# Patient Record
Sex: FEMALE | Race: OTHER | HISPANIC OR LATINO | Employment: FULL TIME | URBAN - METROPOLITAN AREA
[De-identification: names, ages, dates, MRNs, and addresses within clinical notes are randomized per-mention and may not be internally consistent; named-entity substitution may affect disease eponyms.]

---

## 2017-02-24 DIAGNOSIS — Z12.31 OTHER SCREENING MAMMOGRAM: ICD-10-CM

## 2017-08-28 ENCOUNTER — PATIENT OUTREACH (OUTPATIENT)
Dept: ADMINISTRATIVE | Facility: HOSPITAL | Age: 52
End: 2017-08-28

## 2017-08-29 ENCOUNTER — HOSPITAL ENCOUNTER (OUTPATIENT)
Dept: RADIOLOGY | Facility: HOSPITAL | Age: 52
Discharge: HOME | End: 2017-08-29
Attending: INTERNAL MEDICINE
Payer: COMMERCIAL

## 2017-08-29 DIAGNOSIS — Z12.31 OTHER SCREENING MAMMOGRAM: ICD-10-CM

## 2017-08-29 PROCEDURE — 77063 BREAST TOMOSYNTHESIS BI: CPT | Mod: 26,,, | Performed by: RADIOLOGY

## 2017-08-29 PROCEDURE — 77067 SCR MAMMO BI INCL CAD: CPT | Mod: 26,,, | Performed by: RADIOLOGY

## 2017-08-29 PROCEDURE — 77067 SCR MAMMO BI INCL CAD: CPT | Mod: TC

## 2017-09-11 ENCOUNTER — LAB VISIT (OUTPATIENT)
Dept: LAB | Facility: HOSPITAL | Age: 52
End: 2017-09-11
Attending: INTERNAL MEDICINE
Payer: COMMERCIAL

## 2017-09-11 ENCOUNTER — OFFICE VISIT (OUTPATIENT)
Dept: INTERNAL MEDICINE | Facility: CLINIC | Age: 52
End: 2017-09-11
Payer: COMMERCIAL

## 2017-09-11 VITALS
OXYGEN SATURATION: 97 % | WEIGHT: 155 LBS | HEIGHT: 60 IN | SYSTOLIC BLOOD PRESSURE: 120 MMHG | TEMPERATURE: 97 F | DIASTOLIC BLOOD PRESSURE: 80 MMHG | HEART RATE: 100 BPM | BODY MASS INDEX: 30.43 KG/M2

## 2017-09-11 DIAGNOSIS — Z11.59 NEED FOR HEPATITIS C SCREENING TEST: ICD-10-CM

## 2017-09-11 DIAGNOSIS — J30.89 CHRONIC NONSEASONAL ALLERGIC RHINITIS DUE TO POLLEN: ICD-10-CM

## 2017-09-11 DIAGNOSIS — Z00.00 ANNUAL PHYSICAL EXAM: ICD-10-CM

## 2017-09-11 DIAGNOSIS — Z23 IMMUNIZATION DUE: ICD-10-CM

## 2017-09-11 DIAGNOSIS — Z00.00 ANNUAL PHYSICAL EXAM: Primary | ICD-10-CM

## 2017-09-11 LAB
BASOPHILS # BLD AUTO: 0.06 K/UL
BASOPHILS NFR BLD: 0.8 %
DIFFERENTIAL METHOD: ABNORMAL
EOSINOPHIL # BLD AUTO: 2.1 K/UL
EOSINOPHIL NFR BLD: 26.3 %
ERYTHROCYTE [DISTWIDTH] IN BLOOD BY AUTOMATED COUNT: 12.7 %
HCT VFR BLD AUTO: 40.8 %
HGB BLD-MCNC: 14 G/DL
LYMPHOCYTES # BLD AUTO: 1.7 K/UL
LYMPHOCYTES NFR BLD: 21.5 %
MCH RBC QN AUTO: 30.4 PG
MCHC RBC AUTO-ENTMCNC: 34.3 G/DL
MCV RBC AUTO: 89 FL
MONOCYTES # BLD AUTO: 0.5 K/UL
MONOCYTES NFR BLD: 5.9 %
NEUTROPHILS # BLD AUTO: 3.5 K/UL
NEUTROPHILS NFR BLD: 45.5 %
PLATELET # BLD AUTO: 256 K/UL
PMV BLD AUTO: 10.1 FL
RBC # BLD AUTO: 4.61 M/UL
WBC # BLD AUTO: 7.78 K/UL

## 2017-09-11 PROCEDURE — 86803 HEPATITIS C AB TEST: CPT

## 2017-09-11 PROCEDURE — 80053 COMPREHEN METABOLIC PANEL: CPT

## 2017-09-11 PROCEDURE — 90715 TDAP VACCINE 7 YRS/> IM: CPT | Mod: S$GLB,,, | Performed by: INTERNAL MEDICINE

## 2017-09-11 PROCEDURE — 84443 ASSAY THYROID STIM HORMONE: CPT

## 2017-09-11 PROCEDURE — 99396 PREV VISIT EST AGE 40-64: CPT | Mod: 25,S$GLB,, | Performed by: INTERNAL MEDICINE

## 2017-09-11 PROCEDURE — 99999 PR PBB SHADOW E&M-EST. PATIENT-LVL IV: CPT | Mod: PBBFAC,,, | Performed by: INTERNAL MEDICINE

## 2017-09-11 PROCEDURE — 90471 IMMUNIZATION ADMIN: CPT | Mod: S$GLB,,, | Performed by: INTERNAL MEDICINE

## 2017-09-11 PROCEDURE — 85025 COMPLETE CBC W/AUTO DIFF WBC: CPT

## 2017-09-11 PROCEDURE — 36415 COLL VENOUS BLD VENIPUNCTURE: CPT

## 2017-09-11 RX ORDER — FLUTICASONE PROPIONATE 50 MCG
1 SPRAY, SUSPENSION (ML) NASAL DAILY
COMMUNITY
End: 2017-09-11 | Stop reason: SDUPTHER

## 2017-09-11 RX ORDER — FLUTICASONE PROPIONATE 50 MCG
2 SPRAY, SUSPENSION (ML) NASAL DAILY
Qty: 1 BOTTLE | Refills: 6 | Status: SHIPPED | OUTPATIENT
Start: 2017-09-11 | End: 2018-05-03 | Stop reason: SDUPTHER

## 2017-09-11 NOTE — PROGRESS NOTES
Subjective:       Patient ID: Reta Navas is a 51 y.o. female.    Chief Complaint: Annual Exam    Reta Navas  51 y.o. White female     Patient presents with:  Annual Exam    HPI: Here today for an annual physical.   Chronic allergies--she needs a refill on Flonase.   She is otherwise without significant complaints.     Hepatitis C Screening due on 1965  Mammogram due on 08/29/2019  Lipid Panel due on 03/13/2020  Colonoscopy due on 06/29/2025  TETANUS VACCINE due on 09/11/2027  Influenza Vaccine Completed    Past Medical History:  Abnormal Pap smear  Abnormal Pap smear of vagina  Chronic rhinitis  Hyperlipidemia  Hypertension  Overactive bladder  Plantar fasciitis, bilateral  Psoriasis    Past Surgical History:  HYSTERECTOMY      Comment: supracervical hyst/LSO for fibroids  OOPHORECTOMY      Comment: LSO  TUBAL LIGATION    Review of patient's family history indicates:    Diabetes                       Brother                   No Known Problems              Father                    Colon cancer                   Paternal Grandfather      Colon cancer                   Paternal Uncle            Ovarian cancer                 Maternal Aunt             Current Outpatient Prescriptions on File Prior to Visit:  cholecalciferol, vitamin D3, 5,000 unit Tab, Take 5,000 Units by mouth once daily., Disp: , Rfl:   clobetasol (TEMOVATE) 0.05 % cream, APPLY TWICE DAILY TO AFFECTED AREA, Disp: 45 g, Rfl: 1  desonide (DESOWEN) 0.05 % cream, APPLY TWICE DAILY TO AFFECTED AREA, Disp: 60 g, Rfl: 0    Allergies:  Review of patient's allergies indicates:  No Known Allergies                Review of Systems   Constitutional: Negative for fever and unexpected weight change.   Respiratory: Negative for shortness of breath.    Cardiovascular: Negative for chest pain and leg swelling.   Gastrointestinal: Negative for abdominal pain, constipation and diarrhea.   Endocrine: Positive for heat intolerance.   Genitourinary:  Negative for dysuria.   Musculoskeletal: Negative for gait problem.   Neurological: Negative for dizziness and headaches.   Psychiatric/Behavioral: Negative for dysphoric mood.       Objective:      Physical Exam   Constitutional: She is oriented to person, place, and time. She appears well-developed and well-nourished. No distress.   Eyes: No scleral icterus.   Neck: No tracheal deviation present.   Cardiovascular: Normal rate, regular rhythm and normal heart sounds.    Pulmonary/Chest: Effort normal and breath sounds normal. No respiratory distress.   Abdominal: Soft. Bowel sounds are normal.   Neurological: She is alert and oriented to person, place, and time.   Skin: Skin is warm and dry.   Psychiatric: She has a normal mood and affect.   Vitals reviewed.      Assessment:       1. Annual physical exam    2. Chronic nonseasonal allergic rhinitis due to pollen    3. Need for hepatitis C screening test        Plan:       Reta was seen today for annual exam.    Diagnoses and all orders for this visit:    Annual physical exam  -     Counseled regarding age appropriate screenings and immunizations  -     CBC auto differential; Future  -     TSH; Future  -     Comprehensive metabolic panel; Future    Chronic nonseasonal allergic rhinitis due to pollen  -     fluticasone (FLONASE) 50 mcg/actuation nasal spray; 2 sprays by Each Nare route once daily.    Need for hepatitis C screening test  -     Hepatitis C antibody; Future    Immunization due   -     (In Office Administered) Tdap Vaccine    Schedule labs.     RTC annually and as needed.

## 2017-09-12 LAB
ALBUMIN SERPL BCP-MCNC: 3.9 G/DL
ALP SERPL-CCNC: 89 U/L
ALT SERPL W/O P-5'-P-CCNC: 32 U/L
ANION GAP SERPL CALC-SCNC: 8 MMOL/L
AST SERPL-CCNC: 25 U/L
BILIRUB SERPL-MCNC: 0.3 MG/DL
BUN SERPL-MCNC: 12 MG/DL
CALCIUM SERPL-MCNC: 9.2 MG/DL
CHLORIDE SERPL-SCNC: 107 MMOL/L
CO2 SERPL-SCNC: 26 MMOL/L
CREAT SERPL-MCNC: 0.8 MG/DL
EST. GFR  (AFRICAN AMERICAN): >60 ML/MIN/1.73 M^2
EST. GFR  (NON AFRICAN AMERICAN): >60 ML/MIN/1.73 M^2
GLUCOSE SERPL-MCNC: 91 MG/DL
HCV AB SERPL QL IA: NEGATIVE
POTASSIUM SERPL-SCNC: 4 MMOL/L
PROT SERPL-MCNC: 7.3 G/DL
SODIUM SERPL-SCNC: 141 MMOL/L
TSH SERPL DL<=0.005 MIU/L-ACNC: 3.07 UIU/ML

## 2017-10-30 ENCOUNTER — OFFICE VISIT (OUTPATIENT)
Dept: INTERNAL MEDICINE | Facility: CLINIC | Age: 52
End: 2017-10-30
Payer: COMMERCIAL

## 2017-10-30 VITALS
WEIGHT: 151.69 LBS | DIASTOLIC BLOOD PRESSURE: 82 MMHG | BODY MASS INDEX: 29.78 KG/M2 | TEMPERATURE: 97 F | HEIGHT: 60 IN | SYSTOLIC BLOOD PRESSURE: 124 MMHG | HEART RATE: 77 BPM | OXYGEN SATURATION: 96 %

## 2017-10-30 DIAGNOSIS — J01.90 ACUTE BACTERIAL SINUSITIS: Primary | ICD-10-CM

## 2017-10-30 DIAGNOSIS — B96.89 ACUTE BACTERIAL SINUSITIS: Primary | ICD-10-CM

## 2017-10-30 DIAGNOSIS — K21.9 GASTROESOPHAGEAL REFLUX DISEASE, ESOPHAGITIS PRESENCE NOT SPECIFIED: ICD-10-CM

## 2017-10-30 DIAGNOSIS — J30.89 CHRONIC NONSEASONAL ALLERGIC RHINITIS DUE TO OTHER ALLERGEN: ICD-10-CM

## 2017-10-30 PROCEDURE — 96372 THER/PROPH/DIAG INJ SC/IM: CPT | Mod: S$GLB,,, | Performed by: INTERNAL MEDICINE

## 2017-10-30 PROCEDURE — 99999 PR PBB SHADOW E&M-EST. PATIENT-LVL III: CPT | Mod: PBBFAC,,, | Performed by: INTERNAL MEDICINE

## 2017-10-30 PROCEDURE — 99214 OFFICE O/P EST MOD 30 MIN: CPT | Mod: 25,S$GLB,, | Performed by: INTERNAL MEDICINE

## 2017-10-30 RX ORDER — AZITHROMYCIN 250 MG/1
TABLET, FILM COATED ORAL
Qty: 6 TABLET | Refills: 0 | Status: SHIPPED | OUTPATIENT
Start: 2017-10-30 | End: 2017-11-04

## 2017-10-30 RX ORDER — MONTELUKAST SODIUM 10 MG/1
10 TABLET ORAL NIGHTLY
Qty: 30 TABLET | Refills: 6 | Status: SHIPPED | OUTPATIENT
Start: 2017-10-30 | End: 2018-05-17 | Stop reason: SDUPTHER

## 2017-10-30 RX ORDER — METHYLPREDNISOLONE ACETATE 40 MG/ML
40 INJECTION, SUSPENSION INTRA-ARTICULAR; INTRALESIONAL; INTRAMUSCULAR; SOFT TISSUE
Status: COMPLETED | OUTPATIENT
Start: 2017-10-30 | End: 2017-10-30

## 2017-10-30 RX ORDER — OMEPRAZOLE 40 MG/1
40 CAPSULE, DELAYED RELEASE ORAL DAILY
Qty: 30 CAPSULE | Refills: 3 | Status: SHIPPED | OUTPATIENT
Start: 2017-10-30 | End: 2017-12-11 | Stop reason: SDUPTHER

## 2017-10-30 RX ORDER — AMOXICILLIN 500 MG
2 CAPSULE ORAL DAILY
COMMUNITY
End: 2018-09-19

## 2017-10-30 RX ORDER — PROMETHAZINE HYDROCHLORIDE AND DEXTROMETHORPHAN HYDROBROMIDE 6.25; 15 MG/5ML; MG/5ML
5 SYRUP ORAL EVERY 8 HOURS PRN
Qty: 118 ML | Refills: 0 | Status: SHIPPED | OUTPATIENT
Start: 2017-10-30 | End: 2017-11-09

## 2017-10-30 RX ADMIN — METHYLPREDNISOLONE ACETATE 40 MG: 40 INJECTION, SUSPENSION INTRA-ARTICULAR; INTRALESIONAL; INTRAMUSCULAR; SOFT TISSUE at 02:10

## 2017-10-30 NOTE — PATIENT INSTRUCTIONS
Sinusitis (Antibiotic Treatment)    The sinuses are air-filled spaces within the bones of the face. They connect to the inside of the nose. Sinusitis is an inflammation of the tissue lining the sinus cavity. Sinus inflammation can occur during a cold. It can also be due to allergies to pollens and other particles in the air. Sinusitis can cause symptoms of sinus congestion and fullness. A sinus infection causes fever, headache and facial pain. There is often green or yellow drainage from the nose or into the back of the throat (post-nasal drip). You have been given antibiotics to treat this condition.  Home care:  · Take the full course of antibiotics as instructed. Do not stop taking them, even if you feel better.  · Drink plenty of water, hot tea, and other liquids. This may help thin mucus. It also may promote sinus drainage.  · Heat may help soothe painful areas of the face. Use a towel soaked in hot water. Or,  the shower and direct the hot spray onto your face. Using a vaporizer along with a menthol rub at night may also help.   · An expectorant containing guaifenesin may help thin the mucus and promote drainage from the sinuses.  · Over-the-counter decongestants may be used unless a similar medicine was prescribed. Nasal sprays work the fastest. Use one that contains phenylephrine or oxymetazoline. First blow the nose gently. Then use the spray. Do not use these medicines more often than directed on the label or symptoms may get worse. You may also use tablets containing pseudoephedrine. Avoid products that combine ingredients, because side effects may be increased. Read labels. You can also ask the pharmacist for help. (NOTE: Persons with high blood pressure should not use decongestants. They can raise blood pressure.)  · Over-the-counter antihistamines may help if allergies contributed to your sinusitis.    · Do not use nasal rinses or irrigation during an acute sinus infection, unless told to by  your health care provider. Rinsing may spread the infection to other sinuses.  · Use acetaminophen or ibuprofen to control pain, unless another pain medicine was prescribed. (If you have chronic liver or kidney disease or ever had a stomach ulcer, talk with your doctor before using these medicines. Aspirin should never be used in anyone under 18 years of age who is ill with a fever. It may cause severe liver damage.)  · Don't smoke. This can worsen symptoms.  Follow-up care  Follow up with your healthcare provider or our staff if you are not improving within the next week.  When to seek medical advice  Call your healthcare provider if any of these occur:  · Facial pain or headache becoming more severe  · Stiff neck  · Unusual drowsiness or confusion  · Swelling of the forehead or eyelids  · Vision problems, including blurred or double vision  · Fever of 100.4ºF (38ºC) or higher, or as directed by your healthcare provider  · Seizure  · Breathing problems  · Symptoms not resolving within 10 days  Date Last Reviewed: 4/13/2015  © 6668-5779 [employer]. [address] [place] [address], [place] [address]. All rights reserved. This information is not intended as a substitute for professional medical care. Always follow your healthcare professional's instructions.

## 2017-10-30 NOTE — PROGRESS NOTES
Reta Navas  52 y.o. White female     Chief Complaint   Patient presents with    Sinus Problem        HPI: Presents to the clinic with complaint of nasal congestion, sinus pressure/pain and a headache for the past week. She states she is coughing up a greenish mucous. She denies having a fever.   She has chronic allergies and is using Flonase but not any antihistamines. She was on Singulair in the past.   She has been having worsening acid reflux over the past few months. She was on Nexium in the past. She has been taking OTC acid reducers but continues to have symptoms.     PMH: Reviewed    MEDS: Reviewed med card    ALLERGIES: Reviewed allergy card    PE: Reviewed vitals  GENERAL: Alert and oriented, no acute distress  ENT: Nasal congestion noted  NECK: No lymphadenopathy  HEART: Regular rate and rhythm  LUNGS: Unlabored respirations, bilaterally clear to auscultation     ASSESSMENT/PLAN:    Reta was seen today for sinus problem.    Diagnoses and all orders for this visit:    Acute bacterial sinusitis  -     azithromycin (Z-TORSTEN) 250 MG tablet; Take 2 tablets by mouth on day 1; Take 1 tablet by mouth on days 2-5  -     promethazine-dextromethorphan (PROMETHAZINE-DM) 6.25-15 mg/5 mL Syrp; Take 5 mLs by mouth every 8 (eight) hours as needed.  -     methylPREDNISolone acetate injection 40 mg; Inject 1 mL (40 mg total) into the muscle one time.  -     Recommend increased fluids and rest  -     Educational information provided    Chronic nonseasonal allergic rhinitis due to other allergen  -     montelukast (SINGULAIR) 10 mg tablet; Take 1 tablet (10 mg total) by mouth every evening.  -     Continue Flonase     Gastroesophageal reflux disease, esophagitis presence not specified  -     omeprazole (PRILOSEC) 40 MG capsule; Take 1 capsule (40 mg total) by mouth once daily.    RTC: If symptoms worsen or fail to resolve

## 2017-12-11 ENCOUNTER — PATIENT OUTREACH (OUTPATIENT)
Dept: ADMINISTRATIVE | Facility: HOSPITAL | Age: 52
End: 2017-12-11

## 2017-12-11 DIAGNOSIS — K21.9 GASTROESOPHAGEAL REFLUX DISEASE, ESOPHAGITIS PRESENCE NOT SPECIFIED: ICD-10-CM

## 2017-12-12 RX ORDER — OMEPRAZOLE 40 MG/1
40 CAPSULE, DELAYED RELEASE ORAL DAILY
Qty: 90 CAPSULE | Refills: 1 | Status: SHIPPED | OUTPATIENT
Start: 2017-12-12 | End: 2018-08-18 | Stop reason: SDUPTHER

## 2017-12-26 ENCOUNTER — TELEPHONE (OUTPATIENT)
Dept: INTERNAL MEDICINE | Facility: CLINIC | Age: 52
End: 2017-12-26

## 2017-12-26 ENCOUNTER — OFFICE VISIT (OUTPATIENT)
Dept: URGENT CARE | Facility: CLINIC | Age: 52
End: 2017-12-26
Payer: COMMERCIAL

## 2017-12-26 VITALS
HEART RATE: 95 BPM | TEMPERATURE: 99 F | OXYGEN SATURATION: 99 % | HEIGHT: 60 IN | DIASTOLIC BLOOD PRESSURE: 82 MMHG | SYSTOLIC BLOOD PRESSURE: 128 MMHG | BODY MASS INDEX: 29.22 KG/M2 | WEIGHT: 148.81 LBS

## 2017-12-26 DIAGNOSIS — R10.32 LEFT LOWER QUADRANT PAIN: Primary | ICD-10-CM

## 2017-12-26 LAB
BILIRUB SERPL-MCNC: NEGATIVE MG/DL
BLOOD URINE, POC: ABNORMAL
COLOR, POC UA: YELLOW
GLUCOSE UR QL STRIP: NORMAL
KETONES UR QL STRIP: NEGATIVE
LEUKOCYTE ESTERASE URINE, POC: ABNORMAL
NITRITE, POC UA: NEGATIVE
PH, POC UA: 7
PROTEIN, POC: ABNORMAL
SPECIFIC GRAVITY, POC UA: 1.01
UROBILINOGEN, POC UA: 1

## 2017-12-26 PROCEDURE — 99999 PR PBB SHADOW E&M-EST. PATIENT-LVL III: CPT | Mod: PBBFAC,,, | Performed by: FAMILY MEDICINE

## 2017-12-26 PROCEDURE — 99214 OFFICE O/P EST MOD 30 MIN: CPT | Mod: 25,S$GLB,, | Performed by: FAMILY MEDICINE

## 2017-12-26 PROCEDURE — 81002 URINALYSIS NONAUTO W/O SCOPE: CPT | Mod: S$GLB,,, | Performed by: FAMILY MEDICINE

## 2017-12-26 RX ORDER — METRONIDAZOLE 500 MG/1
500 TABLET ORAL EVERY 12 HOURS
Qty: 14 TABLET | Refills: 0 | Status: SHIPPED | OUTPATIENT
Start: 2017-12-26 | End: 2018-05-16

## 2017-12-26 NOTE — TELEPHONE ENCOUNTER
----- Message from Juliet Richardson sent at 12/26/2017  8:19 AM CST -----  Contact: pt  The pt states she is having pain in her left leg wants to be worked in today, pt can be reached at [phone]///[id number]

## 2017-12-26 NOTE — TELEPHONE ENCOUNTER
Called and spoke with pt. Informed pt that dr. Vazquez is booked for today. Pt stated she will got to the walk in clinic.

## 2017-12-29 ENCOUNTER — HOSPITAL ENCOUNTER (OUTPATIENT)
Dept: RADIOLOGY | Facility: HOSPITAL | Age: 52
Discharge: HOME | End: 2017-12-29
Attending: INTERNAL MEDICINE
Payer: COMMERCIAL

## 2017-12-29 ENCOUNTER — OFFICE VISIT (OUTPATIENT)
Dept: INTERNAL MEDICINE | Facility: CLINIC | Age: 52
End: 2017-12-29
Payer: COMMERCIAL

## 2017-12-29 VITALS
OXYGEN SATURATION: 98 % | TEMPERATURE: 98 F | BODY MASS INDEX: 29.13 KG/M2 | SYSTOLIC BLOOD PRESSURE: 138 MMHG | HEIGHT: 60 IN | WEIGHT: 148.38 LBS | HEART RATE: 75 BPM | DIASTOLIC BLOOD PRESSURE: 82 MMHG

## 2017-12-29 DIAGNOSIS — R10.32 ABDOMINAL PAIN, LLQ: ICD-10-CM

## 2017-12-29 DIAGNOSIS — R10.32 ABDOMINAL PAIN, LLQ: Primary | ICD-10-CM

## 2017-12-29 PROCEDURE — 99213 OFFICE O/P EST LOW 20 MIN: CPT | Mod: S$GLB,,, | Performed by: INTERNAL MEDICINE

## 2017-12-29 PROCEDURE — 99999 PR PBB SHADOW E&M-EST. PATIENT-LVL IV: CPT | Mod: PBBFAC,,, | Performed by: INTERNAL MEDICINE

## 2017-12-29 PROCEDURE — 74000 XR ABDOMEN AP 1 VIEW: CPT | Mod: 26,,, | Performed by: RADIOLOGY

## 2017-12-29 PROCEDURE — 74000 XR ABDOMEN AP 1 VIEW: CPT | Mod: TC

## 2017-12-29 NOTE — PATIENT INSTRUCTIONS
Abdominal Pain    Abdominal pain is pain in the stomach or belly area. Everyone has this pain from time to time. In many cases it goes away on its own. But abdominal pain can sometimes be due to a serious problem, such as appendicitis. So its important to know when to seek help.  Causes of abdominal pain  There are many possible causes of abdominal pain. Common causes in adults include:  · Constipation, diarrhea, or gas  · Stomach acid flowing back up into the esophagus (acid reflux or heartburn)  · Severe acid reflux, called GERD (gastroesophageal reflux disease)  · A sore in the lining of the stomach or small intestine (peptic ulcer)  · Inflammation of the gallbladder, liver, or pancreas  · Gallstones or kidney stones  · Appendicitis   · Intestinal blockage   · An internal organ pushing through a muscle or other tissue (hernia)  · Urinary tract infections  · In women, menstrual cramps, fibroids, or endometriosis  · Inflammation or infection of the intestines  Diagnosing the cause of abdominal pain  Your healthcare provider will do a physical exam help find the cause of your pain. If needed, tests will be ordered. Belly pain has many possible causes. So it can be hard to find the reason for your pain. Giving details about your pain can help. Tell your provider where and when you feel the pain, and what makes it better or worse. Also let your provider know if you have other symptoms such as:  · Fever  · Tiredness  · Upset stomach (nausea)  · Vomiting  · Changes in bathroom habits  Treating abdominal pain  Some causes of pain need emergency medical treatment right away. These include appendicitis or a bowel blockage. Other problems can be treated with rest, fluids, or medicines. Your healthcare provider can give you specific instructions for treatment or self-care based on what is causing your pain.  If you have vomiting or diarrhea, sip water or other clear fluids. When you are ready to eat solid foods again,  start with small amounts of easy-to-digest, low-fat foods. These include apple sauce, toast, or crackers.   When to seek medical care  Call 911 or go to the hospital right away if you:  · Cant pass stool and are vomiting  · Are vomiting blood or have bloody diarrhea or black, tarry diarrhea  · Have chest, neck, or shoulder pain  · Feel like you might pass out  · Have pain in your shoulder blades with nausea  · Have sudden, severe belly pain  · Have new, severe pain unlike any you have felt before  · Have a belly that is rigid, hard, and tender to touch  Call your healthcare provider if you have:  · Pain for more than 5 days  · Bloating for more than 2 days  · Diarrhea for more than 5 days  · A fever of 100.4°F (38.0°C) or higher, or as directed by your provider  · Pain that gets worse  · Weight loss for no reason  · Continued lack of appetite  · Blood in your stool  How to prevent abdominal pain  Here are some tips to help prevent abdominal pain:  · Eat smaller amounts of food at one time.  · Avoid greasy, fried, or other high-fat foods.  · Avoid foods that give you gas.  · Exercise regularly.  · Drink plenty of fluids.  To help prevent GERD symptoms:  · Quit smoking.  · Reduce alcohol and certain foods that increase stomach acid.  · Avoid aspirin and over-the-counter pain and fever medicines (NSAIDS or nonsteroidal anti-inflammatory drugs), if possible  · Lose extra weight.  · Finish eating at least 2 hours before you go to bed or lie down.  · Raise the head of your bed.  Date Last Reviewed: 7/1/2016  © 1233-1826 [employer]. [address] [place] [address], [place] [address]. All rights reserved. This information is not intended as a substitute for professional medical care. Always follow your healthcare professional's instructions.

## 2017-12-29 NOTE — PROGRESS NOTES
Subjective:       Patient ID: Reta Navas is a 52 y.o. female.    Chief Complaint: Follow-up    Abdominal Pain   This is a new problem. The current episode started in the past 7 days. The problem occurs intermittently. The problem has been gradually improving. The pain is located in the LLQ. The pain is mild. The quality of the pain is aching. Pain radiation: left lower back/buttock. Pertinent negatives include no constipation, diarrhea, dysuria, fever, hematuria, nausea or vomiting. The pain is aggravated by certain positions. She has tried antibiotics for the symptoms. The treatment provided moderate relief.   She went to urgent care 3 days ago. Dipstick showed 5-10 RBC's. She was prescribed Flagyl.     Review of Systems   Constitutional: Negative for fever.   Gastrointestinal: Positive for abdominal pain. Negative for blood in stool, constipation, diarrhea, nausea and vomiting.   Genitourinary: Negative for dysuria and hematuria.       Objective:      Physical Exam   Constitutional: She is oriented to person, place, and time. She appears well-developed and well-nourished.   Eyes: No scleral icterus.   Cardiovascular: Normal rate.    Pulmonary/Chest: Effort normal. No respiratory distress.   Abdominal: Soft. Bowel sounds are normal. She exhibits no distension. There is tenderness (LLQ). There is no guarding.   Neurological: She is alert and oriented to person, place, and time.   Skin: Skin is warm and dry.   Psychiatric: She has a normal mood and affect.   Vitals reviewed.      Assessment:       1. Abdominal pain, LLQ        Plan:       Reta was seen today for follow-up.    Diagnoses and all orders for this visit:    Abdominal pain, LLQ  -     X-Ray Abdomen AP 1 View; Future  -     Rule out kidney stone    X-ray today.     Handout provided.

## 2018-05-03 DIAGNOSIS — J30.89 CHRONIC NONSEASONAL ALLERGIC RHINITIS DUE TO POLLEN: ICD-10-CM

## 2018-05-04 RX ORDER — FLUTICASONE PROPIONATE 50 MCG
2 SPRAY, SUSPENSION (ML) NASAL DAILY
Qty: 1 BOTTLE | Refills: 6 | Status: SHIPPED | OUTPATIENT
Start: 2018-05-04 | End: 2018-05-09 | Stop reason: SDUPTHER

## 2018-05-09 DIAGNOSIS — J30.89 CHRONIC NONSEASONAL ALLERGIC RHINITIS DUE TO POLLEN: ICD-10-CM

## 2018-05-09 RX ORDER — FLUTICASONE PROPIONATE 50 MCG
2 SPRAY, SUSPENSION (ML) NASAL DAILY
Qty: 2 BOTTLE | Refills: 6 | Status: SHIPPED | OUTPATIENT
Start: 2018-05-09 | End: 2019-06-05 | Stop reason: SDUPTHER

## 2018-05-16 ENCOUNTER — OFFICE VISIT (OUTPATIENT)
Dept: INTERNAL MEDICINE | Facility: CLINIC | Age: 53
End: 2018-05-16
Payer: COMMERCIAL

## 2018-05-16 VITALS
WEIGHT: 153.25 LBS | HEIGHT: 60 IN | DIASTOLIC BLOOD PRESSURE: 74 MMHG | BODY MASS INDEX: 30.09 KG/M2 | TEMPERATURE: 98 F | HEART RATE: 97 BPM | SYSTOLIC BLOOD PRESSURE: 132 MMHG | OXYGEN SATURATION: 97 %

## 2018-05-16 DIAGNOSIS — A09 TRAVELER'S DIARRHEA: Primary | ICD-10-CM

## 2018-05-16 PROCEDURE — 3078F DIAST BP <80 MM HG: CPT | Mod: CPTII,S$GLB,, | Performed by: NURSE PRACTITIONER

## 2018-05-16 PROCEDURE — 99214 OFFICE O/P EST MOD 30 MIN: CPT | Mod: S$GLB,,, | Performed by: NURSE PRACTITIONER

## 2018-05-16 PROCEDURE — 3075F SYST BP GE 130 - 139MM HG: CPT | Mod: CPTII,S$GLB,, | Performed by: NURSE PRACTITIONER

## 2018-05-16 PROCEDURE — 99999 PR PBB SHADOW E&M-EST. PATIENT-LVL IV: CPT | Mod: PBBFAC,,, | Performed by: NURSE PRACTITIONER

## 2018-05-16 PROCEDURE — 3008F BODY MASS INDEX DOCD: CPT | Mod: CPTII,S$GLB,, | Performed by: NURSE PRACTITIONER

## 2018-05-16 RX ORDER — CLOBETASOL PROPIONATE 0.5 MG/G
EMULSION TOPICAL
COMMUNITY
Start: 2018-02-21 | End: 2018-05-16 | Stop reason: SDUPTHER

## 2018-05-16 RX ORDER — CIPROFLOXACIN 500 MG/1
500 TABLET ORAL EVERY 12 HOURS
Qty: 14 TABLET | Refills: 0 | Status: SHIPPED | OUTPATIENT
Start: 2018-05-16 | End: 2018-05-23

## 2018-05-16 NOTE — PROGRESS NOTES
Subjective:       Patient ID: Reta Navsa is a 52 y.o. female.    Chief Complaint: Abdominal Pain and Diarrhea    Patient presents with diarrhea that began 3-5 days ago. It began on her last day in [place] for her son's wedding. She states her son was hospitalized upon return home in [place] for colitis secondary to traveler's diarrhea. She reports she has a history of diverticulitis and colitis.       Abdominal Pain   This is a new problem. The current episode started in the past 7 days. Associated symptoms include diarrhea, myalgias and nausea. Pertinent negatives include no constipation, dysuria, fever, headaches or vomiting.   Diarrhea    This is a new problem. The current episode started in the past 7 days. The problem occurs 5 to 10 times per day (about 5 times per day). The problem has been unchanged. The stool consistency is described as watery. Associated symptoms include abdominal pain, bloating and myalgias. Pertinent negatives include no chills, fever, headaches or vomiting. Associated symptoms comments: nausea. Nothing aggravates the symptoms. Risk factors include travel to endemic area ([place]). She has tried anti-motility drug for the symptoms. The treatment provided no relief.     Review of Systems   Constitutional: Positive for appetite change. Negative for chills and fever.   Cardiovascular: Negative for leg swelling.   Gastrointestinal: Positive for abdominal distention, abdominal pain, bloating, diarrhea and nausea. Negative for blood in stool, constipation and vomiting.   Genitourinary: Negative for dysuria.   Musculoskeletal: Positive for myalgias.   Skin: Negative for rash.   Neurological: Negative for headaches.       Objective:      Physical Exam   Constitutional: She is oriented to person, place, and time. She appears well-developed and well-nourished. No distress.   HENT:   Head: Normocephalic and atraumatic.   Eyes: Conjunctivae are normal.   Cardiovascular: Normal rate and regular  rhythm.  Exam reveals no gallop and no friction rub.    No murmur heard.  Pulmonary/Chest: Effort normal and breath sounds normal.   Abdominal: Soft. Normal appearance and bowel sounds are normal. There is generalized tenderness. There is CVA tenderness (right side). There is no rigidity and no guarding.   Neurological: She is alert and oriented to person, place, and time.   Skin: Skin is warm and dry.   Psychiatric: She has a normal mood and affect.   Vitals reviewed.      Assessment:       1. Traveler's diarrhea        Plan:   Traveler's diarrhea  -     ciprofloxacin HCl (CIPRO) 500 MG tablet; Take 1 tablet (500 mg total) by mouth every 12 (twelve) hours.  Dispense: 14 tablet; Refill: 0      Information given on diarrhea, diet and medication education provided.  Given her history of colitis and son's recent hospitalization with similar symptoms, will treat with antibiotic.  She declined zofran for nausea.   Follow-up if symptoms worsen or fail to improve.

## 2018-05-17 RX ORDER — MONTELUKAST SODIUM 10 MG/1
TABLET ORAL
Qty: 90 TABLET | Refills: 1 | Status: SHIPPED | OUTPATIENT
Start: 2018-05-17 | End: 2018-10-06 | Stop reason: SDUPTHER

## 2018-05-17 NOTE — PATIENT INSTRUCTIONS
Traveler's Diarrhea (Adult)    Traveler's diarrhea is an infection in the intestinal tract caused by bacteria called E coli. This bacteria is commonly found in water supplies of developing countries. The local people of those countries are used to E coli in the water and don't get sick. Tourists who drink contaminated water or eat foods that were washed or prepared with this water may become very ill.  The illness begins 1 to 3 days after exposure and lasts up to 5 days. Symptoms include fever, vomiting, stomach cramping and watery diarrhea. There may also be blood or mucus in the stool. Mild cases will get better without treatment. Antibiotics are used for more severe cases.  Home care  · If you were prescribed antibiotics,  take them until they are finished.  · You may use acetaminophen or ibuprofen to control fever, unless another medicine was prescribed. If you have chronic liver or kidney disease or have ever had a stomach ulcer or gastrointestinal  bleeding, talk with the doctor before using these medicines. Aspirin should never be used in anyone under 18 years of age who is ill with a fever. It may cause severe illness or death.  · Do not take over-the-counter anti-diarrheal medicines, unless advised by the doctor.  Once vomiting stops, follow these guidelines:  During the first 12 to 24 hours, follow the diet below:  · Fruit juices. Apple, grape and cranberry juice; clear fruit drinks, electrolyte replacement and sports drinks.  · Beverages. Soft drinks without caffeine; mineral water (plain or flavored); decaffeinated tea and coffee  · Soups. Clear broth, consommé and bouillon.  · Desserts. Plain gelatin, popsicles and fruit juice bars; As you feel better, you may add 6 to 8 oz of yogurt per day.  During the next 24 hours, you may add the following to the above:  · Hot cereal, plain toast, bread, rolls, crackers  · Plain noodles, rice, mashed potatoes, chicken noodle or rice soup  · Unsweetened canned  fruit (avoid pineapple), bananas  · Limit fat intake to less than 15 grams per day by avoiding margarine, butter, oils, mayonnaise, sauces, gravies, fried foods, peanut butter, meat, poultry and fish.  · Limit fiber; avoid raw or cooked vegetables, fresh fruits (except bananas) and bran cereals.  · Limit caffeine and chocolate. No spices or seasonings except salt.  During the next 24 hours you can gradually resume a normal diet as the symptoms lessen.  Follow-up care  Follow up with your healthcare provider, or as advised. Call if you are not improving within 24 hours or if the diarrhea lasts more than 1 week on antibiotics. If a stool (diarrhea) sample was taken, you may call in 2 days (or as directed) for the results.  When to seek medical advice  Call your healthcare provider if any of these occur:  · Severe constant pain in the lower part of the abdomen, on the right side  · Blood in diarrhea or vomit, dark coffee ground appearing vomit, or dark tarry stools  · continued vomiting (unable to keep liquids down)  · Frequent diarrhea (more than 5 times a day); blood (red or black) or mucus in diarrhea  · Reduced oral intake  · Reduced urine output or extreme thirst  · Weakness, dizziness, fainting  · Drowsiness, confusion, stiff neck, or seizure  · Fever (1 degree above your normal temperature) lasting 24 to 48 hours, or whatever your healthcare provider told you to report based on your condition  Date Last Reviewed: 11/16/2015  © 9247-1615 [employer]. [address] [place] [address], [place] [address]. All rights reserved. This information is not intended as a substitute for professional medical care. Always follow your healthcare professional's instructions.      [place] Diet  Your healthcare provider may recommend a bland diet if you have an upset stomach. It consists of foods that are mild and easy to digest. It is better to eat small frequent meals rather than 3 large meals a day.    Beverages  OK: Fruit  "juices, non-caffeinated teas and coffee, non-carbonated thurston  Avoid: Carbonated beverage, caffeinated tea and coffee, all alcoholic beverages  Bread  OK: Refined white, wheat or rye bread, georgette or soda crackers, Lola toast, plain rolls, bagels  Avoid: Whole-grain bread  Cereal  OK: Refined cereals: cooked or ready to eat  Avoid: Whole-grain cereals and granola, or those containing bran, seeds or nuts  Desserts  OK: Peanut butter and all others except those to "avoid"  Avoid: Chocolate, cocoa, coconut, popcorn, nuts, seeds, jam, marmalade  Fruits  OK: Canned, cooked, frozen or fresh fruits without seeds or tough skin  Avoid: Olives, skin and seeds of fruit  Meats  OK: All fresh or preserved meat, fish and fowl  Avoid: Any that are prepared with those spices to "avoid"  Cheese and eggs  OK: Eggs, cottage cheese, cream cheese, other cheeses  Avoid: All cheeses made with those spices to "avoid"  Potatoes and pasta  OK: Potato, rice, macaroni, noodles, spaghetti  Avoid: None  Soups  OK: All soups without heavy seasoning  Avoid: Soups made with those spices to "avoid"  Vegetables  OK: Canned, cooked, fresh or frozen mildly flavored vegetables without seeds, skins or coarse fiber  Avoid: Vegetables prepared with those spices to "avoid"; skin and seeds of vegetables and those with coarse fiber  Spices  OK: Salt, lemon and lime juice, vinegar, all extracts, norberto, cinnamon, thyme, mace, allspice, paprika  Avoid: Chili powder, cloves, pepper, seed spices, garlic, gravy pickles, highly seasoned salad dressings  Date Last Reviewed: 11/20/2015  © 6234-0264 [employer]. [address] [place] [address], [place] [address]. All rights reserved. This information is not intended as a substitute for professional medical care. Always follow your healthcare professional's instructions.            "

## 2018-07-13 ENCOUNTER — LAB VISIT (OUTPATIENT)
Dept: LAB | Facility: HOSPITAL | Age: 53
End: 2018-07-13
Attending: INTERNAL MEDICINE
Payer: COMMERCIAL

## 2018-07-13 ENCOUNTER — OFFICE VISIT (OUTPATIENT)
Dept: INTERNAL MEDICINE | Facility: CLINIC | Age: 53
End: 2018-07-13
Payer: COMMERCIAL

## 2018-07-13 VITALS
WEIGHT: 157.19 LBS | TEMPERATURE: 96 F | BODY MASS INDEX: 30.86 KG/M2 | HEIGHT: 60 IN | SYSTOLIC BLOOD PRESSURE: 122 MMHG | DIASTOLIC BLOOD PRESSURE: 84 MMHG | OXYGEN SATURATION: 96 % | HEART RATE: 56 BPM

## 2018-07-13 DIAGNOSIS — R35.0 URINARY FREQUENCY: ICD-10-CM

## 2018-07-13 DIAGNOSIS — R53.83 FATIGUE, UNSPECIFIED TYPE: ICD-10-CM

## 2018-07-13 DIAGNOSIS — B35.4 TINEA CORPORIS: Primary | ICD-10-CM

## 2018-07-13 DIAGNOSIS — M79.10 MYALGIA: ICD-10-CM

## 2018-07-13 LAB
ALBUMIN SERPL BCP-MCNC: 4 G/DL
ALP SERPL-CCNC: 94 U/L
ALT SERPL W/O P-5'-P-CCNC: 46 U/L
AMORPH CRY UR QL COMP ASSIST: ABNORMAL
ANION GAP SERPL CALC-SCNC: 12 MMOL/L
AST SERPL-CCNC: 35 U/L
BACTERIA #/AREA URNS AUTO: ABNORMAL /HPF
BASOPHILS # BLD AUTO: 0.09 K/UL
BASOPHILS NFR BLD: 1.3 %
BILIRUB SERPL-MCNC: 0.4 MG/DL
BILIRUB UR QL STRIP: NEGATIVE
BUN SERPL-MCNC: 15 MG/DL
CALCIUM SERPL-MCNC: 9.2 MG/DL
CHLORIDE SERPL-SCNC: 104 MMOL/L
CK SERPL-CCNC: 94 U/L
CLARITY UR REFRACT.AUTO: ABNORMAL
CO2 SERPL-SCNC: 23 MMOL/L
COLOR UR AUTO: YELLOW
CREAT SERPL-MCNC: 0.8 MG/DL
DIFFERENTIAL METHOD: ABNORMAL
EOSINOPHIL # BLD AUTO: 2.1 K/UL
EOSINOPHIL NFR BLD: 30.8 %
ERYTHROCYTE [DISTWIDTH] IN BLOOD BY AUTOMATED COUNT: 12.8 %
ERYTHROCYTE [SEDIMENTATION RATE] IN BLOOD BY WESTERGREN METHOD: 2 MM/HR
EST. GFR  (AFRICAN AMERICAN): >60 ML/MIN/1.73 M^2
EST. GFR  (NON AFRICAN AMERICAN): >60 ML/MIN/1.73 M^2
ESTIMATED AVG GLUCOSE: 111 MG/DL
GLUCOSE SERPL-MCNC: 92 MG/DL
GLUCOSE UR QL STRIP: NEGATIVE
HBA1C MFR BLD HPLC: 5.5 %
HCT VFR BLD AUTO: 43 %
HGB BLD-MCNC: 14.2 G/DL
HGB UR QL STRIP: ABNORMAL
IMM GRANULOCYTES # BLD AUTO: 0.01 K/UL
IMM GRANULOCYTES NFR BLD AUTO: 0.1 %
KETONES UR QL STRIP: NEGATIVE
LEUKOCYTE ESTERASE UR QL STRIP: NEGATIVE
LYMPHOCYTES # BLD AUTO: 1.3 K/UL
LYMPHOCYTES NFR BLD: 18.7 %
MCH RBC QN AUTO: 30.5 PG
MCHC RBC AUTO-ENTMCNC: 33 G/DL
MCV RBC AUTO: 93 FL
MICROSCOPIC COMMENT: ABNORMAL
MONOCYTES # BLD AUTO: 0.5 K/UL
MONOCYTES NFR BLD: 7.5 %
NEUTROPHILS # BLD AUTO: 2.8 K/UL
NEUTROPHILS NFR BLD: 41.6 %
NITRITE UR QL STRIP: NEGATIVE
NRBC BLD-RTO: 0 /100 WBC
PH UR STRIP: 5 [PH] (ref 5–8)
PLATELET # BLD AUTO: 305 K/UL
PMV BLD AUTO: 10.3 FL
POTASSIUM SERPL-SCNC: 4.3 MMOL/L
PROT SERPL-MCNC: 7.5 G/DL
PROT UR QL STRIP: NEGATIVE
RBC # BLD AUTO: 4.65 M/UL
RBC #/AREA URNS AUTO: 1 /HPF (ref 0–4)
SODIUM SERPL-SCNC: 139 MMOL/L
SP GR UR STRIP: 1.03 (ref 1–1.03)
SQUAMOUS #/AREA URNS AUTO: 5 /HPF
TSH SERPL DL<=0.005 MIU/L-ACNC: 3.59 UIU/ML
URN SPEC COLLECT METH UR: ABNORMAL
UROBILINOGEN UR STRIP-ACNC: NEGATIVE EU/DL
WBC # BLD AUTO: 6.79 K/UL

## 2018-07-13 PROCEDURE — 80053 COMPREHEN METABOLIC PANEL: CPT

## 2018-07-13 PROCEDURE — 81001 URINALYSIS AUTO W/SCOPE: CPT

## 2018-07-13 PROCEDURE — 36415 COLL VENOUS BLD VENIPUNCTURE: CPT

## 2018-07-13 PROCEDURE — 85651 RBC SED RATE NONAUTOMATED: CPT

## 2018-07-13 PROCEDURE — 83036 HEMOGLOBIN GLYCOSYLATED A1C: CPT

## 2018-07-13 PROCEDURE — 99214 OFFICE O/P EST MOD 30 MIN: CPT | Mod: S$GLB,,, | Performed by: INTERNAL MEDICINE

## 2018-07-13 PROCEDURE — 84443 ASSAY THYROID STIM HORMONE: CPT

## 2018-07-13 PROCEDURE — 85025 COMPLETE CBC W/AUTO DIFF WBC: CPT

## 2018-07-13 PROCEDURE — 82550 ASSAY OF CK (CPK): CPT

## 2018-07-13 PROCEDURE — 99999 PR PBB SHADOW E&M-EST. PATIENT-LVL III: CPT | Mod: PBBFAC,,, | Performed by: INTERNAL MEDICINE

## 2018-07-13 RX ORDER — CLOTRIMAZOLE AND BETAMETHASONE DIPROPIONATE 10; .64 MG/G; MG/G
CREAM TOPICAL 2 TIMES DAILY
Qty: 15 G | Refills: 0 | Status: SHIPPED | OUTPATIENT
Start: 2018-07-13 | End: 2018-07-27

## 2018-07-13 NOTE — PROGRESS NOTES
Subjective:       Patient ID: Reta Navas is a 52 y.o. female.    Chief Complaint: Rash    Rash   This is a new problem. The current episode started 1 to 4 weeks ago. The problem is unchanged. The affected locations include the right arm. The rash is characterized by itchiness and redness. She was exposed to nothing. Associated symptoms include fatigue (chronic). Pertinent negatives include no cough, fever, joint pain or shortness of breath. Treatments tried: OTC cream. The treatment provided mild relief. Her past medical history is significant for allergies. There is no history of asthma.     She has muscle pain in multiple locations. She states it is not consistently in the same location. This has been going on for several months. She denies significant joint pain.   She has chronic fatigue.   She urinates a lot, especially at night. She denies dysuria. She drinks a lot of sodas and is trying to stop.     Review of Systems   Constitutional: Positive for fatigue (chronic). Negative for fever.   Respiratory: Negative for cough and shortness of breath.    Cardiovascular: Negative for chest pain and leg swelling.   Genitourinary: Positive for frequency. Negative for dysuria.   Musculoskeletal: Positive for back pain and myalgias. Negative for arthralgias and joint pain.   Skin: Positive for rash.       Objective:      Physical Exam   Constitutional: She is oriented to person, place, and time. She appears well-developed and well-nourished. No distress.   Eyes: No scleral icterus.   Neck: No tracheal deviation present.   Cardiovascular: Normal rate, regular rhythm and normal heart sounds.    Pulmonary/Chest: Effort normal and breath sounds normal. No respiratory distress. She has no wheezes. She has no rales.   Abdominal: Soft. Bowel sounds are normal.   Musculoskeletal: She exhibits tenderness (back and thighs without erythema, swelling or lesions).   Neurological: She is alert and oriented to person, place, and  time.   Skin: Skin is warm and dry.   Two circular flat, mildly erythematous lesions with raised erythematous borders on the right arm.   Psychiatric: She has a normal mood and affect.   Vitals reviewed.      Assessment:       1. Tinea corporis    2. Myalgia    3. Fatigue, unspecified type    4. Urinary frequency        Plan:       Reta was seen today for rash.    Diagnoses and all orders for this visit:    Tinea corporis  -     clotrimazole-betamethasone 1-0.05% (LOTRISONE) cream; Apply topically 2 (two) times daily. for 14 days    Myalgia  -     CK; Future  -     CBC auto differential; Future  -     Sedimentation rate; Future  -     TSH; Future    Fatigue, unspecified type  -     CBC auto differential; Future  -     TSH; Future  -     Comprehensive metabolic panel; Future    Urinary frequency  -     Comprehensive metabolic panel; Future  -     Cancel: Urinalysis; Future  -     Hemoglobin A1c; Future  -     Urinalysis    Labs today.

## 2018-07-20 ENCOUNTER — TELEPHONE (OUTPATIENT)
Dept: INTERNAL MEDICINE | Facility: CLINIC | Age: 53
End: 2018-07-20

## 2018-07-20 DIAGNOSIS — R82.90 ABNORMAL URINALYSIS: Primary | ICD-10-CM

## 2018-07-20 DIAGNOSIS — R79.89 LFT ELEVATION: Primary | ICD-10-CM

## 2018-07-23 ENCOUNTER — TELEPHONE (OUTPATIENT)
Dept: INTERNAL MEDICINE | Facility: CLINIC | Age: 53
End: 2018-07-23

## 2018-07-23 NOTE — TELEPHONE ENCOUNTER
I notified patent her ALT (liver test) is mildly elevated, Dr. Vazquez  recommend limiting alcohol, repeat LFT's in 6 weeks and your other labs are normal. I also notified the patient her UA shows a lot of bacteria but no infection and Dr. Vazquez want you to repeat your UA to confirm no UTI. Patient stated okay and her appointment is scheduled for 7-27-18 at 11:00 am

## 2018-07-27 ENCOUNTER — LAB VISIT (OUTPATIENT)
Dept: LAB | Facility: HOSPITAL | Age: 53
End: 2018-07-27
Payer: COMMERCIAL

## 2018-07-27 DIAGNOSIS — R82.90 ABNORMAL URINALYSIS: ICD-10-CM

## 2018-07-27 LAB
BILIRUB UR QL STRIP: NEGATIVE
CLARITY UR REFRACT.AUTO: CLEAR
COLOR UR AUTO: YELLOW
GLUCOSE UR QL STRIP: NEGATIVE
HGB UR QL STRIP: ABNORMAL
KETONES UR QL STRIP: NEGATIVE
LEUKOCYTE ESTERASE UR QL STRIP: NEGATIVE
NITRITE UR QL STRIP: NEGATIVE
PH UR STRIP: 5 [PH] (ref 5–8)
PROT UR QL STRIP: NEGATIVE
SP GR UR STRIP: 1.02 (ref 1–1.03)
URN SPEC COLLECT METH UR: ABNORMAL
UROBILINOGEN UR STRIP-ACNC: NEGATIVE EU/DL

## 2018-07-27 PROCEDURE — 81003 URINALYSIS AUTO W/O SCOPE: CPT

## 2018-07-30 ENCOUNTER — TELEPHONE (OUTPATIENT)
Dept: INTERNAL MEDICINE | Facility: CLINIC | Age: 53
End: 2018-07-30

## 2018-07-30 NOTE — TELEPHONE ENCOUNTER
----- Message from Lexi Vazquez DO sent at 7/27/2018  5:32 PM CDT -----  Notify patient UA does not show any significant abnormal findings.

## 2018-07-30 NOTE — TELEPHONE ENCOUNTER
I notified patient her UA does not show any significant abnormal findings and the patient stated okay.

## 2018-08-18 DIAGNOSIS — K21.9 GASTROESOPHAGEAL REFLUX DISEASE, ESOPHAGITIS PRESENCE NOT SPECIFIED: ICD-10-CM

## 2018-08-20 RX ORDER — OMEPRAZOLE 40 MG/1
CAPSULE, DELAYED RELEASE ORAL
Qty: 90 CAPSULE | Refills: 1 | Status: SHIPPED | OUTPATIENT
Start: 2018-08-20 | End: 2018-09-19

## 2018-09-12 ENCOUNTER — LAB VISIT (OUTPATIENT)
Dept: LAB | Facility: HOSPITAL | Age: 53
End: 2018-09-12
Attending: INTERNAL MEDICINE
Payer: COMMERCIAL

## 2018-09-12 ENCOUNTER — OFFICE VISIT (OUTPATIENT)
Dept: INTERNAL MEDICINE | Facility: CLINIC | Age: 53
End: 2018-09-12
Payer: COMMERCIAL

## 2018-09-12 VITALS
DIASTOLIC BLOOD PRESSURE: 88 MMHG | SYSTOLIC BLOOD PRESSURE: 140 MMHG | HEIGHT: 60 IN | OXYGEN SATURATION: 97 % | TEMPERATURE: 98 F | BODY MASS INDEX: 30.47 KG/M2 | WEIGHT: 155.19 LBS | HEART RATE: 76 BPM

## 2018-09-12 DIAGNOSIS — R79.89 LFT ELEVATION: ICD-10-CM

## 2018-09-12 DIAGNOSIS — R25.1 TREMOR: ICD-10-CM

## 2018-09-12 DIAGNOSIS — B35.4 TINEA CORPORIS: Primary | ICD-10-CM

## 2018-09-12 DIAGNOSIS — E55.9 VITAMIN D INSUFFICIENCY: ICD-10-CM

## 2018-09-12 LAB
25(OH)D3+25(OH)D2 SERPL-MCNC: 28 NG/ML
ALBUMIN SERPL BCP-MCNC: 4.1 G/DL
ALP SERPL-CCNC: 91 U/L
ALT SERPL W/O P-5'-P-CCNC: 54 U/L
AST SERPL-CCNC: 37 U/L
BILIRUB DIRECT SERPL-MCNC: 0.2 MG/DL
BILIRUB SERPL-MCNC: 0.4 MG/DL
PROT SERPL-MCNC: 7.6 G/DL
VIT B12 SERPL-MCNC: 1158 PG/ML

## 2018-09-12 PROCEDURE — 82306 VITAMIN D 25 HYDROXY: CPT

## 2018-09-12 PROCEDURE — 99999 PR PBB SHADOW E&M-EST. PATIENT-LVL III: CPT | Mod: PBBFAC,,, | Performed by: INTERNAL MEDICINE

## 2018-09-12 PROCEDURE — 80076 HEPATIC FUNCTION PANEL: CPT

## 2018-09-12 PROCEDURE — 82607 VITAMIN B-12: CPT

## 2018-09-12 PROCEDURE — 99214 OFFICE O/P EST MOD 30 MIN: CPT | Mod: S$GLB,,, | Performed by: INTERNAL MEDICINE

## 2018-09-12 PROCEDURE — 36415 COLL VENOUS BLD VENIPUNCTURE: CPT

## 2018-09-12 RX ORDER — CLOTRIMAZOLE AND BETAMETHASONE DIPROPIONATE 10; .64 MG/G; MG/G
CREAM TOPICAL 2 TIMES DAILY
Qty: 15 G | Refills: 0 | Status: SHIPPED | OUTPATIENT
Start: 2018-09-12 | End: 2018-09-12 | Stop reason: SDUPTHER

## 2018-09-12 RX ORDER — CLOTRIMAZOLE AND BETAMETHASONE DIPROPIONATE 10; .64 MG/G; MG/G
CREAM TOPICAL 2 TIMES DAILY
Qty: 15 G | Refills: 0 | Status: SHIPPED | OUTPATIENT
Start: 2018-09-12 | End: 2018-09-26

## 2018-09-12 NOTE — PROGRESS NOTES
Reta Navas  52 y.o. White female    Chief Complaint   Patient presents with    Follow-up     HPI: Presents to the clinic to follow up on a rash. She was treated for tinea recently but states she lost the tube of cream after using it for approximately one week. She states when she did use it, it helped.    She is also concerned about a tremor of her right lower face on occasion. She does not notice it but other people do. She denies any other neurological symptoms. She is under a lot of stress.   She would like to have her vitamin D level checked. She has a history of a low vitamin D and was on medication but not now.     Past Medical History:   Diagnosis Date    Abnormal Pap smear     Abnormal Pap smear of vagina     Chronic rhinitis     Hyperlipidemia     history    Hypertension     not on meds    Overactive bladder     Plantar fasciitis, bilateral     Psoriasis        Current Outpatient Medications on File Prior to Visit   Medication Sig Dispense Refill    clobetasol (TEMOVATE) 0.05 % cream APPLY TWICE DAILY TO AFFECTED AREA 45 g 1    desonide (DESOWEN) 0.05 % cream APPLY TWICE DAILY TO AFFECTED AREA 60 g 0    cholecalciferol, vitamin D3, 5,000 unit Tab Take 5,000 Units by mouth once daily.      fish oil-omega-3 fatty acids 300-1,000 mg capsule Take 2 g by mouth once daily.      fluticasone (FLONASE) 50 mcg/actuation nasal spray 2 sprays (100 mcg total) by Each Nare route once daily. 2 Bottle 6    montelukast (SINGULAIR) 10 mg tablet TAKE 1 TABLET BY MOUTH EVERY EVENING. 90 tablet 1    omeprazole (PRILOSEC) 40 MG capsule TAKE 1 CAPSULE (40 MG TOTAL) BY MOUTH ONCE DAILY IN THE MORNING BEFORE BREAKFAST FOR ACID REFLUX 90 capsule 1     No current facility-administered medications on file prior to visit.        Allergies:  Review of patient's allergies indicates:  No Known Allergies    ROS:  Denies headache, dizziness, chest pain or shortness of breath    PHYSICAL EXAM:  VITAL SIGNS:  Reviewed  GENERAL: Alert and oriented, no acute distress  SKIN: Two circular, faintly erythematous lesions on right upper arm  HEART: Regular rate  LUNGS:Respirations unlabored    ASSESSMENT/PLAN:  Reta was seen today for follow-up.    Diagnoses and all orders for this visit:    Tinea corporis  -     clotrimazole-betamethasone 1-0.05% (LOTRISONE) cream; Apply topically 2 (two) times daily. for 14 days    Tremor  -     Vitamin B12; Future    Vitamin D insufficiency  -     Vitamin D; Future    RTC as previously scheduled and as needed.

## 2018-09-14 ENCOUNTER — PATIENT MESSAGE (OUTPATIENT)
Dept: INTERNAL MEDICINE | Facility: CLINIC | Age: 53
End: 2018-09-14

## 2018-09-14 ENCOUNTER — TELEPHONE (OUTPATIENT)
Dept: INTERNAL MEDICINE | Facility: CLINIC | Age: 53
End: 2018-09-14

## 2018-09-14 DIAGNOSIS — R79.89 LFT ELEVATION: Primary | ICD-10-CM

## 2018-09-14 DIAGNOSIS — E55.9 VITAMIN D INSUFFICIENCY: Primary | ICD-10-CM

## 2018-09-14 RX ORDER — ERGOCALCIFEROL 1.25 MG/1
50000 CAPSULE ORAL
Qty: 4 CAPSULE | Refills: 11 | Status: SHIPPED | OUTPATIENT
Start: 2018-09-14 | End: 2018-09-19

## 2018-09-17 ENCOUNTER — TELEPHONE (OUTPATIENT)
Dept: GASTROENTEROLOGY | Facility: CLINIC | Age: 53
End: 2018-09-17

## 2018-09-17 ENCOUNTER — PATIENT MESSAGE (OUTPATIENT)
Dept: INTERNAL MEDICINE | Facility: CLINIC | Age: 53
End: 2018-09-17

## 2018-09-17 ENCOUNTER — TELEPHONE (OUTPATIENT)
Dept: INTERNAL MEDICINE | Facility: CLINIC | Age: 53
End: 2018-09-17

## 2018-09-17 DIAGNOSIS — R25.1 TREMOR: Primary | ICD-10-CM

## 2018-09-17 NOTE — TELEPHONE ENCOUNTER
----- Message from Nabila Trejoite sent at 9/17/2018 11:56 AM CDT -----  Contact: Pt  Pt called and stated she needs to schedule an appt for a follow up on her liver tests. She can be reached at [phone].    Thanks,  TF

## 2018-09-17 NOTE — TELEPHONE ENCOUNTER
----- Message from Jessica Gamboa LPN sent at 9/12/2018  2:42 PM CDT -----  Pt is asking if she needs a neurology referral. She states yall talked about it in the visit.

## 2018-09-18 ENCOUNTER — LAB VISIT (OUTPATIENT)
Dept: LAB | Facility: HOSPITAL | Age: 53
End: 2018-09-18
Attending: INTERNAL MEDICINE
Payer: COMMERCIAL

## 2018-09-18 ENCOUNTER — TELEPHONE (OUTPATIENT)
Dept: INTERNAL MEDICINE | Facility: CLINIC | Age: 53
End: 2018-09-18

## 2018-09-18 DIAGNOSIS — R79.89 LFT ELEVATION: ICD-10-CM

## 2018-09-18 PROCEDURE — 80074 ACUTE HEPATITIS PANEL: CPT

## 2018-09-18 PROCEDURE — 36415 COLL VENOUS BLD VENIPUNCTURE: CPT

## 2018-09-19 ENCOUNTER — TELEPHONE (OUTPATIENT)
Dept: INTERNAL MEDICINE | Facility: CLINIC | Age: 53
End: 2018-09-19

## 2018-09-19 ENCOUNTER — HOSPITAL ENCOUNTER (OUTPATIENT)
Dept: RADIOLOGY | Facility: HOSPITAL | Age: 53
Discharge: HOME | End: 2018-09-19
Attending: INTERNAL MEDICINE
Payer: COMMERCIAL

## 2018-09-19 ENCOUNTER — PATIENT MESSAGE (OUTPATIENT)
Dept: INTERNAL MEDICINE | Facility: CLINIC | Age: 53
End: 2018-09-19

## 2018-09-19 ENCOUNTER — OFFICE VISIT (OUTPATIENT)
Dept: NEUROLOGY | Facility: CLINIC | Age: 53
End: 2018-09-19
Payer: COMMERCIAL

## 2018-09-19 VITALS
WEIGHT: 159.63 LBS | DIASTOLIC BLOOD PRESSURE: 90 MMHG | HEIGHT: 60 IN | BODY MASS INDEX: 31.34 KG/M2 | HEART RATE: 80 BPM | SYSTOLIC BLOOD PRESSURE: 140 MMHG | RESPIRATION RATE: 20 BRPM

## 2018-09-19 DIAGNOSIS — R16.0 ENLARGED LIVER: Primary | ICD-10-CM

## 2018-09-19 DIAGNOSIS — R74.8 ABNORMAL LIVER ENZYMES: ICD-10-CM

## 2018-09-19 DIAGNOSIS — K80.20 GALL BLADDER STONES: ICD-10-CM

## 2018-09-19 DIAGNOSIS — R79.89 LFT ELEVATION: ICD-10-CM

## 2018-09-19 DIAGNOSIS — G70.00 MG (MYASTHENIA GRAVIS): ICD-10-CM

## 2018-09-19 DIAGNOSIS — R25.3 MUSCLE TWITCHING: ICD-10-CM

## 2018-09-19 LAB
HAV IGM SERPL QL IA: NEGATIVE
HBV CORE IGM SERPL QL IA: NEGATIVE
HBV SURFACE AG SERPL QL IA: NEGATIVE
HCV AB SERPL QL IA: NEGATIVE

## 2018-09-19 PROCEDURE — 99999 PR PBB SHADOW E&M-EST. PATIENT-LVL III: CPT | Mod: PBBFAC,,, | Performed by: PSYCHIATRY & NEUROLOGY

## 2018-09-19 PROCEDURE — 99205 OFFICE O/P NEW HI 60 MIN: CPT | Mod: S$GLB,,, | Performed by: PSYCHIATRY & NEUROLOGY

## 2018-09-19 PROCEDURE — 76700 US EXAM ABDOM COMPLETE: CPT | Mod: TC

## 2018-09-19 NOTE — LETTER
September 19, 2018      Lexi Vazquez DO  [address] [place] [address] [address] [place] [address]           [facility]  [address] [place] [address] [address] [address]  Phone: [phone]  Fax: [phone]          Patient: Reta Navas   MR Number: [id number]   YOB: 1965   Date of Visit: 9/19/2018       Dear Dr. Lexi Vazquez:    Thank you for referring Reta Navas to me for evaluation. Attached you will find relevant portions of my assessment and plan of care.    If you have questions, please do not hesitate to call me. I look forward to following Reta Navas along with you.    Sincerely,    Natalee Vega MD    Enclosure  CC:  No Recipients    If you would like to receive this communication electronically, please contact [email or web address] or [phone] to request more information on [employer] Link access.    For providers and/or their staff who would like to refer a patient to [facility], please contact us through our one-stop-shop provider referral line, Ellie Garcia, at [phone].    If you feel you have received this communication in error or would no longer like to receive these types of communications, please e-mail [email or web address]

## 2018-09-19 NOTE — PROGRESS NOTES
Consult Requested By: No att. providers found  Reason for Consult: Left chin twitching     SUBJECTIVE:       HPI:   HISTORY OF PRESENT ILLNESS:  This is a 52-year-old right-handed pleasant lady,   presented today in the clinic with the complaint of left chin twitching.  She   does not feel anything and she did not see any twitching, but she was told by   her grandkids and also by her family member that when she talks her left chin   twitches and she also said that sometimes when she smiles she feels some spasm   on the right side of the face.  She recalled that 10-12 years ago, one day she   was driving and she felt numbness on her left side of the face and she thought   maybe stroke, but she did not take any medical help at that time.  She also   noticed that her left eye is little smaller than the right eye.  She is no   diabetic, but she has high blood pressure.      AK/EFREN  dd: 2018 14:21:59 (CDT)  td: 2018 03:43:03 (CDT)  Doc ID   #[id number]  Job ID #[id number]    CC:     This office note has been dictated.      Past Medical History:   Diagnosis Date    Abnormal Pap smear     Abnormal Pap smear of vagina     Chronic rhinitis     Hyperlipidemia     history    Hypertension     not on meds    Overactive bladder     Plantar fasciitis, bilateral     Psoriasis      Past Surgical History:   Procedure Laterality Date    COLONOSCOPY N/A 2015    [facility]    HYSTERECTOMY      supracervical hyst/LSO for fibroids    OOPHORECTOMY      LSO    TUBAL LIGATION       Family History   Problem Relation Age of Onset    Diabetes Brother     No Known Problems Father     Colon cancer Paternal Grandfather     Colon cancer Paternal Uncle     Ovarian cancer Maternal Aunt      Social History     Tobacco Use    Smoking status: Former Smoker     Packs/day: 1.00     Years: 39.00     Pack years: 39.00     Last attempt to quit: 2013     Years since quittin.6     Smokeless tobacco: Never Used   Substance Use Topics    Alcohol use: No     Alcohol/week: 0.0 oz    Drug use: No     Review of Systems   Constitutional: Negative for fever and weight loss.   HENT: Negative for hearing loss.    Eyes: Negative for blurred vision, double vision, photophobia and pain.   Respiratory: Negative for cough.    Cardiovascular: Negative for chest pain.   Gastrointestinal: Negative for abdominal pain, nausea and vomiting.   Genitourinary: Negative for dysuria, frequency and urgency.   Musculoskeletal: Negative.  Negative for back pain, falls, joint pain, myalgias and neck pain.   Skin: Negative for itching and rash.   Neurological: Positive for tremors. Negative for tingling and headaches.   Psychiatric/Behavioral: Negative for depression and memory loss.         OBJECTIVE:     Vital Signs (Most Recent)  Pulse: 80 (09/19/18 1357)  Resp: 20 (09/19/18 1357)  BP: (!) 140/90 (09/19/18 1357)    Physical Exam   Constitutional: She is oriented to person, place, and time. She appears well-developed and well-nourished.   HENT:   Head: Normocephalic and atraumatic.   Eyes: Conjunctivae and EOM are normal. Pupils are equal, round, and reactive to light.   Neck: Normal range of motion. Neck supple. No JVD present. No tracheal deviation present. No thyromegaly present.   Cardiovascular: Normal rate, regular rhythm and normal heart sounds.   Pulmonary/Chest: Effort normal and breath sounds normal.   Abdominal: She exhibits no distension. There is no tenderness.   Musculoskeletal: Normal range of motion. She exhibits no edema or tenderness.   Neurological: She is alert and oriented to person, place, and time. She has normal strength and normal reflexes. She displays normal reflexes. No cranial nerve deficit or sensory deficit. She exhibits normal muscle tone. She displays a negative Romberg sign. Coordination and gait normal.   Reflex Scores:       Tricep reflexes are 2+ on the right side and 2+ on the left  side.       Bicep reflexes are 2+ on the right side and 2+ on the left side.       Brachioradialis reflexes are 2+ on the right side and 2+ on the left side.       Patellar reflexes are 2+ on the right side and 2+ on the left side.       Achilles reflexes are 2+ on the right side and 2+ on the left side.  She blinked her eyes ten times and left eye became smaller in size.  There was no visible twitching or tremor in her face.   Skin: Skin is warm and dry. No rash noted.   Psychiatric: She has a normal mood and affect. Her behavior is normal. Judgment and thought content normal.         Strength  Deltoids Triceps Biceps Wrist Extension Wrist Flexion Hand    Upper: R 5/5 5/5 5/5 5/5 5/5 5/5    L 5/5 5/5 5/5 5/5 5/5 5/5     Iliopsoas Quadriceps Knee  Flexion Tibialis  anterior Gastro- cnemius EHL   Lower: R 5/5 5/5 5/5 5/5 5/5 5/5    L 5/5 5/5 5/5 5/5 5/5 5/5     Laboratory:  Lab Results   Component Value Date    WBC 6.79 07/13/2018    HGB 14.2 07/13/2018    HCT 43.0 07/13/2018     07/13/2018    CHOL 170 03/13/2015    TRIG 76 03/13/2015    HDL 45 03/13/2015    ALT 54 (H) 09/12/2018    AST 37 09/12/2018     07/13/2018    K 4.3 07/13/2018     07/13/2018    CREATININE 0.8 07/13/2018    BUN 15 07/13/2018    CO2 23 07/13/2018    TSH 3.588 07/13/2018    HGBA1C 5.5 07/13/2018               ASSESSMENT/PLAN:     Primary Diagnoses:  1. Left chin twitching : May be benign process . Had history of left face numbness 10 years ago , may be it was some kind of Bell's palsy at that time went unnoticed. Now she has complication of Bell's palsy. We have to rule out any lesion pressing the nerve.  2. Myasthenia Graves: left eye get  Smaller on repeated blinking , rule out MG.      Patient Active Problem List   Diagnosis    Obesity    Urinary, incontinence, stress female    Abnormal Pap smear    Lateral epicondylitis (tennis elbow)    Hypertension    Chest pain    Chronic rhinitis    Special screening for  malignant neoplasms, colon        Plan: MRI of the brain and face.  Time spent: 60 minutes in face to face discussion concerning diagnosis, prognosis, review of lab and test results, benefits of treatment as well as management of disease, counseling of patient and coordination of care between various health care providers . Greater than half the time spent was used for coordination of care and counseling of patient. This note may have some spelling or wording mistakes which might have been overlooked during proof reading.

## 2018-09-21 ENCOUNTER — CLINICAL SUPPORT (OUTPATIENT)
Dept: CARDIOLOGY | Facility: CLINIC | Age: 53
End: 2018-09-21
Payer: COMMERCIAL

## 2018-09-21 ENCOUNTER — OFFICE VISIT (OUTPATIENT)
Dept: SURGERY | Facility: CLINIC | Age: 53
End: 2018-09-21
Payer: COMMERCIAL

## 2018-09-21 ENCOUNTER — HOSPITAL ENCOUNTER (OUTPATIENT)
Dept: RADIOLOGY | Facility: HOSPITAL | Age: 53
Discharge: HOME | End: 2018-09-21
Attending: SURGERY
Payer: COMMERCIAL

## 2018-09-21 VITALS
BODY MASS INDEX: 29.72 KG/M2 | TEMPERATURE: 98 F | HEIGHT: 61 IN | DIASTOLIC BLOOD PRESSURE: 79 MMHG | SYSTOLIC BLOOD PRESSURE: 106 MMHG | WEIGHT: 157.44 LBS | HEART RATE: 66 BPM

## 2018-09-21 DIAGNOSIS — K80.50 BILIARY COLIC: Primary | ICD-10-CM

## 2018-09-21 DIAGNOSIS — K80.50 BILIARY COLIC: ICD-10-CM

## 2018-09-21 PROCEDURE — 99204 OFFICE O/P NEW MOD 45 MIN: CPT | Mod: S$GLB,,, | Performed by: SURGERY

## 2018-09-21 PROCEDURE — 71046 X-RAY EXAM CHEST 2 VIEWS: CPT | Mod: TC,FY,PO

## 2018-09-21 PROCEDURE — 71046 X-RAY EXAM CHEST 2 VIEWS: CPT | Mod: 26,,, | Performed by: RADIOLOGY

## 2018-09-21 PROCEDURE — 93000 ELECTROCARDIOGRAM COMPLETE: CPT | Mod: S$GLB,,, | Performed by: INTERNAL MEDICINE

## 2018-09-21 PROCEDURE — 99999 PR PBB SHADOW E&M-EST. PATIENT-LVL IV: CPT | Mod: PBBFAC,,, | Performed by: SURGERY

## 2018-09-21 RX ORDER — LIDOCAINE HYDROCHLORIDE 10 MG/ML
1 INJECTION, SOLUTION EPIDURAL; INFILTRATION; INTRACAUDAL; PERINEURAL ONCE
Status: DISCONTINUED | OUTPATIENT
Start: 2018-09-21 | End: 2018-10-18 | Stop reason: HOSPADM

## 2018-09-21 RX ORDER — ERGOCALCIFEROL 1.25 MG/1
CAPSULE ORAL
Qty: 90 CAPSULE | Refills: 3 | Status: SHIPPED | OUTPATIENT
Start: 2018-09-21 | End: 2019-03-20 | Stop reason: SDUPTHER

## 2018-09-21 RX ORDER — SODIUM CHLORIDE 9 MG/ML
INJECTION, SOLUTION INTRAVENOUS CONTINUOUS
Status: CANCELLED | OUTPATIENT
Start: 2018-09-21

## 2018-09-21 NOTE — H&P
History & Physical    SUBJECTIVE:     History of Present Illness:  Patient is a 52 y.o. female presents with known gallstones associated with right upper quadrant pain and nausea. Onset of symptoms was gradual starting several months ago with gradually worsening course since that time. Patient denies of nausea and vomiting at this time of examination. Symptoms are aggravated by fatty food intake.  She had been seen by her primary care provider and was noted to have abnormal liver function test results which led to ultrasound evaluation.  The ultrasound evaluation confirmed the presence of a 14 mm gallstones in her gallbladder without any signs of acute cholecystitis.    Chief Complaint   Patient presents with    GI Problem     Gallbladder       Review of patient's allergies indicates:  No Known Allergies    Current Outpatient Medications   Medication Sig Dispense Refill    clobetasol (TEMOVATE) 0.05 % cream APPLY TWICE DAILY TO AFFECTED AREA 45 g 1    clotrimazole-betamethasone 1-0.05% (LOTRISONE) cream Apply topically 2 (two) times daily. for 14 days 15 g 0    fluticasone (FLONASE) 50 mcg/actuation nasal spray 2 sprays (100 mcg total) by Each Nare route once daily. 2 Bottle 6    montelukast (SINGULAIR) 10 mg tablet TAKE 1 TABLET BY MOUTH EVERY EVENING. 90 tablet 1     Current Facility-Administered Medications   Medication Dose Route Frequency Provider Last Rate Last Dose    lidocaine (PF) 10 mg/ml (1%) injection 10 mg  1 mL Intradermal Once Robel Ferrer MD           Past Medical History:   Diagnosis Date    Abnormal Pap smear     Abnormal Pap smear of vagina     Chronic rhinitis     Hyperlipidemia     history    Hypertension     not on meds    Overactive bladder     Plantar fasciitis, bilateral     Psoriasis      Past Surgical History:   Procedure Laterality Date    COLONOSCOPY N/A 6/29/2015    [facility]    HYSTERECTOMY      supracervical hyst/LSO for fibroids  "   OOPHORECTOMY      LSO    TUBAL LIGATION       Family History   Problem Relation Age of Onset    Diabetes Brother     No Known Problems Father     Colon cancer Paternal Grandfather     Colon cancer Paternal Uncle     Ovarian cancer Maternal Aunt      Social History     Tobacco Use    Smoking status: Former Smoker     Packs/day: 1.00     Years: 39.00     Pack years: 39.00     Last attempt to quit: 2013     Years since quittin.6    Smokeless tobacco: Never Used   Substance Use Topics    Alcohol use: No     Alcohol/week: 0.0 oz    Drug use: No        Review of Systems:  Review of Systems   Constitutional: Positive for appetite change. Negative for chills and fever.   HENT: Negative for sore throat and trouble swallowing.    Eyes: Negative.    Respiratory: Negative for cough and shortness of breath.    Cardiovascular: Negative.    Gastrointestinal: Positive for abdominal distention, abdominal pain, nausea and vomiting.   Endocrine: Negative.    Genitourinary: Negative.    Musculoskeletal: Negative.    Skin: Negative.    Allergic/Immunologic: Negative.    Neurological: Negative.    Hematological: Does not bruise/bleed easily.   Psychiatric/Behavioral: Negative.        OBJECTIVE:     Vital Signs (Most Recent)  Temp: 98.1 °F (36.7 °C) (18 0859)  Pulse: 66 (18 0859)  BP: 106/79 (18 0859)  5' 1" (1.549 m)  71.4 kg (157 lb 6.5 oz)     Physical Exam:  Physical Exam   Constitutional: She is oriented to person, place, and time. She appears well-developed and well-nourished.   HENT:   Head: Normocephalic.   Right Ear: External ear normal.   Left Ear: External ear normal.   Nose: Nose normal.   Eyes: Pupils are equal, round, and reactive to light. No scleral icterus.   Neck: Normal range of motion. Neck supple. No thyromegaly present.   Cardiovascular: Normal rate, regular rhythm and normal heart sounds.   No murmur heard.  Pulmonary/Chest: Effort normal and breath sounds normal. "   Abdominal: Soft. Bowel sounds are normal. There is tenderness. There is no guarding.   Musculoskeletal: Normal range of motion.   Lymphadenopathy:     She has no cervical adenopathy.   Neurological: She is alert and oriented to person, place, and time.   Skin: Skin is warm and dry.       Laboratory  Lab Results   Component Value Date    WBC 6.79 07/13/2018    HGB 14.2 07/13/2018    HCT 43.0 07/13/2018     07/13/2018    CHOL 170 03/13/2015    TRIG 76 03/13/2015    HDL 45 03/13/2015    ALT 54 (H) 09/12/2018    AST 37 09/12/2018     07/13/2018    K 4.3 07/13/2018     07/13/2018    CREATININE 0.8 07/13/2018    BUN 15 07/13/2018    CO2 23 07/13/2018    TSH 3.588 07/13/2018    HGBA1C 5.5 07/13/2018       No results found for this or any previous visit.      Diagnostic Results:  Labs: Reviewed  ECG: Reviewed  X-Ray: Reviewed  US: Reviewed    XR CHEST PA AND LATERAL    ASSESSMENT/PLAN:     Cholelithiasis with biliary colic.      PLAN:Plan     The recommendation is to proceed for robotic assisted laparoscopic cholecystectomy.  The risk and the benefit of the surgery were discussed with the patient.  The surgery scheduled for October 18, 2018 on Thursday at 11:30 a.m..    Robel Ferrer

## 2018-09-21 NOTE — LETTER
September 21, 2018      Victor M Chow III, PA-C  [address] [place] [address]           [facility] [place]  [address] [place] [address]  Phone: [phone]  Fax: [phone]          Patient: Reta Navas   MR Number: [id number]   YOB: 1965   Date of Visit: 9/21/2018       Dear Victor M Chow III:    Thank you for referring Reta Navas to me for evaluation. Attached you will find relevant portions of my assessment and plan of care.    If you have questions, please do not hesitate to call me. I look forward to following Reta Navas along with you.    Sincerely,    Robel Ferrer MD    Enclosure  CC:  No Recipients    If you would like to receive this communication electronically, please contact [email or web address] or [phone] to request more information on [employer] Link access.    For providers and/or their staff who would like to refer a patient to [facility], please contact us through our one-stop-shop provider referral line, Ellie Garcia, at [phone].    If you feel you have received this communication in error or would no longer like to receive these types of communications, please e-mail [email or web address]

## 2018-09-21 NOTE — H&P (VIEW-ONLY)
History & Physical    SUBJECTIVE:     History of Present Illness:  Patient is a 52 y.o. female presents with known gallstones associated with right upper quadrant pain and nausea. Onset of symptoms was gradual starting several months ago with gradually worsening course since that time. Patient denies of nausea and vomiting at this time of examination. Symptoms are aggravated by fatty food intake.  She had been seen by her primary care provider and was noted to have abnormal liver function test results which led to ultrasound evaluation.  The ultrasound evaluation confirmed the presence of a 14 mm gallstones in her gallbladder without any signs of acute cholecystitis.    Chief Complaint   Patient presents with    GI Problem     Gallbladder       Review of patient's allergies indicates:  No Known Allergies    Current Outpatient Medications   Medication Sig Dispense Refill    clobetasol (TEMOVATE) 0.05 % cream APPLY TWICE DAILY TO AFFECTED AREA 45 g 1    clotrimazole-betamethasone 1-0.05% (LOTRISONE) cream Apply topically 2 (two) times daily. for 14 days 15 g 0    fluticasone (FLONASE) 50 mcg/actuation nasal spray 2 sprays (100 mcg total) by Each Nare route once daily. 2 Bottle 6    montelukast (SINGULAIR) 10 mg tablet TAKE 1 TABLET BY MOUTH EVERY EVENING. 90 tablet 1     Current Facility-Administered Medications   Medication Dose Route Frequency Provider Last Rate Last Dose    lidocaine (PF) 10 mg/ml (1%) injection 10 mg  1 mL Intradermal Once Robel Ferrer MD           Past Medical History:   Diagnosis Date    Abnormal Pap smear     Abnormal Pap smear of vagina     Chronic rhinitis     Hyperlipidemia     history    Hypertension     not on meds    Overactive bladder     Plantar fasciitis, bilateral     Psoriasis      Past Surgical History:   Procedure Laterality Date    COLONOSCOPY N/A 6/29/2015    Performed by Darien Grace MD at [facility] END[facility]    HYSTERECTOMY      supracervical hyst/LSO for fibroids  "   OOPHORECTOMY      LSO    TUBAL LIGATION       Family History   Problem Relation Age of Onset    Diabetes Brother     No Known Problems Father     Colon cancer Paternal Grandfather     Colon cancer Paternal Uncle     Ovarian cancer Maternal Aunt      Social History     Tobacco Use    Smoking status: Former Smoker     Packs/day: 1.00     Years: 39.00     Pack years: 39.00     Last attempt to quit: 2013     Years since quittin.6    Smokeless tobacco: Never Used   Substance Use Topics    Alcohol use: No     Alcohol/week: 0.0 oz    Drug use: No        Review of Systems:  Review of Systems   Constitutional: Positive for appetite change. Negative for chills and fever.   HENT: Negative for sore throat and trouble swallowing.    Eyes: Negative.    Respiratory: Negative for cough and shortness of breath.    Cardiovascular: Negative.    Gastrointestinal: Positive for abdominal distention, abdominal pain, nausea and vomiting.   Endocrine: Negative.    Genitourinary: Negative.    Musculoskeletal: Negative.    Skin: Negative.    Allergic/Immunologic: Negative.    Neurological: Negative.    Hematological: Does not bruise/bleed easily.   Psychiatric/Behavioral: Negative.        OBJECTIVE:     Vital Signs (Most Recent)  Temp: 98.1 °F (36.7 °C) (18 0859)  Pulse: 66 (18 0859)  BP: 106/79 (18 0859)  5' 1" (1.549 m)  71.4 kg (157 lb 6.5 oz)     Physical Exam:  Physical Exam   Constitutional: She is oriented to person, place, and time. She appears well-developed and well-nourished.   HENT:   Head: Normocephalic.   Right Ear: External ear normal.   Left Ear: External ear normal.   Nose: Nose normal.   Eyes: Pupils are equal, round, and reactive to light. No scleral icterus.   Neck: Normal range of motion. Neck supple. No thyromegaly present.   Cardiovascular: Normal rate, regular rhythm and normal heart sounds.   No murmur heard.  Pulmonary/Chest: Effort normal and breath sounds normal. "   Abdominal: Soft. Bowel sounds are normal. There is tenderness. There is no guarding.   Musculoskeletal: Normal range of motion.   Lymphadenopathy:     She has no cervical adenopathy.   Neurological: She is alert and oriented to person, place, and time.   Skin: Skin is warm and dry.       Laboratory  Lab Results   Component Value Date    WBC 6.79 07/13/2018    HGB 14.2 07/13/2018    HCT 43.0 07/13/2018     07/13/2018    CHOL 170 03/13/2015    TRIG 76 03/13/2015    HDL 45 03/13/2015    ALT 54 (H) 09/12/2018    AST 37 09/12/2018     07/13/2018    K 4.3 07/13/2018     07/13/2018    CREATININE 0.8 07/13/2018    BUN 15 07/13/2018    CO2 23 07/13/2018    TSH 3.588 07/13/2018    HGBA1C 5.5 07/13/2018       No results found for this or any previous visit.      Diagnostic Results:  Labs: Reviewed  ECG: Reviewed  X-Ray: Reviewed  US: Reviewed    XR CHEST PA AND LATERAL    ASSESSMENT/PLAN:     Cholelithiasis with biliary colic.      PLAN:Plan     The recommendation is to proceed for robotic assisted laparoscopic cholecystectomy.  The risk and the benefit of the surgery were discussed with the patient.  The surgery scheduled for October 18, 2018 on Thursday at 11:30 a.m..    Robel Ferrer

## 2018-09-26 ENCOUNTER — TELEPHONE (OUTPATIENT)
Dept: NEUROLOGY | Facility: CLINIC | Age: 53
End: 2018-09-26

## 2018-09-26 NOTE — TELEPHONE ENCOUNTER
Called to give results of ms labs.normal per dr matthews. She says she has seen it online. 9/26/18/26/18/1655/sf

## 2018-09-28 ENCOUNTER — HOSPITAL ENCOUNTER (OUTPATIENT)
Dept: RADIOLOGY | Facility: HOSPITAL | Age: 53
Discharge: HOME | End: 2018-09-28
Attending: PSYCHIATRY & NEUROLOGY
Payer: COMMERCIAL

## 2018-09-28 DIAGNOSIS — R25.3 MUSCLE TWITCHING: ICD-10-CM

## 2018-09-28 DIAGNOSIS — G70.00 MG (MYASTHENIA GRAVIS): ICD-10-CM

## 2018-10-09 RX ORDER — MONTELUKAST SODIUM 10 MG/1
TABLET ORAL
Qty: 90 TABLET | Refills: 0 | Status: SHIPPED | OUTPATIENT
Start: 2018-10-09 | End: 2019-01-02 | Stop reason: SDUPTHER

## 2018-10-17 NOTE — PRE-PROCEDURE INSTRUCTIONS
Pre op instructions reviewed with patient per phone:    To confirm, Your surgeon has instructed you:  Surgery is scheduled 10/18/18 at 1130.    Please report to [facility] 1st floor main lobby by 1000.  Pre admit office will call this afternoon only if arrival time for surgery changes      INSTRUCTIONS IMPORTANT!!!  ¨ No smoking after 12 midnight, the night before surgery.  ¨ No solid food after 12 midnight, but you may have clear liquids up until 3 hours prior to surgery.  This includes: grape, cranberry, and apple juice (not orange, and no coffee.)   ¨ OK to brush teeth, but no gum, candy or mints!    ¨ Take only these medicines with a small swallow of water-morning of surgery.  None    ____  Do not wear makeup, including mascara.  ____  No powder, lotions or creams to surgical area.  ____  Please remove all jewelry, including piercings and leave at home.  ____  No money or valuables needed. Please leave at home.  ____  Please bring identification and insurance information to hospital.  ____  If going home the same day, arrange for a ride home. You will not be able to   drive if Anesthesia was used.  ____  Children, under 12 years old, must remain in the waiting room with an adult.  They are not allowed in patient areas.  ____  Wear loose fitting clothing. Allow for dressings, bandages.  ____  Stop Aspirin, Ibuprofen, Motrin and Aleve at least 5-7 days before surgery, unless otherwise instructed by your doctor, or the nurse.   You MAY use Tylenol/acetaminophen until day of surgery.  ____  If you take diabetic medication, do not take am of surgery unless instructed by   Doctor.  ____ Stop taking any Fish Oil supplement or any Vitamins that contain Vitamin E at least 5 days prior to surgery.          Bathing Instructions-- The night before surgery and the morning prior to coming to the hospital:   -Do not shave the surgical area.   -Shower and wash your hair and body as usual with your  regular soap and shampoo.   -Rinse your hair and body completely.   -Use one packet of hibiclens to wash the surgical site (using your hand) gently for 5 minutes.  Do not scrub you skin too hard.   -Do not use hibiclens on your head, face, or genitals.   -Do not wash with regular soap after you use the hibiclens.   -Rinse your body thoroughly.   -Dry with clean, soft towel.  Do not use lotion, cream, deodorant, or powders on   the surgical site.    Use antibacterial soap in place of hibiclens if your surgery is on the head, face or genitals.         Surgical Site Infection    Prevention of surgical site infections:     -Keep incisions clean and dry.   -Do not soak/submerge incisions in water until completely healed.   -Do not apply lotions, powders, creams, or deodorants to site.   -Always make sure hands are cleaned with antibacterial soap/ alcohol-based   prior to touching the surgical site.  (This includes doctors, nurses, staff, and yourself.)    Signs and symptoms:   -Redness and pain around the area where you had surgery   -Drainage of cloudy fluid from your surgical wound   -Fever over 100.4  I have read or had read and explained to me, and understand the above information.

## 2018-10-18 ENCOUNTER — ANESTHESIA EVENT (OUTPATIENT)
Dept: SURGERY | Facility: HOSPITAL | Age: 53
End: 2018-10-18
Payer: COMMERCIAL

## 2018-10-18 ENCOUNTER — HOSPITAL ENCOUNTER (OUTPATIENT)
Facility: HOSPITAL | Age: 53
Discharge: HOME | End: 2018-10-18
Attending: SURGERY | Admitting: SURGERY
Payer: COMMERCIAL

## 2018-10-18 ENCOUNTER — ANESTHESIA (OUTPATIENT)
Dept: SURGERY | Facility: HOSPITAL | Age: 53
End: 2018-10-18
Payer: COMMERCIAL

## 2018-10-18 DIAGNOSIS — K80.50 BILIARY COLIC: ICD-10-CM

## 2018-10-18 PROCEDURE — 37000009 HC ANESTHESIA EA ADD 15 MINS: Performed by: SURGERY

## 2018-10-18 PROCEDURE — 25000003 PHARM REV CODE 250: Performed by: CLINIC/CENTER

## 2018-10-18 PROCEDURE — 27201423 OPTIME MED/SURG SUP & DEVICES STERILE SUPPLY: Performed by: SURGERY

## 2018-10-18 PROCEDURE — 36000710: Performed by: SURGERY

## 2018-10-18 PROCEDURE — 25000003 PHARM REV CODE 250: Performed by: ANESTHESIOLOGY

## 2018-10-18 PROCEDURE — 25000003 PHARM REV CODE 250: Performed by: SURGERY

## 2018-10-18 PROCEDURE — S0020 INJECTION, BUPIVICAINE HYDRO: HCPCS | Performed by: SURGERY

## 2018-10-18 PROCEDURE — 63600175 PHARM REV CODE 636 W HCPCS: Performed by: STUDENT IN AN ORGANIZED HEALTH CARE EDUCATION/TRAINING PROGRAM

## 2018-10-18 PROCEDURE — 88304 TISSUE EXAM BY PATHOLOGIST: CPT | Performed by: PATHOLOGY

## 2018-10-18 PROCEDURE — 47562 LAPAROSCOPIC CHOLECYSTECTOMY: CPT | Mod: ,,, | Performed by: SURGERY

## 2018-10-18 PROCEDURE — 63600175 PHARM REV CODE 636 W HCPCS: Performed by: SURGERY

## 2018-10-18 PROCEDURE — 63600175 PHARM REV CODE 636 W HCPCS: Performed by: CLINIC/CENTER

## 2018-10-18 PROCEDURE — 36000711: Performed by: SURGERY

## 2018-10-18 PROCEDURE — 88304 TISSUE EXAM BY PATHOLOGIST: CPT | Mod: 26,,, | Performed by: PATHOLOGY

## 2018-10-18 PROCEDURE — 37000008 HC ANESTHESIA 1ST 15 MINUTES: Performed by: SURGERY

## 2018-10-18 PROCEDURE — 71000015 HC POSTOP RECOV 1ST HR: Performed by: SURGERY

## 2018-10-18 PROCEDURE — 71000033 HC RECOVERY, INTIAL HOUR: Performed by: SURGERY

## 2018-10-18 RX ORDER — MIDAZOLAM HYDROCHLORIDE 1 MG/ML
INJECTION, SOLUTION INTRAMUSCULAR; INTRAVENOUS
Status: DISCONTINUED | OUTPATIENT
Start: 2018-10-18 | End: 2018-10-18

## 2018-10-18 RX ORDER — INDOCYANINE GREEN AND WATER 25 MG
2.5 KIT INJECTION ONCE
Status: COMPLETED | OUTPATIENT
Start: 2018-10-18 | End: 2018-10-18

## 2018-10-18 RX ORDER — SODIUM CHLORIDE 0.9 % (FLUSH) 0.9 %
3 SYRINGE (ML) INJECTION
Status: DISCONTINUED | OUTPATIENT
Start: 2018-10-18 | End: 2018-10-18 | Stop reason: HOSPADM

## 2018-10-18 RX ORDER — GLYCOPYRROLATE 0.2 MG/ML
INJECTION INTRAMUSCULAR; INTRAVENOUS
Status: DISCONTINUED | OUTPATIENT
Start: 2018-10-18 | End: 2018-10-18

## 2018-10-18 RX ORDER — ONDANSETRON 2 MG/ML
INJECTION INTRAMUSCULAR; INTRAVENOUS
Status: DISCONTINUED | OUTPATIENT
Start: 2018-10-18 | End: 2018-10-18

## 2018-10-18 RX ORDER — ACETAMINOPHEN 10 MG/ML
INJECTION, SOLUTION INTRAVENOUS
Status: DISCONTINUED | OUTPATIENT
Start: 2018-10-18 | End: 2018-10-18

## 2018-10-18 RX ORDER — LIDOCAINE HYDROCHLORIDE 10 MG/ML
1 INJECTION, SOLUTION EPIDURAL; INFILTRATION; INTRACAUDAL; PERINEURAL ONCE
Status: DISCONTINUED | OUTPATIENT
Start: 2018-10-18 | End: 2018-10-18 | Stop reason: HOSPADM

## 2018-10-18 RX ORDER — CEFAZOLIN SODIUM 2 G/50ML
2 SOLUTION INTRAVENOUS
Status: COMPLETED | OUTPATIENT
Start: 2018-10-18 | End: 2018-10-18

## 2018-10-18 RX ORDER — PROPOFOL 10 MG/ML
VIAL (ML) INTRAVENOUS
Status: DISCONTINUED | OUTPATIENT
Start: 2018-10-18 | End: 2018-10-18

## 2018-10-18 RX ORDER — INDOCYANINE GREEN AND WATER 25 MG
2.5 KIT INJECTION ONCE
Status: DISCONTINUED | OUTPATIENT
Start: 2018-10-18 | End: 2018-10-18 | Stop reason: HOSPADM

## 2018-10-18 RX ORDER — SODIUM CHLORIDE 9 MG/ML
INJECTION, SOLUTION INTRAVENOUS CONTINUOUS
Status: DISCONTINUED | OUTPATIENT
Start: 2018-10-18 | End: 2018-10-18 | Stop reason: HOSPADM

## 2018-10-18 RX ORDER — SODIUM CHLORIDE 0.9 % (FLUSH) 0.9 %
3 SYRINGE (ML) INJECTION EVERY 8 HOURS
Status: DISCONTINUED | OUTPATIENT
Start: 2018-10-18 | End: 2018-10-18 | Stop reason: HOSPADM

## 2018-10-18 RX ORDER — ROCURONIUM BROMIDE 10 MG/ML
INJECTION, SOLUTION INTRAVENOUS
Status: DISCONTINUED | OUTPATIENT
Start: 2018-10-18 | End: 2018-10-18

## 2018-10-18 RX ORDER — FENTANYL CITRATE 50 UG/ML
25 INJECTION, SOLUTION INTRAMUSCULAR; INTRAVENOUS EVERY 5 MIN PRN
Status: DISCONTINUED | OUTPATIENT
Start: 2018-10-18 | End: 2018-10-18 | Stop reason: HOSPADM

## 2018-10-18 RX ORDER — LIDOCAINE HYDROCHLORIDE 10 MG/ML
INJECTION INFILTRATION; PERINEURAL
Status: DISCONTINUED | OUTPATIENT
Start: 2018-10-18 | End: 2018-10-18

## 2018-10-18 RX ORDER — FENTANYL CITRATE 50 UG/ML
INJECTION, SOLUTION INTRAMUSCULAR; INTRAVENOUS
Status: DISCONTINUED | OUTPATIENT
Start: 2018-10-18 | End: 2018-10-18

## 2018-10-18 RX ORDER — ONDANSETRON 2 MG/ML
4 INJECTION INTRAMUSCULAR; INTRAVENOUS DAILY PRN
Status: DISCONTINUED | OUTPATIENT
Start: 2018-10-18 | End: 2018-10-18 | Stop reason: HOSPADM

## 2018-10-18 RX ORDER — SUCCINYLCHOLINE CHLORIDE 20 MG/ML
INJECTION INTRAMUSCULAR; INTRAVENOUS
Status: DISCONTINUED | OUTPATIENT
Start: 2018-10-18 | End: 2018-10-18

## 2018-10-18 RX ORDER — NEOSTIGMINE METHYLSULFATE 1 MG/ML
INJECTION, SOLUTION INTRAVENOUS
Status: DISCONTINUED | OUTPATIENT
Start: 2018-10-18 | End: 2018-10-18

## 2018-10-18 RX ORDER — DEXAMETHASONE SODIUM PHOSPHATE 4 MG/ML
INJECTION, SOLUTION INTRA-ARTICULAR; INTRALESIONAL; INTRAMUSCULAR; INTRAVENOUS; SOFT TISSUE
Status: DISCONTINUED | OUTPATIENT
Start: 2018-10-18 | End: 2018-10-18

## 2018-10-18 RX ORDER — BUPIVACAINE HYDROCHLORIDE 5 MG/ML
INJECTION, SOLUTION EPIDURAL; INTRACAUDAL
Status: DISCONTINUED | OUTPATIENT
Start: 2018-10-18 | End: 2018-10-18 | Stop reason: HOSPADM

## 2018-10-18 RX ORDER — OXYCODONE AND ACETAMINOPHEN 10; 325 MG/1; MG/1
1 TABLET ORAL EVERY 4 HOURS PRN
Qty: 30 TABLET | Refills: 0 | Status: SHIPPED | OUTPATIENT
Start: 2018-10-18 | End: 2018-11-02

## 2018-10-18 RX ORDER — SODIUM CHLORIDE, SODIUM LACTATE, POTASSIUM CHLORIDE, CALCIUM CHLORIDE 600; 310; 30; 20 MG/100ML; MG/100ML; MG/100ML; MG/100ML
INJECTION, SOLUTION INTRAVENOUS CONTINUOUS
Status: DISCONTINUED | OUTPATIENT
Start: 2018-10-18 | End: 2018-10-18 | Stop reason: HOSPADM

## 2018-10-18 RX ADMIN — NEOSTIGMINE METHYLSULFATE 5 MG: 1 INJECTION INTRAVENOUS at 12:10

## 2018-10-18 RX ADMIN — FENTANYL CITRATE 50 MCG: 50 INJECTION, SOLUTION INTRAMUSCULAR; INTRAVENOUS at 12:10

## 2018-10-18 RX ADMIN — ROBINUL 0.5 MG: 0.2 INJECTION INTRAMUSCULAR; INTRAVENOUS at 12:10

## 2018-10-18 RX ADMIN — SODIUM CHLORIDE, SODIUM LACTATE, POTASSIUM CHLORIDE, AND CALCIUM CHLORIDE: 600; 310; 30; 20 INJECTION, SOLUTION INTRAVENOUS at 11:10

## 2018-10-18 RX ADMIN — ROCURONIUM BROMIDE 5 MG: 10 INJECTION, SOLUTION INTRAVENOUS at 11:10

## 2018-10-18 RX ADMIN — DEXAMETHASONE SODIUM PHOSPHATE 4 MG: 4 INJECTION, SOLUTION INTRA-ARTICULAR; INTRALESIONAL; INTRAMUSCULAR; INTRAVENOUS; SOFT TISSUE at 12:10

## 2018-10-18 RX ADMIN — MIDAZOLAM 2 MG: 1 INJECTION INTRAMUSCULAR; INTRAVENOUS at 11:10

## 2018-10-18 RX ADMIN — INDOCYANINE GREEN AND WATER 2.5 MG: KIT at 11:10

## 2018-10-18 RX ADMIN — CEFAZOLIN SODIUM 2 G: 2 SOLUTION INTRAVENOUS at 11:10

## 2018-10-18 RX ADMIN — ROCURONIUM BROMIDE 25 MG: 10 INJECTION, SOLUTION INTRAVENOUS at 11:10

## 2018-10-18 RX ADMIN — SUCCINYLCHOLINE CHLORIDE 100 MG: 20 INJECTION, SOLUTION INTRAMUSCULAR; INTRAVENOUS at 11:10

## 2018-10-18 RX ADMIN — LIDOCAINE HYDROCHLORIDE 30 MG: 10 INJECTION, SOLUTION INFILTRATION; PERINEURAL at 11:10

## 2018-10-18 RX ADMIN — FENTANYL CITRATE 25 MCG: 50 INJECTION INTRAMUSCULAR; INTRAVENOUS at 01:10

## 2018-10-18 RX ADMIN — PROPOFOL 140 MG: 10 INJECTION, EMULSION INTRAVENOUS at 11:10

## 2018-10-18 RX ADMIN — FENTANYL CITRATE 100 MCG: 50 INJECTION, SOLUTION INTRAMUSCULAR; INTRAVENOUS at 11:10

## 2018-10-18 RX ADMIN — ONDANSETRON 4 MG: 2 INJECTION, SOLUTION INTRAMUSCULAR; INTRAVENOUS at 12:10

## 2018-10-18 RX ADMIN — ACETAMINOPHEN 1000 MG: 10 INJECTION, SOLUTION INTRAVENOUS at 12:10

## 2018-10-18 NOTE — ANESTHESIA POSTPROCEDURE EVALUATION
Anesthesia Post Evaluation    Patient: Reta Navas    Procedure(s) Performed: Procedure(s) (LRB):  XI ROBOTIC CHOLECYSTECTOMY (N/A)    Final Anesthesia Type: general  Patient location during evaluation: PACU  Patient participation: Yes- Able to Participate  Level of consciousness: awake and alert and oriented  Post-procedure vital signs: reviewed and stable  Pain management: adequate  Airway patency: patent  PONV status at discharge: No PONV  Anesthetic complications: no      Cardiovascular status: blood pressure returned to baseline  Respiratory status: unassisted and spontaneous ventilation  Hydration status: euvolemic          Visit Vitals  /73   Pulse 70   Temp 36.4 °C (97.6 °F) (Temporal)   Resp 14   Ht 5' (1.524 m)   Wt 71.2 kg (156 lb 15.5 oz)   SpO2 95%   Breastfeeding? No   BMI 30.66 kg/m²       Pain/Yessy Score: Pain Assessment Performed: Yes (10/18/2018  1:30 PM)  Presence of Pain: complains of pain/discomfort (10/18/2018  1:30 PM)  Pain Rating Prior to Med Admin: 5 (10/18/2018  1:17 PM)  Yessy Score: 9 (10/18/2018  1:30 PM)

## 2018-10-18 NOTE — DISCHARGE INSTRUCTIONS
General Information:    1. Do not drink alcoholic beverages including beer for 24 hours or as long as you are on pain medication..  2. Do not drive a motor vehicle, operate machinery or power tools, or signs legal papers for 24 hours or as long as you are on pain medication.   3. You may experience light-headedness, dizziness, and sleepiness following surgery. Please do not stay alone. A responsible adult should be with you for this 24 hour period.  4. Go home and rest.  5. Progress slowly to a normal diet unless instructed.  Otherwise, begin with liquids such as soft drinks, then soup and crackers working up to solid foods. Drink plenty of nonalcoholic fluids.  6. Certain anesthetics and pain medications produce nausea and vomiting in certain individuals. If nausea becomes a problem at home, call you doctor.  7. A nurse will be calling you sometime after surgery. Do not be alarmed. This is our way of finding out how you are doing.  8. Several times every hour while you are awake, take 2-3 deep breaths and cough. If you had stomach surgery hold a pillow or rolled towel firmly against your stomach before you cough. This will help with any pain the cough might cause.  9. Several times every hour while you are awake, pump and flex your feet 5-6 times and do foot circles. This will help prevent blood clots.  10. Call your doctor for severe pain, bleeding, fever, or signs or symptoms of infection (pain, swelling, redness, foul odor, drainage).  11. You can contact your doctor anytime by [phone] for the [facility] (at [facility]) or [phone] for the [facility] on [facility].   [email or web address] is another way to contact your doctor if you are an active participant online with [facility].  12. Continue Nozin provided at discharge twice daily for 7 days or until the incision is healed.  See pamphlet or box for instructions.

## 2018-10-18 NOTE — DISCHARGE SUMMARY
[facility]  Short Stay  Discharge Summary    Admit Date: 10/18/2018    Discharge Date and Time: 10/18/2018      Discharge Attending Physician: Robel Ferrer MD     Reason for Admission: Biliary colic.    Hospital Course (synopsis of major diagnoses, care, treatment, and services provided during the course of the hospital stay): Uneventful and full recovery    Final Diagnoses:    Principal Problem: Biliary colic   Secondary Diagnoses: Biliary colic    Procedures Performed: Procedure(s) (LRB):  XI ROBOTIC CHOLECYSTECTOMY (N/A)      Discharged Condition: good    Disposition: Home or Self Care     Diet: regular.    Discharge Condition: Stable    Follow up/Patient Instructions:    Follow-up Information     Opal Elmore PA-C.    Specialty:  General Surgery  Contact information:  [address] [place] [address] [address] Andrey [place] [id number]  [phone]                   Medications:      Medication List      START taking these medications    oxyCODONE-acetaminophen  mg per tablet  Commonly known as:  PERCOCET  Take 1 tablet by mouth every 4 (four) hours as needed for Pain.        CONTINUE taking these medications    clobetasol 0.05 % cream  Commonly known as:  TEMOVATE  APPLY TWICE DAILY TO AFFECTED AREA     ergocalciferol 50,000 unit Cap  Commonly known as:  ERGOCALCIFEROL  TAKE 1 CAPSULE BY MOUTH WEEKLY     fluticasone 50 mcg/actuation nasal spray  Commonly known as:  FLONASE  2 sprays (100 mcg total) by Each Nare route once daily.     montelukast 10 mg tablet  Commonly known as:  SINGULAIR  TAKE 1 TABLET BY MOUTH EVERY EVENING.           Where to Get Your Medications      These medications were sent to [facility] [address] [place] [address]    Hours:  Mon-Fri, 8a-5:30p Phone:  [phone]   · oxyCODONE-acetaminophen  mg per tablet       Discharge Procedure Orders   Diet general     Remove dressing in 48 hours

## 2018-10-18 NOTE — TRANSFER OF CARE
Anesthesia Transfer of Care Note    Patient: Reta Navas    Procedure(s) Performed: Procedure(s) (LRB):  XI ROBOTIC CHOLECYSTECTOMY (N/A)    Patient location: PACU    Anesthesia Type: general    Transport from OR: Transported from OR on room air with adequate spontaneous ventilation    Post pain: adequate analgesia    Post assessment: no apparent anesthetic complications and tolerated procedure well    Post vital signs: stable    Level of consciousness: sedated    Nausea/Vomiting: no nausea/vomiting    Complications: none    Transfer of care protocol was followed      Last vitals:   Visit Vitals  /81 (BP Location: Right arm, Patient Position: Sitting)   Pulse 73   Temp 37.1 °C (98.7 °F) (Temporal)   Resp 18   Ht 5' (1.524 m)   Wt 71.2 kg (156 lb 15.5 oz)   SpO2 97%   Breastfeeding? No   BMI 30.66 kg/m²

## 2018-10-18 NOTE — OP NOTE
"Operative Note       SURGERY DATE:  10/18/2018    PRE-OP DIAGNOSIS:  Biliary colic [K80.50]    POST-OP DIAGNOSIS:  Biliary colic [K80.50]   Active Hospital Problems    Diagnosis  POA    *Biliary colic [K80.50]  Yes      Resolved Hospital Problems   No resolved problems to display.       Procedure(s) (LRB):  XI ROBOTIC CHOLECYSTECTOMY (N/A)    Surgeon(s) and Role:     * Robel Ferrer MD - Primary    ASSISTANTS: None  ANESTHESIA: General    FINDINGS: gallstones.    ESTIMATED BLOOD LOSS: 5 mL              COMPLICATIONS:  None    SPECIMEN:  gall bladder    Implants: None    INDICATION:  Biliary colic.    DESCRIPTION OF PROCEDURE:    The patient was taken to the operating room and underwent general anesthesia with orotracheal intubation. Once the patient was sleep, a "time-out" was called, the patient's ID, the site of surgery, the names of participants, and the planned surgery were confirmed prior to making the incision. A Verres needle was inserted on the left upper quadrant, and achieved pneumoperitoneum. Once gained access, insufflation was achieved up to 15 mm Hg. Then four 8 mm metal trocars were positioned in a line on the abdomen toward the gall bladder. The patient was then positioned in reverse trendelenburg position, and docking was completed. The gall bladder was retracted cephalad, and the Calot's triangle was exposed. With the indocyanine Green 2.5 mg injected prior to the surgery, the biliary tree was visualized. There was no filling defects in the biliary tree, and visualized the cystic duct. The cystic duct and artery were identified, and clipped twice distally and single proximally with Hemolock clips. The structures divided, and then gall bladder was then mobilized off the fossa. The gall bladder was then removed from the peritoneal cavity after placing in a 5 mm bag. With the help of Firefly, there was no evidence of bile leakage at the operative field. The port sites were all injected with 0.25% " Marcaine and wound closed in the usual fashion with 4-0 Vicryl. The incisions were dressed with steristrips and band-aids. The patient was later awakened and extubated.           CONDITION: Good    DISPOSITION: PACU - hemodynamically stable.     Robel Ferrer

## 2018-10-18 NOTE — ANESTHESIA PREPROCEDURE EVALUATION
10/18/2018  Reta Navas is a 52 y.o., female.    Anesthesia Evaluation    I have reviewed the Patient Summary Reports.    I have reviewed the Nursing Notes.   I have reviewed the Medications.     Review of Systems  Anesthesia Hx:  No problems with previous Anesthesia Denies Hx of Anesthetic complications  History of prior surgery of interest to airway management or planning: Denies Family Hx of Anesthesia complications.   Denies Personal Hx of Anesthesia complications.   EENT/Dental:EENT/Dental Normal   Cardiovascular:   Exercise tolerance: good Hypertension Denies CAD.         Hypertension, Essential Hypertension    Pulmonary:   Denies COPD. Asthma asymptomatic  Denies Shortness of breath.    Renal/:  Renal/ Normal     Hepatic/GI:   Denies GERD.  Biliary Disease, Chronic Cholecystitis    Neurological:   Neuromuscular Disease,  Neuromuscular Disease, Myasthenia Gravis       Physical Exam  General:  Well nourished    Airway/Jaw/Neck:  Airway Findings: Mouth Opening: Normal Tongue: Normal  General Airway Assessment: Adult  Mallampati: II  TM Distance: Normal, at least 6 cm      Dental:  Dental Findings: In tact   Chest/Lungs:  Chest/Lungs Findings: Clear to auscultation, Normal Respiratory Rate     Heart/Vascular:  Heart Findings: Rate: Normal  Rhythm: Regular Rhythm  Sounds: Normal        Mental Status:  Mental Status Findings:  Cooperative, Alert and Oriented         Anesthesia Plan  Type of Anesthesia, risks & benefits discussed:  Anesthesia Type:  general  Patient's Preference:   Intra-op Monitoring Plan: standard ASA monitors  Intra-op Monitoring Plan Comments:   Post Op Pain Control Plan: multimodal analgesia  Post Op Pain Control Plan Comments:   Induction:   IV  Beta Blocker:  Patient is not currently on a Beta-Blocker (No further documentation required).       Informed Consent: Patient  understands risks and agrees with Anesthesia plan.  Questions answered. Anesthesia consent signed with patient.  ASA Score: 2     Day of Surgery Review of History & Physical: I have interviewed and examined the patient. I have reviewed the patient's H&P dated:  There are no significant changes.  H&P update referred to the surgeon.         Ready For Surgery From Anesthesia Perspective.

## 2018-11-01 VITALS
TEMPERATURE: 98 F | HEIGHT: 60 IN | BODY MASS INDEX: 30.81 KG/M2 | DIASTOLIC BLOOD PRESSURE: 73 MMHG | RESPIRATION RATE: 14 BRPM | HEART RATE: 70 BPM | SYSTOLIC BLOOD PRESSURE: 119 MMHG | WEIGHT: 156.94 LBS | OXYGEN SATURATION: 95 %

## 2018-11-02 ENCOUNTER — OFFICE VISIT (OUTPATIENT)
Dept: SURGERY | Facility: CLINIC | Age: 53
End: 2018-11-02
Payer: COMMERCIAL

## 2018-11-02 VITALS
SYSTOLIC BLOOD PRESSURE: 136 MMHG | DIASTOLIC BLOOD PRESSURE: 93 MMHG | HEART RATE: 88 BPM | HEIGHT: 60 IN | WEIGHT: 158.75 LBS | BODY MASS INDEX: 31.17 KG/M2 | TEMPERATURE: 98 F

## 2018-11-02 DIAGNOSIS — Z98.890 POST-OPERATIVE STATE: Primary | ICD-10-CM

## 2018-11-02 DIAGNOSIS — K80.50 BILIARY COLIC: ICD-10-CM

## 2018-11-02 PROCEDURE — 99999 PR PBB SHADOW E&M-EST. PATIENT-LVL III: CPT | Mod: PBBFAC,,, | Performed by: PHYSICIAN ASSISTANT

## 2018-11-02 PROCEDURE — 99024 POSTOP FOLLOW-UP VISIT: CPT | Mod: S$GLB,,, | Performed by: PHYSICIAN ASSISTANT

## 2018-11-02 NOTE — PROGRESS NOTES
Reta Navas is status post robotic cholecystectomy and presents today for follow-up care.  She is tolerating a regular diet and denies fevers, nausea or vomiting. She continues to have right upper back pain at times which she also had before surgery. Pain only occurs when she moves into certain positions.     PE: Abdomen is soft, non-tender, non-distended.  Incisions clean, dry, and intact.    Pathology report was reviewed with the patient, which showed Gallbladder:  Chronic cholecystitis and cholelithiasis.    A/P:  Normal post-operative course.    The patient is instructed to avoid heavy lifting until 4 weeks after the surgery date.  Return to clinic as needed.

## 2018-11-19 ENCOUNTER — OFFICE VISIT (OUTPATIENT)
Dept: INTERNAL MEDICINE | Facility: CLINIC | Age: 53
End: 2018-11-19
Payer: COMMERCIAL

## 2018-11-19 VITALS
BODY MASS INDEX: 31.82 KG/M2 | DIASTOLIC BLOOD PRESSURE: 94 MMHG | HEART RATE: 92 BPM | SYSTOLIC BLOOD PRESSURE: 142 MMHG | WEIGHT: 162.06 LBS | TEMPERATURE: 98 F | OXYGEN SATURATION: 98 % | HEIGHT: 60 IN

## 2018-11-19 DIAGNOSIS — F32.9 MAJOR DEPRESSION, CHRONIC: ICD-10-CM

## 2018-11-19 DIAGNOSIS — J01.90 ACUTE BACTERIAL SINUSITIS: Primary | ICD-10-CM

## 2018-11-19 DIAGNOSIS — I10 HYPERTENSION GOAL BP (BLOOD PRESSURE) < 140/90: ICD-10-CM

## 2018-11-19 DIAGNOSIS — K76.0 FATTY LIVER: ICD-10-CM

## 2018-11-19 DIAGNOSIS — B96.89 ACUTE BACTERIAL SINUSITIS: Primary | ICD-10-CM

## 2018-11-19 DIAGNOSIS — E78.5 HYPERLIPIDEMIA LDL GOAL <130: ICD-10-CM

## 2018-11-19 DIAGNOSIS — R79.89 ELEVATED LFTS: ICD-10-CM

## 2018-11-19 PROCEDURE — 99214 OFFICE O/P EST MOD 30 MIN: CPT | Mod: 25,S$GLB,, | Performed by: INTERNAL MEDICINE

## 2018-11-19 PROCEDURE — 99999 PR PBB SHADOW E&M-EST. PATIENT-LVL III: CPT | Mod: PBBFAC,,, | Performed by: INTERNAL MEDICINE

## 2018-11-19 PROCEDURE — 96372 THER/PROPH/DIAG INJ SC/IM: CPT | Mod: S$GLB,,, | Performed by: INTERNAL MEDICINE

## 2018-11-19 RX ORDER — VENLAFAXINE HYDROCHLORIDE 75 MG/1
75 CAPSULE, EXTENDED RELEASE ORAL DAILY
Qty: 90 CAPSULE | Refills: 1 | Status: SHIPPED | OUTPATIENT
Start: 2018-11-19 | End: 2019-04-24

## 2018-11-19 RX ORDER — AZITHROMYCIN 250 MG/1
TABLET, FILM COATED ORAL
Qty: 6 TABLET | Refills: 0 | Status: SHIPPED | OUTPATIENT
Start: 2018-11-19 | End: 2018-11-24

## 2018-11-19 RX ORDER — DESONIDE 0.5 MG/G
CREAM TOPICAL
COMMUNITY
Start: 2015-03-04 | End: 2020-06-30 | Stop reason: SDUPTHER

## 2018-11-19 RX ORDER — METHYLPREDNISOLONE ACETATE 40 MG/ML
40 INJECTION, SUSPENSION INTRA-ARTICULAR; INTRALESIONAL; INTRAMUSCULAR; SOFT TISSUE
Status: COMPLETED | OUTPATIENT
Start: 2018-11-19 | End: 2018-11-19

## 2018-11-19 RX ORDER — VENLAFAXINE HYDROCHLORIDE 75 MG/1
75 CAPSULE, EXTENDED RELEASE ORAL DAILY
Qty: 30 CAPSULE | Refills: 3 | Status: SHIPPED | OUTPATIENT
Start: 2018-11-19 | End: 2018-11-19 | Stop reason: SDUPTHER

## 2018-11-19 RX ORDER — VENLAFAXINE HYDROCHLORIDE 75 MG/1
CAPSULE, EXTENDED RELEASE ORAL
COMMUNITY
Start: 2017-07-11 | End: 2018-11-19 | Stop reason: SDUPTHER

## 2018-11-19 RX ADMIN — METHYLPREDNISOLONE ACETATE 40 MG: 40 INJECTION, SUSPENSION INTRA-ARTICULAR; INTRALESIONAL; INTRAMUSCULAR; SOFT TISSUE at 05:11

## 2018-11-21 NOTE — PROGRESS NOTES
Subjective:       Patient ID: Reta Navas is a 53 y.o. female.    Chief Complaint: Sinus Problem and Follow-up    Reta Navas  53 y.o. White female    Patient presents with:  Sinus Problem  Follow-up    HPI: Presents to the clinic with complaint of sinus problems and to follow up.  She has been having sinus pressure for the past week. Her symptoms were getting better then started worsening. She denies a fever or purulent nasal drainage. She uses Flonase and takes Singulair.   HTN--elevated today. She is not on medication. She checks her b/p at home and gets variable readings. She has not had her cuff checked.   She needs labs.                     LDLCALC                  109.8               03/13/2015          ;  She recently had a cholecystectomy. She would like to have her LFT's rechecked. She has a fatty liver.                     ALT                      53 (H)              09/21/2018                 AST                      38                  09/21/2018                 ALKPHOS                  87                  09/21/2018                 BILITOT                  0.4                 09/21/2018            Depression--she would like to get back on medication. She was taking venlafaxine and it helped.     Past Medical History:  Abnormal Pap smear  Chronic rhinitis  Hyperlipidemia      Comment:  history  Hypertension  Overactive bladder  Plantar fasciitis, bilateral  Psoriasis    Current Outpatient Medications on File Prior to Visit:  clobetasol (TEMOVATE) 0.05 % cream, APPLY TWICE DAILY TO AFFECTED AREA, Disp: 45 g, Rfl: 1  desonide (DESOWEN) 0.05 % cream, , Disp: , Rfl:   ergocalciferol (ERGOCALCIFEROL) 50,000 unit Cap, TAKE 1 CAPSULE BY MOUTH WEEKLY, Disp: 90 capsule, Rfl: 3  fluticasone (FLONASE) 50 mcg/actuation nasal spray, 2 sprays (100 mcg total) by Each Nare route once daily., Disp: 2 Bottle, Rfl: 6  montelukast (SINGULAIR) 10 mg tablet, TAKE 1 TABLET BY MOUTH EVERY EVENING., Disp: 90 tablet,  Rfl: 0    Allergies:  Review of patient's allergies indicates:   -- Latex, natural rubber         Review of Systems   Constitutional: Negative for fever.   HENT: Positive for congestion and sinus pressure.    Respiratory: Negative for shortness of breath.    Cardiovascular: Negative for chest pain.   Gastrointestinal: Positive for constipation. Negative for abdominal pain.   Musculoskeletal: Negative for gait problem.   Neurological: Negative for dizziness and headaches.   Psychiatric/Behavioral: Positive for dysphoric mood.       Objective:      Physical Exam   Constitutional: She is oriented to person, place, and time. She appears well-developed and well-nourished. No distress.   Eyes: No scleral icterus.   Neck: No tracheal deviation present. No thyromegaly present.   Cardiovascular: Normal rate, regular rhythm and normal heart sounds.   Pulmonary/Chest: Effort normal and breath sounds normal. No respiratory distress. She has no wheezes. She has no rales.   Abdominal: Soft. Bowel sounds are normal.   Lymphadenopathy:     She has no cervical adenopathy.   Neurological: She is alert and oriented to person, place, and time.   Skin: Skin is warm and dry.   Psychiatric: She has a normal mood and affect.   Vitals reviewed.      Assessment:       1. Acute bacterial sinusitis    2. Hypertension goal BP (blood pressure) < 140/90    3. Hyperlipidemia LDL goal <130    4. Elevated LFTs    5. Fatty liver    6. Major depression, chronic        Plan:       Reta was seen today for sinus problem and follow-up.    Diagnoses and all orders for this visit:    Acute bacterial sinusitis  -     methylPREDNISolone acetate injection 40 mg  -     azithromycin (Z-TORSTEN) 250 MG tablet; Take 2 tablets by mouth on day 1; Take 1 tablet by mouth on days 2-5  -     Continue Flonase and Singulair     Hypertension goal BP (blood pressure) < 140/90  -     Comprehensive metabolic panel; Future  -     Lipid panel; Future    Hyperlipidemia LDL goal  <130  -     Lipid panel; Future    Elevated LFTs  -     Comprehensive metabolic panel; Future    Fatty liver  -     Lifestyle modifications discussed    Major depression, chronic  -     Resume venlafaxine (EFFEXOR-XR) 75 MG 24 hr capsule; Take 1 capsule (75 mg total) by mouth once daily.    Schedule labs.     F/U in 2 weeks for b/p recheck and cuff check.

## 2018-12-03 ENCOUNTER — OFFICE VISIT (OUTPATIENT)
Dept: INTERNAL MEDICINE | Facility: CLINIC | Age: 53
End: 2018-12-03
Payer: COMMERCIAL

## 2018-12-03 VITALS
BODY MASS INDEX: 31.86 KG/M2 | HEART RATE: 95 BPM | DIASTOLIC BLOOD PRESSURE: 94 MMHG | WEIGHT: 162.25 LBS | SYSTOLIC BLOOD PRESSURE: 140 MMHG | OXYGEN SATURATION: 98 % | HEIGHT: 60 IN | TEMPERATURE: 99 F

## 2018-12-03 DIAGNOSIS — I10 HYPERTENSION GOAL BP (BLOOD PRESSURE) < 140/90: Primary | ICD-10-CM

## 2018-12-03 DIAGNOSIS — I10 HYPERTENSION GOAL BP (BLOOD PRESSURE) < 140/90: ICD-10-CM

## 2018-12-03 PROCEDURE — 99214 OFFICE O/P EST MOD 30 MIN: CPT | Mod: S$GLB,,, | Performed by: INTERNAL MEDICINE

## 2018-12-03 PROCEDURE — 99999 PR PBB SHADOW E&M-EST. PATIENT-LVL III: CPT | Mod: PBBFAC,,, | Performed by: INTERNAL MEDICINE

## 2018-12-03 RX ORDER — AMLODIPINE BESYLATE 2.5 MG/1
2.5 TABLET ORAL DAILY
Qty: 30 TABLET | Refills: 1 | Status: SHIPPED | OUTPATIENT
Start: 2018-12-03 | End: 2018-12-24

## 2018-12-03 NOTE — PROGRESS NOTES
Reta Navas  53 y.o. White female    HPI: Presents to the clinic to follow up on hypertension. Her b/p remains above goal. She checks it at home and states it runs about the same. She is not on medication for hypertension but was on amlodipine and HCTZ in the past. She states she urinated too much with HCTZ but does not recall side effects from amlodipine.     Past Medical History:   Diagnosis Date    Abnormal Pap smear     Chronic rhinitis     Depression     Hyperlipidemia     Hypertension     Overactive bladder     Plantar fasciitis, bilateral     Psoriasis          Current Outpatient Medications:     clobetasol (TEMOVATE) 0.05 % cream, APPLY TWICE DAILY TO AFFECTED AREA, Disp: 45 g, Rfl: 1    desonide (DESOWEN) 0.05 % cream, , Disp: , Rfl:     ergocalciferol (ERGOCALCIFEROL) 50,000 unit Cap, TAKE 1 CAPSULE BY MOUTH WEEKLY, Disp: 90 capsule, Rfl: 3    fluticasone (FLONASE) 50 mcg/actuation nasal spray, 2 sprays (100 mcg total) by Each Nare route once daily., Disp: 2 Bottle, Rfl: 6    montelukast (SINGULAIR) 10 mg tablet, TAKE 1 TABLET BY MOUTH EVERY EVENING., Disp: 90 tablet, Rfl: 0    venlafaxine (EFFEXOR-XR) 75 MG 24 hr capsule, Take 1 capsule (75 mg total) by mouth once daily., Disp: 90 capsule, Rfl: 1    Review of patient's allergies indicates:   Allergen Reactions    Latex, natural rubber        ROS: Denies dizziness, headache, chest pain, shortness of breath or edema    PE:  GEN: Alert and oriented, no acute distress  HEART: Normal S1 and S2, RRR, no lower extremity edema  LUNGS: Respirations unlabored, bilaterally clear to auscultation    ASSESSMENT/PLAN:  Reta was seen today for follow-up.    Diagnoses and all orders for this visit:    Hypertension goal BP (blood pressure) < 140/90  -     amLODIPine (NORVASC) 2.5 MG tablet; Take 1 tablet (2.5 mg total) by mouth once daily.  -     Discussed lifestyle modifications  -     Recommend home b/p monitoring     RTC in 2 weeks for b/p recheck.

## 2018-12-04 RX ORDER — AMLODIPINE BESYLATE 2.5 MG/1
TABLET ORAL
Qty: 90 TABLET | Refills: 1 | OUTPATIENT
Start: 2018-12-04

## 2018-12-18 ENCOUNTER — CLINICAL SUPPORT (OUTPATIENT)
Dept: INTERNAL MEDICINE | Facility: CLINIC | Age: 53
End: 2018-12-18
Payer: COMMERCIAL

## 2018-12-18 ENCOUNTER — LAB VISIT (OUTPATIENT)
Dept: LAB | Facility: HOSPITAL | Age: 53
End: 2018-12-18
Attending: INTERNAL MEDICINE
Payer: COMMERCIAL

## 2018-12-18 VITALS — DIASTOLIC BLOOD PRESSURE: 90 MMHG | HEART RATE: 78 BPM | OXYGEN SATURATION: 98 % | SYSTOLIC BLOOD PRESSURE: 130 MMHG

## 2018-12-18 DIAGNOSIS — I10 HYPERTENSION GOAL BP (BLOOD PRESSURE) < 140/90: ICD-10-CM

## 2018-12-18 DIAGNOSIS — R79.89 ELEVATED LFTS: ICD-10-CM

## 2018-12-18 DIAGNOSIS — E78.5 HYPERLIPIDEMIA LDL GOAL <130: ICD-10-CM

## 2018-12-18 DIAGNOSIS — E55.9 VITAMIN D INSUFFICIENCY: ICD-10-CM

## 2018-12-18 LAB
25(OH)D3+25(OH)D2 SERPL-MCNC: 25 NG/ML
ALBUMIN SERPL BCP-MCNC: 3.7 G/DL
ALP SERPL-CCNC: 112 U/L
ALT SERPL W/O P-5'-P-CCNC: 71 U/L
ANION GAP SERPL CALC-SCNC: 9 MMOL/L
AST SERPL-CCNC: 37 U/L
BILIRUB SERPL-MCNC: 0.4 MG/DL
BUN SERPL-MCNC: 12 MG/DL
CALCIUM SERPL-MCNC: 9.3 MG/DL
CHLORIDE SERPL-SCNC: 107 MMOL/L
CHOLEST SERPL-MCNC: 171 MG/DL
CHOLEST/HDLC SERPL: 3.5 {RATIO}
CO2 SERPL-SCNC: 26 MMOL/L
CREAT SERPL-MCNC: 0.8 MG/DL
EST. GFR  (AFRICAN AMERICAN): >60 ML/MIN/1.73 M^2
EST. GFR  (NON AFRICAN AMERICAN): >60 ML/MIN/1.73 M^2
GLUCOSE SERPL-MCNC: 104 MG/DL
HDLC SERPL-MCNC: 49 MG/DL
HDLC SERPL: 28.7 %
LDLC SERPL CALC-MCNC: 98.6 MG/DL
NONHDLC SERPL-MCNC: 122 MG/DL
POTASSIUM SERPL-SCNC: 4.2 MMOL/L
PROT SERPL-MCNC: 7.7 G/DL
SODIUM SERPL-SCNC: 142 MMOL/L
TRIGL SERPL-MCNC: 117 MG/DL

## 2018-12-18 PROCEDURE — 82306 VITAMIN D 25 HYDROXY: CPT

## 2018-12-18 PROCEDURE — 80053 COMPREHEN METABOLIC PANEL: CPT

## 2018-12-18 PROCEDURE — 80061 LIPID PANEL: CPT

## 2018-12-18 PROCEDURE — 99999 PR PBB SHADOW E&M-EST. PATIENT-LVL II: CPT | Mod: PBBFAC,,,

## 2018-12-18 PROCEDURE — 36415 COLL VENOUS BLD VENIPUNCTURE: CPT

## 2018-12-18 NOTE — PROGRESS NOTES
"Pt here for b/p check. Today b/p 132/90 Hr 78  Pt stated, " At home my b/p runs high. I think my medication should be increased." Pt denies headaches or dizziness.  Pt getting labs done after b/p.  Please advise.  "

## 2018-12-19 ENCOUNTER — PATIENT MESSAGE (OUTPATIENT)
Dept: INTERNAL MEDICINE | Facility: CLINIC | Age: 53
End: 2018-12-19

## 2018-12-19 NOTE — TELEPHONE ENCOUNTER
Pt also wants to know about her Vitmin D levels she said she takes her vitamin D meds and not understanding why her levels are so low.

## 2018-12-24 DIAGNOSIS — I10 HYPERTENSION GOAL BP (BLOOD PRESSURE) < 140/90: ICD-10-CM

## 2018-12-24 RX ORDER — AMLODIPINE BESYLATE 5 MG/1
TABLET ORAL
Qty: 90 TABLET | Refills: 1 | OUTPATIENT
Start: 2018-12-24

## 2018-12-24 RX ORDER — AMLODIPINE BESYLATE 5 MG/1
5 TABLET ORAL DAILY
Qty: 30 TABLET | Refills: 1 | Status: SHIPPED | OUTPATIENT
Start: 2018-12-24 | End: 2019-03-01 | Stop reason: SDUPTHER

## 2018-12-26 NOTE — PROGRESS NOTES
Spoke with pt about the increase in her medication, and schedule her appt for 2 weeks to get b/p check. Pt states she seen her lab results and is concern about her liver, wants to know what do she need to do or what is the next step to get the levels right?

## 2018-12-26 NOTE — PROGRESS NOTES
Recommendations have been sent to patient regarding diet, exercise and weight loss for fatty liver/elevated liver enzymes.   If she would like, she can see a gastroenterologist as well.

## 2018-12-30 DIAGNOSIS — I10 HYPERTENSION GOAL BP (BLOOD PRESSURE) < 140/90: ICD-10-CM

## 2018-12-31 RX ORDER — AMLODIPINE BESYLATE 2.5 MG/1
TABLET ORAL
Qty: 90 TABLET | Refills: 1 | Status: SHIPPED | OUTPATIENT
Start: 2018-12-31 | End: 2019-01-11 | Stop reason: DRUGHIGH

## 2019-01-02 RX ORDER — MONTELUKAST SODIUM 10 MG/1
TABLET ORAL
Qty: 90 TABLET | Refills: 0 | Status: SHIPPED | OUTPATIENT
Start: 2019-01-02 | End: 2019-12-16 | Stop reason: SDUPTHER

## 2019-01-08 ENCOUNTER — TELEPHONE (OUTPATIENT)
Dept: INTERNAL MEDICINE | Facility: CLINIC | Age: 54
End: 2019-01-08

## 2019-01-08 NOTE — TELEPHONE ENCOUNTER
----- Message from Candi Cole sent at 1/8/2019  1:50 PM CST -----  Contact: self [phone]  Would like to reschedule nurse visit appt scheduled on 01/09 to 1:45pm.  Please call back at [phone].  Sanjuana Cuellar

## 2019-01-09 ENCOUNTER — CLINICAL SUPPORT (OUTPATIENT)
Dept: INTERNAL MEDICINE | Facility: CLINIC | Age: 54
End: 2019-01-09
Payer: COMMERCIAL

## 2019-01-09 VITALS — OXYGEN SATURATION: 96 % | DIASTOLIC BLOOD PRESSURE: 90 MMHG | SYSTOLIC BLOOD PRESSURE: 140 MMHG | HEART RATE: 83 BPM

## 2019-01-09 DIAGNOSIS — R79.89 ELEVATED LFTS: Primary | ICD-10-CM

## 2019-01-09 PROCEDURE — 99999 PR PBB SHADOW E&M-EST. PATIENT-LVL III: ICD-10-PCS | Mod: PBBFAC,,,

## 2019-01-09 PROCEDURE — 99999 PR PBB SHADOW E&M-EST. PATIENT-LVL III: CPT | Mod: PBBFAC,,,

## 2019-01-09 NOTE — PROGRESS NOTES
Patient came in today for b/p check 140/90, pulse 83. Ox 96, she compared it to her machine it was 164/113. Pt is concerned about her lab results and would like to know what is the next step to get her levels back normal.

## 2019-01-11 ENCOUNTER — TELEPHONE (OUTPATIENT)
Dept: INTERNAL MEDICINE | Facility: CLINIC | Age: 54
End: 2019-01-11

## 2019-01-11 NOTE — PROGRESS NOTES
Just to clarify. Patient should be taking amlodipine 5 mg daily.   Recommend lifestyle modifications.   Repeat LFT's in 6 weeks.   If LFT's remain elevated I recommend she see GI.   Schedule f/u with me in 6 weeks as well for HTN.

## 2019-03-01 DIAGNOSIS — I10 HYPERTENSION GOAL BP (BLOOD PRESSURE) < 140/90: ICD-10-CM

## 2019-03-01 RX ORDER — AMLODIPINE BESYLATE 5 MG/1
TABLET ORAL
Qty: 30 TABLET | Refills: 3 | Status: SHIPPED | OUTPATIENT
Start: 2019-03-01 | End: 2019-06-05 | Stop reason: SDUPTHER

## 2019-03-21 RX ORDER — ERGOCALCIFEROL 1.25 MG/1
CAPSULE ORAL
Qty: 90 CAPSULE | Refills: 1 | Status: SHIPPED | OUTPATIENT
Start: 2019-03-21 | End: 2020-09-28

## 2019-04-24 ENCOUNTER — OFFICE VISIT (OUTPATIENT)
Dept: OBSTETRICS AND GYNECOLOGY | Facility: CLINIC | Age: 54
End: 2019-04-24
Payer: COMMERCIAL

## 2019-04-24 ENCOUNTER — LAB VISIT (OUTPATIENT)
Dept: LAB | Facility: HOSPITAL | Age: 54
End: 2019-04-24
Attending: INTERNAL MEDICINE
Payer: COMMERCIAL

## 2019-04-24 VITALS
BODY MASS INDEX: 31.68 KG/M2 | HEIGHT: 60 IN | SYSTOLIC BLOOD PRESSURE: 118 MMHG | DIASTOLIC BLOOD PRESSURE: 78 MMHG | WEIGHT: 161.38 LBS

## 2019-04-24 DIAGNOSIS — Z12.39 BREAST CANCER SCREENING: ICD-10-CM

## 2019-04-24 DIAGNOSIS — R79.89 ELEVATED LFTS: ICD-10-CM

## 2019-04-24 DIAGNOSIS — E55.9 VITAMIN D INSUFFICIENCY: ICD-10-CM

## 2019-04-24 DIAGNOSIS — N95.2 ATROPHIC VAGINITIS: ICD-10-CM

## 2019-04-24 DIAGNOSIS — Z01.419 WELL WOMAN EXAM WITH ROUTINE GYNECOLOGICAL EXAM: Primary | ICD-10-CM

## 2019-04-24 DIAGNOSIS — Z12.4 CERVICAL CANCER SCREENING: ICD-10-CM

## 2019-04-24 DIAGNOSIS — R79.89 ELEVATED LFTS: Primary | ICD-10-CM

## 2019-04-24 LAB
25(OH)D3+25(OH)D2 SERPL-MCNC: 19 NG/ML (ref 30–96)
ALBUMIN SERPL BCP-MCNC: 4 G/DL (ref 3.5–5.2)
ALP SERPL-CCNC: 125 U/L (ref 55–135)
ALT SERPL W/O P-5'-P-CCNC: 60 U/L (ref 10–44)
AST SERPL-CCNC: 37 U/L (ref 10–40)
BILIRUB DIRECT SERPL-MCNC: 0.2 MG/DL (ref 0.1–0.3)
BILIRUB SERPL-MCNC: 0.4 MG/DL (ref 0.1–1)
PROT SERPL-MCNC: 7.8 G/DL (ref 6–8.4)

## 2019-04-24 PROCEDURE — 80076 HEPATIC FUNCTION PANEL: CPT

## 2019-04-24 PROCEDURE — 36415 COLL VENOUS BLD VENIPUNCTURE: CPT

## 2019-04-24 PROCEDURE — 99386 PR PREVENTIVE VISIT,NEW,40-64: ICD-10-PCS | Mod: S$GLB,,, | Performed by: OBSTETRICS & GYNECOLOGY

## 2019-04-24 PROCEDURE — 99999 PR PBB SHADOW E&M-EST. PATIENT-LVL III: ICD-10-PCS | Mod: PBBFAC,,, | Performed by: OBSTETRICS & GYNECOLOGY

## 2019-04-24 PROCEDURE — 88175 CYTOPATH C/V AUTO FLUID REDO: CPT

## 2019-04-24 PROCEDURE — 87624 HPV HI-RISK TYP POOLED RSLT: CPT

## 2019-04-24 PROCEDURE — 99999 PR PBB SHADOW E&M-EST. PATIENT-LVL III: CPT | Mod: PBBFAC,,, | Performed by: OBSTETRICS & GYNECOLOGY

## 2019-04-24 PROCEDURE — 99386 PREV VISIT NEW AGE 40-64: CPT | Mod: S$GLB,,, | Performed by: OBSTETRICS & GYNECOLOGY

## 2019-04-24 PROCEDURE — 82306 VITAMIN D 25 HYDROXY: CPT

## 2019-04-24 RX ORDER — ESTRADIOL 10 UG/1
1 INSERT VAGINAL
Qty: 8 TABLET | Refills: 11 | Status: SHIPPED | OUTPATIENT
Start: 2019-04-25 | End: 2020-06-01

## 2019-04-24 NOTE — PROGRESS NOTES
Subjective:       Patient ID: Reta Navas is a 53 y.o. female.    Chief Complaint:  Annual Exam      History of Present Illness  HPI  Presents for well-woman exam.  Patient has a hx of supracervical hysterectomy/LSO for benign indications.  Has a hx of dysplasia in the past.  She took effexor for menopausal hot flushes, and it helped.  She stopped it, then resumed it, but developed significant thirst while on it, so stopped it again.  Has some mild hot flushes, but states they are manageable.  She does report significant vaginal dryness during intercourse.  Uses OTC lubricant, but still has dryness and pain.  Is mutually monogamous with her .   Needs screening MMG.  Denies any breast complaints.  Colonoscopy : one small polyp; repeat due in 5-10 years    GYN & OB History  No LMP recorded. Patient has had a hysterectomy.   Date of Last Pap: 2013    OB History    Para Term  AB Living   5 4 4   1 4   SAB TAB Ectopic Multiple Live Births   1              # Outcome Date GA Lbr Cesar/2nd Weight Sex Delivery Anes PTL Lv   5 Term  38w0d  3.175 kg (7 lb)  Vag-Spont      4 Term  40w0d  3.175 kg (7 lb)  Vag-Spont      3 Term  40w0d  3.175 kg (7 lb)  Vag-Spont      2 Term  42w0d  4.309 kg (9 lb 8 oz)  Vag-Spont EPI     1 SAB                Review of Systems  Review of Systems   Constitutional: Negative for fatigue, fever and unexpected weight change.   Gastrointestinal: Negative for abdominal pain, bloating, blood in stool, constipation, diarrhea, nausea and vomiting.   Endocrine: Positive for hot flashes (mild, and currently able to manage them).   Genitourinary: Positive for dyspareunia and vaginal pain (dryness and pain with sex). Negative for decreased libido, dysuria, flank pain, frequency, genital sores, hematuria, pelvic pain, urgency, vaginal bleeding, vaginal discharge, urinary incontinence, postcoital bleeding, postmenopausal bleeding and vaginal odor.   Integumentary:  Negative for  rash, hair changes, breast mass, nipple discharge and breast skin changes.   Psychiatric/Behavioral: Negative for depression. The patient is not nervous/anxious.    Breast: Negative for mass, mastodynia, nipple discharge and skin changes          Objective:    Physical Exam:   Constitutional: She is oriented to person, place, and time. She appears well-developed and well-nourished. No distress.      Neck: Neck supple. No thyromegaly present.     Pulmonary/Chest: Right breast exhibits no inverted nipple, no mass, no nipple discharge, no skin change, no tenderness, no bleeding and no swelling. Left breast exhibits no inverted nipple, no mass, no nipple discharge, no skin change, no tenderness, no bleeding and no swelling. Breasts are symmetrical.        Abdominal: Soft. She exhibits no distension and no mass. There is no tenderness. There is no rebound and no guarding.     Genitourinary: Pelvic exam was performed with patient supine. There is no rash, tenderness, lesion or injury on the right labia. There is no rash, tenderness, lesion or injury on the left labia. Uterus is absent. Cervix is normal. Right adnexum displays no mass, no tenderness and no fullness. Left adnexum displays no mass, no tenderness and no fullness. There is tenderness in the vagina. No erythema (mucosa pale, thin, not well-rugated), bleeding, rectocele or cystocele in the vagina. No foreign body in the vagina. No signs of injury around the vagina. No vaginal discharge found. Cervix exhibits no motion tenderness, no discharge and no friability.              Lymphadenopathy:        Right: No inguinal adenopathy present.        Left: No inguinal adenopathy present.    Neurological: She is alert and oriented to person, place, and time.     Psychiatric: She has a normal mood and affect.          Assessment:        1. Well woman exam with routine gynecological exam    2. Cervical cancer screening    3. Breast cancer screening    4. Atrophic vaginitis                 Plan:      Reta was seen today for annual exam.    Diagnoses and all orders for this visit:    Well woman exam with routine gynecological exam    Cervical cancer screening  -     Liquid-based pap smear, screening  -     HPV High Risk Genotypes, PCR    Breast cancer screening  -     Mammo Digital Screening Bilat; Future    Atrophic vaginitis  -     estradiol (VAGIFEM) 10 mcg Tab; Place 1 tablet (10 mcg total) vaginally twice a week.    Discussed pap with HPV co-tests q 3 years.  Discussed EVOO as needed for lubrication during sex.  Reviewed vaginal estrogen therapy, and she would like to try it.  Rx sent.  RTC 1 year.

## 2019-04-24 NOTE — PATIENT INSTRUCTIONS
Atrophic Vaginitis    Atrophic vaginitis means the walls of your vagina have become thin. This happens when your body makes too little of the hormone estrogen. Menopause or surgical removal of the ovaries are the most common causes for a drop in estrogen. Breastfeeding can also cause the hormone level to drop.  Symptoms of atrophic vaginitis include:  · Dryness, soreness, burning, or itching in the vagina  · Vaginal discharge  Sex can be uncomfortable, even painful. After sex, you may have bleeding from your vagina. You may also have burning or pain when you urinate.  Home care  Your healthcare provider may recommend 1 or more of these as treatment:  · Vaginal creams, lotions, and lubricants. These products help relieve vaginal dryness. They dont need a prescription. They can be found in the personal care section of most pharmacies. Creams and lotions are used daily to help keep the vagina moist. Lubricants help reduce dryness and pain during sex. Choose water-based lubricants. Dont use petroleum jelly, mineral oil, or other oils. These increase the chance of infection.   · Hormone therapy (HT). HT increases the amount of estrogen in your body. This can help manage or relieve symptoms. HT can be given in pill form. It may be given as a lotion, cream, ring put into the vagina, or a patch on the skin. The risks and benefits of HT vary for each woman. For instance, your risk may be higher if you have had breast cancer. Discuss this treatment with your healthcare provider. Not every woman can use HT.  You dont need to give up (abstain from) sex. In fact, regular sex can help keep vaginal tissues healthy. Take steps to make sex more comfortable by using water-based lubricants.  Preventing infections  Atrophic vaginitis makes an infection of the vagina or the urinary tract more likely. To help reduce your risk:  · Keep your genitals clean. When you bathe, wash the outside of your vagina with mild soap and water.  Clean gently between the folds of your vagina.  · Wipe from front to back after a bowel movement.  · Dont douche unless your healthcare provider tells you to.  · Avoid scented toilet paper, scented vaginal sprays, and scented tampons.  · Avoid wearing clothes that are tight in the crotch. These include pantyhose, jeans, and leggings. Wear cotton underwear. Change it every day.  Follow-up care  Follow up with your healthcare provider, or as advised.  When to seek medical advice  Call your health care provider right away if any of these occur:  · Fever of 100.4°F (38°C) or higher, or as directed by your healthcare provider  · Symptoms dont go away or get worse even with treatment  · Vaginal area swells or becomes painful  · Vaginal area bleeds, but not because of your period  · Bad-smelling discharge from the vagina  · Pain or burning when you urinate or you have trouble passing urine  · Open sores develop around vagina   Date Last Reviewed: 12/1/2016  © 4913-0275 [employer]. [address] [place] [address], [place] [address]. All rights reserved. This information is not intended as a substitute for professional medical care. Always follow your healthcare professional's instructions.

## 2019-04-29 LAB
HPV HR 12 DNA CVX QL NAA+PROBE: NEGATIVE
HPV16 AG SPEC QL: NEGATIVE
HPV18 DNA SPEC QL NAA+PROBE: NEGATIVE

## 2019-05-10 ENCOUNTER — OFFICE VISIT (OUTPATIENT)
Dept: INTERNAL MEDICINE | Facility: CLINIC | Age: 54
End: 2019-05-10
Payer: COMMERCIAL

## 2019-05-10 VITALS
TEMPERATURE: 98 F | HEIGHT: 60 IN | HEART RATE: 81 BPM | SYSTOLIC BLOOD PRESSURE: 124 MMHG | WEIGHT: 160.06 LBS | DIASTOLIC BLOOD PRESSURE: 68 MMHG | BODY MASS INDEX: 31.42 KG/M2 | OXYGEN SATURATION: 96 %

## 2019-05-10 DIAGNOSIS — R04.0 EPISTAXIS: Primary | ICD-10-CM

## 2019-05-10 PROCEDURE — 99999 PR PBB SHADOW E&M-EST. PATIENT-LVL IV: ICD-10-PCS | Mod: PBBFAC,,, | Performed by: NURSE PRACTITIONER

## 2019-05-10 PROCEDURE — 99213 PR OFFICE/OUTPT VISIT, EST, LEVL III, 20-29 MIN: ICD-10-PCS | Mod: S$GLB,,, | Performed by: NURSE PRACTITIONER

## 2019-05-10 PROCEDURE — 99999 PR PBB SHADOW E&M-EST. PATIENT-LVL IV: CPT | Mod: PBBFAC,,, | Performed by: NURSE PRACTITIONER

## 2019-05-10 PROCEDURE — 99213 OFFICE O/P EST LOW 20 MIN: CPT | Mod: S$GLB,,, | Performed by: NURSE PRACTITIONER

## 2019-05-10 NOTE — PATIENT INSTRUCTIONS
Nosebleed (Adult)    Bleeding from the nose most commonly occurs because of injury or drying and cracking of the inner lining of the nose. Most nosebleeds are because of dry air or nose-picking. They can occur during a common cold or an allergy attack. They can also occur on a very hot day, or from dry air in the winter.  If the bleeding site is found, it may be cauterized. This means it is treated to cause a blood clot to form. This may be done with a chemical, heat, or electricity. If the bleeding continues after the site is cauterized, or if the site cannot be found, packing may be put in your nose. This is to apply pressure and stop the bleeding. The packing may be made of gauze or sponge. A small balloon catheter is sometimes used. These must be removed by your doctor. Some types of packing dissolve on their own.  Home care  · If packing was put in your nose, unless told otherwise, do not pull on it or try to remove it yourself. You will be given an appointment to have it removed. You may also have been given antibiotics to prevent a sinus infection. If so, finish all of the medicine.  · Do not blow your nose for 12 hours after the bleeding stops. This will allow a strong blood clot to form. Do not pick your nose. This may restart bleeding.  · Avoid drinking alcohol and hot liquids for the next 2 days. Alcohol or hot liquids in your mouth can dilate blood vessels in your nose. This can cause bleeding to start again.  · Do not take ibuprofen, naproxen, or medicines that contain aspirin. These thin the blood and may cause your nose to bleed. You may take acetaminophen for pain, unless another pain medicine was prescribed.  · If the bleeding starts again, sit up and lean forward to prevent swallowing blood. Pinch your nose tightly on both sides, as shown above, for 10 to 15 minutes. Time yourself. Dont release the pressure on your nose until 10 minutes is up. If bleeding does not stop, continue to pinch your  nose and call your healthcare provider or return to this facility.  · If you have a cold, allergies, or dry nasal membranes, lubricate the nasal passages. Apply a small amount of petroleum jelly inside the nose with a cotton swab twice a day (morning and night).  · Avoid overheating your home. This can dry the air and make your condition worse.  · Put a humidifier in the room where you sleep. This will add moisture to the air.  · Use a saline nasal spray to keep nasal passages moist.  · Do not pick your nose. Keep fingernails trimmed to decrease risk of bleeds.  · Do not smoke.  Follow-up care  Follow up with your healthcare provider, or as advised. Nasal packing should be rechecked or removed within 2 to 3 days.  When to seek medical advice  Call your healthcare provider right away if any of these occur.  · You have another nosebleed that you cannot control  · Dizziness, weakness, or fainting  · You become tired or confused  · Fever of 100.4ºF (38ºC) or higher, or as directed by your healthcare provider  · Headache  · Sinus or facial pain  · Shortness of breath or trouble breathing  Date Last Reviewed: 3/22/2015  © 7119-5285 [employer]. [address] [place] [address], [place] [address]. All rights reserved. This information is not intended as a substitute for professional medical care. Always follow your healthcare professional's instructions.

## 2019-05-10 NOTE — PROGRESS NOTES
Subjective:       Patient ID: Reta Navas is a 53 y.o. female.    Chief Complaint: blood vessel popped in nostril and Sore Throat    Patient presents for nose bleed that occurred this morning. Spontaneous left-side nosebleed for 10-15 minutes. Stopped after applying continuous pressure to nasal bridge.     Reports increased stress. Son is an addict and missing for past 3 days.     Sore Throat    Associated symptoms include headaches.   Epistaxis    The bleeding has been from the left nare. This is a new problem. The current episode started today. She has experienced no nasal trauma. The problem has been resolved. She has tried vasoconstrictor for the symptoms. The treatment provided significant relief.     Review of Systems   Constitutional: Negative for chills and fever.   HENT: Positive for nosebleeds and sore throat.    Neurological: Positive for headaches.   Psychiatric/Behavioral: The patient is nervous/anxious.        Objective:      Physical Exam   Constitutional: She appears well-developed and well-nourished. No distress.   HENT:   Head: Normocephalic and atraumatic.   Nose: No mucosal edema, rhinorrhea or nasal septal hematoma. No epistaxis. Right sinus exhibits no maxillary sinus tenderness. Left sinus exhibits no maxillary sinus tenderness.   Skin: She is not diaphoretic.   Psychiatric: Her mood appears anxious.   Vitals reviewed.      Assessment:       1. Epistaxis        Plan:   Epistaxis  Comments:  resolved, discussed symptom management     Other orders  -     mupirocin calcium 2% nasl oint (BACTROBAN) 2 % Oint; by Nasal route 2 (two) times daily.  Dispense: 1 g; Refill: 0      Nosebleed lasted less than 15 minutes and no other red flag signs. Reassurance provided and handout given.   Follow up if symptoms worsen or fail to improve, for keep routine follow up.

## 2019-05-15 ENCOUNTER — HOSPITAL ENCOUNTER (EMERGENCY)
Facility: HOSPITAL | Age: 54
Discharge: HOME | End: 2019-05-15
Attending: EMERGENCY MEDICINE
Payer: COMMERCIAL

## 2019-05-15 VITALS
HEART RATE: 94 BPM | DIASTOLIC BLOOD PRESSURE: 94 MMHG | RESPIRATION RATE: 22 BRPM | HEIGHT: 60 IN | BODY MASS INDEX: 31.29 KG/M2 | OXYGEN SATURATION: 97 % | SYSTOLIC BLOOD PRESSURE: 153 MMHG | TEMPERATURE: 97 F | WEIGHT: 159.38 LBS

## 2019-05-15 DIAGNOSIS — R04.0 ANTERIOR EPISTAXIS: Primary | ICD-10-CM

## 2019-05-15 PROCEDURE — 99281 EMR DPT VST MAYX REQ PHY/QHP: CPT

## 2019-05-16 NOTE — ED PROVIDER NOTES
SCRIBE #1 NOTE: I, Nikolas Fisher, am scribing for, and in the presence of, TRENA Vasquez. I have scribed the entire note.       History     Chief Complaint   Patient presents with    Epistaxis     states taking bath tonight and nose started bleeding States this is 3rd episode this week. Saw PCP with tampon and cream given. Slight bleeding at present     Review of patient's allergies indicates:   Allergen Reactions    Latex, natural rubber          History of Present Illness     HPI    5/15/2019, 8:14 PM  History obtained from the patient      History of Present Illness: Reta Navas is a 53 y.o. female patient who presents to the Emergency Department for evaluation of epistaxis which onset gradually tonight. Pt reports that this is the 3rd episode this week. B/l nares were bleeding this evening. Pt reports that she has seasonal allergies. Symptoms are episodic and moderate in severity. No mitigating or exacerbating factors reported. There are no other sxs. Patient denies any fever, chills, lightheadedness, dizziness, HA, numbness, weakness, and all other sxs at this time. No further complaints or concerns at this time.         Arrival mode: Personal vehicle    PCP: Lexi Vazquez DO        Past Medical History:  Past Medical History:   Diagnosis Date    Abnormal Pap smear     Abnormal Pap smear of cervix     Chronic rhinitis     Depression     GERD (gastroesophageal reflux disease)     Hyperlipidemia     Hypertension     Overactive bladder     Plantar fasciitis, bilateral     Psoriasis        Past Surgical History:  Past Surgical History:   Procedure Laterality Date    CERVICAL BIOPSY  W/ LOOP ELECTRODE EXCISION      COLONOSCOPY      COLONOSCOPY N/A 6/29/2015    [facility]    GALLBLADDER SURGERY N/A 10/11/2018    hx colposcopy      HYSTERECTOMY      supracervical hyst/LSO for fibroids    OOPHORECTOMY      LSO    TUBAL LIGATION      XI ROBOTIC  CHOLECYSTECTOMY N/A 10/18/2018    [facility]el Ferrer [facility] at [facility] OR         Family History:  Family History   Problem Relation Age of Onset    Diabetes Brother     No Known Problems Father     Colon cancer Paternal Grandfather     Colon cancer Paternal Uncle     Ovarian cancer Maternal Aunt     Breast cancer Paternal Grandmother        Social History:  Social History     Tobacco Use    Smoking status: Former Smoker     Packs/day: 1.00     Years: 39.00     Pack years: 39.00     Last attempt to quit: 2013     Years since quittin.3    Smokeless tobacco: Never Used   Substance and Sexual Activity    Alcohol use: No     Alcohol/week: 0.0 oz    Drug use: No    Sexual activity: Yes     Partners: Male     Birth control/protection: None, Surgical, Post-menopausal        Review of Systems     Review of Systems   Constitutional: Negative for chills and fever.   HENT: Positive for nosebleeds. Negative for sore throat.    Respiratory: Negative for shortness of breath.    Cardiovascular: Negative for chest pain.   Gastrointestinal: Negative for nausea and vomiting.   Genitourinary: Negative for dysuria.   Musculoskeletal: Negative for back pain.   Skin: Negative for rash.   Neurological: Negative for dizziness, weakness, light-headedness, numbness and headaches.   Hematological: Does not bruise/bleed easily.   All other systems reviewed and are negative.     Physical Exam     Initial Vitals [05/15/19 1953]   BP Pulse Resp Temp SpO2   (!) 153/94 94 (!) 22 97.3 °F (36.3 °C) 97 %      MAP       --          Physical Exam  Nursing Notes and Vital Signs Reviewed.  Constitutional: Patient is in no acute distress. Well-developed and well-nourished.  Head: Atraumatic. Normocephalic.  Eyes: PERRL. EOM intact. Conjunctivae are not pale. No scleral icterus.  ENT: Mucous membranes are moist. Oropharynx is clear and symmetric.  Excoriations to the bilateral anterior kiesselbach plexi. No active bleeding.  Neck: Supple.  Full ROM. No lymphadenopathy.  Cardiovascular: Regular rate. Regular rhythm. No murmurs, rubs, or gallops. Distal pulses are 2+ and symmetric.  Pulmonary/Chest: No respiratory distress. Clear to auscultation bilaterally. No wheezing or rales.  Abdominal: Soft and non-distended.  There is no tenderness.  No rebound, guarding, or rigidity.   Musculoskeletal: Moves all extremities. No obvious deformities.  Skin: Warm and dry.  Neurological:  Alert, awake, and appropriate.  Normal speech.  No acute focal neurological deficits are appreciated.  Psychiatric: Normal affect. Good eye contact. Appropriate in content.     ED Course   Procedures  ED Vital Signs:  Vitals:    05/15/19 1953   BP: (!) 153/94   Pulse: 94   Resp: (!) 22   Temp: 97.3 °F (36.3 °C)   TempSrc: Oral   SpO2: 97%   Weight: 72.3 kg (159 lb 6.3 oz)   Height: 5' (1.524 m)       Abnormal Lab Results:  Labs Reviewed   HIV 1 / 2 ANTIBODY   HEPATITIS C ANTIBODY        All Lab Results:  None    Imaging Results:  Imaging Results    None                 The Emergency Provider reviewed the vital signs and test results, which are outlined above.     ED Discussion     8:23 PM: Initial encounter. Discussed with pt all pertinent ED information and results. Discussed pt dx and plan of tx. Gave pt all f/u and return to the ED instructions. All questions and concerns were addressed at this time. Pt expresses understanding of information and instructions, and is comfortable with plan to discharge. Pt is stable for discharge.    I discussed with patient and/or family/caretaker that evaluation in the ED does not suggest any emergent or life threatening medical conditions requiring immediate intervention beyond what was provided in the ED, and I believe patient is safe for discharge.  Regardless, an unremarkable evaluation in the ED does not preclude the development or presence of a serious of life threatening condition. As such, patient was instructed to return immediately for  any worsening or change in current symptoms.      ED Medication(s):  Medications - No data to display    New Prescriptions    No medications on file       Follow-up Information     Saul Jesus MD. Go in 1 day.    Specialty:  Otolaryngology  Contact information:  [address] [address]  [phone]                                     Scribe Attestation:   Scribe #1: I performed the above scribed service and the documentation accurately describes the services I performed. I attest to the accuracy of the note.     Attending:   Physician Attestation Statement for Scribe #1: I, TRENA Vasquez, personally performed the services described in this documentation, as scribed by Nikolas Fisher, in my presence, and it is both accurate and complete.           Clinical Impression       ICD-10-CM ICD-9-CM   1. Anterior epistaxis R04.0 784.7       Disposition:   Disposition: Discharged  Condition: Stable         TRENA Vasquez  05/15/19 2110

## 2019-06-05 ENCOUNTER — TELEPHONE (OUTPATIENT)
Dept: OPHTHALMOLOGY | Facility: CLINIC | Age: 54
End: 2019-06-05

## 2019-06-05 ENCOUNTER — OFFICE VISIT (OUTPATIENT)
Dept: ALLERGY | Facility: CLINIC | Age: 54
End: 2019-06-05
Payer: COMMERCIAL

## 2019-06-05 ENCOUNTER — OFFICE VISIT (OUTPATIENT)
Dept: INTERNAL MEDICINE | Facility: CLINIC | Age: 54
End: 2019-06-05
Payer: COMMERCIAL

## 2019-06-05 VITALS
HEART RATE: 74 BPM | SYSTOLIC BLOOD PRESSURE: 120 MMHG | TEMPERATURE: 97 F | BODY MASS INDEX: 30.47 KG/M2 | DIASTOLIC BLOOD PRESSURE: 80 MMHG | WEIGHT: 161.38 LBS | HEIGHT: 61 IN | RESPIRATION RATE: 15 BRPM

## 2019-06-05 VITALS
HEIGHT: 61 IN | BODY MASS INDEX: 30.6 KG/M2 | HEART RATE: 86 BPM | DIASTOLIC BLOOD PRESSURE: 88 MMHG | RESPIRATION RATE: 20 BRPM | OXYGEN SATURATION: 98 % | WEIGHT: 162.06 LBS | SYSTOLIC BLOOD PRESSURE: 126 MMHG | TEMPERATURE: 98 F

## 2019-06-05 DIAGNOSIS — J30.9 ALLERGIC RHINITIS, UNSPECIFIED SEASONALITY, UNSPECIFIED TRIGGER: ICD-10-CM

## 2019-06-05 DIAGNOSIS — I10 HYPERTENSION GOAL BP (BLOOD PRESSURE) < 140/90: Primary | ICD-10-CM

## 2019-06-05 DIAGNOSIS — L30.9 DERMATITIS: ICD-10-CM

## 2019-06-05 DIAGNOSIS — K90.49 FOOD INTOLERANCE: ICD-10-CM

## 2019-06-05 DIAGNOSIS — J30.89 CHRONIC NONSEASONAL ALLERGIC RHINITIS DUE TO POLLEN: ICD-10-CM

## 2019-06-05 DIAGNOSIS — K76.0 FATTY LIVER: ICD-10-CM

## 2019-06-05 DIAGNOSIS — R79.89 ELEVATED LFTS: ICD-10-CM

## 2019-06-05 DIAGNOSIS — Z23 IMMUNIZATION DUE: ICD-10-CM

## 2019-06-05 DIAGNOSIS — J31.0 CHRONIC RHINITIS: Primary | ICD-10-CM

## 2019-06-05 DIAGNOSIS — E55.9 VITAMIN D DEFICIENCY: ICD-10-CM

## 2019-06-05 DIAGNOSIS — Z72.0 NICOTINE VAPOR PRODUCT USER: ICD-10-CM

## 2019-06-05 DIAGNOSIS — K21.9 CHRONIC GERD: ICD-10-CM

## 2019-06-05 DIAGNOSIS — L25.9 CONTACT DERMATITIS, UNSPECIFIED CONTACT DERMATITIS TYPE, UNSPECIFIED TRIGGER: ICD-10-CM

## 2019-06-05 PROCEDURE — 99999 PR PBB SHADOW E&M-EST. PATIENT-LVL III: CPT | Mod: PBBFAC,,, | Performed by: INTERNAL MEDICINE

## 2019-06-05 PROCEDURE — 99214 PR OFFICE/OUTPT VISIT, EST, LEVL IV, 30-39 MIN: ICD-10-PCS | Mod: 25,S$GLB,, | Performed by: INTERNAL MEDICINE

## 2019-06-05 PROCEDURE — 95004 PR ALLERGY SKIN TESTS,ALLERGENS: ICD-10-PCS | Mod: S$GLB,,, | Performed by: ALLERGY & IMMUNOLOGY

## 2019-06-05 PROCEDURE — 90471 PNEUMOCOCCAL POLYSACCHARIDE VACCINE 23-VALENT =>2YO SQ IM: ICD-10-PCS | Mod: S$GLB,,, | Performed by: INTERNAL MEDICINE

## 2019-06-05 PROCEDURE — 99999 PR PBB SHADOW E&M-EST. PATIENT-LVL III: ICD-10-PCS | Mod: PBBFAC,,, | Performed by: INTERNAL MEDICINE

## 2019-06-05 PROCEDURE — 99999 PR PBB SHADOW E&M-EST. PATIENT-LVL III: CPT | Mod: PBBFAC,,, | Performed by: ALLERGY & IMMUNOLOGY

## 2019-06-05 PROCEDURE — 99204 OFFICE O/P NEW MOD 45 MIN: CPT | Mod: 25,S$GLB,, | Performed by: ALLERGY & IMMUNOLOGY

## 2019-06-05 PROCEDURE — 99214 OFFICE O/P EST MOD 30 MIN: CPT | Mod: 25,S$GLB,, | Performed by: INTERNAL MEDICINE

## 2019-06-05 PROCEDURE — 90732 PPSV23 VACC 2 YRS+ SUBQ/IM: CPT | Mod: S$GLB,,, | Performed by: INTERNAL MEDICINE

## 2019-06-05 PROCEDURE — 90471 IMMUNIZATION ADMIN: CPT | Mod: S$GLB,,, | Performed by: INTERNAL MEDICINE

## 2019-06-05 PROCEDURE — 99999 PR PBB SHADOW E&M-EST. PATIENT-LVL III: ICD-10-PCS | Mod: PBBFAC,,, | Performed by: ALLERGY & IMMUNOLOGY

## 2019-06-05 PROCEDURE — 90732 PNEUMOCOCCAL POLYSACCHARIDE VACCINE 23-VALENT =>2YO SQ IM: ICD-10-PCS | Mod: S$GLB,,, | Performed by: INTERNAL MEDICINE

## 2019-06-05 PROCEDURE — 95004 PERQ TESTS W/ALRGNC XTRCS: CPT | Mod: S$GLB,,, | Performed by: ALLERGY & IMMUNOLOGY

## 2019-06-05 PROCEDURE — 99204 PR OFFICE/OUTPT VISIT, NEW, LEVL IV, 45-59 MIN: ICD-10-PCS | Mod: 25,S$GLB,, | Performed by: ALLERGY & IMMUNOLOGY

## 2019-06-05 RX ORDER — LEVOCETIRIZINE DIHYDROCHLORIDE 5 MG/1
5 TABLET, FILM COATED ORAL NIGHTLY
Qty: 90 TABLET | Refills: 1 | Status: SHIPPED | OUTPATIENT
Start: 2019-06-05 | End: 2019-12-16

## 2019-06-05 RX ORDER — AMLODIPINE BESYLATE 5 MG/1
5 TABLET ORAL DAILY
Qty: 90 TABLET | Refills: 1 | Status: SHIPPED | OUTPATIENT
Start: 2019-06-05 | End: 2020-03-11

## 2019-06-05 RX ORDER — FLUTICASONE PROPIONATE 50 MCG
2 SPRAY, SUSPENSION (ML) NASAL DAILY
Qty: 3 BOTTLE | Refills: 1 | Status: SHIPPED | OUTPATIENT
Start: 2019-06-05 | End: 2019-12-16 | Stop reason: SDUPTHER

## 2019-06-05 RX ORDER — OMEPRAZOLE 20 MG/1
20 CAPSULE, DELAYED RELEASE ORAL DAILY
Qty: 90 CAPSULE | Refills: 1 | Status: SHIPPED | OUTPATIENT
Start: 2019-06-05 | End: 2019-12-04 | Stop reason: SDUPTHER

## 2019-06-05 RX ORDER — AZELASTINE 1 MG/ML
1 SPRAY, METERED NASAL 2 TIMES DAILY
Qty: 30 ML | Refills: 6 | Status: SHIPPED | OUTPATIENT
Start: 2019-06-05 | End: 2020-06-01

## 2019-06-05 RX ORDER — CETIRIZINE HYDROCHLORIDE 10 MG/1
10 TABLET ORAL DAILY
COMMUNITY
End: 2019-06-05 | Stop reason: ALTCHOICE

## 2019-06-05 NOTE — PROGRESS NOTES
Subjective:       Patient ID: Reta Navas is a 53 y.o. female.    Chief Complaint: Follow-up    Reta Navas  53 y.o. White female    Patient presents with:  Follow-up    HPI: Here today to follow up on chronic conditions.  HTN--stable on amlodipine.              LDLCALC                  98.6                12/18/2018            Chronic allergies--she  Takes cetirizine but would like to try levocetirizine. She also takes Singulair and uses Flonase.   GERD--she states omeprazole 20 mg works. She needs a prescription.   Vitamin D deficiency--she is taking vitamin D weekly. Her last vitamin D level was 19.   Elevated LFT's--she has a fatty liver. She has not lost a significant amount of weight.   She vapes.     Past Medical History:  Abnormal Pap smear  Abnormal Pap smear of cervix  Chronic rhinitis  Depression  GERD (gastroesophageal reflux disease)  Hyperlipidemia  Hypertension  Overactive bladder  Plantar fasciitis, bilateral  Psoriasis    Current Outpatient Medications on File Prior to Visit:  clobetasol (TEMOVATE) 0.05 % cream, APPLY TWICE DAILY TO AFFECTED AREA, Disp: 45 g, Rfl: 1  ergocalciferol (ERGOCALCIFEROL) 50,000 unit Cap, TAKE 1 CAPSULE BY MOUTH WEEKLY, Disp: 90 capsule, Rfl: 1  estradiol (VAGIFEM) 10 mcg Tab, Place 1 tablet (10 mcg total) vaginally twice a week., Disp: 8 tablet, Rfl: 11  mupirocin calcium 2% nasl oint (BACTROBAN) 2 % Oint, by Nasal route 2 (two) times daily., Disp: 1 g, Rfl: 0  amLODIPine (NORVASC) 5 MG tablet, TAKE 1 TABLET(5 MG) BY MOUTH EVERY DAY, Disp: 30 tablet, Rfl: 3  cetirizine (ZYRTEC) 10 MG tablet, Take 10 mg by mouth once daily., Disp: , Rfl:   fluticasone (FLONASE) 50 mcg/actuation nasal spray, 2 sprays (100 mcg total) by Each Nare route once daily., Disp: 2 Bottle, Rfl: 6  azelastine (ASTELIN) 137 mcg (0.1 %) nasal spray, 1 spray (137 mcg total) by Nasal route 2 (two) times daily., Disp: 30 mL, Rfl: 6  desonide (DESOWEN) 0.05 % cream, , Disp: , Rfl:   montelukast  (SINGULAIR) 10 mg tablet, TAKE 1 TABLET BY MOUTH EVERY EVENING, Disp: 90 tablet, Rfl: 0    Allergies:  Review of patient's allergies indicates:   -- Latex, natural rubber           Review of Systems   Constitutional: Negative for fever and unexpected weight change.   Respiratory: Negative for cough and shortness of breath.    Cardiovascular: Negative for chest pain and leg swelling.   Gastrointestinal: Positive for constipation. Negative for abdominal pain.   Genitourinary: Negative for difficulty urinating.   Musculoskeletal: Negative for gait problem.   Allergic/Immunologic: Positive for environmental allergies.   Neurological: Negative for dizziness and headaches.       Objective:      Physical Exam   Constitutional: She is oriented to person, place, and time. She appears well-developed and well-nourished.   Eyes: No scleral icterus.   Neck: No tracheal deviation present.   Cardiovascular: Normal rate, regular rhythm and normal heart sounds.   Pulmonary/Chest: Effort normal and breath sounds normal. No respiratory distress. She has no wheezes. She has no rales.   Abdominal: Soft. Bowel sounds are normal.   Musculoskeletal: She exhibits no edema.   Neurological: She is alert and oriented to person, place, and time.   Skin: Skin is warm and dry.   Psychiatric: She has a normal mood and affect.   Vitals reviewed.      Assessment:       1. Hypertension goal BP (blood pressure) < 140/90    2. Chronic nonseasonal allergic rhinitis due to pollen    3. Chronic GERD    4. Vitamin D deficiency    5. Elevated LFTs    6. Fatty liver    7. Nicotine vapor product user    8. Immunization due        Plan:       Reta was seen today for follow-up.    Diagnoses and all orders for this visit:    Hypertension goal BP (blood pressure) < 140/90  -     Refill amLODIPine (NORVASC) 5 MG tablet; Take 1 tablet (5 mg total) by mouth once daily.  -     Comprehensive metabolic panel; Standing  -     Lipid panel; Standing    Chronic nonseasonal  allergic rhinitis due to pollen  -     Refill fluticasone propionate (FLONASE) 50 mcg/actuation nasal spray; 2 sprays (100 mcg total) by Each Nare route once daily.  -     Change to levocetirizine (XYZAL) 5 MG tablet; Take 1 tablet (5 mg total) by mouth every evening.    Chronic GERD  -     omeprazole (PRILOSEC) 20 MG capsule; Take 1 capsule (20 mg total) by mouth once daily.    Vitamin D deficiency  -     Vitamin D; Future    Elevated LFTs  -     Comprehensive metabolic panel; Standing    Fatty liver  -     Comprehensive metabolic panel; Standing  -     Lifestyle modifications discussed    Nicotine vapor product user  -     Recommend cessation    Immunization due  -     (In Office Administered) Pneumococcal Polysaccharide Vaccine (23 Valent) (SQ/IM)    Labs and f/u in 6 months and as needed.

## 2019-06-05 NOTE — TELEPHONE ENCOUNTER
----- Message from Riana Montemayor sent at 6/5/2019 12:49 PM CDT -----  Contact: pt  She's calling in regards to schedule appt ,pls call pt back at [phone] (home)

## 2019-06-05 NOTE — PROGRESS NOTES
Chief complaint:  Respiratory symptoms, skin problems    This note was dictated using voice recognition software and may contain errors.    History:    She had an 8:00 a.m. appointment on Wednesday June 5, 2019.  Information in her medical record regarding her past medical history, family history, and social history, was reviewed and updated today.  Significant additions, if any, are as noted below.    She is concerned regarding her status is relates to her respiratory symptoms and skin concerns.    She stated about 20 years ago when she was living in [facility], allergy testing was performed.  She was told that she was allergic.  She has been living in [place] the past 8 years.  She stated that she has perennial nasal symptoms.  She experiences nasal obstruction, excessive nasal mucus production and sneezing.    She is suspicious that she is allergic to cats.  She has no history of nose or sinus surgery.    In the past she found Flonase to be helpful.  She stated she has been informed that Flonase is not a covered medication with her health insurance pharmacy benefits.  She has never evaluated Astelin.    A separate concern is that dermatitis.  She stated that she will develop itching and rash after wearing her watch.  She is suspicious that she may be allergic to metal and possibly rubber.    Social history:  She lives in a house with 1 cat and 1 dog.  No one smokes indoors.  There is carpeting in her bedroom.    Family history:  She is not aware of family members with respiratory symptoms    Past medical history:  She stated in the past when she ate citrus fruit she developed swelling of the lips.  As result she avoids citrus foods.    Review of systems:  At the time of her appointment this morning she is feeling well in general.  She pointed out several areas of concern regarding the skin on her arms.  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    Exam:    In  general she is in no distress.  She is alert oriented well-developed in good mood and attentive  Gait steady  Skin minimal erythema and noted in small patches on the skin of the forearms.  No vesicles noted.  No urticaria noted  Head no swelling noted  Eyes scleral white conjunctiva pink  Nose patent no polyp seen  Mouth no inflammation or swelling of the lips tongue or in the throat noted  Ears not inflamed tympanic membranes not inflamed  Neck no masses or thyromegaly noted  Lymph nodes no significant cervical or epitrochlear lymphadenopathy noted  Lungs clear to auscultation  Heart no murmurs heard regular rhythm  Extremities no swelling or inflammation of the hands legs noted    Allergy testing:    She elected to pursue allergy evaluation of her respiratory symptoms.  Diagnostic immediate hypersensitivity prick skin tests were performed on the volar forearms.  These tests were personally evaluated in interpreted by myself 15 min after they were performed.  Histamine and saline were tested.  Thirty-six airborne allergens were tested.    The histamine control was positive.  The saline control was negative.  All allergens tested were negative.  I reviewed the results of the tests with her.    Impression:    1.  Chronic rhinitis  2.  Dermatitis, possible contact dermatitis  3.  Food intolerance  4.  Other health concerns as noted in her medical record    Assessment plan:    Her appointment was 45 min in duration spent entirely in face-to-face contact.  More than 50% of the visit was spent in counseling and coordination of care.  An additional 15 min were required for the performance an evaluation of her immediate hypersensitivity skin tests.    I recommended she contact the  of her air filtering machines and be certain that these machines do not generate ozone when they operate.  I reviewed with her that ozone may provoke respiratory symptoms.  I reviewed with her concerns of an indoor environment with  increased levels of ozone being present.    I suggested temporarily she stop using her air filter machines and observe as to whether not any of her symptoms improve.    In the past she has not evaluated Astelin nasal spray.  Using anatomical teaching models, I reviewed nasal anatomy with her.  She was instructed in the proper technique for using a nasal spray.  At her request she was given a printed prescription for Astelin.  I suggested she consider evaluating 1 spray in each nasal passage every 12 hr if needed.    I recommended she obtain dermatology consultation for evaluation.  I told her dermatologists may perform diagnostic patch tests as part of the evaluation of concerns regarding allergic contact dermatitis.  I informed her sometimes the performance of a skin biopsy may be considered in trying to assist in establishing a diagnosis of skin disorders.    She will continue to avoid those foods and substances she does not tolerate.  I reviewed with her the treatment of choice for allergic contact dermatitis is avoidance.    She was given the office phone number.  Should she have additional questions or concerns she was instructed to call.

## 2019-06-07 ENCOUNTER — NURSE TRIAGE (OUTPATIENT)
Dept: ADMINISTRATIVE | Facility: CLINIC | Age: 54
End: 2019-06-07

## 2019-06-07 ENCOUNTER — HOSPITAL ENCOUNTER (EMERGENCY)
Facility: HOSPITAL | Age: 54
Discharge: HOME | End: 2019-06-07
Attending: EMERGENCY MEDICINE
Payer: COMMERCIAL

## 2019-06-07 VITALS
WEIGHT: 164.25 LBS | SYSTOLIC BLOOD PRESSURE: 131 MMHG | BODY MASS INDEX: 32.25 KG/M2 | DIASTOLIC BLOOD PRESSURE: 70 MMHG | HEART RATE: 70 BPM | OXYGEN SATURATION: 99 % | RESPIRATION RATE: 18 BRPM | HEIGHT: 60 IN | TEMPERATURE: 98 F

## 2019-06-07 DIAGNOSIS — R07.9 CHEST PAIN, UNSPECIFIED TYPE: Primary | ICD-10-CM

## 2019-06-07 DIAGNOSIS — R07.9 CHEST PAIN: ICD-10-CM

## 2019-06-07 DIAGNOSIS — R20.2 PARESTHESIA: ICD-10-CM

## 2019-06-07 LAB
ALBUMIN SERPL BCP-MCNC: 4 G/DL (ref 3.5–5.2)
ALP SERPL-CCNC: 110 U/L (ref 55–135)
ALT SERPL W/O P-5'-P-CCNC: 60 U/L (ref 10–44)
ANION GAP SERPL CALC-SCNC: 9 MMOL/L (ref 8–16)
ANISOCYTOSIS BLD QL SMEAR: SLIGHT
AST SERPL-CCNC: 38 U/L (ref 10–40)
BASOPHILS # BLD AUTO: 0.05 K/UL (ref 0–0.2)
BASOPHILS NFR BLD: 0.5 % (ref 0–1.9)
BILIRUB SERPL-MCNC: 0.3 MG/DL (ref 0.1–1)
BNP SERPL-MCNC: 14 PG/ML (ref 0–99)
BUN SERPL-MCNC: 11 MG/DL (ref 6–20)
CALCIUM SERPL-MCNC: 9.5 MG/DL (ref 8.7–10.5)
CHLORIDE SERPL-SCNC: 108 MMOL/L (ref 95–110)
CO2 SERPL-SCNC: 25 MMOL/L (ref 23–29)
CREAT SERPL-MCNC: 0.9 MG/DL (ref 0.5–1.4)
DACRYOCYTES BLD QL SMEAR: ABNORMAL
DIFFERENTIAL METHOD: ABNORMAL
EOSINOPHIL # BLD AUTO: 3.5 K/UL (ref 0–0.5)
EOSINOPHIL NFR BLD: 37.8 % (ref 0–8)
ERYTHROCYTE [DISTWIDTH] IN BLOOD BY AUTOMATED COUNT: 13.1 % (ref 11.5–14.5)
EST. GFR  (AFRICAN AMERICAN): >60 ML/MIN/1.73 M^2
EST. GFR  (NON AFRICAN AMERICAN): >60 ML/MIN/1.73 M^2
GLUCOSE SERPL-MCNC: 101 MG/DL (ref 70–110)
HCT VFR BLD AUTO: 39.7 % (ref 37–48.5)
HGB BLD-MCNC: 13.6 G/DL (ref 12–16)
HYPOCHROMIA BLD QL SMEAR: ABNORMAL
LYMPHOCYTES # BLD AUTO: 2 K/UL (ref 1–4.8)
LYMPHOCYTES NFR BLD: 21.4 % (ref 18–48)
MCH RBC QN AUTO: 31.1 PG (ref 27–31)
MCHC RBC AUTO-ENTMCNC: 34.3 G/DL (ref 32–36)
MCV RBC AUTO: 91 FL (ref 82–98)
MONOCYTES # BLD AUTO: 0.4 K/UL (ref 0.3–1)
MONOCYTES NFR BLD: 4.7 % (ref 4–15)
NEUTROPHILS # BLD AUTO: 3.3 K/UL (ref 1.8–7.7)
NEUTROPHILS NFR BLD: 35.7 % (ref 38–73)
OVALOCYTES BLD QL SMEAR: ABNORMAL
PLATELET # BLD AUTO: 275 K/UL (ref 150–350)
PLATELET BLD QL SMEAR: ABNORMAL
PMV BLD AUTO: 10 FL (ref 9.2–12.9)
POIKILOCYTOSIS BLD QL SMEAR: SLIGHT
POLYCHROMASIA BLD QL SMEAR: ABNORMAL
POTASSIUM SERPL-SCNC: 3.8 MMOL/L (ref 3.5–5.1)
PROT SERPL-MCNC: 7.6 G/DL (ref 6–8.4)
RBC # BLD AUTO: 4.38 M/UL (ref 4–5.4)
SODIUM SERPL-SCNC: 142 MMOL/L (ref 136–145)
SPHEROCYTES BLD QL SMEAR: ABNORMAL
STOMATOCYTES BLD QL SMEAR: PRESENT
TROPONIN I SERPL DL<=0.01 NG/ML-MCNC: <0.006 NG/ML (ref 0–0.03)
WBC # BLD AUTO: 9.3 K/UL (ref 3.9–12.7)

## 2019-06-07 PROCEDURE — 84484 ASSAY OF TROPONIN QUANT: CPT

## 2019-06-07 PROCEDURE — 83880 ASSAY OF NATRIURETIC PEPTIDE: CPT

## 2019-06-07 PROCEDURE — 85025 COMPLETE CBC W/AUTO DIFF WBC: CPT

## 2019-06-07 PROCEDURE — 86703 HIV-1/HIV-2 1 RESULT ANTBDY: CPT

## 2019-06-07 PROCEDURE — 93005 ELECTROCARDIOGRAM TRACING: CPT

## 2019-06-07 PROCEDURE — 80053 COMPREHEN METABOLIC PANEL: CPT

## 2019-06-07 PROCEDURE — 25000003 PHARM REV CODE 250: Performed by: EMERGENCY MEDICINE

## 2019-06-07 PROCEDURE — 99285 EMERGENCY DEPT VISIT HI MDM: CPT

## 2019-06-07 PROCEDURE — 93010 ELECTROCARDIOGRAM REPORT: CPT | Mod: ,,, | Performed by: INTERNAL MEDICINE

## 2019-06-07 PROCEDURE — 93010 EKG 12-LEAD: ICD-10-PCS | Mod: ,,, | Performed by: INTERNAL MEDICINE

## 2019-06-07 RX ORDER — ASPIRIN 325 MG
325 TABLET ORAL
Status: COMPLETED | OUTPATIENT
Start: 2019-06-07 | End: 2019-06-07

## 2019-06-07 RX ORDER — KETOROLAC TROMETHAMINE 10 MG/1
10 TABLET, FILM COATED ORAL EVERY 6 HOURS
Qty: 12 TABLET | Refills: 0 | Status: SHIPPED | OUTPATIENT
Start: 2019-06-07 | End: 2019-12-16

## 2019-06-07 RX ORDER — HYDROCODONE BITARTRATE AND ACETAMINOPHEN 5; 325 MG/1; MG/1
1 TABLET ORAL
Status: COMPLETED | OUTPATIENT
Start: 2019-06-07 | End: 2019-06-07

## 2019-06-07 RX ADMIN — ASPIRIN 325 MG ORAL TABLET 325 MG: 325 PILL ORAL at 04:06

## 2019-06-07 RX ADMIN — HYDROCODONE BITARTRATE AND ACETAMINOPHEN 1 TABLET: 5; 325 TABLET ORAL at 05:06

## 2019-06-07 NOTE — TELEPHONE ENCOUNTER
Reason for Disposition   Chest pain lasting longer than 5 minutes and ANY of the following:* Over 50 years old* Over 30 years old and at least one cardiac risk factor (i.e., high blood pressure, diabetes, high cholesterol, obesity, smoker or strong family history of heart disease)* Pain is crushing, pressure-like, or heavy * Took nitroglycerin and chest pain was not relieved* History of heart disease (i.e., angina, heart attack, bypass surgery, angioplasty, CHF)    Protocols used: CHEST PAIN-A-OH    Chest pain intermittent, aching, radiates to left arm, 3/10  Right hand has been getting numb-starting yesterday, constant  129/88 pulse 91  Recommended 911 per protocol

## 2019-06-07 NOTE — ED PROVIDER NOTES
"SCRIBE #1 NOTE: I, Lucy Kaur, am scribing for, and in the presence of, Surendra Parks Jr., MD. I have scribed the entire note.      History      Chief Complaint   Patient presents with    Chest Pain     C/o of midsternal constant chest pain described as an ache radiating down the left arm since yesterday.       Review of patient's allergies indicates:   Allergen Reactions    Latex, natural rubber         HPI   HPI    6/7/2019, 4:22 PM   History obtained from the patient      History of Present Illness: Reta Navas is a 53 y.o. female patient with PMHx HTN, HLD, and GERD who presents to the Emergency Department for evaluation of L sided chest tightness which onset gradually yesterday. Patient describes it as "aching" and uncomfortable". She called cardiologist today to make an appointment but was told to come to ED. Symptoms are constant and moderate in severity. No mitigating or exacerbating factors reported. Associated sxs include LUE numbness/tingling and HA. Patient denies any SOB, diaphoresis, palpitations, leg pain/swelling, dizziness, cough, N/V, and all other sxs at this time. Prior Tx includes aspirin 81 mg today. Patient's last cardiac stress test was 15 years ago. No further complaints or concerns at this time.          Arrival mode: Personal vehicle    PCP: Lexi Vazquez DO       Past Medical History:  Past Medical History:   Diagnosis Date    Abnormal Pap smear     Abnormal Pap smear of cervix     Chronic rhinitis     Depression     GERD (gastroesophageal reflux disease)     Hyperlipidemia     Hypertension     Overactive bladder     Plantar fasciitis, bilateral     Psoriasis        Past Surgical History:  Past Surgical History:   Procedure Laterality Date    CERVICAL BIOPSY  W/ LOOP ELECTRODE EXCISION      COLONOSCOPY      COLONOSCOPY N/A 6/29/2015    [facility] at [facility]    GALLBLADDER SURGERY N/A 10/11/2018    hx colposcopy      HYSTERECTOMY      " supracervical hyst/LSO for fibroids    OOPHORECTOMY      LSO    TUBAL LIGATION      XI ROBOTIC CHOLECYSTECTOMY N/A 10/18/2018    Performed by Robel Ferrer MD at [facility] [place] [facility]         Family History:  Family History   Problem Relation Age of Onset    Diabetes Brother     No Known Problems Father     Colon cancer Paternal Grandfather     Colon cancer Paternal Uncle     Ovarian cancer Maternal Aunt     Breast cancer Paternal Grandmother        Social History:  Social History     Tobacco Use    Smoking status: Former Smoker     Packs/day: 1.00     Years: 39.00     Pack years: 39.00     Last attempt to quit: 2013     Years since quittin.3    Smokeless tobacco: Never Used   Substance and Sexual Activity    Alcohol use: No     Alcohol/week: 0.0 oz    Drug use: No    Sexual activity: Yes     Partners: Male     Birth control/protection: None, Surgical, Post-menopausal       ROS   Review of Systems   Constitutional: Negative for activity change, appetite change, chills, diaphoresis, fatigue and fever.   HENT: Negative for congestion, ear pain, nosebleeds, rhinorrhea, sinus pain, sore throat and trouble swallowing.    Eyes: Negative for pain and discharge.   Respiratory: Positive for chest tightness (L sided). Negative for cough, shortness of breath, wheezing and stridor.    Cardiovascular: Negative for chest pain, palpitations and leg swelling.   Gastrointestinal: Negative for abdominal distention, abdominal pain, blood in stool, constipation, diarrhea, nausea and vomiting.   Genitourinary: Negative for difficulty urinating, dysuria, flank pain, frequency, hematuria and urgency.   Musculoskeletal: Negative for arthralgias, back pain, myalgias and neck pain.        (-) leg pain   Skin: Negative for pallor, rash and wound.   Neurological: Positive for numbness (LUE) and headaches. Negative for dizziness, syncope, weakness and light-headedness.        (+) LUE tingling   Hematological: Does not bruise/bleed  easily.   Psychiatric/Behavioral: Negative for confusion and self-injury.   All other systems reviewed and are negative.    Physical Exam      Initial Vitals [06/07/19 1606]   BP Pulse Resp Temp SpO2   (!) 159/85 80 16 97.9 °F (36.6 °C) 96 %      MAP       --          Physical Exam  Nursing Notes and Vital Signs Reviewed.  Constitutional: Patient is in no acute distress. Well-developed and well-nourished.  Head: Atraumatic. Normocephalic.  Eyes: PERRL. EOM intact. Conjunctivae are not pale. No scleral icterus.  ENT: Mucous membranes are moist. Oropharynx is clear and symmetric.    Neck: Supple. Full ROM. No lymphadenopathy.  Cardiovascular: Regular rate. Regular rhythm. No murmurs, rubs, or gallops. Distal pulses are 2+ and symmetric.  Pulmonary/Chest: No respiratory distress. Clear to auscultation bilaterally. No wheezing or rales.  Abdominal: Soft and non-distended.  There is no tenderness.  No rebound, guarding, or rigidity. Good bowel sounds.  Genitourinary: No CVA tenderness  Musculoskeletal: Moves all extremities. No obvious deformities. No edema. No calf tenderness.  Skin: Warm and dry.  Neurological:  Alert, awake, and appropriate.  Normal speech.  No acute focal neurological deficits are appreciated.  Psychiatric: Normal affect. Good eye contact. Appropriate in content.    ED Course    Procedures  ED Vital Signs:  Vitals:    06/07/19 1606 06/07/19 1633 06/07/19 1653 06/07/19 1657   BP: (!) 159/85 133/80  130/74   Pulse: 80  77 72   Resp: 16      Temp: 97.9 °F (36.6 °C)      TempSrc: Oral      SpO2: 96%  96% 96%   Weight: 74.5 kg (164 lb 3.9 oz)      Height: 5' (1.524 m)          Abnormal Lab Results:  Labs Reviewed   CBC W/ AUTO DIFFERENTIAL - Abnormal; Notable for the following components:       Result Value    Mean Corpuscular Hemoglobin 31.1 (*)     Eos # 3.5 (*)     Gran% 35.7 (*)     Eosinophil% 37.8 (*)     All other components within normal limits   COMPREHENSIVE METABOLIC PANEL - Abnormal; Notable  for the following components:    ALT 60 (*)     All other components within normal limits   TROPONIN I   B-TYPE NATRIURETIC PEPTIDE   HIV 1 / 2 ANTIBODY   TROPONIN I        All Lab Results:  Results for orders placed or performed during the hospital encounter of 06/07/19   CBC auto differential   Result Value Ref Range    WBC 9.30 3.90 - 12.70 K/uL    RBC 4.38 4.00 - 5.40 M/uL    Hemoglobin 13.6 12.0 - 16.0 g/dL    Hematocrit 39.7 37.0 - 48.5 %    Mean Corpuscular Volume 91 82 - 98 fL    Mean Corpuscular Hemoglobin 31.1 (H) 27.0 - 31.0 pg    Mean Corpuscular Hemoglobin Conc 34.3 32.0 - 36.0 g/dL    RDW 13.1 11.5 - 14.5 %    Platelets 275 150 - 350 K/uL    MPV 10.0 9.2 - 12.9 fL    Gran # (ANC) 3.3 1.8 - 7.7 K/uL    Lymph # 2.0 1.0 - 4.8 K/uL    Mono # 0.4 0.3 - 1.0 K/uL    Eos # 3.5 (H) 0.0 - 0.5 K/uL    Baso # 0.05 0.00 - 0.20 K/uL    Gran% 35.7 (L) 38.0 - 73.0 %    Lymph% 21.4 18.0 - 48.0 %    Mono% 4.7 4.0 - 15.0 %    Eosinophil% 37.8 (H) 0.0 - 8.0 %    Basophil% 0.5 0.0 - 1.9 %    Platelet Estimate Appears normal     Aniso Slight     Poik Slight     Poly Occasional     Hypo Occasional     Ovalocytes Occasional     Tear Drop Cells Occasional     Stomatocytes Present     Spherocytes Occasional     Differential Method Automated    Comprehensive metabolic panel   Result Value Ref Range    Sodium 142 136 - 145 mmol/L    Potassium 3.8 3.5 - 5.1 mmol/L    Chloride 108 95 - 110 mmol/L    CO2 25 23 - 29 mmol/L    Glucose 101 70 - 110 mg/dL    BUN, Bld 11 6 - 20 mg/dL    Creatinine 0.9 0.5 - 1.4 mg/dL    Calcium 9.5 8.7 - 10.5 mg/dL    Total Protein 7.6 6.0 - 8.4 g/dL    Albumin 4.0 3.5 - 5.2 g/dL    Total Bilirubin 0.3 0.1 - 1.0 mg/dL    Alkaline Phosphatase 110 55 - 135 U/L    AST 38 10 - 40 U/L    ALT 60 (H) 10 - 44 U/L    Anion Gap 9 8 - 16 mmol/L    eGFR if African American >60 >60 mL/min/1.73 m^2    eGFR if non African American >60 >60 mL/min/1.73 m^2   Troponin I #1   Result Value Ref Range    Troponin I <0.006  0.000 - 0.026 ng/mL   B-Type natriuretic peptide (BNP)   Result Value Ref Range    BNP 14 0 - 99 pg/mL         Imaging Results:  Imaging Results          X-Ray Cervical Spine AP And Lateral (Final result)  Result time 06/07/19 16:45:33    Final result by Zeeshan Buck III, MD (06/07/19 16:45:33)                 Impression:      1.  Slight reversal of normal lordotic curve which could be positional or due to muscle spasm.  No acute cervical fracture or significant subluxation.      Electronically signed by: Zeeshan Buck  Date:    06/07/2019  Time:    16:45             Narrative:    EXAMINATION:  XR CERVICAL SPINE AP LATERAL    CLINICAL HISTORY:  neck pain;    COMPARISON:  None    FINDINGS  There is slight reversal of the normal lordotic curve which could be positional or due to muscle spasm.  No acute cervical fracture or significant subluxation.                               X-Ray Chest AP Portable (Final result)  Result time 06/07/19 16:44:32    Final result by Zeeshan Buck III, MD (06/07/19 16:44:32)                 Impression:      Negative one view chest x-ray.      Electronically signed by: Zeeshan Buck  Date:    06/07/2019  Time:    16:44             Narrative:    EXAMINATION:  XR CHEST AP PORTABLE    CLINICAL HISTORY:  Chest Pain;    COMPARISON:  Two thousand eighteen    FINDINGS:  No significant change.  Normal heart size.  Clear lungs.                                 The EKG was ordered, reviewed, and independently interpreted by the ED provider.  Interpretation time: 16:05  Rate: 75 BPM  Rhythm: normal sinus rhythm  Interpretation: No acute ST changes. No STEMI.         The Emergency Provider reviewed the vital signs and test results, which are outlined above.    ED Discussion     5:37 PM: Reassessed pt at this time. Discussed with pt all pertinent ED information and results. Discussed pt dx and plan of tx. Gave pt all f/u and return to the ED instructions. All questions and concerns  were addressed at this time. Pt expresses understanding of information and instructions, and is comfortable with plan to discharge. Pt is stable for discharge.    I discussed with patient and/or family/caretaker that evaluation in the ED does not suggest any emergent or life threatening medical conditions requiring immediate intervention beyond what was provided in the ED, and I believe patient is safe for discharge.  Regardless, an unremarkable evaluation in the ED does not preclude the development or presence of a serious of life threatening condition. As such, patient was instructed to return immediately for any worsening or change in current symptoms.      ED Medication(s):  Medications   nitroGLYCERIN 2% TD oint ointment 0.5 inch (0.5 inches Topical (Top) Not Given 6/7/19 1630)   HYDROcodone-acetaminophen 5-325 mg per tablet 1 tablet (has no administration in time range)   aspirin tablet 325 mg (325 mg Oral Given 6/7/19 1652)       Follow-up Information     Katina Llamas MD. Call in 3 days.    Specialty:  Cardiology  Why:  to schedule appt for recheck and for further evaluation of your chest discomfort  Contact information:  [address]  [place] [id number] [phone]                     Current Discharge Medication List      START taking these medications    Details   ketorolac (TORADOL) 10 mg tablet Take 1 tablet (10 mg total) by mouth every 6 (six) hours. Prn neck pain and chest soreness take with food  Qty: 12 tablet, Refills: 0             Medical Decision Making    Medical Decision Making:   Clinical Tests:   Lab Tests: Ordered and Reviewed  Radiological Study: Ordered and Reviewed  Medical Tests: Ordered and Reviewed           Scribe Attestation:   Scribe #1: I performed the above scribed service and the documentation accurately describes the services I performed. I attest to the accuracy of the note.    Attending:   Physician Attestation Statement for Scribe #1: I, Surendra Parks Jr., MD,  personally performed the services described in this documentation, as scribed by Lucy Kaur, in my presence, and it is both accurate and complete.          Clinical Impression       ICD-10-CM ICD-9-CM   1. Chest pain, unspecified type R07.9 786.50   2. Chest pain R07.9 786.50   3. Paresthesia R20.2 782.0       Disposition:   Disposition: Discharged  Condition: Stable         Surendra Parks Jr., MD  06/07/19 3044

## 2019-06-10 LAB — HIV 1+2 AB+HIV1 P24 AG SERPL QL IA: NEGATIVE

## 2019-06-25 ENCOUNTER — OFFICE VISIT (OUTPATIENT)
Dept: DERMATOLOGY | Facility: CLINIC | Age: 54
End: 2019-06-25
Payer: COMMERCIAL

## 2019-06-25 DIAGNOSIS — L30.9 DERMATITIS: Primary | ICD-10-CM

## 2019-06-25 PROCEDURE — 99202 OFFICE O/P NEW SF 15 MIN: CPT | Mod: S$GLB,,, | Performed by: STUDENT IN AN ORGANIZED HEALTH CARE EDUCATION/TRAINING PROGRAM

## 2019-06-25 PROCEDURE — 99999 PR PBB SHADOW E&M-EST. PATIENT-LVL II: ICD-10-PCS | Mod: PBBFAC,,, | Performed by: STUDENT IN AN ORGANIZED HEALTH CARE EDUCATION/TRAINING PROGRAM

## 2019-06-25 PROCEDURE — 99999 PR PBB SHADOW E&M-EST. PATIENT-LVL II: CPT | Mod: PBBFAC,,, | Performed by: STUDENT IN AN ORGANIZED HEALTH CARE EDUCATION/TRAINING PROGRAM

## 2019-06-25 PROCEDURE — 99202 PR OFFICE/OUTPT VISIT, NEW, LEVL II, 15-29 MIN: ICD-10-PCS | Mod: S$GLB,,, | Performed by: STUDENT IN AN ORGANIZED HEALTH CARE EDUCATION/TRAINING PROGRAM

## 2019-06-25 RX ORDER — TRIAMCINOLONE ACETONIDE 1 MG/G
CREAM TOPICAL
Qty: 454 G | Refills: 0 | Status: SHIPPED | OUTPATIENT
Start: 2019-06-25 | End: 2020-06-01

## 2019-06-25 NOTE — PROGRESS NOTES
Subjective:       Patient ID:  Reta Navas is a 53 y.o. female who presents for   Chief Complaint   Patient presents with    Rash     c/o rashes to arms and legs x 1 yr on and off tx none     Psoriasis     c/o psoriasis to feet and eyelids x 12 yrs tx desowen      History of Present Illness: The patient presents with chief complaint of rashes and psoriasis.  Location: feet eyelids psoriaisis , rashes to arms and feet   Duration: months to years   Signs/Symptoms: itch at times, develops redness and itchy rash, especially when going outside. Currently rash under control  Prior treatments: desowen   She was recently evaluated by [facility] where she had skin allergy testing done. Currently on desowen for the face, and recently started on xyzal. Not using any topical treatments for the rash on the arms and body.         Review of Systems   Skin: Positive for rash.        Objective:    Physical Exam   Skin:   Areas Examined (abnormalities noted in diagram):   Head / Face Inspection Performed  Neck Inspection Performed  RUE Inspected  LUE Inspection Performed  RLE Inspected  LLE Inspection Performed  Nails and Digits Inspection Performed                  Diagram Legend     Erythematous scaling macule/papule c/w actinic keratosis       Vascular papule c/w angioma      Pigmented verrucoid papule/plaque c/w seborrheic keratosis      Yellow umbilicated papule c/w sebaceous hyperplasia      Irregularly shaped tan macule c/w lentigo     1-2 mm smooth white papules consistent with Milia      Movable subcutaneous cyst with punctum c/w epidermal inclusion cyst      Subcutaneous movable cyst c/w pilar cyst      Firm pink to brown papule c/w dermatofibroma      Pedunculated fleshy papule(s) c/w skin tag(s)      Evenly pigmented macule c/w junctional nevus     Mildly variegated pigmented, slightly irregular-bordered macule c/w mildly atypical nevus      Flesh colored to evenly pigmented papule c/w intradermal nevus       Pink  pearly papule/plaque c/w basal cell carcinoma      Erythematous hyperkeratotic cursted plaque c/w SCC      Surgical scar with no sign of skin cancer recurrence      Open and closed comedones      Inflammatory papules and pustules      Verrucoid papule consistent consistent with wart     Erythematous eczematous patches and plaques     Dystrophic onycholytic nail with subungual debris c/w onychomycosis     Umbilicated papule    Erythematous-base heme-crusted tan verrucoid plaque consistent with inflamed seborrheic keratosis     Erythematous Silvery Scaling Plaque c/w Psoriasis     See annotation      Assessment / Plan:        Dermatitis  -     triamcinolone acetonide 0.1% (KENALOG) 0.1 % cream; AAA bid  Dispense: 454 g; Refill: 0  -     Counseled patient on gentle skin care regimen, including need for sensitive soaps/detergents, as well as need for frequent use of sensitize moisturizers.   -     Continue xyzal orally daily.        Follow up in about 4 months (around 10/25/2019).

## 2019-09-20 ENCOUNTER — OFFICE VISIT (OUTPATIENT)
Dept: INTERNAL MEDICINE | Facility: CLINIC | Age: 54
End: 2019-09-20
Payer: COMMERCIAL

## 2019-09-20 VITALS
HEIGHT: 60 IN | DIASTOLIC BLOOD PRESSURE: 84 MMHG | HEART RATE: 77 BPM | BODY MASS INDEX: 30.35 KG/M2 | TEMPERATURE: 98 F | SYSTOLIC BLOOD PRESSURE: 130 MMHG | OXYGEN SATURATION: 97 % | WEIGHT: 154.56 LBS

## 2019-09-20 DIAGNOSIS — B96.89 ACUTE BACTERIAL SINUSITIS: Primary | ICD-10-CM

## 2019-09-20 DIAGNOSIS — J01.90 ACUTE BACTERIAL SINUSITIS: Primary | ICD-10-CM

## 2019-09-20 PROCEDURE — 99213 OFFICE O/P EST LOW 20 MIN: CPT | Mod: S$GLB,,, | Performed by: INTERNAL MEDICINE

## 2019-09-20 PROCEDURE — 99999 PR PBB SHADOW E&M-EST. PATIENT-LVL IV: CPT | Mod: PBBFAC,,, | Performed by: INTERNAL MEDICINE

## 2019-09-20 PROCEDURE — 99999 PR PBB SHADOW E&M-EST. PATIENT-LVL IV: ICD-10-PCS | Mod: PBBFAC,,, | Performed by: INTERNAL MEDICINE

## 2019-09-20 PROCEDURE — 99213 PR OFFICE/OUTPT VISIT, EST, LEVL III, 20-29 MIN: ICD-10-PCS | Mod: S$GLB,,, | Performed by: INTERNAL MEDICINE

## 2019-09-20 RX ORDER — AZITHROMYCIN 250 MG/1
TABLET, FILM COATED ORAL
Qty: 6 TABLET | Refills: 0 | Status: SHIPPED | OUTPATIENT
Start: 2019-09-20 | End: 2019-09-25

## 2019-09-20 NOTE — PATIENT INSTRUCTIONS
Sinusitis (Antibiotic Treatment)    The sinuses are air-filled spaces within the bones of the face. They connect to the inside of the nose. Sinusitis is an inflammation of the tissue lining the sinus cavity. Sinus inflammation can occur during a cold. It can also be due to allergies to pollens and other particles in the air. Sinusitis can cause symptoms of sinus congestion and fullness. A sinus infection causes fever, headache and facial pain. There is often green or yellow drainage from the nose or into the back of the throat (post-nasal drip). You have been given antibiotics to treat this condition.  Home care:  · Take the full course of antibiotics as instructed. Do not stop taking them, even if you feel better.  · Drink plenty of water, hot tea, and other liquids. This may help thin mucus. It also may promote sinus drainage.  · Heat may help soothe painful areas of the face. Use a towel soaked in hot water. Or,  the shower and direct the hot spray onto your face. Using a vaporizer along with a menthol rub at night may also help.   · An expectorant containing guaifenesin may help thin the mucus and promote drainage from the sinuses.  · Over-the-counter decongestants may be used unless a similar medicine was prescribed. Nasal sprays work the fastest. Use one that contains phenylephrine or oxymetazoline. First blow the nose gently. Then use the spray. Do not use these medicines more often than directed on the label or symptoms may get worse. You may also use tablets containing pseudoephedrine. Avoid products that combine ingredients, because side effects may be increased. Read labels. You can also ask the pharmacist for help. (NOTE: Persons with high blood pressure should not use decongestants. They can raise blood pressure.)  · Over-the-counter antihistamines may help if allergies contributed to your sinusitis.    · Do not use nasal rinses or irrigation during an acute sinus infection, unless told to by  your health care provider. Rinsing may spread the infection to other sinuses.  · Use acetaminophen or ibuprofen to control pain, unless another pain medicine was prescribed. (If you have chronic liver or kidney disease or ever had a stomach ulcer, talk with your doctor before using these medicines. Aspirin should never be used in anyone under 18 years of age who is ill with a fever. It may cause severe liver damage.)  · Don't smoke. This can worsen symptoms.  Follow-up care  Follow up with your healthcare provider or our staff if you are not improving within the next week.  When to seek medical advice  Call your healthcare provider if any of these occur:  · Facial pain or headache becoming more severe  · Stiff neck  · Unusual drowsiness or confusion  · Swelling of the forehead or eyelids  · Vision problems, including blurred or double vision  · Fever of 100.4ºF (38ºC) or higher, or as directed by your healthcare provider  · Seizure  · Breathing problems  · Symptoms not resolving within 10 days  Date Last Reviewed: 4/13/2015  © 1269-5520 [employer]. [address] [place] [address], [place] [address]. All rights reserved. This information is not intended as a substitute for professional medical care. Always follow your healthcare professional's instructions.

## 2019-09-20 NOTE — PROGRESS NOTES
Reta Navas  53 y.o. White female     Chief Complaint   Patient presents with    Cough    Nasal Congestion      HPI: Presents to the clinic with complaint of a cough and nasal congestion for the past week. When she coughs she produces a greenish sputum with occasional blood. She feels a lot of pressure in her sinuses. She has chronic allergies. She takes allergy medication and has also been taking Dayquil.   She denies having a fever.     PMH: Reviewed    MEDS: Reviewed med card    ALLERGIES: Reviewed allergy card    PE: Reviewed vitals  GENERAL: Alert and oriented, no acute distress  ENT: Nasal mucosa inflamed   NECK: No lymphadenopathy  HEART: Regular rate and rhythm  LUNGS: Unlabored respirations, bilaterally clear to auscultation     ASSESSMENT/PLAN:    Reta was seen today for cough and nasal congestion.    Diagnoses and all orders for this visit:    Acute bacterial sinusitis  -     azithromycin (Z-TORSTEN) 250 MG tablet; Take 2 tablets by mouth on day 1; Take 1 tablet by mouth on days 2-5  -     Recommend increased fluids and rest  -     Educational information provided    RTC: If symptoms worsen or fail to resolve

## 2019-10-17 ENCOUNTER — PATIENT OUTREACH (OUTPATIENT)
Dept: ADMINISTRATIVE | Facility: HOSPITAL | Age: 54
End: 2019-10-17

## 2019-10-17 NOTE — PROGRESS NOTES
I have attempted without success to contact this patient re scheduling Mammogram. No answer, Unable to LVM.

## 2019-12-04 DIAGNOSIS — K21.9 CHRONIC GERD: ICD-10-CM

## 2019-12-05 RX ORDER — OMEPRAZOLE 20 MG/1
20 CAPSULE, DELAYED RELEASE ORAL DAILY
Qty: 90 CAPSULE | Refills: 1 | Status: SHIPPED | OUTPATIENT
Start: 2019-12-05 | End: 2020-06-03

## 2019-12-11 ENCOUNTER — OFFICE VISIT (OUTPATIENT)
Dept: CARDIOLOGY | Facility: CLINIC | Age: 54
End: 2019-12-11
Payer: COMMERCIAL

## 2019-12-11 ENCOUNTER — PATIENT MESSAGE (OUTPATIENT)
Dept: CARDIOLOGY | Facility: CLINIC | Age: 54
End: 2019-12-11

## 2019-12-11 ENCOUNTER — CLINICAL SUPPORT (OUTPATIENT)
Dept: CARDIOLOGY | Facility: CLINIC | Age: 54
End: 2019-12-11
Payer: COMMERCIAL

## 2019-12-11 VITALS
HEIGHT: 60 IN | HEART RATE: 78 BPM | WEIGHT: 160 LBS | DIASTOLIC BLOOD PRESSURE: 92 MMHG | SYSTOLIC BLOOD PRESSURE: 132 MMHG | BODY MASS INDEX: 31.41 KG/M2

## 2019-12-11 DIAGNOSIS — E66.9 OBESITY, UNSPECIFIED CLASSIFICATION, UNSPECIFIED OBESITY TYPE, UNSPECIFIED WHETHER SERIOUS COMORBIDITY PRESENT: ICD-10-CM

## 2019-12-11 DIAGNOSIS — I10 ESSENTIAL HYPERTENSION: ICD-10-CM

## 2019-12-11 DIAGNOSIS — I10 HYPERTENSION, UNSPECIFIED TYPE: ICD-10-CM

## 2019-12-11 DIAGNOSIS — R07.9 CHEST PAIN, UNSPECIFIED TYPE: Primary | ICD-10-CM

## 2019-12-11 DIAGNOSIS — R25.3 MUSCLE TWITCHING: ICD-10-CM

## 2019-12-11 DIAGNOSIS — G70.00 MG (MYASTHENIA GRAVIS): ICD-10-CM

## 2019-12-11 DIAGNOSIS — I10 ESSENTIAL HYPERTENSION: Primary | ICD-10-CM

## 2019-12-11 PROCEDURE — 99204 PR OFFICE/OUTPT VISIT, NEW, LEVL IV, 45-59 MIN: ICD-10-PCS | Mod: S$GLB,,, | Performed by: INTERNAL MEDICINE

## 2019-12-11 PROCEDURE — 99999 PR PBB SHADOW E&M-EST. PATIENT-LVL III: ICD-10-PCS | Mod: PBBFAC,,, | Performed by: INTERNAL MEDICINE

## 2019-12-11 PROCEDURE — 93010 ELECTROCARDIOGRAM REPORT: CPT | Mod: S$GLB,,, | Performed by: INTERNAL MEDICINE

## 2019-12-11 PROCEDURE — 93005 EKG 12-LEAD: ICD-10-PCS | Mod: S$GLB,,, | Performed by: INTERNAL MEDICINE

## 2019-12-11 PROCEDURE — 99204 OFFICE O/P NEW MOD 45 MIN: CPT | Mod: S$GLB,,, | Performed by: INTERNAL MEDICINE

## 2019-12-11 PROCEDURE — 93010 EKG 12-LEAD: ICD-10-PCS | Mod: S$GLB,,, | Performed by: INTERNAL MEDICINE

## 2019-12-11 PROCEDURE — 93005 ELECTROCARDIOGRAM TRACING: CPT | Mod: S$GLB,,, | Performed by: INTERNAL MEDICINE

## 2019-12-11 PROCEDURE — 99999 PR PBB SHADOW E&M-EST. PATIENT-LVL III: CPT | Mod: PBBFAC,,, | Performed by: INTERNAL MEDICINE

## 2019-12-11 RX ORDER — LOSARTAN POTASSIUM 25 MG/1
25 TABLET ORAL DAILY
Qty: 90 TABLET | Refills: 3 | Status: SHIPPED | OUTPATIENT
Start: 2019-12-11 | End: 2020-06-01

## 2019-12-11 NOTE — PROGRESS NOTES
Subjective:   Patient ID:  Reta Navas is a 54 y.o. female who presents for evaluation of Consult (chest pains)      HPI  A 53 yo female with htn hlp atypical chest pain was seen in the er for chest pain and was discahrged home. She ahs different types pof chest pain that are burning sharp left sided it radiates to the left arm. She has also neck pain and shoulder pain and is unable to raise her left arm no exertional symptoms. She had w/u in  with stress echo which was normal. Her ekg is normal her htn fluctuates. Has  Not been compliant with salt.,she is compliant with meds.   Past Medical History:   Diagnosis Date    Abnormal Pap smear     Abnormal Pap smear of cervix     Chronic rhinitis     Depression     GERD (gastroesophageal reflux disease)     Hyperlipidemia     Hypertension     Overactive bladder     Plantar fasciitis, bilateral     Psoriasis        Past Surgical History:   Procedure Laterality Date    CERVICAL BIOPSY  W/ LOOP ELECTRODE EXCISION      COLONOSCOPY      GALLBLADDER SURGERY N/A 10/11/2018    hx colposcopy      HYSTERECTOMY      supracervical hyst/LSO for fibroids    OOPHORECTOMY      LSO    ROBOT-ASSISTED CHOLECYSTECTOMY USING DA WILVER XI N/A 10/18/2018    Procedure: XI ROBOTIC CHOLECYSTECTOMY;  Surgeon: Robel Ferrer MD;  [facility];  Service: General;  Laterality: N/A;    TUBAL LIGATION         Social History     Tobacco Use    Smoking status: Former Smoker     Packs/day: 1.00     Years: 39.00     Pack years: 39.00     Types: Vaping with nicotine     Last attempt to quit: 2013     Years since quittin.9    Smokeless tobacco: Never Used   Substance Use Topics    Alcohol use: No     Alcohol/week: 0.0 standard drinks    Drug use: No       Family History   Problem Relation Age of Onset    Diabetes Brother     No Known Problems Father     Colon cancer Paternal Grandfather     Colon cancer Paternal Uncle     Ovarian cancer Maternal Aunt     Breast  cancer Paternal Grandmother        Current Outpatient Medications   Medication Sig    amLODIPine (NORVASC) 5 MG tablet Take 1 tablet (5 mg total) by mouth once daily.    azelastine (ASTELIN) 137 mcg (0.1 %) nasal spray 1 spray (137 mcg total) by Nasal route 2 (two) times daily.    clobetasol (TEMOVATE) 0.05 % cream APPLY TWICE DAILY TO AFFECTED AREA    desonide (DESOWEN) 0.05 % cream     ergocalciferol (ERGOCALCIFEROL) 50,000 unit Cap TAKE 1 CAPSULE BY MOUTH WEEKLY    estradiol (VAGIFEM) 10 mcg Tab Place 1 tablet (10 mcg total) vaginally twice a week.    fluticasone propionate (FLONASE) 50 mcg/actuation nasal spray 2 sprays (100 mcg total) by Each Nare route once daily.    levocetirizine (XYZAL) 5 MG tablet Take 1 tablet (5 mg total) by mouth every evening.    montelukast (SINGULAIR) 10 mg tablet TAKE 1 TABLET BY MOUTH EVERY EVENING    mupirocin calcium 2% nasl oint (BACTROBAN) 2 % Oint by Nasal route 2 (two) times daily.    omeprazole (PRILOSEC) 20 MG capsule Take 1 capsule (20 mg total) by mouth once daily.    triamcinolone acetonide 0.1% (KENALOG) 0.1 % cream AAA bid    ketorolac (TORADOL) 10 mg tablet Take 1 tablet (10 mg total) by mouth every 6 (six) hours. Prn neck pain and chest soreness take with food     No current facility-administered medications for this visit.      Current Outpatient Medications on File Prior to Visit   Medication Sig    amLODIPine (NORVASC) 5 MG tablet Take 1 tablet (5 mg total) by mouth once daily.    azelastine (ASTELIN) 137 mcg (0.1 %) nasal spray 1 spray (137 mcg total) by Nasal route 2 (two) times daily.    clobetasol (TEMOVATE) 0.05 % cream APPLY TWICE DAILY TO AFFECTED AREA    desonide (DESOWEN) 0.05 % cream     ergocalciferol (ERGOCALCIFEROL) 50,000 unit Cap TAKE 1 CAPSULE BY MOUTH WEEKLY    estradiol (VAGIFEM) 10 mcg Tab Place 1 tablet (10 mcg total) vaginally twice a week.    fluticasone propionate (FLONASE) 50 mcg/actuation nasal spray 2 sprays (100 mcg  total) by Each Nare route once daily.    levocetirizine (XYZAL) 5 MG tablet Take 1 tablet (5 mg total) by mouth every evening.    montelukast (SINGULAIR) 10 mg tablet TAKE 1 TABLET BY MOUTH EVERY EVENING    mupirocin calcium 2% nasl oint (BACTROBAN) 2 % Oint by Nasal route 2 (two) times daily.    omeprazole (PRILOSEC) 20 MG capsule Take 1 capsule (20 mg total) by mouth once daily.    triamcinolone acetonide 0.1% (KENALOG) 0.1 % cream AAA bid    ketorolac (TORADOL) 10 mg tablet Take 1 tablet (10 mg total) by mouth every 6 (six) hours. Prn neck pain and chest soreness take with food     No current facility-administered medications on file prior to visit.        Review of patient's allergies indicates:   Allergen Reactions    Adhesive Rash    Latex, natural rubber        Review of Systems   Constitution: Negative for diaphoresis, malaise/fatigue and weight gain.   HENT: Negative for hoarse voice.    Eyes: Negative for double vision and visual disturbance.   Cardiovascular: Positive for chest pain. Negative for claudication, cyanosis, dyspnea on exertion, irregular heartbeat, leg swelling, near-syncope, orthopnea, palpitations, paroxysmal nocturnal dyspnea and syncope.   Respiratory: Negative for cough, hemoptysis, shortness of breath and snoring.    Hematologic/Lymphatic: Negative for bleeding problem. Does not bruise/bleed easily.   Skin: Negative for color change and poor wound healing.   Musculoskeletal: Positive for joint pain, muscle weakness, myalgias, neck pain and stiffness. Negative for muscle cramps.   Gastrointestinal: Negative for bloating, abdominal pain, change in bowel habit, diarrhea, heartburn, hematemesis, hematochezia, melena and nausea.   Neurological: Negative for excessive daytime sleepiness, dizziness, headaches, light-headedness, loss of balance, numbness and weakness.   Psychiatric/Behavioral: Negative for memory loss. The patient does not have insomnia.    Allergic/Immunologic:  Negative for hives.       Objective:   Physical Exam   Constitutional: She is oriented to person, place, and time. She appears well-developed and well-nourished. She does not appear ill. No distress.   HENT:   Head: Normocephalic and atraumatic.   Eyes: Pupils are equal, round, and reactive to light. EOM are normal. No scleral icterus.   Neck: Normal range of motion. Neck supple. Normal carotid pulses, no hepatojugular reflux and no JVD present. Carotid bruit is not present. No tracheal deviation present. No thyromegaly present.   Cardiovascular: Normal rate, regular rhythm, normal heart sounds and normal pulses. Exam reveals no gallop and no friction rub.   No murmur heard.  Pulses:       Carotid pulses are 2+ on the right side, and 2+ on the left side.       Radial pulses are 2+ on the right side, and 2+ on the left side.        Femoral pulses are 2+ on the right side, and 2+ on the left side.       Popliteal pulses are 2+ on the right side, and 2+ on the left side.        Dorsalis pedis pulses are 2+ on the right side, and 2+ on the left side.        Posterior tibial pulses are 2+ on the right side, and 2+ on the left side.   Pulmonary/Chest: Effort normal and breath sounds normal. No respiratory distress. She has no wheezes. She has no rhonchi. She has no rales. She exhibits no tenderness.   Abdominal: Soft. Normal appearance, normal aorta and bowel sounds are normal. She exhibits no distension, no abdominal bruit, no ascites and no pulsatile midline mass. There is no hepatomegaly. There is no tenderness.   Musculoskeletal: She exhibits no edema.        Right shoulder: She exhibits no deformity.   Neurological: She is alert and oriented to person, place, and time. She has normal strength. No cranial nerve deficit. Coordination normal.   Skin: Skin is warm. No rash noted. No cyanosis or erythema. Nails show no clubbing.   Psychiatric: She has a normal mood and affect. Her speech is normal and behavior is normal.    Nursing note and vitals reviewed.    Vitals:    12/11/19 1605 12/11/19 1609   BP: 122/88 (!) 132/92   BP Location: Left arm Right arm   Patient Position: Sitting    Pulse: 78    Weight: 72.6 kg (160 lb)    Height: 5' (1.524 m)      Lab Results   Component Value Date    CHOL 171 12/18/2018    CHOL 170 03/13/2015    CHOL 278 (H) 11/07/2014     Lab Results   Component Value Date    HDL 49 12/18/2018    HDL 45 03/13/2015    HDL 46 11/07/2014     Lab Results   Component Value Date    LDLCALC 98.6 12/18/2018    LDLCALC 109.8 03/13/2015    LDLCALC 197.6 (H) 11/07/2014     Lab Results   Component Value Date    TRIG 117 12/18/2018    TRIG 76 03/13/2015    TRIG 172 (H) 11/07/2014     Lab Results   Component Value Date    CHOLHDL 28.7 12/18/2018    CHOLHDL 26.5 03/13/2015    CHOLHDL 16.5 (L) 11/07/2014       Chemistry        Component Value Date/Time     06/07/2019 1632    K 3.8 06/07/2019 1632     06/07/2019 1632    CO2 25 06/07/2019 1632    BUN 11 06/07/2019 1632    CREATININE 0.9 06/07/2019 1632     06/07/2019 1632        Component Value Date/Time    CALCIUM 9.5 06/07/2019 1632    ALKPHOS 110 06/07/2019 1632    AST 38 06/07/2019 1632    ALT 60 (H) 06/07/2019 1632    BILITOT 0.3 06/07/2019 1632    ESTGFRAFRICA >60 06/07/2019 1632    EGFRNONAA >60 06/07/2019 1632          Lab Results   Component Value Date    TSH 3.588 07/13/2018     No results found for: INR, PROTIME  Lab Results   Component Value Date    WBC 9.30 06/07/2019    HGB 13.6 06/07/2019    HCT 39.7 06/07/2019    MCV 91 06/07/2019     06/07/2019     BNP  @LABRCNTIP(BNP,BNPTRIAGEBLO)@  CrCl cannot be calculated (Patient's most recent lab result is older than the maximum 7 days allowed.).  Assessment:     1. Chest pain, unspecified type    2. Obesity, unspecified classification, unspecified obesity type, unspecified whether serious comorbidity present    3. Hypertension, unspecified type    4. MG (myasthenia gravis)    5. Muscle twitching       Uncontrolled bp will add losartan 25 mg po daily  Atypical chest pain stress echo  Plan:   Stress echo   Losartan 25 mg po daily   Low salt diet   F/u in 2 weeks with mid level.

## 2019-12-16 ENCOUNTER — OFFICE VISIT (OUTPATIENT)
Dept: INTERNAL MEDICINE | Facility: CLINIC | Age: 54
End: 2019-12-16
Payer: COMMERCIAL

## 2019-12-16 ENCOUNTER — OFFICE VISIT (OUTPATIENT)
Dept: OTOLARYNGOLOGY | Facility: CLINIC | Age: 54
End: 2019-12-16
Payer: COMMERCIAL

## 2019-12-16 VITALS
DIASTOLIC BLOOD PRESSURE: 88 MMHG | OXYGEN SATURATION: 96 % | RESPIRATION RATE: 18 BRPM | TEMPERATURE: 97 F | WEIGHT: 160.5 LBS | SYSTOLIC BLOOD PRESSURE: 130 MMHG | HEART RATE: 71 BPM | BODY MASS INDEX: 31.34 KG/M2

## 2019-12-16 VITALS
WEIGHT: 160.25 LBS | TEMPERATURE: 99 F | HEART RATE: 81 BPM | BODY MASS INDEX: 31.3 KG/M2 | DIASTOLIC BLOOD PRESSURE: 85 MMHG | SYSTOLIC BLOOD PRESSURE: 127 MMHG

## 2019-12-16 DIAGNOSIS — R09.81 NASAL CONGESTION: ICD-10-CM

## 2019-12-16 DIAGNOSIS — J01.10 ACUTE FRONTAL SINUSITIS, RECURRENCE NOT SPECIFIED: Primary | ICD-10-CM

## 2019-12-16 DIAGNOSIS — J01.90 ACUTE BACTERIAL SINUSITIS: Primary | ICD-10-CM

## 2019-12-16 DIAGNOSIS — J30.9 CHRONIC ALLERGIC RHINITIS: ICD-10-CM

## 2019-12-16 DIAGNOSIS — L30.9 DERMATITIS: ICD-10-CM

## 2019-12-16 DIAGNOSIS — B96.89 ACUTE BACTERIAL SINUSITIS: Primary | ICD-10-CM

## 2019-12-16 DIAGNOSIS — J32.9 RECURRENT SINUS INFECTIONS: ICD-10-CM

## 2019-12-16 DIAGNOSIS — J30.2 SEASONAL ALLERGIC RHINITIS, UNSPECIFIED TRIGGER: ICD-10-CM

## 2019-12-16 PROCEDURE — 99999 PR PBB SHADOW E&M-EST. PATIENT-LVL IV: ICD-10-PCS | Mod: PBBFAC,,, | Performed by: PHYSICIAN ASSISTANT

## 2019-12-16 PROCEDURE — 99999 PR PBB SHADOW E&M-EST. PATIENT-LVL IV: ICD-10-PCS | Mod: PBBFAC,,, | Performed by: INTERNAL MEDICINE

## 2019-12-16 PROCEDURE — 99203 PR OFFICE/OUTPT VISIT, NEW, LEVL III, 30-44 MIN: ICD-10-PCS | Mod: S$GLB,,, | Performed by: PHYSICIAN ASSISTANT

## 2019-12-16 PROCEDURE — 99999 PR PBB SHADOW E&M-EST. PATIENT-LVL IV: CPT | Mod: PBBFAC,,, | Performed by: PHYSICIAN ASSISTANT

## 2019-12-16 PROCEDURE — 99203 OFFICE O/P NEW LOW 30 MIN: CPT | Mod: S$GLB,,, | Performed by: PHYSICIAN ASSISTANT

## 2019-12-16 PROCEDURE — 99214 PR OFFICE/OUTPT VISIT, EST, LEVL IV, 30-39 MIN: ICD-10-PCS | Mod: S$GLB,,, | Performed by: INTERNAL MEDICINE

## 2019-12-16 PROCEDURE — 99999 PR PBB SHADOW E&M-EST. PATIENT-LVL IV: CPT | Mod: PBBFAC,,, | Performed by: INTERNAL MEDICINE

## 2019-12-16 PROCEDURE — 99214 OFFICE O/P EST MOD 30 MIN: CPT | Mod: S$GLB,,, | Performed by: INTERNAL MEDICINE

## 2019-12-16 RX ORDER — MONTELUKAST SODIUM 10 MG/1
10 TABLET ORAL NIGHTLY
Qty: 90 TABLET | Refills: 1 | Status: SHIPPED | OUTPATIENT
Start: 2019-12-16 | End: 2020-03-16

## 2019-12-16 RX ORDER — AMOXICILLIN AND CLAVULANATE POTASSIUM 875; 125 MG/1; MG/1
1 TABLET, FILM COATED ORAL 2 TIMES DAILY
Qty: 20 TABLET | Refills: 0 | Status: SHIPPED | OUTPATIENT
Start: 2019-12-16 | End: 2019-12-26

## 2019-12-16 RX ORDER — FLUTICASONE PROPIONATE 50 MCG
2 SPRAY, SUSPENSION (ML) NASAL DAILY
Qty: 3 BOTTLE | Refills: 1 | Status: SHIPPED | OUTPATIENT
Start: 2019-12-16

## 2019-12-16 RX ORDER — CLOBETASOL PROPIONATE 0.5 MG/G
CREAM TOPICAL
Qty: 45 G | Refills: 1 | Status: SHIPPED | OUTPATIENT
Start: 2019-12-16 | End: 2020-06-30 | Stop reason: ALTCHOICE

## 2019-12-16 RX ORDER — AZITHROMYCIN 250 MG/1
TABLET, FILM COATED ORAL
Qty: 6 TABLET | Refills: 0 | Status: SHIPPED | OUTPATIENT
Start: 2019-12-16 | End: 2019-12-21

## 2019-12-16 NOTE — LETTER
December 16, 2019      Lexi Vazqeuz DO  [address] [place] [address] Dr Andrey [place] [address]           [facility]  [address] [place] [address] [address] [place] [address]  Phone: [phone]  Fax: [phone]          Patient: Reta Navas   MR Number: [id number]   YOB: 1965   Date of Visit: 12/16/2019       Dear Dr. Lexi Vazquez:    Thank you for referring Reta Navas to me for evaluation. Attached you will find relevant portions of my assessment and plan of care.    If you have questions, please do not hesitate to call me. I look forward to following Reta Navas along with you.    Sincerely,    Oliva Loza PA-C    Enclosure  CC:  No Recipients    If you would like to receive this communication electronically, please contact [email or web address] or [phone] to request more information on [employer] access.    For providers and/or their staff who would like to refer a patient to [facility], please contact us through our one-stop-shop provider referral line, [facility], at [phone].    If you feel you have received this communication in error or would no longer like to receive these types of communications, please e-mail [email or web address]

## 2019-12-16 NOTE — PROGRESS NOTES
Subjective:       Patient ID: Reta Navas is a 54 y.o. female.    Chief Complaint: Sinus Problem    Sinus Problem   This is a recurrent problem. The current episode started in the past 7 days. The problem has been gradually worsening since onset. There has been no fever. The pain is moderate. Associated symptoms include congestion, headaches and sinus pressure. Pertinent negatives include no chills, coughing, ear pain, shortness of breath or sore throat. Past treatments include nothing.     She was taking Singulair and using Flonase but has not used them in a while. She has chronic allergies. She is concerned because she gets several sinus infections yearly. She would like to see a specialist.     She has a rash on her arms that is itchy. She frequently gets rashes. She needs a refill on her steroid cream.     Review of Systems   Constitutional: Negative for chills.   HENT: Positive for congestion and sinus pressure. Negative for ear pain and sore throat.    Respiratory: Negative for cough and shortness of breath.    Neurological: Positive for headaches.       Objective:      Physical Exam   Constitutional: She is oriented to person, place, and time. She appears well-developed and well-nourished. No distress.   Eyes: No scleral icterus.   Neck: No tracheal deviation present. No thyromegaly present.   Cardiovascular: Normal rate, regular rhythm and normal heart sounds.   Pulmonary/Chest: Effort normal and breath sounds normal. No respiratory distress.   Abdominal: Soft. Bowel sounds are normal.   Lymphadenopathy:     She has no cervical adenopathy.   Neurological: She is alert and oriented to person, place, and time.   Skin: Skin is warm and dry. Rash (bilateral arms) noted.   Psychiatric: She has a normal mood and affect.   Vitals reviewed.      Assessment:       1. Acute bacterial sinusitis    2. Chronic allergic rhinitis    3. Recurrent sinus infections    4. Dermatitis        Plan:       Reta was seen today  for sinus problem.    Diagnoses and all orders for this visit:    Acute bacterial sinusitis  -     azithromycin (Z-TORSTEN) 250 MG tablet; Take 2 tablets by mouth on day 1; Take 1 tablet by mouth on days 2-5    Chronic allergic rhinitis  -     fluticasone propionate (FLONASE) 50 mcg/actuation nasal spray; 2 sprays (100 mcg total) by Each Nostril route once daily.  -     montelukast (SINGULAIR) 10 mg tablet; Take 1 tablet (10 mg total) by mouth every evening.  -     azithromycin (Z-TORSTEN) 250 MG tablet; Take 2 tablets by mouth on day 1; Take 1 tablet by mouth on days 2-5    Recurrent sinus infections  -     Ambulatory consult to ENT    Dermatitis  -     clobetasol (TEMOVATE) 0.05 % cream; APPLY TWICE DAILY TO AFFECTED AREA    RTC if symptoms worsen or fail to resolve with treatment.

## 2019-12-16 NOTE — PATIENT INSTRUCTIONS
Sinusitis (Antibiotic Treatment)    The sinuses are air-filled spaces within the bones of the face. They connect to the inside of the nose. Sinusitis is an inflammation of the tissue lining the sinus cavity. Sinus inflammation can occur during a cold. It can also be due to allergies to pollens and other particles in the air. Sinusitis can cause symptoms of sinus congestion and fullness. A sinus infection causes fever, headache and facial pain. There is often green or yellow drainage from the nose or into the back of the throat (post-nasal drip). You have been given antibiotics to treat this condition.  Home care:  · Take the full course of antibiotics as instructed. Do not stop taking them, even if you feel better.  · Drink plenty of water, hot tea, and other liquids. This may help thin mucus. It also may promote sinus drainage.  · Heat may help soothe painful areas of the face. Use a towel soaked in hot water. Or,  the shower and direct the hot spray onto your face. Using a vaporizer along with a menthol rub at night may also help.   · An expectorant containing guaifenesin may help thin the mucus and promote drainage from the sinuses.  · Over-the-counter decongestants may be used unless a similar medicine was prescribed. Nasal sprays work the fastest. Use one that contains phenylephrine or oxymetazoline. First blow the nose gently. Then use the spray. Do not use these medicines more often than directed on the label or symptoms may get worse. You may also use tablets containing pseudoephedrine. Avoid products that combine ingredients, because side effects may be increased. Read labels. You can also ask the pharmacist for help. (NOTE: Persons with high blood pressure should not use decongestants. They can raise blood pressure.)  · Over-the-counter antihistamines may help if allergies contributed to your sinusitis.    · Do not use nasal rinses or irrigation during an acute sinus infection, unless told to by  your health care provider. Rinsing may spread the infection to other sinuses.  · Use acetaminophen or ibuprofen to control pain, unless another pain medicine was prescribed. (If you have chronic liver or kidney disease or ever had a stomach ulcer, talk with your doctor before using these medicines. Aspirin should never be used in anyone under 18 years of age who is ill with a fever. It may cause severe liver damage.)  · Don't smoke. This can worsen symptoms.  Follow-up care  Follow up with your healthcare provider or our staff if you are not improving within the next week.  When to seek medical advice  Call your healthcare provider if any of these occur:  · Facial pain or headache becoming more severe  · Stiff neck  · Unusual drowsiness or confusion  · Swelling of the forehead or eyelids  · Vision problems, including blurred or double vision  · Fever of 100.4ºF (38ºC) or higher, or as directed by your healthcare provider  · Seizure  · Breathing problems  · Symptoms not resolving within 10 days  Date Last Reviewed: 4/13/2015  © 7784-3067 [employer]. [address] [place] [address], [place] [address]. All rights reserved. This information is not intended as a substitute for professional medical care. Always follow your healthcare professional's instructions.

## 2019-12-16 NOTE — PROGRESS NOTES
Subjective:       Patient ID: Reta Navas is a 54 y.o. female.    Chief Complaint: Sinusitis (for about the last week) and Dizziness (On and off and sometimes more than once a day)    Ms. Navas is a very pleasant 53 yo female here to see me today with complaints of nasal congestion, facial pressure/pain, fever. She has known allergies an has been on allergy medications in the past but none recently . She states that she get receurrent sinus infections.     Review of Systems   Constitutional: Positive for fever. Negative for chills, fatigue and unexpected weight change.   HENT: Positive for congestion, sinus pressure and sinus pain. Negative for dental problem, ear discharge, ear pain, facial swelling, hearing loss, nosebleeds, postnasal drip, rhinorrhea, sneezing, sore throat, tinnitus, trouble swallowing and voice change.    Eyes: Negative for redness, itching and visual disturbance.   Respiratory: Negative for cough, choking, shortness of breath and wheezing.    Cardiovascular: Negative for chest pain and palpitations.   Gastrointestinal: Negative for abdominal pain.        No reflux.   Musculoskeletal: Negative for gait problem.   Skin: Negative for rash.   Neurological: Negative for dizziness, light-headedness and headaches.       Objective:      Physical Exam   Constitutional: She is oriented to person, place, and time. She appears well-developed and well-nourished. No distress.   HENT:   Head: Normocephalic and atraumatic.   Right Ear: Tympanic membrane, external ear and ear canal normal.   Left Ear: Tympanic membrane, external ear and ear canal normal.   Nose: Mucosal edema present. No rhinorrhea, nasal deformity or septal deviation. No epistaxis. Right sinus exhibits maxillary sinus tenderness and frontal sinus tenderness. Left sinus exhibits maxillary sinus tenderness and frontal sinus tenderness.   Mouth/Throat: Uvula is midline, oropharynx is clear and moist and mucous membranes are normal. Mucous  membranes are not pale and not dry. No dental caries. No oropharyngeal exudate or posterior oropharyngeal erythema.   Eyes: Pupils are equal, round, and reactive to light. Conjunctivae, EOM and lids are normal. Right eye exhibits no chemosis. Left eye exhibits no chemosis. Right conjunctiva is not injected. Left conjunctiva is not injected. No scleral icterus. Right eye exhibits normal extraocular motion and no nystagmus. Left eye exhibits normal extraocular motion and no nystagmus.   Neck: Trachea normal and phonation normal. No tracheal tenderness present. No tracheal deviation present. No thyroid mass and no thyromegaly present.   Cardiovascular: Intact distal pulses.   Pulmonary/Chest: Effort normal. No stridor. No respiratory distress.   Abdominal: She exhibits no distension.   Lymphadenopathy:        Head (right side): No submental, no submandibular, no preauricular, no posterior auricular and no occipital adenopathy present.        Head (left side): No submental, no submandibular, no preauricular, no posterior auricular and no occipital adenopathy present.     She has no cervical adenopathy.   Neurological: She is alert and oriented to person, place, and time. No cranial nerve deficit.   Skin: Skin is warm and dry. No rash noted. No erythema.   Psychiatric: She has a normal mood and affect. Her behavior is normal.       Assessment:       1. Acute frontal sinusitis, recurrence not specified    2. Seasonal allergic rhinitis, unspecified trigger    3. Nasal congestion        Plan:       I agree to go ahead and treat her for an acute sinus infection. I will change her to Augmentin. I do believe that her symptoms have an allergic component. I would recommend that she start daily antihistamine, Flonase and singulair. RTC in 3-4 weeks for recheck.

## 2019-12-20 ENCOUNTER — OFFICE VISIT (OUTPATIENT)
Dept: SPORTS MEDICINE | Facility: CLINIC | Age: 54
End: 2019-12-20
Payer: COMMERCIAL

## 2019-12-20 ENCOUNTER — HOSPITAL ENCOUNTER (OUTPATIENT)
Dept: RADIOLOGY | Facility: HOSPITAL | Age: 54
Discharge: HOME | End: 2019-12-20
Attending: ORTHOPAEDIC SURGERY
Payer: COMMERCIAL

## 2019-12-20 VITALS
SYSTOLIC BLOOD PRESSURE: 138 MMHG | HEIGHT: 60 IN | WEIGHT: 160 LBS | DIASTOLIC BLOOD PRESSURE: 92 MMHG | HEART RATE: 74 BPM | BODY MASS INDEX: 31.41 KG/M2

## 2019-12-20 DIAGNOSIS — M25.512 LEFT SHOULDER PAIN, UNSPECIFIED CHRONICITY: ICD-10-CM

## 2019-12-20 DIAGNOSIS — M54.2 NECK PAIN: ICD-10-CM

## 2019-12-20 DIAGNOSIS — M75.42 SUBACROMIAL IMPINGEMENT OF LEFT SHOULDER: ICD-10-CM

## 2019-12-20 DIAGNOSIS — M25.512 LEFT SHOULDER PAIN, UNSPECIFIED CHRONICITY: Primary | ICD-10-CM

## 2019-12-20 DIAGNOSIS — M75.52 SUBACROMIAL BURSITIS OF LEFT SHOULDER JOINT: Primary | ICD-10-CM

## 2019-12-20 DIAGNOSIS — M75.82 ROTATOR CUFF TENDONITIS, LEFT: ICD-10-CM

## 2019-12-20 PROCEDURE — 73030 X-RAY EXAM OF SHOULDER: CPT | Mod: TC,LT

## 2019-12-20 PROCEDURE — 99204 OFFICE O/P NEW MOD 45 MIN: CPT | Mod: 25,S$GLB,, | Performed by: ORTHOPAEDIC SURGERY

## 2019-12-20 PROCEDURE — 99999 PR PBB SHADOW E&M-EST. PATIENT-LVL III: CPT | Mod: PBBFAC,,, | Performed by: ORTHOPAEDIC SURGERY

## 2019-12-20 PROCEDURE — 99999 PR PBB SHADOW E&M-EST. PATIENT-LVL III: ICD-10-PCS | Mod: PBBFAC,,, | Performed by: ORTHOPAEDIC SURGERY

## 2019-12-20 PROCEDURE — 73030 XR SHOULDER COMPLETE 2 OR MORE VIEWS LEFT: ICD-10-PCS | Mod: 26,LT,, | Performed by: RADIOLOGY

## 2019-12-20 PROCEDURE — 73030 X-RAY EXAM OF SHOULDER: CPT | Mod: 26,LT,, | Performed by: RADIOLOGY

## 2019-12-20 PROCEDURE — 99204 PR OFFICE/OUTPT VISIT, NEW, LEVL IV, 45-59 MIN: ICD-10-PCS | Mod: 25,S$GLB,, | Performed by: ORTHOPAEDIC SURGERY

## 2019-12-20 PROCEDURE — 20610 LARGE JOINT ASPIRATION/INJECTION: L SUBACROMIAL BURSA: ICD-10-PCS | Mod: LT,S$GLB,, | Performed by: ORTHOPAEDIC SURGERY

## 2019-12-20 PROCEDURE — 20610 DRAIN/INJ JOINT/BURSA W/O US: CPT | Mod: LT,S$GLB,, | Performed by: ORTHOPAEDIC SURGERY

## 2019-12-20 RX ORDER — METHYLPREDNISOLONE ACETATE 80 MG/ML
80 INJECTION, SUSPENSION INTRA-ARTICULAR; INTRALESIONAL; INTRAMUSCULAR; SOFT TISSUE
Status: DISCONTINUED | OUTPATIENT
Start: 2019-12-20 | End: 2019-12-20 | Stop reason: HOSPADM

## 2019-12-20 RX ADMIN — METHYLPREDNISOLONE ACETATE 80 MG: 80 INJECTION, SUSPENSION INTRA-ARTICULAR; INTRALESIONAL; INTRAMUSCULAR; SOFT TISSUE at 10:12

## 2019-12-20 NOTE — PROCEDURES
Large Joint Aspiration/Injection: R subacromial bursa  Date/Time: 12/20/2019 10:20 AM  Performed by: Jonathan Shin MD  Authorized by: Jonathan Shin MD     Consent Done?:  Yes (Verbal)  Indications:  Pain and diagnostic evaluation  Procedure site marked: Yes    Timeout: Prior to procedure the correct patient, procedure, and site was verified    Anesthesia  Local anesthesia used      Location:  Shoulder  Site:  R subacromial bursa  Prep: Patient was prepped and draped in usual sterile fashion    Needle size:  22 G  Ultrasonic Guidance for needle placement: No  Approach: posterolateral.  Medications:  80 mg methylPREDNISolone acetate 80 mg/mL  Patient tolerance:  Patient tolerated the procedure well with no immediate complications    Additional Comments: The patient had no adverse reactions to the medication. The patient was instructed to apply ice to the joint for 30 minutes on and 30 minutes off for the next 48 hours, and avoid strenuous activities for 48 hours following the injection. Patient was warned of possible blood sugar and/or blood pressure changes during that time regarding corticosteroid injections if applicable.  Following that time, patient can resume regular activities. Note that informed consent was performed with the patient before injection discussing risks, benefits, indications and alternatives to injection, risks including but not limited to bleeding and infection.      5 min post injection ROM and impingement testing improved

## 2019-12-20 NOTE — PROCEDURES
Large Joint Aspiration/Injection: L subacromial bursa  Date/Time: 12/20/2019 10:20 AM  Performed by: Jonathan Shin MD  Authorized by: Jonathan Shin MD     Consent Done?:  Yes (Verbal)  Indications:  Pain and diagnostic evaluation  Procedure site marked: Yes    Timeout: Prior to procedure the correct patient, procedure, and site was verified    Anesthesia  Local anesthesia used      Location:  Shoulder  Site:  L subacromial bursa  Prep: Patient was prepped and draped in usual sterile fashion    Needle size:  22 G  Ultrasonic Guidance for needle placement: No  Approach: posterolateral.  Medications:  80 mg methylPREDNISolone acetate 80 mg/mL  Patient tolerance:  Patient tolerated the procedure well with no immediate complications    Additional Comments: The patient had no adverse reactions to the medication. The patient was instructed to apply ice to the joint for 30 minutes on and 30 minutes off for the next 48 hours, and avoid strenuous activities for 48 hours following the injection. Patient was warned of possible blood sugar and/or blood pressure changes during that time regarding corticosteroid injections if applicable.  Following that time, patient can resume regular activities. Note that informed consent was performed with the patient before injection discussing risks, benefits, indications and alternatives to injection, risks including but not limited to bleeding and infection.        5 min post injection ROM and impingement testing to shoulder improved

## 2019-12-20 NOTE — PROGRESS NOTES
Subjective:     Patient ID: Reta Navas is a 54 y.o. female.    Chief Complaint: Pain of the Left Shoulder    Reta states she was in a department store and she hit her shoulder on a metal rake that was sticking out.    She believe that happen about 4 months ago    She has lingering pain especially with overhead lifting and lying on the left side    She has some trapezial pain and dipika acromial pain, she has lingering neck pain / cervical spine pain    Shoulder Pain    The pain is present in the left shoulder. The current episode started more than 1 month ago. The problem occurs intermittently. The problem has been gradually worsening. The quality of the pain is described as aching. The pain is at a severity of 4/10. Pertinent negatives include no fever or itching. The symptoms are aggravated by activity, lifting and rotation.       Past Medical History:   Diagnosis Date    Abnormal Pap smear     Abnormal Pap smear of cervix     Chronic rhinitis     Depression     GERD (gastroesophageal reflux disease)     Hyperlipidemia     Hypertension     Overactive bladder     Plantar fasciitis, bilateral     Psoriasis      Past Surgical History:   Procedure Laterality Date    CERVICAL BIOPSY  W/ LOOP ELECTRODE EXCISION      COLONOSCOPY      GALLBLADDER SURGERY N/A 10/11/2018    hx colposcopy      HYSTERECTOMY      supracervical hyst/LSO for fibroids    OOPHORECTOMY      LSO    ROBOT-ASSISTED CHOLECYSTECTOMY USING DA WILVER XI N/A 10/18/2018    Procedure: XI ROBOTIC CHOLECYSTECTOMY;  Surgeon: Robel Ferrer MD;  Location: [facility];  Service: General;  Laterality: N/A;    TUBAL LIGATION       Family History   Problem Relation Age of Onset    Diabetes Brother     No Known Problems Father     Colon cancer Paternal Grandfather     Colon cancer Paternal Uncle     Ovarian cancer Maternal Aunt     Breast cancer Paternal Grandmother      Social History     Socioeconomic History    Marital status:       Spouse name: Not on file    Number of children: Not on file    Years of education: Not on file    Highest education level: Not on file   Occupational History    Not on file   Social Needs    Financial resource strain: Not on file    Food insecurity:     Worry: Not on file     Inability: Not on file    Transportation needs:     Medical: Not on file     Non-medical: Not on file   Tobacco Use    Smoking status: Former Smoker     Packs/day: 1.00     Years: 39.00     Pack years: 39.00     Types: Vaping with nicotine     Last attempt to quit: 2013     Years since quittin.9    Smokeless tobacco: Never Used   Substance and Sexual Activity    Alcohol use: No     Alcohol/week: 0.0 standard drinks    Drug use: No    Sexual activity: Yes     Partners: Male     Birth control/protection: None, Surgical, Post-menopausal   Lifestyle    Physical activity:     Days per week: Not on file     Minutes per session: Not on file    Stress: Not on file   Relationships    Social connections:     Talks on phone: Not on file     Gets together: Not on file     Attends Rastafari service: Not on file     Active member of club or organization: Not on file     Attends meetings of clubs or organizations: Not on file     Relationship status: Not on file   Other Topics Concern    Not on file   Social History Narrative    Not on file     Medication List with Changes/Refills   Current Medications    AMLODIPINE (NORVASC) 5 MG TABLET    Take 1 tablet (5 mg total) by mouth once daily.    AMOXICILLIN-CLAVULANATE 875-125MG (AUGMENTIN) 875-125 MG PER TABLET    Take 1 tablet by mouth 2 (two) times daily. for 10 days    AZELASTINE (ASTELIN) 137 MCG (0.1 %) NASAL SPRAY    1 spray (137 mcg total) by Nasal route 2 (two) times daily.    AZITHROMYCIN (Z-TORSTEN) 250 MG TABLET    Take 2 tablets by mouth on day 1; Take 1 tablet by mouth on days 2-5    CLOBETASOL (TEMOVATE) 0.05 % CREAM    APPLY TWICE DAILY TO AFFECTED AREA    DESONIDE (DESOWEN)  0.05 % CREAM        ERGOCALCIFEROL (ERGOCALCIFEROL) 50,000 UNIT CAP    TAKE 1 CAPSULE BY MOUTH WEEKLY    ESTRADIOL (VAGIFEM) 10 MCG TAB    Place 1 tablet (10 mcg total) vaginally twice a week.    FLUTICASONE PROPIONATE (FLONASE) 50 MCG/ACTUATION NASAL SPRAY    2 sprays (100 mcg total) by Each Nostril route once daily.    LOSARTAN (COZAAR) 25 MG TABLET    Take 1 tablet (25 mg total) by mouth once daily.    MONTELUKAST (SINGULAIR) 10 MG TABLET    Take 1 tablet (10 mg total) by mouth every evening.    OMEPRAZOLE (PRILOSEC) 20 MG CAPSULE    Take 1 capsule (20 mg total) by mouth once daily.    TRIAMCINOLONE ACETONIDE 0.1% (KENALOG) 0.1 % CREAM    AAA bid     Review of patient's allergies indicates:   Allergen Reactions    Adhesive Rash    Latex, natural rubber      Review of Systems   Constitution: Negative for fever.   HENT: Negative for sore throat.    Eyes: Negative for blurred vision.   Cardiovascular: Negative for dyspnea on exertion.   Respiratory: Negative for shortness of breath.    Hematologic/Lymphatic: Does not bruise/bleed easily.   Skin: Negative for itching.   Gastrointestinal: Negative for vomiting.   Genitourinary: Negative for dysuria.   Neurological: Negative for dizziness.   Psychiatric/Behavioral: The patient does not have insomnia.        Objective:   Body mass index is 31.25 kg/m².  Vitals:    12/20/19 1020   BP: (!) 138/92   Pulse: 74           General    Nursing note and vitals reviewed.  Constitutional: She is oriented to person, place, and time. She appears well-developed. No distress.   HENT:   Head: Normocephalic and atraumatic.   Eyes: EOM are normal.   Cardiovascular: Normal rate.    Pulmonary/Chest: Effort normal. No stridor.   Neurological: She is alert and oriented to person, place, and time.   Psychiatric: She has a normal mood and affect. Her behavior is normal.         Back (L-Spine & T-Spine) / Neck (C-Spine) Exam     Tenderness   The patient is tender to palpation of the left  trapezial.     Comments:  She is mild to moderate TTP over the paraspinous muscles of the neck especially the left side  Mild stiffness with ROM cervical spine  No step off or deformity  She has pain to left trapezius area with Spurlings but none down the arm more distal  Right Shoulder Exam     Range of Motion   Active abduction: 90   Forward Flexion: 170   External Rotation 0 degrees: 30 External Rotation 90 degrees: 90   Internal rotation 0 degrees: L1   Internal rotation 90 degrees: 10     Left Shoulder Exam     Inspection/Observation   Deformity: absent  Atrophy: absent    Tenderness   The patient is tender to palpation of the acromion and greater tuberosity.    Range of Motion   Active abduction: 80   Forward Flexion: 150   External Rotation 0 degrees: 30 External Rotation 90 degrees: 90   Internal rotation 0 degrees: L3   Internal rotation 90 degrees: 10     Tests & Signs   Apprehension: negative  Drop arm: negative  Zuleta test: positive  Impingement: positive  Rotator Cuff Painful Arc/Range: mild and moderate  Belly Press: negative  Left Speed's test:  equivocal   Bear Hug: negative    Other   Sensation: normal     Comments:        Spurling's is negative bilaterally      Muscle Strength   Left Upper Extremity  Shoulder Internal Rotation: 5/5   Shoulder External Rotation: 5/5   Supraspinatus: 5/5/5   Subscapularis: 5/5/5     Vascular Exam       Capillary Refill  Left Hand: normal capillary refill      IMAGING Radiographs / Imaging : Results reviewed by me and interpreted by me, discussed with the patient and / or family     X-Ray Cervical Spine AP And Lateral  Narrative: EXAMINATION:  XR CERVICAL SPINE AP LATERAL    CLINICAL HISTORY:  neck pain;    COMPARISON:  None    FINDINGS  There is slight reversal of the normal lordotic curve which could be positional or due to muscle spasm.  No acute cervical fracture or significant subluxation.  Impression: 1.  Slight reversal of normal lordotic curve which could be  positional or due to muscle spasm.  No acute cervical fracture or significant subluxation.    Electronically signed by: Zeeshan Buck  Date:    06/07/2019  Time:    16:45        Imaging / Radiographs:  4 views of the Left shoulder - AP, Grashey, Outlet and Axillary view were ordered by me and reviewed / interpreted by me demonstrate:   No fracture or dislocation   Proximal migration of humeral head: None    Glenohumeral degenerative change / arthritis: None   AC joint degenerative change / arthritis: mild   Acromion type: Type II      Assessment:     Encounter Diagnoses   Name Primary?    Subacromial bursitis of left shoulder joint Yes    Subacromial impingement of left shoulder     Rotator cuff tendonitis, left     Neck pain         Plan:     I had an in depth discussion today with Reta today regarding her left shoulder problem, going over her radiographs and the model to help further her understanding. I explained the anatomy and pathophysiology of the problem. I told Reta  that I believe the problem relates to above noted diagnoses. We had an in depth discussion regarding appropriate treatment and management of her condition.     From a treatment standpoint, the decision was made to go forward with :     Offered left shoulder corticosteroid injection to subacromial space today, discussed pros and cons risks benefits including risk for infection bleeding, Reta Navas expressed good understanding and wishes to proceed    Recommend physical therapy 1-2 visits for left shoulder subacromial bursitis, eval and treat, teach home exercise program to include stretching and range-of-motion exercises, rotator cuff strengthening and scapular stabilizing exercises    Referral to Pain Management for evaluation cervical spine pain    Follow-up in 2 months for recheck    Consider MRI left shoulder if not improving     Reta Navas is very agreeable with above noted plan, and all questions answered to full  satisfaction today.

## 2019-12-31 ENCOUNTER — DOCUMENTATION ONLY (OUTPATIENT)
Dept: CARDIOLOGY | Facility: CLINIC | Age: 54
End: 2019-12-31

## 2019-12-31 ENCOUNTER — CLINICAL SUPPORT (OUTPATIENT)
Dept: CARDIOLOGY | Facility: CLINIC | Age: 54
End: 2019-12-31
Attending: INTERNAL MEDICINE
Payer: COMMERCIAL

## 2019-12-31 DIAGNOSIS — I10 HYPERTENSION, UNSPECIFIED TYPE: ICD-10-CM

## 2019-12-31 DIAGNOSIS — R07.9 CHEST PAIN, UNSPECIFIED TYPE: ICD-10-CM

## 2019-12-31 LAB
DIASTOLIC DYSFUNCTION: NO
ESTIMATED PA SYSTOLIC PRESSURE: 20.31
MITRAL VALVE MOBILITY: NORMAL
RETIRED EF AND QEF - SEE NOTES: 60 (ref 55–65)
TRICUSPID VALVE REGURGITATION: NORMAL

## 2019-12-31 PROCEDURE — 93351 STRESS TTE COMPLETE: CPT | Mod: S$GLB,,, | Performed by: INTERNAL MEDICINE

## 2019-12-31 PROCEDURE — 93325 EXERCISE STRESS ECHO WITH COLOR FLOW: ICD-10-PCS | Mod: S$GLB,,, | Performed by: INTERNAL MEDICINE

## 2019-12-31 PROCEDURE — 93352 ADMIN ECG CONTRAST AGENT: CPT | Mod: S$GLB,,, | Performed by: INTERNAL MEDICINE

## 2019-12-31 PROCEDURE — 93320 EXERCISE STRESS ECHO WITH COLOR FLOW: ICD-10-PCS | Mod: S$GLB,,, | Performed by: INTERNAL MEDICINE

## 2019-12-31 PROCEDURE — 93325 DOPPLER ECHO COLOR FLOW MAPG: CPT | Mod: S$GLB,,, | Performed by: INTERNAL MEDICINE

## 2019-12-31 PROCEDURE — 93320 DOPPLER ECHO COMPLETE: CPT | Mod: S$GLB,,, | Performed by: INTERNAL MEDICINE

## 2019-12-31 PROCEDURE — 93352 EXERCISE STRESS ECHO WITH COLOR FLOW: ICD-10-PCS | Mod: S$GLB,,, | Performed by: INTERNAL MEDICINE

## 2019-12-31 PROCEDURE — 93351 EXERCISE STRESS ECHO WITH COLOR FLOW: ICD-10-PCS | Mod: S$GLB,,, | Performed by: INTERNAL MEDICINE

## 2019-12-31 NOTE — PROGRESS NOTES
Pt presented for a stress echo today.  This study was performed in conjunction with Optison contrast agent because of poor endocardial visualization.  Procedure was explained to the patient, she verbalized understanding and signed the consent.  IV, 24ga x 1 attempts, was started in the left AC using aseptic technique.  Administered a total of 3 ml of Optison (lot # [id number], expiration date 01/07/2021).  Patient tolerated the procedure well.  IV discontinued, pressure dressing applied.

## 2020-01-02 ENCOUNTER — TELEPHONE (OUTPATIENT)
Dept: CARDIOLOGY | Facility: CLINIC | Age: 55
End: 2020-01-02

## 2020-01-08 ENCOUNTER — OFFICE VISIT (OUTPATIENT)
Dept: CARDIOLOGY | Facility: CLINIC | Age: 55
End: 2020-01-08
Payer: COMMERCIAL

## 2020-01-08 ENCOUNTER — LAB VISIT (OUTPATIENT)
Dept: LAB | Facility: HOSPITAL | Age: 55
End: 2020-01-08
Attending: PHYSICIAN ASSISTANT
Payer: COMMERCIAL

## 2020-01-08 VITALS
SYSTOLIC BLOOD PRESSURE: 122 MMHG | WEIGHT: 161.38 LBS | HEIGHT: 60 IN | BODY MASS INDEX: 31.68 KG/M2 | DIASTOLIC BLOOD PRESSURE: 82 MMHG | HEART RATE: 72 BPM

## 2020-01-08 DIAGNOSIS — I10 ESSENTIAL HYPERTENSION: ICD-10-CM

## 2020-01-08 DIAGNOSIS — I10 ESSENTIAL HYPERTENSION: Primary | ICD-10-CM

## 2020-01-08 DIAGNOSIS — E66.9 OBESITY, UNSPECIFIED CLASSIFICATION, UNSPECIFIED OBESITY TYPE, UNSPECIFIED WHETHER SERIOUS COMORBIDITY PRESENT: ICD-10-CM

## 2020-01-08 DIAGNOSIS — G70.00 MG (MYASTHENIA GRAVIS): ICD-10-CM

## 2020-01-08 LAB
ANION GAP SERPL CALC-SCNC: 13 MMOL/L (ref 8–16)
BUN SERPL-MCNC: 14 MG/DL (ref 6–20)
CALCIUM SERPL-MCNC: 9.1 MG/DL (ref 8.7–10.5)
CHLORIDE SERPL-SCNC: 106 MMOL/L (ref 95–110)
CO2 SERPL-SCNC: 24 MMOL/L (ref 23–29)
CREAT SERPL-MCNC: 0.7 MG/DL (ref 0.5–1.4)
EST. GFR  (AFRICAN AMERICAN): >60 ML/MIN/1.73 M^2
EST. GFR  (NON AFRICAN AMERICAN): >60 ML/MIN/1.73 M^2
GLUCOSE SERPL-MCNC: 90 MG/DL (ref 70–110)
POTASSIUM SERPL-SCNC: 3.8 MMOL/L (ref 3.5–5.1)
SODIUM SERPL-SCNC: 143 MMOL/L (ref 136–145)

## 2020-01-08 PROCEDURE — 99999 PR PBB SHADOW E&M-EST. PATIENT-LVL IV: CPT | Mod: PBBFAC,,, | Performed by: PHYSICIAN ASSISTANT

## 2020-01-08 PROCEDURE — 99214 PR OFFICE/OUTPT VISIT, EST, LEVL IV, 30-39 MIN: ICD-10-PCS | Mod: S$GLB,,, | Performed by: PHYSICIAN ASSISTANT

## 2020-01-08 PROCEDURE — 99214 OFFICE O/P EST MOD 30 MIN: CPT | Mod: S$GLB,,, | Performed by: PHYSICIAN ASSISTANT

## 2020-01-08 PROCEDURE — 36415 COLL VENOUS BLD VENIPUNCTURE: CPT

## 2020-01-08 PROCEDURE — 80048 BASIC METABOLIC PNL TOTAL CA: CPT

## 2020-01-08 PROCEDURE — 99999 PR PBB SHADOW E&M-EST. PATIENT-LVL IV: ICD-10-PCS | Mod: PBBFAC,,, | Performed by: PHYSICIAN ASSISTANT

## 2020-01-08 RX ORDER — MINERAL OIL
180 ENEMA (ML) RECTAL DAILY
COMMUNITY
End: 2020-06-01 | Stop reason: ALTCHOICE

## 2020-01-08 NOTE — PROGRESS NOTES
Subjective:    Patient ID:  Reta Navas is a 54 y.o. female who presents for follow-up of HTN      HPI   Ms. Navas is a 54 year old female patient whose current medical conditions include HTN and atypical chest pain who presents today for follow-up. Patient previously seen by Dr. Llamas and had Losartan added to med regimen due to elevated BP. Stress echo was also ordered. She returns today and states overall is doing well. Still has occasional bouts of atypical chest pain-numbness and sharp in nature. Generally occurs at rest. Not really bothersome. No exertional symptoms. No other complaints. No SOB. No lightheadedness, dizziness, palpitations, near syncope, or syncope. No s/s of CHF. BP stable and controlled. Patient tolerating current meds. Stress echo 12/31/19 showed normal EF, no WMA.     Results discussed with patient.       Review of Systems   Constitution: Negative for chills, decreased appetite, fever and malaise/fatigue.   HENT: Negative for congestion, hoarse voice and sore throat.    Eyes: Negative for blurred vision and discharge.   Cardiovascular: Positive for chest pain (atypical). Negative for claudication, cyanosis, dyspnea on exertion, irregular heartbeat, leg swelling, near-syncope, orthopnea, palpitations and paroxysmal nocturnal dyspnea.   Respiratory: Negative for cough, hemoptysis, shortness of breath, snoring, sputum production and wheezing.    Endocrine: Negative for cold intolerance and heat intolerance.   Hematologic/Lymphatic: Negative for bleeding problem. Does not bruise/bleed easily.   Skin: Negative for rash.   Musculoskeletal: Negative for arthritis, back pain, joint pain, joint swelling, muscle cramps, muscle weakness and myalgias.   Gastrointestinal: Negative for abdominal pain, constipation, diarrhea, heartburn, melena and nausea.   Genitourinary: Negative for hematuria.   Neurological: Negative for dizziness, focal weakness, headaches, light-headedness, loss of balance,  numbness, paresthesias, seizures and weakness.   Psychiatric/Behavioral: Negative for memory loss. The patient does not have insomnia.    Allergic/Immunologic: Negative for hives.     /82 (BP Location: Left arm)   Pulse 72   Ht 5' (1.524 m)   Wt 73.2 kg (161 lb 6 oz)   BMI 31.52 kg/m²   Objective:    Physical Exam   Constitutional: She is oriented to person, place, and time. She appears well-developed and well-nourished. No distress.   HENT:   Head: Normocephalic and atraumatic.   Eyes: Pupils are equal, round, and reactive to light. Right eye exhibits no discharge. Left eye exhibits no discharge.   Neck: Neck supple. No JVD present.   Cardiovascular: Normal rate, regular rhythm, S1 normal, S2 normal and normal heart sounds.   No murmur heard.  Pulmonary/Chest: Effort normal and breath sounds normal. No respiratory distress. She has no wheezes. She has no rales.   Abdominal: Soft. She exhibits no distension.   Musculoskeletal: She exhibits no edema.   Neurological: She is alert and oriented to person, place, and time.   Skin: Skin is warm and dry. She is not diaphoretic. No erythema.   Psychiatric: She has a normal mood and affect. Her behavior is normal. Thought content normal.   Nursing note and vitals reviewed.    2D Echo CONCLUSIONS     1 - Normal left ventricular systolic function (EF 60-65%).     2 - Normal left ventricular diastolic function.     3 - Normal right ventricular systolic function .     4 - The estimated PA systolic pressure is 20 mmHg.     5 - Concentric hypertrophy.     6 - No wall motion abnormalities.     7 - Mild tricuspid regurgitation.     No evidence of stress induced myocardial ischemia.   Assessment:       1. Essential hypertension    2. MG (myasthenia gravis)    3. Obesity, unspecified classification, unspecified obesity type, unspecified whether serious comorbidity present      Patient presents for f/u. Doing clinically well. BP controlled. Patient tolerating meds. Stress  echo results/reviewed discussed. Continue same meds/mgmt.  Plan:   -Continue current medical management and risk factor modification  -Cardiac, low salt diet  -Increase activity level, weight loss  -BMP today, phone review  -RTC 1 year with EKG  -PCP gets labs

## 2020-01-10 ENCOUNTER — TELEPHONE (OUTPATIENT)
Dept: CARDIOLOGY | Facility: HOSPITAL | Age: 55
End: 2020-01-10

## 2020-01-16 ENCOUNTER — OFFICE VISIT (OUTPATIENT)
Dept: OTOLARYNGOLOGY | Facility: CLINIC | Age: 55
End: 2020-01-16
Payer: COMMERCIAL

## 2020-01-16 VITALS
DIASTOLIC BLOOD PRESSURE: 89 MMHG | BODY MASS INDEX: 31.39 KG/M2 | HEART RATE: 85 BPM | WEIGHT: 160.69 LBS | SYSTOLIC BLOOD PRESSURE: 129 MMHG | TEMPERATURE: 98 F

## 2020-01-16 DIAGNOSIS — J34.89 SINUS PRESSURE: Primary | ICD-10-CM

## 2020-01-16 PROCEDURE — 99213 PR OFFICE/OUTPT VISIT, EST, LEVL III, 20-29 MIN: ICD-10-PCS | Mod: S$GLB,,, | Performed by: PHYSICIAN ASSISTANT

## 2020-01-16 PROCEDURE — 99999 PR PBB SHADOW E&M-EST. PATIENT-LVL IV: ICD-10-PCS | Mod: PBBFAC,,, | Performed by: PHYSICIAN ASSISTANT

## 2020-01-16 PROCEDURE — 99999 PR PBB SHADOW E&M-EST. PATIENT-LVL IV: CPT | Mod: PBBFAC,,, | Performed by: PHYSICIAN ASSISTANT

## 2020-01-16 PROCEDURE — 99213 OFFICE O/P EST LOW 20 MIN: CPT | Mod: S$GLB,,, | Performed by: PHYSICIAN ASSISTANT

## 2020-01-16 NOTE — PROGRESS NOTES
Subjective:       Patient ID: Reta Navas is a 54 y.o. female.    Chief Complaint: 1 month follow up on sinus/allergy (Still has pain on top of eyebrows/forehead) and Nasal Congestion (Hear & feels like a rattling in nose)    Ms. Navas is a very pleasant 55 yo female here to see me t follow up of nasal congestion, facial pressure/pain, fever. She has known allergies an has been on allergy medications in the past but none recently . She states that she get receurrent sinus infections. She has completed augmentin.    Review of Systems   Constitutional: Negative for chills, fatigue, fever and unexpected weight change.   HENT: Positive for congestion, sinus pressure and sneezing. Negative for dental problem, ear discharge, ear pain, facial swelling, hearing loss, nosebleeds, postnasal drip, rhinorrhea, sore throat, tinnitus, trouble swallowing and voice change.    Eyes: Negative for redness, itching and visual disturbance.   Respiratory: Negative for cough, choking, shortness of breath and wheezing.    Cardiovascular: Negative for chest pain and palpitations.   Gastrointestinal: Negative for abdominal pain.        No reflux.   Musculoskeletal: Negative for gait problem.   Skin: Negative for rash.   Neurological: Positive for headaches. Negative for dizziness and light-headedness.       Objective:      Physical Exam   Constitutional: She is oriented to person, place, and time. She appears well-developed and well-nourished. No distress.   HENT:   Head: Normocephalic and atraumatic.   Right Ear: Tympanic membrane, external ear and ear canal normal.   Left Ear: Tympanic membrane, external ear and ear canal normal.   Nose: Mucosal edema present. No rhinorrhea, nasal deformity or septal deviation. No epistaxis. Right sinus exhibits frontal sinus tenderness. Right sinus exhibits no maxillary sinus tenderness. Left sinus exhibits frontal sinus tenderness. Left sinus exhibits no maxillary sinus tenderness.   Mouth/Throat:  Uvula is midline, oropharynx is clear and moist and mucous membranes are normal. Mucous membranes are not pale and not dry. No dental caries. No oropharyngeal exudate or posterior oropharyngeal erythema.   Eyes: Pupils are equal, round, and reactive to light. Conjunctivae, EOM and lids are normal. Right eye exhibits no chemosis. Left eye exhibits no chemosis. Right conjunctiva is not injected. Left conjunctiva is not injected. No scleral icterus. Right eye exhibits normal extraocular motion and no nystagmus. Left eye exhibits normal extraocular motion and no nystagmus.   Neck: Trachea normal and phonation normal. No tracheal tenderness present. No tracheal deviation present. No thyroid mass and no thyromegaly present.   Cardiovascular: Intact distal pulses.   Pulmonary/Chest: Effort normal. No stridor. No respiratory distress.   Abdominal: She exhibits no distension.   Lymphadenopathy:        Head (right side): No submental, no submandibular, no preauricular, no posterior auricular and no occipital adenopathy present.        Head (left side): No submental, no submandibular, no preauricular, no posterior auricular and no occipital adenopathy present.     She has no cervical adenopathy.   Neurological: She is alert and oriented to person, place, and time. No cranial nerve deficit.   Skin: Skin is warm and dry. No rash noted. No erythema.   Psychiatric: She has a normal mood and affect. Her behavior is normal.       Assessment:       1. Sinus pressure        Plan:       Will plan to order CT sinus to evaluate sinus disease. I do believe that she has an allergic component and recommend coninued use of allergy medications.

## 2020-01-23 ENCOUNTER — HOSPITAL ENCOUNTER (OUTPATIENT)
Dept: RADIOLOGY | Facility: HOSPITAL | Age: 55
Discharge: HOME | End: 2020-01-23
Attending: PHYSICIAN ASSISTANT
Payer: COMMERCIAL

## 2020-01-23 DIAGNOSIS — J34.89 SINUS PRESSURE: ICD-10-CM

## 2020-01-23 PROCEDURE — 70486 CT MAXILLOFACIAL W/O DYE: CPT | Mod: TC

## 2020-01-24 ENCOUNTER — TELEPHONE (OUTPATIENT)
Dept: OTOLARYNGOLOGY | Facility: CLINIC | Age: 55
End: 2020-01-24

## 2020-01-24 NOTE — TELEPHONE ENCOUNTER
Please let Mrs. Navas know that her recent CT of her sinuses did not show sinus infection.  No additional antibiotics are needed.  She should continue allergy medications as discussed with Yoselin.

## 2020-01-24 NOTE — TELEPHONE ENCOUNTER
Spoke with patient and gave her the results of her CT and informed her of Angelica's recommendation to continue with allergy medication.

## 2020-03-10 DIAGNOSIS — I10 HYPERTENSION GOAL BP (BLOOD PRESSURE) < 140/90: ICD-10-CM

## 2020-03-11 RX ORDER — AMLODIPINE BESYLATE 5 MG/1
TABLET ORAL
Qty: 30 TABLET | Refills: 6 | Status: SHIPPED | OUTPATIENT
Start: 2020-03-11 | End: 2020-06-30 | Stop reason: DRUGHIGH

## 2020-03-13 DIAGNOSIS — J30.9 CHRONIC ALLERGIC RHINITIS: ICD-10-CM

## 2020-03-16 RX ORDER — MONTELUKAST SODIUM 10 MG/1
TABLET ORAL
Qty: 90 TABLET | Refills: 1 | Status: SHIPPED | OUTPATIENT
Start: 2020-03-16 | End: 2021-05-03

## 2020-06-01 ENCOUNTER — TELEPHONE (OUTPATIENT)
Dept: INTERNAL MEDICINE | Facility: CLINIC | Age: 55
End: 2020-06-01

## 2020-06-01 ENCOUNTER — TELEPHONE (OUTPATIENT)
Dept: ORTHOPEDICS | Facility: CLINIC | Age: 55
End: 2020-06-01

## 2020-06-01 ENCOUNTER — OFFICE VISIT (OUTPATIENT)
Dept: INTERNAL MEDICINE | Facility: CLINIC | Age: 55
End: 2020-06-01
Payer: COMMERCIAL

## 2020-06-01 DIAGNOSIS — I10 ESSENTIAL HYPERTENSION: ICD-10-CM

## 2020-06-01 DIAGNOSIS — J06.9 UPPER RESPIRATORY TRACT INFECTION, UNSPECIFIED TYPE: Primary | ICD-10-CM

## 2020-06-01 PROCEDURE — 99214 PR OFFICE/OUTPT VISIT, EST, LEVL IV, 30-39 MIN: ICD-10-PCS | Mod: 95,,, | Performed by: NURSE PRACTITIONER

## 2020-06-01 PROCEDURE — 99214 OFFICE O/P EST MOD 30 MIN: CPT | Mod: 95,,, | Performed by: NURSE PRACTITIONER

## 2020-06-01 RX ORDER — LEVOCETIRIZINE DIHYDROCHLORIDE 5 MG/1
5 TABLET, FILM COATED ORAL NIGHTLY
Qty: 30 TABLET | Refills: 0 | Status: SHIPPED | OUTPATIENT
Start: 2020-06-01 | End: 2020-06-02

## 2020-06-01 NOTE — TELEPHONE ENCOUNTER
----- Message from Aydee Stark sent at 6/1/2020  3:44 PM CDT -----  Contact: Patient [phone]  Requesting Orders    Orders being requested: COVID-19    Reason for request: chills, sore throat, temp 98 but normal is 96    Does patient have future appt with PCP: yes    Please advise.    Thank You

## 2020-06-01 NOTE — TELEPHONE ENCOUNTER
Called pt to schedule her an apt with another provider. Pt understood.        ----- Message from Aydee Stark sent at 6/1/2020  3:42 PM CDT -----  Contact: Patient [phone]  Patient had an appt on 02/21/2020 that was cancelled and needs to reschedule.    Please call and advise.    Thank You

## 2020-06-01 NOTE — PROGRESS NOTES
Reta Navas 54 y.o. female     History of Present Illness:  The patient location is: [place], [place]   The chief complaint leading to consultation is: COVID   Visit type: Virtual visit with synchronous audio and video  Total time spent with patient: 12 min   Each patient to whom he or she provides medical services by telemedicine is:  (1) informed of the relationship between the physician and patient and the respective role of any other health care provider with respect to management of the patient; and (2) notified that he or she may decline to receive medical services by telemedicine and may withdraw from such care at any time.    Pt is new to provider, but established in practice and c/o:      Cough, worse in evening   Mouth breathing   Nasal congestion   Using vicks     No SOB/wheezing   About 6 day   Taking dayquil and nyquil   No fever, however Temp 98, typically 96   + sore throat     No sudden loss of smell/taste   Decreased appetite   Fatigue   + nausea   No VD     Not taking norvasc   Home Bps 130/90s     Exam:  Review of Systems   Constitutional: Positive for appetite change, fatigue and fever. Negative for chills and diaphoresis.   HENT: Positive for congestion and sore throat. Negative for drooling, ear discharge, ear pain and trouble swallowing.    Respiratory: Positive for cough. Negative for chest tightness, shortness of breath and wheezing.    Gastrointestinal: Negative for abdominal distention, abdominal pain, constipation, diarrhea and vomiting.   Musculoskeletal: Positive for neck pain (chronic and stable ). Negative for myalgias.   Neurological: Positive for headaches.     Physical Exam   Constitutional: She is oriented to person, place, and time. She appears well-developed and well-nourished.  Non-toxic appearance. She does not have a sickly appearance. She does not appear ill. No distress.   HENT:   Head: Normocephalic and atraumatic.   Right Ear: External ear normal.   Left Ear: External ear  normal.   Eyes: Pupils are equal, round, and reactive to light. Conjunctivae, EOM and lids are normal. Right eye exhibits no discharge. Left eye exhibits no discharge. No scleral icterus.   Neck: Normal range of motion. No tracheal deviation present.   Pulmonary/Chest: Effort normal. No respiratory distress.   Speaks in full sentences without distress    Neurological: She is alert and oriented to person, place, and time.   Skin: She is not diaphoretic.   Psychiatric: She has a normal mood and affect. Her speech is normal and behavior is normal. Judgment and thought content normal. Cognition and memory are normal.       Most Recent Laboratory Results Reviewed ({Yes)  Lab Results   Component Value Date    WBC 9.30 06/07/2019    HGB 13.6 06/07/2019    HCT 39.7 06/07/2019     06/07/2019    CHOL 171 12/18/2018    TRIG 117 12/18/2018    HDL 49 12/18/2018    ALT 60 (H) 06/07/2019    AST 38 06/07/2019     01/08/2020    K 3.8 01/08/2020     01/08/2020    CREATININE 0.7 01/08/2020    BUN 14 01/08/2020    CO2 24 01/08/2020    TSH 3.588 07/13/2018    HGBA1C 5.5 07/13/2018       Assessment     ICD-10-CM ICD-9-CM   1. Upper respiratory tract infection, unspecified type J06.9 465.9   2. Essential hypertension I10 401.9        Plan   Upper respiratory tract infection, unspecified type  -     COVID-19 Routine Screening- negative   -    levocetirizine (XYZAL) 5 MG tablet; Take 1 tablet (5 mg total) by mouth every evening.  Dispense: 30 tablet; Refill: 0  - mucinex (not D/DM) twice daily   - increase fluids and rest   - humidifier at night   - sleep with your head elevated as much as possible   - throat lozenges all day long   - tylenol as needed for pain/body aches      Essential hypertension  Reported borderline/uncontrolled   [facility]        Follow up if symptoms worsen or fail to improve.  Future Appointments     Date Provider Specialty Appt Notes    6/16/2020 Oliva Carson PA-C Orthopedics left  shoulder pain    6/30/2020 Mendoza Cardoso MD Internal Medicine Establish Care

## 2020-06-02 ENCOUNTER — LAB VISIT (OUTPATIENT)
Dept: INTERNAL MEDICINE | Facility: CLINIC | Age: 55
End: 2020-06-02
Payer: COMMERCIAL

## 2020-06-02 DIAGNOSIS — J06.9 UPPER RESPIRATORY TRACT INFECTION, UNSPECIFIED TYPE: ICD-10-CM

## 2020-06-02 PROCEDURE — U0003 INFECTIOUS AGENT DETECTION BY NUCLEIC ACID (DNA OR RNA); SEVERE ACUTE RESPIRATORY SYNDROME CORONAVIRUS 2 (SARS-COV-2) (CORONAVIRUS DISEASE [COVID-19]), AMPLIFIED PROBE TECHNIQUE, MAKING USE OF HIGH THROUGHPUT TECHNOLOGIES AS DESCRIBED BY CMS-2020-01-R: HCPCS

## 2020-06-03 DIAGNOSIS — K21.9 CHRONIC GERD: ICD-10-CM

## 2020-06-03 LAB — SARS-COV-2 RNA RESP QL NAA+PROBE: NOT DETECTED

## 2020-06-03 RX ORDER — OMEPRAZOLE 20 MG/1
CAPSULE, DELAYED RELEASE ORAL
Qty: 90 CAPSULE | Refills: 0 | Status: SHIPPED | OUTPATIENT
Start: 2020-06-03 | End: 2020-09-28 | Stop reason: SDUPTHER

## 2020-06-08 VITALS — SYSTOLIC BLOOD PRESSURE: 130 MMHG | DIASTOLIC BLOOD PRESSURE: 90 MMHG

## 2020-06-15 ENCOUNTER — PATIENT OUTREACH (OUTPATIENT)
Dept: ADMINISTRATIVE | Facility: OTHER | Age: 55
End: 2020-06-15

## 2020-06-15 DIAGNOSIS — Z12.31 BREAST CANCER SCREENING BY MAMMOGRAM: Primary | ICD-10-CM

## 2020-06-15 NOTE — PROGRESS NOTES
Chart reviewed.   Immunizations: Triggered Imm Registry     Orders placed: Mammo w/CAD   Upcoming appts to satisfy CAROL topics: n/a

## 2020-06-16 ENCOUNTER — OFFICE VISIT (OUTPATIENT)
Dept: ORTHOPEDICS | Facility: CLINIC | Age: 55
End: 2020-06-16
Payer: COMMERCIAL

## 2020-06-16 VITALS
HEART RATE: 84 BPM | HEIGHT: 65 IN | SYSTOLIC BLOOD PRESSURE: 133 MMHG | BODY MASS INDEX: 26.66 KG/M2 | WEIGHT: 160 LBS | DIASTOLIC BLOOD PRESSURE: 91 MMHG

## 2020-06-16 DIAGNOSIS — M75.42 IMPINGEMENT SYNDROME OF LEFT SHOULDER: ICD-10-CM

## 2020-06-16 DIAGNOSIS — M54.12 CERVICAL RADICULOPATHY: ICD-10-CM

## 2020-06-16 DIAGNOSIS — G89.29 CHRONIC LEFT SHOULDER PAIN: Primary | ICD-10-CM

## 2020-06-16 DIAGNOSIS — M75.02 ADHESIVE CAPSULITIS OF LEFT SHOULDER: ICD-10-CM

## 2020-06-16 DIAGNOSIS — M67.912 DYSFUNCTION OF LEFT ROTATOR CUFF: ICD-10-CM

## 2020-06-16 DIAGNOSIS — M25.512 CHRONIC LEFT SHOULDER PAIN: Primary | ICD-10-CM

## 2020-06-16 PROCEDURE — 99214 PR OFFICE/OUTPT VISIT, EST, LEVL IV, 30-39 MIN: ICD-10-PCS | Mod: S$GLB,,, | Performed by: PHYSICIAN ASSISTANT

## 2020-06-16 PROCEDURE — 99999 PR PBB SHADOW E&M-EST. PATIENT-LVL IV: CPT | Mod: PBBFAC,,, | Performed by: PHYSICIAN ASSISTANT

## 2020-06-16 PROCEDURE — 99999 PR PBB SHADOW E&M-EST. PATIENT-LVL IV: ICD-10-PCS | Mod: PBBFAC,,, | Performed by: PHYSICIAN ASSISTANT

## 2020-06-16 PROCEDURE — 99214 OFFICE O/P EST MOD 30 MIN: CPT | Mod: S$GLB,,, | Performed by: PHYSICIAN ASSISTANT

## 2020-06-16 RX ORDER — METHYLPREDNISOLONE 4 MG/1
TABLET ORAL
Qty: 1 PACKAGE | Refills: 0 | Status: SHIPPED | OUTPATIENT
Start: 2020-06-16 | End: 2020-06-30

## 2020-06-16 NOTE — PROGRESS NOTES
Patient ID: Reta Navas is a 54 y.o. female.    Chief Complaint: Pain of the Left Shoulder      HPI: Reta Navas  is a 54 y.o. female who c/o Pain of the Left Shoulder   for duration of about a year.  She saw Dr. Shin back in December.  He did a corticosteroid injection for the left shoulder.  She says that that really helped, but unfortunately the pain has returned and it is worse now.  She complains of pain radiating down the arm past the elbow into the forearm.  She also complains of pain radiating up into the neck.  She says it is now 10 times worse than it was back in December before the injection.  Pain severity today at present is 4/10.  It is significantly worsened with range of motion above shoulder level.  Quality is aching, sharp, pulling, shooting.  Alleviating factors include range of motion below shoulder level, prior steroid injection.  Aggravating factors include trying to sleep at nighttime, range of motion overhead, lifting.    Past Medical History:   Diagnosis Date    Abnormal Pap smear     Abnormal Pap smear of cervix     Chronic rhinitis     Depression     GERD (gastroesophageal reflux disease)     Hyperlipidemia     Hypertension     Overactive bladder     Plantar fasciitis, bilateral     Psoriasis      Past Surgical History:   Procedure Laterality Date    CERVICAL BIOPSY  W/ LOOP ELECTRODE EXCISION      COLONOSCOPY      GALLBLADDER SURGERY N/A 10/11/2018    hx colposcopy      HYSTERECTOMY      supracervical hyst/LSO for fibroids    OOPHORECTOMY      LSO    ROBOT-ASSISTED CHOLECYSTECTOMY USING DA WILVER XI N/A 10/18/2018    Procedure: XI ROBOTIC CHOLECYSTECTOMY;  Surgeon: Robel Ferrer MD;  Location: [facility];  Service: General;  Laterality: N/A;    TUBAL LIGATION       Family History   Problem Relation Age of Onset    Diabetes Brother     No Known Problems Father     Colon cancer Paternal Grandfather     Colon cancer Paternal Uncle     Ovarian cancer Maternal Aunt      Breast cancer Paternal Grandmother      Social History     Socioeconomic History    Marital status:      Spouse name: Not on file    Number of children: Not on file    Years of education: Not on file    Highest education level: Not on file   Occupational History    Not on file   Social Needs    Financial resource strain: Not on file    Food insecurity     Worry: Not on file     Inability: Not on file    Transportation needs     Medical: Not on file     Non-medical: Not on file   Tobacco Use    Smoking status: Former Smoker     Packs/day: 1.00     Years: 39.00     Pack years: 39.00     Types: Vaping with nicotine     Quit date: 2013     Years since quittin.4    Smokeless tobacco: Never Used   Substance and Sexual Activity    Alcohol use: No     Alcohol/week: 0.0 standard drinks    Drug use: No    Sexual activity: Yes     Partners: Male     Birth control/protection: None, Surgical, Post-menopausal   Lifestyle    Physical activity     Days per week: Not on file     Minutes per session: Not on file    Stress: Not on file   Relationships    Social connections     Talks on phone: Not on file     Gets together: Not on file     Attends Yazdanism service: Not on file     Active member of club or organization: Not on file     Attends meetings of clubs or organizations: Not on file     Relationship status: Not on file   Other Topics Concern    Not on file   Social History Narrative    Not on file     Medication List with Changes/Refills   New Medications    METHYLPREDNISOLONE (MEDROL DOSEPACK) 4 MG TABLET    use as directed   Current Medications    AMLODIPINE (NORVASC) 5 MG TABLET    TAKE 1 TABLET(5 MG) BY MOUTH EVERY DAY    CLOBETASOL (TEMOVATE) 0.05 % CREAM    APPLY TWICE DAILY TO AFFECTED AREA    DESONIDE (DESOWEN) 0.05 % CREAM        ERGOCALCIFEROL (ERGOCALCIFEROL) 50,000 UNIT CAP    TAKE 1 CAPSULE BY MOUTH WEEKLY    FLUTICASONE PROPIONATE (FLONASE) 50 MCG/ACTUATION NASAL SPRAY    2  sprays (100 mcg total) by Each Nostril route once daily.    LEVOCETIRIZINE (XYZAL) 5 MG TABLET    TAKE 1 TABLET(5 MG) BY MOUTH EVERY EVENING    MONTELUKAST (SINGULAIR) 10 MG TABLET    TAKE 1 TABLET(10 MG) BY MOUTH EVERY EVENING    MULTIVITAMIN/IRON/FOLIC ACID (CENTRUM ORAL)    Take 1 tablet by mouth once daily.    OMEPRAZOLE (PRILOSEC) 20 MG CAPSULE    TAKE 1 CAPSULE(20 MG) BY MOUTH EVERY DAY     Review of patient's allergies indicates:   Allergen Reactions    Adhesive Rash    Latex, natural rubber        Objective:        General    Nursing note and vitals reviewed.  Constitutional: She is oriented to person, place, and time. She appears well-developed and well-nourished.   HENT:   Head: Normocephalic and atraumatic.   Eyes: EOM are normal.   Cardiovascular: Normal rate and regular rhythm.    Pulmonary/Chest: Effort normal and breath sounds normal.   Abdominal: Soft.   Neurological: She is alert and oriented to person, place, and time.   Psychiatric: She has a normal mood and affect. Her behavior is normal.         Right Shoulder Exam     Inspection/Observation   Swelling: absent  Bruising: absent  Scars: absent  Deformity: absent  Scapular Dyskinesia: negative    Range of Motion   Active abduction: normal   Passive abduction: normal   Extension: normal   Forward Flexion: normal   Forward Elevation: normal  Adduction: normal  External Rotation 0 degrees: normal   Internal rotation 0 degrees: normal     Left Shoulder Exam     Inspection/Observation   Swelling: absent  Bruising: absent  Scars: absent  Deformity: absent  Scapular Dyskinesia: negative    Tenderness   The patient is tender to palpation of the greater tuberosity.    Range of Motion   Active abduction:  120 abnormal   Passive abduction: abnormal   Extension: abnormal   Forward Flexion:  140 abnormal   Forward Elevation: abnormal  Adduction: normal  External Rotation 0 degrees: normal   Internal rotation 0 degrees: abnormal Left shoulder internal  rotation 0 degrees: unable to IR to sacrum.     Tests & Signs   Drop arm: negative  Zuleta test: positive  Impingement: positive  Rotator Cuff Painful Arc/Range: severe  Active Compression test ([place]'s Sign): negative  Speed's Test: negative    Other   Sensation: normal     Comments:  No TTP over bicipital groove, greater tuberosity, nor ac joint.        Muscle Strength   Right Upper Extremity   Shoulder Abduction: 5/5   Shoulder Internal Rotation: 5/5   Shoulder External Rotation: 5/5   Left Upper Extremity  Shoulder Abduction: 4/5   Shoulder Internal Rotation: 5/5   Shoulder External Rotation: 5/5     Vascular Exam       Left Pulses      Radial:                    2+      Capillary Refill  Left Hand: normal capillary refill      Xray Images and report were reviewed today.  I agree with the radiologist's interpretation.    X-Ray Shoulder 2 or More Views Left  Order: [id number]  Status:  Final result   Visible to patient:  Yes (Patient Portal)   Next appt:  06/30/2020 at 02:00 PM in Internal Medicine (Mendoza Cardoso MD)   Dx:  Left shoulder pain, unspecified chron...  Details    Reading Physician Reading Date Result Priority   Clayton Simmons III, MD  [phone] 12/20/2019 Routine      Narrative & Impression     EXAMINATION:  XR SHOULDER COMPLETE 2 OR MORE VIEWS LEFT     CLINICAL HISTORY:  Pain in left shoulder     TECHNIQUE:  Two or three views of the left shoulder were performed.     COMPARISON:  None     FINDINGS:  Generalized osteopenia.  Mild degenerative change in the AC and glenohumeral joints.  Remaining included osseous structures intact.  Visualized lung fields clear.     Impression:     As above.        Electronically signed by: Clayton Simmons MD  Date:                                            12/20/2019  Time:                                           18:36               Assessment:       Encounter Diagnoses   Name Primary?    Chronic left shoulder pain Yes    Dysfunction of left rotator cuff      Impingement syndrome of left shoulder     Adhesive capsulitis of left shoulder     Cervical radiculopathy           Plan:       Reta was seen today for pain.    Diagnoses and all orders for this visit:    Chronic left shoulder pain  -     methylPREDNISolone (MEDROL DOSEPACK) 4 mg tablet; use as directed    Dysfunction of left rotator cuff  -     methylPREDNISolone (MEDROL DOSEPACK) 4 mg tablet; use as directed    Impingement syndrome of left shoulder  -     methylPREDNISolone (MEDROL DOSEPACK) 4 mg tablet; use as directed    Adhesive capsulitis of left shoulder  -     Ambulatory referral/consult to Physical/Occupational Therapy; Future  -     methylPREDNISolone (MEDROL DOSEPACK) 4 mg tablet; use as directed    Cervical radiculopathy  -     methylPREDNISolone (MEDROL DOSEPACK) 4 mg tablet; use as directed        Reta Navas is an established pt who comes in today for evaluation of a new problem to my clinic.  Although established within the department this is the 1st time I have seen her.  She is developing adhesive capsulitis of the left shoulder.  I am also concerned about cervical radiculopathy.  We briefly discussed risks and benefits of repeating corticosteroid injection.  She is terrified of needles and would like to hold off on that.  Instead, I have given her prescription for Medrol Dosepak.  She would greatly benefit from formal physical therapy for range of motion of the shoulder as well as rotator cuff strengthening and manual modalities for myofascial release and cervical radiculopathy.  I would like to see her back in the office in 1 month.  If she is not making good progress, I would recommend further diagnostic imaging.  I have put her on a home exercise program to include exercises for range of motion of the shoulder until she can get started with therapy.  She verbalizes understanding and agrees.    Follow up in about 1 month (around 7/16/2020) for no xray.    The patient understands,  chooses and consents to this plan and accepts all   the risks which include but are not limited to the risks mentioned above.     Disclaimer: This note was prepared using a voice recognition system and is likely to have sound alike errors within the text.

## 2020-06-16 NOTE — PATIENT INSTRUCTIONS
Exercises for Shoulder Flexibility: Back Scratch    Improving your flexibility can reduce pain. Stretching exercises also can help increase your range of pain-free motion. Breathe normally when you exercise. Try to use smooth, fluid movements. Never force a stretch.  Note: Follow any special instructions you are given. If you feel pain, stop the exercise. If the pain continues after stopping, call your healthcare provider.  · Stand straight, placing the back of your hand on the side you want to stretch flat against your lower back.  · Throw one end of a towel over your shoulder. Grab it behind your back with your other hand.  · Pull down gently on the towel with your front arm. Let your back arm slide up as high as is comfortable. Youll feel a stretch in your shoulder. Hold the stretch for a few seconds.  · Repeat 3 to 5 times. Build up to holding each stretch for 30 to 60 seconds.  For your safety, check with your healthcare provider before starting an exercise program.   Date Last Reviewed: 8/26/2015  © 0075-0373 [employer]. [address] [place] [address]. All rights reserved. This information is not intended as a substitute for professional medical care. Always follow your healthcare professional's instructions.        Exercises for Shoulder Flexibility: Wall Walk    Improving your flexibility can reduce pain. Stretching exercises also can help increase your range of pain-free motion. Breathe normally when you exercise. Use smooth, fluid movements.  Note: Follow any special instructions you are given. If you feel pain, stop the exercise. If the pain continues after stopping, call your healthcare provider:  · Stand with your shoulder about 2 feet from the wall.  · Raise your arm to shoulder level and gently walk your fingers up the wall as high as you can.  · Hold for a few seconds. Then walk your fingers back down.  · Repeat 3 times. Move closer to the wall as you repeat.  · Build  "up to holding each stretch for 30 seconds.  Caution: Do this stretch only if your healthcare provider recommends it. Dont do it when you are first injured.       Date Last Reviewed: 8/16/2015  © 5493-4265 [employer]. [address] [place] [address]. All rights reserved. This information is not intended as a substitute for professional medical care. Always follow your healthcare professional's instructions.        Shoulder Abduction (Flexibility)    1. Stand up straight. Or lie on your back on the floor with legs straight. Hold a broomstick horizontally. Cup the top of the broomstick with your right hand. Hold the broomstick just above the broom with your left hand, palm facing down.  2. Push the broomstick to the right with your left hand, raising your right arm up. Raise it only as high as feels comfortable.  3. Hold for a few seconds. Return to the starting position.  4. Repeat 3 to 5 times, or as instructed. Build up to holding each stretch for 10 to 30 seconds.     Tip: If you dont have a broom, you can also do this exercise with a cane, mop, or yardstick.      Date Last Reviewed: 3/10/2016  © 8954-5548 [employer]. [address] [place] [address]. All rights reserved. This information is not intended as a substitute for professional medical care. Always follow your healthcare professional's instructions.        "Reaching Up" for Shoulder Flexibility    This stretch can help restore shoulder flexibility and relieve pain over time. When stretching, be sure to breathe deeply. And follow any special instructions from your doctor or therapist.  5. Raise the hand on the side you want to stretch as high as you can. Then grasp a stable surface, such as a bookcase or a doorframe, with the same hand.  6. Keeping your arm straight, lower your body by bending your knees. Stop when you feel the stretch in the shoulder. Try to hold the stretch for 5 seconds.  7. Work up to " doing 3 sets of this stretch, 3 times a day. Work up to holding the stretch for 30 to 60 seconds.  Note: Your back should remain straight. To enhance the stretch over time, try to bend your knees lower. Or, raise your arm higher at the start of the stretch.     Frozen shoulder  Frozen shoulder is another name for adhesive capsulitis. This causes restricted movement in the shoulder. If you have frozen shoulder, this stretch may cause discomfort, especially when you first get started. A few months may pass before you achieve the results you want. But once your shoulder heals, it rarely becomes frozen again. So stick to your stretching program. If you have any questions, be sure to ask your healthcare provider.   Date Last Reviewed: 10/14/2015  © 2703-6051 [employer]. [address] [place] [address]. All rights reserved. This information is not intended as a substitute for professional medical care. Always follow your healthcare professional's instructions.        Shoulder Flexion (Flexibility)    8. Sit in a chair sideways next to a table. Lay your right arm on the table, pointed forward.  9. Slowly lean forward.  Slide your arm forward on the table. Feel the stretch in your right shoulder.  10. Hold for 5 seconds. Slowly sit back up.  11. Repeat 5 times.  12. Repeat this exercise 3 times a day, or as instructed.  Date Last Reviewed: 3/10/2016  © 6849-2951 [employer]. [address] [place] [address]. All rights reserved. This information is not intended as a substitute for professional medical care. Always follow your healthcare professional's instructions.

## 2020-06-29 ENCOUNTER — PATIENT OUTREACH (OUTPATIENT)
Dept: ADMINISTRATIVE | Facility: OTHER | Age: 55
End: 2020-06-29

## 2020-06-29 NOTE — PROGRESS NOTES
Chart reviewed.   Immunizations: Triggered Imm Registry     Orders placed: n/a  Upcoming appts to satisfy CAROL topics: n/a

## 2020-06-30 ENCOUNTER — HOSPITAL ENCOUNTER (OUTPATIENT)
Dept: RADIOLOGY | Facility: HOSPITAL | Age: 55
Discharge: HOME | End: 2020-06-30
Attending: INTERNAL MEDICINE
Payer: COMMERCIAL

## 2020-06-30 ENCOUNTER — OFFICE VISIT (OUTPATIENT)
Dept: INTERNAL MEDICINE | Facility: CLINIC | Age: 55
End: 2020-06-30
Payer: COMMERCIAL

## 2020-06-30 VITALS
WEIGHT: 163.13 LBS | OXYGEN SATURATION: 98 % | RESPIRATION RATE: 16 BRPM | DIASTOLIC BLOOD PRESSURE: 22 MMHG | HEART RATE: 91 BPM | TEMPERATURE: 100 F | HEIGHT: 65 IN | BODY MASS INDEX: 27.18 KG/M2 | SYSTOLIC BLOOD PRESSURE: 118 MMHG

## 2020-06-30 DIAGNOSIS — I10 HYPERTENSION, UNSPECIFIED TYPE: Primary | ICD-10-CM

## 2020-06-30 DIAGNOSIS — J30.9 ALLERGIC RHINITIS, UNSPECIFIED SEASONALITY, UNSPECIFIED TRIGGER: ICD-10-CM

## 2020-06-30 DIAGNOSIS — E55.9 VITAMIN D INSUFFICIENCY: ICD-10-CM

## 2020-06-30 DIAGNOSIS — L40.9 PSORIASIS: ICD-10-CM

## 2020-06-30 DIAGNOSIS — F41.9 ANXIETY: ICD-10-CM

## 2020-06-30 DIAGNOSIS — Z12.31 BREAST CANCER SCREENING BY MAMMOGRAM: ICD-10-CM

## 2020-06-30 DIAGNOSIS — L30.9 DERMATITIS: ICD-10-CM

## 2020-06-30 DIAGNOSIS — K21.9 GASTROESOPHAGEAL REFLUX DISEASE, ESOPHAGITIS PRESENCE NOT SPECIFIED: ICD-10-CM

## 2020-06-30 PROCEDURE — 99214 PR OFFICE/OUTPT VISIT, EST, LEVL IV, 30-39 MIN: ICD-10-PCS | Mod: S$GLB,,, | Performed by: FAMILY MEDICINE

## 2020-06-30 PROCEDURE — 77067 SCR MAMMO BI INCL CAD: CPT | Mod: 26,,, | Performed by: RADIOLOGY

## 2020-06-30 PROCEDURE — 99999 PR PBB SHADOW E&M-EST. PATIENT-LVL IV: CPT | Mod: PBBFAC,,, | Performed by: FAMILY MEDICINE

## 2020-06-30 PROCEDURE — 77063 BREAST TOMOSYNTHESIS BI: CPT | Mod: 26,,, | Performed by: RADIOLOGY

## 2020-06-30 PROCEDURE — 77063 MAMMO DIGITAL SCREENING BILAT WITH TOMOSYNTHESIS_CAD: ICD-10-PCS | Mod: 26,,, | Performed by: RADIOLOGY

## 2020-06-30 PROCEDURE — 99214 OFFICE O/P EST MOD 30 MIN: CPT | Mod: S$GLB,,, | Performed by: FAMILY MEDICINE

## 2020-06-30 PROCEDURE — 77067 MAMMO DIGITAL SCREENING BILAT WITH TOMOSYNTHESIS_CAD: ICD-10-PCS | Mod: 26,,, | Performed by: RADIOLOGY

## 2020-06-30 PROCEDURE — 99999 PR PBB SHADOW E&M-EST. PATIENT-LVL IV: ICD-10-PCS | Mod: PBBFAC,,, | Performed by: FAMILY MEDICINE

## 2020-06-30 PROCEDURE — 77067 SCR MAMMO BI INCL CAD: CPT | Mod: TC

## 2020-06-30 RX ORDER — LOSARTAN POTASSIUM 25 MG/1
25 TABLET ORAL DAILY
COMMUNITY
End: 2020-09-28

## 2020-06-30 RX ORDER — AMLODIPINE BESYLATE 10 MG/1
10 TABLET ORAL DAILY
Qty: 90 TABLET | Refills: 1 | Status: SHIPPED | OUTPATIENT
Start: 2020-06-30 | End: 2021-02-03 | Stop reason: SDUPTHER

## 2020-06-30 RX ORDER — DESONIDE 0.5 MG/G
CREAM TOPICAL
Status: CANCELLED | OUTPATIENT
Start: 2020-06-30

## 2020-06-30 RX ORDER — CLOBETASOL PROPIONATE 0.5 MG/G
CREAM TOPICAL
Qty: 45 G | Refills: 1 | Status: CANCELLED | OUTPATIENT
Start: 2020-06-30

## 2020-06-30 RX ORDER — DESONIDE 0.5 MG/G
CREAM TOPICAL 2 TIMES DAILY
Qty: 60 TUBE | Refills: 0 | Status: SHIPPED | OUTPATIENT
Start: 2020-06-30 | End: 2021-08-02

## 2020-06-30 RX ORDER — MINERAL OIL
180 ENEMA (ML) RECTAL DAILY
COMMUNITY

## 2020-06-30 RX ORDER — TRIAMCINOLONE ACETONIDE 1 MG/G
CREAM TOPICAL 2 TIMES DAILY
Qty: 80 TUBE | Refills: 0 | Status: SHIPPED | OUTPATIENT
Start: 2020-06-30 | End: 2021-10-28 | Stop reason: SDUPTHER

## 2020-06-30 NOTE — PATIENT INSTRUCTIONS
Psoriasis  Uses desonide on face  Clobetasol on feet  Given super potency and extended time of use - plan to try and de-escalate the potency to triamcinolone today    HTN  At present she is using losartan at 25 mg and amlodipine at 5 mg.  Pressures controlled, but she has concerns of utilizing both.  I feel it is reasonable tot ry and just use 10 mg amlodipine and remove the losartan - in an effort to reduce the pill burden.    GERD  On PPI  prilosec    Allergic rhinitis  singulair  flonase  Allegra  Prn    Vit d insufficiency  She has been using vit D 50 k for a long time  I am advising she stops taht  Switches to vit d 1000 -2000 units/day - OTC vit d 3    Elevated LFT  persistent  Lab check  If up - plan on US to assess  Suspect fatty liver      Plan on 3 month f/u  Plan to check labs - including hepatic function and vit D level  Goal of 10 lbs down.      Plan on a virtual appt in coming days to discuss anxiety as time constraints today.

## 2020-06-30 NOTE — PROGRESS NOTES
Subjective:       Patient ID: Reta Navas is a 54 y.o. female.    Chief Complaint: Establish Care    HPI     53 yo F  Presents to establish care    Past Medical History:   Diagnosis Date    Abnormal Pap smear     Abnormal Pap smear of cervix     Chronic rhinitis     Depression     GERD (gastroesophageal reflux disease)     Hyperlipidemia     Hypertension     Overactive bladder     Plantar fasciitis, bilateral     Psoriasis      Past Surgical History:   Procedure Laterality Date    CERVICAL BIOPSY  W/ LOOP ELECTRODE EXCISION      COLONOSCOPY      GALLBLADDER SURGERY N/A 10/11/2018    hx colposcopy      HYSTERECTOMY      supracervical hyst/LSO for fibroids    OOPHORECTOMY      LSO    ROBOT-ASSISTED CHOLECYSTECTOMY USING DA WILVER XI N/A 10/18/2018    Procedure: XI ROBOTIC CHOLECYSTECTOMY;  Surgeon: Robel Ferrer MD;  [facility] OR;  Service: General;  Laterality: N/A;    TUBAL LIGATION       Family History   Problem Relation Age of Onset    Diabetes Brother     No Known Problems Father     Colon cancer Paternal Grandfather     Colon cancer Paternal Uncle     Ovarian cancer Maternal Aunt     Breast cancer Paternal Grandmother      Social History     Tobacco Use   Smoking Status Former Smoker    Packs/day: 1.00    Years: 39.00    Pack years: 39.00    Types: Vaping with nicotine    Quit date: 2013    Years since quittin.4   Smokeless Tobacco Never Used     Denies depression  Was on medication for menopause - not depression.    Concerns about blood pressure medications.  Does not want to take 2 if she can take 1    Reports anxiety issues    Long term periodic steroid cream  Has used very high potency clobetasol for some time    PRN use of allergy issues.  Controlled    PPI for gerd  controlled    Wt Readings from Last 5 Encounters:   20 74 kg (163 lb 2.3 oz)   20 72.6 kg (160 lb)   20 72.9 kg (160 lb 11.5 oz)   20 73.2 kg (161 lb 6 oz)   19 72.6 kg  (160 lb)         Review of Systems   Constitutional: Negative for activity change and unexpected weight change.   HENT: Negative for hearing loss, rhinorrhea and trouble swallowing.    Eyes: Negative for discharge and visual disturbance.   Respiratory: Negative for chest tightness and wheezing.    Cardiovascular: Negative for chest pain and palpitations.   Gastrointestinal: Positive for constipation. Negative for blood in stool, diarrhea and vomiting.   Endocrine: Negative for polydipsia and polyuria.   Genitourinary: Negative for difficulty urinating, dysuria, hematuria and menstrual problem.   Musculoskeletal: Positive for neck pain. Negative for arthralgias and joint swelling.   Neurological: Negative for weakness and headaches.   Psychiatric/Behavioral: Negative for confusion and dysphoric mood.       Objective:       Vitals:    06/30/20 1322   BP: (!) 118/22   Pulse: 91   Resp: 16   Temp: 99.8 °F (37.7 °C)       Physical Exam  Constitutional:       General: She is not in acute distress.     Appearance: Normal appearance. She is well-developed.   HENT:      Head: Normocephalic and atraumatic.   Eyes:      General: Lids are normal.      Conjunctiva/sclera: Conjunctivae normal.   Neck:      Musculoskeletal: Full passive range of motion without pain and normal range of motion.   Cardiovascular:      Rate and Rhythm: Normal rate and regular rhythm.      Heart sounds: Normal heart sounds.   Pulmonary:      Effort: Pulmonary effort is normal. No respiratory distress.      Breath sounds: Normal breath sounds.   Abdominal:      General: There is no distension.      Palpations: Abdomen is soft.   Musculoskeletal: Normal range of motion.   Lymphadenopathy:      Cervical: No cervical adenopathy.   Skin:     Coloration: Skin is not pale.   Neurological:      Mental Status: She is alert and oriented to person, place, and time. She is not disoriented.   Psychiatric:         Speech: Speech normal.         Behavior: Behavior  normal.         Thought Content: Thought content normal.         Assessment:       1. Hypertension, unspecified type    2. Dermatitis    3. Gastroesophageal reflux disease, esophagitis presence not specified    4. Allergic rhinitis, unspecified seasonality, unspecified trigger    5. Vitamin D insufficiency    6. Anxiety    7. Psoriasis        Plan:       Psoriasis  Uses desonide on face  Clobetasol on feet  Given super potency and extended time of use - plan to try and de-escalate the potency to triamcinolone today    HTN  At present she is using losartan at 25 mg and amlodipine at 5 mg.  Pressures controlled, but she has concerns of utilizing both.  I feel it is reasonable tot ry and just use 10 mg amlodipine and remove the losartan - in an effort to reduce the pill burden.    GERD  On PPI  prilosec    Allergic rhinitis  singulair  flonase  Allegra  Prn    Vit d insufficiency  She has been using vit D 50 k for a long time  I am advising she stops taht  Switches to vit d 1000 -2000 units/day - OTC vit d 3    Elevated LFT  persistent  Lab check  If up - plan on US to assess  Suspect fatty liver      Plan on 3 month f/u  Plan to check labs - including hepatic function and vit D level  Goal of 10 lbs down.      Plan on a virtual appt in coming days to discuss anxiety as time constraints today.

## 2020-08-24 ENCOUNTER — OFFICE VISIT (OUTPATIENT)
Dept: ORTHOPEDICS | Facility: CLINIC | Age: 55
End: 2020-08-24
Payer: COMMERCIAL

## 2020-08-24 ENCOUNTER — TELEPHONE (OUTPATIENT)
Dept: ORTHOPEDICS | Facility: CLINIC | Age: 55
End: 2020-08-24

## 2020-08-24 VITALS
HEART RATE: 108 BPM | BODY MASS INDEX: 27.16 KG/M2 | DIASTOLIC BLOOD PRESSURE: 88 MMHG | WEIGHT: 163 LBS | HEIGHT: 65 IN | SYSTOLIC BLOOD PRESSURE: 123 MMHG

## 2020-08-24 DIAGNOSIS — M75.42 IMPINGEMENT SYNDROME OF LEFT SHOULDER: ICD-10-CM

## 2020-08-24 DIAGNOSIS — M54.12 RADICULOPATHY, CERVICAL REGION: ICD-10-CM

## 2020-08-24 DIAGNOSIS — M67.912 DYSFUNCTION OF LEFT ROTATOR CUFF: Primary | ICD-10-CM

## 2020-08-24 DIAGNOSIS — M25.512 LEFT SHOULDER PAIN, UNSPECIFIED CHRONICITY: ICD-10-CM

## 2020-08-24 DIAGNOSIS — M25.512 CHRONIC LEFT SHOULDER PAIN: ICD-10-CM

## 2020-08-24 DIAGNOSIS — G89.29 CHRONIC LEFT SHOULDER PAIN: ICD-10-CM

## 2020-08-24 PROCEDURE — 99999 PR PBB SHADOW E&M-EST. PATIENT-LVL IV: ICD-10-PCS | Mod: PBBFAC,,, | Performed by: PHYSICIAN ASSISTANT

## 2020-08-24 PROCEDURE — 99214 PR OFFICE/OUTPT VISIT, EST, LEVL IV, 30-39 MIN: ICD-10-PCS | Mod: S$GLB,,, | Performed by: PHYSICIAN ASSISTANT

## 2020-08-24 PROCEDURE — 99999 PR PBB SHADOW E&M-EST. PATIENT-LVL IV: CPT | Mod: PBBFAC,,, | Performed by: PHYSICIAN ASSISTANT

## 2020-08-24 PROCEDURE — 99214 OFFICE O/P EST MOD 30 MIN: CPT | Mod: S$GLB,,, | Performed by: PHYSICIAN ASSISTANT

## 2020-08-24 NOTE — TELEPHONE ENCOUNTER
----- Message from Isamar Elaine sent at 8/24/2020  3:11 PM CDT -----  Contact: Pt  Pt is calling rg being seen today and is calling to see if she can have something called in for pain and can be reached at [phone]//thanks/dbw

## 2020-08-24 NOTE — PROGRESS NOTES
Patient ID: Reta Navas is a 54 y.o. female.    Chief Complaint: Pain of the Left Shoulder      HPI: Reta Navas  is a 54 y.o. female who c/o Pain of the Left Shoulder   for duration of I year now.  She has had steroid injection by Dr. Shin last December.  I have seen her more recently.  She had an adhesive capsulitis as well as rotator cuff dysfunction biceps tenderness.  I sent her back to physical therapy and range of motion has made drastic improvements, but she still has pain rated at 6/10 at rest.  It increases to 9/10 with activity.  Quality is aching and sharp.  She still complaining of pain radiating from the neck past the shoulder down past the elbow and into the wrist.  Alleviating factors include hot compresses, ibuprofen, physical therapy for range of motion.  Aggravating factors include overhead, lifting, sleeping at nighttime.  She still has a lot of difficulty with internal rotation as well.    Past Medical History:   Diagnosis Date    Abnormal Pap smear     Abnormal Pap smear of cervix     Chronic rhinitis     Depression     GERD (gastroesophageal reflux disease)     Hyperlipidemia     Hypertension     Overactive bladder     Plantar fasciitis, bilateral     Psoriasis      Past Surgical History:   Procedure Laterality Date    CERVICAL BIOPSY  W/ LOOP ELECTRODE EXCISION      COLONOSCOPY      GALLBLADDER SURGERY N/A 10/11/2018    hx colposcopy      HYSTERECTOMY      supracervical hyst/LSO for fibroids    OOPHORECTOMY      LSO    ROBOT-ASSISTED CHOLECYSTECTOMY USING DA WILVER XI N/A 10/18/2018    Procedure: XI ROBOTIC CHOLECYSTECTOMY;  Surgeon: Robel Ferrer MD;  Location: [facility];  Service: General;  Laterality: N/A;    TUBAL LIGATION       Family History   Problem Relation Age of Onset    Diabetes Brother     No Known Problems Father     Colon cancer Paternal Grandfather     Colon cancer Paternal Uncle     Ovarian cancer Maternal Aunt     Breast cancer Paternal  Grandmother      Social History     Socioeconomic History    Marital status:      Spouse name: Not on file    Number of children: Not on file    Years of education: Not on file    Highest education level: Not on file   Occupational History    Not on file   Social Needs    Financial resource strain: Not hard at all    Food insecurity     Worry: Never true     Inability: Never true    Transportation needs     Medical: No     Non-medical: No   Tobacco Use    Smoking status: Former Smoker     Packs/day: 1.00     Years: 39.00     Pack years: 39.00     Types: Vaping with nicotine     Quit date: 2013     Years since quittin.6    Smokeless tobacco: Never Used   Substance and Sexual Activity    Alcohol use: No     Alcohol/week: 0.0 standard drinks     Frequency: Monthly or less     Drinks per session: 1 or 2     Binge frequency: Never    Drug use: No    Sexual activity: Yes     Partners: Male     Birth control/protection: None, Surgical, Post-menopausal   Lifestyle    Physical activity     Days per week: 1 day     Minutes per session: 30 min    Stress: Very much   Relationships    Social connections     Talks on phone: More than three times a week     Gets together: Once a week     Attends Bahai service: Not on file     Active member of club or organization: No     Attends meetings of clubs or organizations: Never     Relationship status:    Other Topics Concern    Not on file   Social History Narrative    Not on file     Medication List with Changes/Refills   Current Medications    AMLODIPINE (NORVASC) 10 MG TABLET    Take 1 tablet (10 mg total) by mouth once daily.    DESONIDE (DESOWEN) 0.05 % CREAM    Apply topically 2 (two) times daily.    ERGOCALCIFEROL (ERGOCALCIFEROL) 50,000 UNIT CAP    TAKE 1 CAPSULE BY MOUTH WEEKLY    FEXOFENADINE (ALLEGRA) 180 MG TABLET    Take 180 mg by mouth once daily.    FLUTICASONE PROPIONATE (FLONASE) 50 MCG/ACTUATION NASAL SPRAY    2 sprays (100  mcg total) by Each Nostril route once daily.    LOSARTAN (COZAAR) 25 MG TABLET    Take 25 mg by mouth once daily.    MONTELUKAST (SINGULAIR) 10 MG TABLET    TAKE 1 TABLET(10 MG) BY MOUTH EVERY EVENING    MULTIVITAMIN/IRON/FOLIC ACID (CENTRUM ORAL)    Take 1 tablet by mouth once daily.    OMEPRAZOLE (PRILOSEC) 20 MG CAPSULE    TAKE 1 CAPSULE(20 MG) BY MOUTH EVERY DAY    TRIAMCINOLONE ACETONIDE 0.1% (KENALOG) 0.1 % CREAM    Apply topically 2 (two) times daily.     Review of patient's allergies indicates:   Allergen Reactions    Adhesive Rash    Latex, natural rubber        Objective:        General    Nursing note and vitals reviewed.  Constitutional: She is oriented to person, place, and time. She appears well-developed and well-nourished.   HENT:   Head: Normocephalic and atraumatic.   Eyes: EOM are normal.   Cardiovascular: Normal rate and regular rhythm.    Pulmonary/Chest: Effort normal and breath sounds normal.   Abdominal: Soft.   Neurological: She is alert and oriented to person, place, and time.   Psychiatric: She has a normal mood and affect. Her behavior is normal.         Back (L-Spine & T-Spine) / Neck (C-Spine) Exam     Tenderness   The patient is tender to palpation of the left trapezial.     Neck (C-Spine) Range of Motion   Flexion:     Normal  Extension: Normal  Right Lateral Bend: normal  Left Lateral Bend: normal  Right Rotation: normal  Left Rotation: normal    Neck (C-Spine) Tests   Spurling's Test   Left:  Negative  Right: negative    Comments:  (-) spurling bilaterally  Right Shoulder Exam     Inspection/Observation   Swelling: absent  Bruising: absent  Scars: absent  Deformity: absent  Scapular Dyskinesia: negative    Range of Motion   Active abduction: normal   Passive abduction: normal   Extension: normal   Forward Flexion: normal   Forward Elevation: normal  Adduction: normal  External Rotation 0 degrees: normal   Internal rotation 0 degrees: normal     Left Shoulder Exam      Inspection/Observation   Swelling: absent  Bruising: absent  Scars: absent  Deformity: absent  Scapular Dyskinesia: negative    Tenderness   The patient is tender to palpation of the greater tuberosity.    Range of Motion   Active abduction: normal   Passive abduction: normal   Extension: normal   Forward Flexion:  140 normal   Forward Elevation: normal  Adduction: normal  External Rotation 0 degrees: normal   Internal rotation 0 degrees:  Sacrum abnormal     Tests & Signs   Drop arm: negative  Zuleta test: positive  Impingement: positive  Rotator Cuff Painful Arc/Range: moderate  Active Compression Test ([place]'s Sign): positive  Speed's Test: positive    Other   Sensation: normal     Comments:  No TTP over bicipital groove, greater tuberosity, nor ac joint.        Muscle Strength   Right Upper Extremity   Shoulder Abduction: 5/5   Shoulder Internal Rotation: 5/5   Shoulder External Rotation: 5/5   Left Upper Extremity  Shoulder Abduction: 4/5   Shoulder Internal Rotation: 5/5   Shoulder External Rotation: 4/5     Vascular Exam       Left Pulses      Radial:                    2+      Capillary Refill  Left Hand: normal capillary refill      Xray Images and report were reviewed today.  I agree with the radiologist's interpretation.    X-Ray Shoulder 2 or More Views Left  Order: [id number]  Status:  Final result   Visible to patient:  Yes (Patient Portal)   Next appt:  08/31/2020 at 09:20 AM in Internal Medicine (Mendoza Cardoso MD)   Dx:  Left shoulder pain, unspecified chron...  Details    Reading Physician Reading Date Result Priority   Clayton Simmons III, MD  [phone] 12/20/2019 Routine      Narrative & Impression     EXAMINATION:  XR SHOULDER COMPLETE 2 OR MORE VIEWS LEFT     CLINICAL HISTORY:  Pain in left shoulder     TECHNIQUE:  Two or three views of the left shoulder were performed.     COMPARISON:  None     FINDINGS:  Generalized osteopenia.  Mild degenerative change in the AC and glenohumeral  joints.  Remaining included osseous structures intact.  Visualized lung fields clear.     Impression:     As above.        Electronically signed by: Clayton Simmons MD  Date:                                            12/20/2019  Time:                                           18:36               Assessment:       Encounter Diagnoses   Name Primary?    Chronic left shoulder pain     Radiculopathy, cervical region     Dysfunction of left rotator cuff Yes    Impingement syndrome of left shoulder     Left shoulder pain, unspecified chronicity           Plan:       Reta was seen today for pain.    Diagnoses and all orders for this visit:    Dysfunction of left rotator cuff  -     MRI Cervical Spine Without Contrast; Future    Chronic left shoulder pain  -     MRI Shoulder Without Contrast Left; Future  -     MRI Shoulder Without Contrast Left    Radiculopathy, cervical region  -     MRI Cervical Spine Without Contrast; Future    Impingement syndrome of left shoulder  -     MRI Cervical Spine Without Contrast; Future    Left shoulder pain, unspecified chronicity  -     MRI Cervical Spine Without Contrast; Future        Reta Navas is an established pt who comes in today for continued left shoulder pain despite conservative treatment with physical therapy and prior injections.  I would like to order an MRI of the left shoulder to evaluate for rotator cuff tear.  She has made great progress in range of motion from adhesive capsulitis at her last appointment.  She is also still having symptoms consistent with radiculopathy.  She would also benefit from an MRI of the cervical spine to further evaluate firs spinal stenosis and nerve impingement.  I would like her to follow up with Dr. Braswell after the MRIs have been complete were further treatment recommendations.  If she has positive findings in her cspine MRI, she would benefit from referral to IPM.      Follow up for consult with Dr. Braswell re MRI  shoulder.    The patient understands, chooses and consents to this plan and accepts all   the risks which include but are not limited to the risks mentioned above.     Disclaimer: This note was prepared using a voice recognition system and is likely to have sound alike errors within the text.

## 2020-08-24 NOTE — TELEPHONE ENCOUNTER
Spoke with patient and informed her that her request would be sent to Oliva Carson PA-C and that I would contact her once she authorizes a medication. Patient verbalized understanding.

## 2020-08-25 ENCOUNTER — PATIENT MESSAGE (OUTPATIENT)
Dept: ORTHOPEDICS | Facility: CLINIC | Age: 55
End: 2020-08-25

## 2020-08-25 DIAGNOSIS — M25.512 CHRONIC LEFT SHOULDER PAIN: Primary | ICD-10-CM

## 2020-08-25 DIAGNOSIS — M67.912 DYSFUNCTION OF LEFT ROTATOR CUFF: ICD-10-CM

## 2020-08-25 DIAGNOSIS — M75.42 IMPINGEMENT SYNDROME OF LEFT SHOULDER: ICD-10-CM

## 2020-08-25 DIAGNOSIS — G89.29 CHRONIC LEFT SHOULDER PAIN: Primary | ICD-10-CM

## 2020-08-25 DIAGNOSIS — M54.12 RADICULOPATHY, CERVICAL REGION: ICD-10-CM

## 2020-08-25 RX ORDER — TRAMADOL HYDROCHLORIDE 50 MG/1
50 TABLET ORAL EVERY 12 HOURS PRN
Qty: 10 TABLET | Refills: 0 | Status: SHIPPED | OUTPATIENT
Start: 2020-08-25 | End: 2020-09-28

## 2020-08-25 NOTE — TELEPHONE ENCOUNTER
Ms. Mcduffie  I apologize for just getting back with you.  I got your message regarding a request for pain medication.  I have sent tramadol to the pharmacy.  Please keep in mind all pain medication as addictive.  Only take it if absolutely necessary.  I typically only give pain medication for broken bones and postoperative patients; however, I have decided to give the some in hopes that will help use sleep which I think can also directly affect improved pain.  Please let me know if you need anything else.  Oliva Carson PA-C  [facility]  [facility]

## 2020-09-11 ENCOUNTER — TELEPHONE (OUTPATIENT)
Dept: ORTHOPEDICS | Facility: CLINIC | Age: 55
End: 2020-09-11

## 2020-09-11 NOTE — TELEPHONE ENCOUNTER
Spoke with patient and scheduled her an appointment with Dr Braswell. Patient will bring in MRI disc

## 2020-09-11 NOTE — TELEPHONE ENCOUNTER
----- Message from Leonel Phillips sent at 9/11/2020 11:40 AM CDT -----  Contact: self  Type:  Patient Returning Call    Who Called:pt  Who Left Message for Patient:Maribeth  Does the patient know what this is regarding?:MRI  Would the patient rather a call back or a response via [facility]? Call back  Best Call Back Number:[phone]  Additional Information: NONE

## 2020-09-14 ENCOUNTER — TELEPHONE (OUTPATIENT)
Dept: ORTHOPEDICS | Facility: CLINIC | Age: 55
End: 2020-09-14

## 2020-09-14 NOTE — TELEPHONE ENCOUNTER
----- Message from Isamar Elaine sent at 9/14/2020 11:55 AM CDT -----  Contact: Pt  Pt is calling rg Dr Braswell's office needing to get th report for her MRI for the pt's appt and can be reached at [phone]//thanks/dbw

## 2020-09-14 NOTE — TELEPHONE ENCOUNTER
Returned call to patient and left a message informing her that her MRI results were scanned into her chart already and to make sure she brings her disc to her appointment.

## 2020-09-14 NOTE — TELEPHONE ENCOUNTER
Spoke w/ patient and patient was informed to bring her MRI disc copy with her to her appt on 9/16/2020. Patient verbalized understanding and was grateful for the call_DD

## 2020-09-15 ENCOUNTER — PATIENT OUTREACH (OUTPATIENT)
Dept: ADMINISTRATIVE | Facility: OTHER | Age: 55
End: 2020-09-15

## 2020-09-16 ENCOUNTER — OFFICE VISIT (OUTPATIENT)
Dept: ORTHOPEDICS | Facility: CLINIC | Age: 55
End: 2020-09-16
Payer: COMMERCIAL

## 2020-09-16 ENCOUNTER — PATIENT MESSAGE (OUTPATIENT)
Dept: ORTHOPEDICS | Facility: CLINIC | Age: 55
End: 2020-09-16

## 2020-09-16 VITALS
SYSTOLIC BLOOD PRESSURE: 125 MMHG | WEIGHT: 163 LBS | HEIGHT: 65 IN | BODY MASS INDEX: 27.16 KG/M2 | DIASTOLIC BLOOD PRESSURE: 89 MMHG | HEART RATE: 91 BPM

## 2020-09-16 DIAGNOSIS — M75.02 ADHESIVE CAPSULITIS OF LEFT SHOULDER: Primary | ICD-10-CM

## 2020-09-16 PROCEDURE — 99999 PR PBB SHADOW E&M-EST. PATIENT-LVL IV: ICD-10-PCS | Mod: PBBFAC,,, | Performed by: STUDENT IN AN ORGANIZED HEALTH CARE EDUCATION/TRAINING PROGRAM

## 2020-09-16 PROCEDURE — 99999 PR PBB SHADOW E&M-EST. PATIENT-LVL IV: CPT | Mod: PBBFAC,,, | Performed by: STUDENT IN AN ORGANIZED HEALTH CARE EDUCATION/TRAINING PROGRAM

## 2020-09-16 PROCEDURE — 99213 OFFICE O/P EST LOW 20 MIN: CPT | Mod: S$GLB,,, | Performed by: STUDENT IN AN ORGANIZED HEALTH CARE EDUCATION/TRAINING PROGRAM

## 2020-09-16 PROCEDURE — 99213 PR OFFICE/OUTPT VISIT, EST, LEVL III, 20-29 MIN: ICD-10-PCS | Mod: S$GLB,,, | Performed by: STUDENT IN AN ORGANIZED HEALTH CARE EDUCATION/TRAINING PROGRAM

## 2020-09-16 NOTE — PROGRESS NOTES
Orthopaedics Sports Medicine     Shoulder Initial Visit         9/16/2020    Referring MD: Oliva Carson,*    Chief Complaint   Patient presents with    Left Shoulder - Pain         History of Present Illness:   Reta Navas is a 54 y.o. right-hand dominant female who presents with left shoulder pain and dysfunction that developed a little over a year ago.  She reports that it all started after she hit her arm against a metal pole walking in a store.  After that she began to have progressive onset of pain and stiffness in the left shoulder.  She reports pain as constant and aching over the shoulder with occasional intermittent shooting pains.  Pain is worse with certain activities such as reaching, pushing, pulling.  She also endorses night pain.  She has tried rest, activity modification, NSAIDs, physical therapy, and none guided corticosteroid injection.  Although she does report significant benefit from physical therapy, she continues to have pain on a daily basis that limits her activities.  She is here today with an MRI to discuss further treatment options.    Treatments to date: rest, NSAIDs, activity modification, physical therapy, corticosteroid injection (subacromial)    Past Medical History:   Past Medical History:   Diagnosis Date    Abnormal Pap smear     Abnormal Pap smear of cervix     Chronic rhinitis     Depression     GERD (gastroesophageal reflux disease)     Hyperlipidemia     Hypertension     Overactive bladder     Plantar fasciitis, bilateral     Psoriasis        Past Surgical History:   Past Surgical History:   Procedure Laterality Date    CERVICAL BIOPSY  W/ LOOP ELECTRODE EXCISION      COLONOSCOPY      GALLBLADDER SURGERY N/A 10/11/2018    hx colposcopy      HYSTERECTOMY      supracervical hyst/LSO for fibroids    OOPHORECTOMY      LSO    ROBOT-ASSISTED CHOLECYSTECTOMY USING DA WILVER XI N/A 10/18/2018    Procedure: XI ROBOTIC CHOLECYSTECTOMY;  Surgeon: Robel  MD Carissa;  Location: [facility];  Service: General;  Laterality: N/A;    TUBAL LIGATION         Medications:  Patient's Medications   New Prescriptions    No medications on file   Previous Medications    AMLODIPINE (NORVASC) 10 MG TABLET    Take 1 tablet (10 mg total) by mouth once daily.    DESONIDE (DESOWEN) 0.05 % CREAM    Apply topically 2 (two) times daily.    ERGOCALCIFEROL (ERGOCALCIFEROL) 50,000 UNIT CAP    TAKE 1 CAPSULE BY MOUTH WEEKLY    FEXOFENADINE (ALLEGRA) 180 MG TABLET    Take 180 mg by mouth once daily.    FLUTICASONE PROPIONATE (FLONASE) 50 MCG/ACTUATION NASAL SPRAY    2 sprays (100 mcg total) by Each Nostril route once daily.    LOSARTAN (COZAAR) 25 MG TABLET    Take 25 mg by mouth once daily.    MONTELUKAST (SINGULAIR) 10 MG TABLET    TAKE 1 TABLET(10 MG) BY MOUTH EVERY EVENING    MULTIVITAMIN/IRON/FOLIC ACID (CENTRUM ORAL)    Take 1 tablet by mouth once daily.    OMEPRAZOLE (PRILOSEC) 20 MG CAPSULE    TAKE 1 CAPSULE(20 MG) BY MOUTH EVERY DAY    TRAMADOL (ULTRAM) 50 MG TABLET    Take 1 tablet (50 mg total) by mouth every 12 (twelve) hours as needed for Pain.    TRIAMCINOLONE ACETONIDE 0.1% (KENALOG) 0.1 % CREAM    Apply topically 2 (two) times daily.   Modified Medications    No medications on file   Discontinued Medications    No medications on file       Allergies:   Review of patient's allergies indicates:   Allergen Reactions    Adhesive Rash    Latex, natural rubber        Social History:   Home town:  [place]  Alcohol use: She reports no history of alcohol use.  Tobacco use: She reports that she quit smoking about 7 years ago. Her smoking use included vaping with nicotine. She has a 39.00 pack-year smoking history. She has never used smokeless tobacco.    Review of systems:  History of recent illness, fevers, shakes, or chills: no  History of cardiac problems or chest pain: no  History of pulmonary problems or asthma: no  History of diabetes: no  History of prior dvt or clotting  "problems: no  History of sleep apnea: no      Physical Examination:  Estimated body mass index is 27.12 kg/m² as calculated from the following:    Height as of this encounter: 5' 5" (1.651 m).    Weight as of this encounter: 73.9 kg (163 lb).    General  Healthy appearing female in no acute distress  Alert and oriented, normal mood, appropriate affect    Shoulder Examination:  Patient is alert and oriented, no distress. Skin is intact. Neuro is normal with no focal motor or sensory findings.    Cervical exam is unremarkable. Intact cervical ROM. Negative Spurling's test    Physical Exam:  RIGHT    LEFT    Atrophy:   (-)    (-)   Deformity   (-)    (-)  Scap Dyskinesis/Winging (-)    (-)    Tenderness:          Greater Tuberosity             (-)    +  Bicipital Groove  (-)    +  AC joint   (-)    (-)  Other:     ROM:  Forward Elevation 180    140  Abduction  120    80  ER (at side)  80    45  IR   T8    Back pocket    Passive ROM: same as above    Strength:   Supraspinatus  5/5    5/5  Infraspinatus  5/5    5/5  Subscap / IR  5/5    5/5     Special Tests:   Zuleta:   (-)    +   SS Stress:   (-)    (-)   Belly Press:   (-)    (-)   Lift Off:    (-)    (-)   [place]'s:   (-)    +   Speeds:   (-)    (-)   Resisted Thrower's:   (-)    +   Cross Arm Abduction:  (-)    +    Neurovascular examination  - Motor grossly intact bilaterally to shoulder abduction, elbow flexion and extension, wrist flexion and extension, and intrinsic hand musculature  - Sensation intact to light touch bilaterally in axillary, median, radial, and ulnar distributions  - Symmetrical radial pulses      Imaging:  XR Left shoulder (12/20/2019):  Generalized osteopenia.  Mild degenerative change in the AC and glenohumeral joints.  Remaining included osseous structures intact.  Visualized lung fields clear    Physician Read: I agree with the above impression.      Impression:  54 y.o. female with left shoulder adhesive " capsulitis      Plan:  1. Outlined a non operative treatment plan  2. Unfortunately we were not able to view her MRI today.  She did bring a CD with her however we were unable to loaded onto our computers in clinic.  3. Based on her history and physical exam she has adhesive capsulitis and I would recommend proceeding with the next steps in treatment.  4. I recommend ultrasound-guided intra-articular glenohumeral joint injection followed by aggressive physical therapy for improving range of motion  5. Will see her back next week for ultrasound-guided injection.  We have ended her disc over to the Radiology Department for help with uploading her MRI to our PACS system.  I will review this with her at her upcoming appointment.  6. Follow up next week for ultrasound-guided Left glenohumeral joint injection      Srinivas Braswell MD

## 2020-09-17 ENCOUNTER — PATIENT MESSAGE (OUTPATIENT)
Dept: OTOLARYNGOLOGY | Facility: CLINIC | Age: 55
End: 2020-09-17

## 2020-09-22 ENCOUNTER — TELEPHONE (OUTPATIENT)
Dept: ORTHOPEDICS | Facility: CLINIC | Age: 55
End: 2020-09-22

## 2020-09-22 ENCOUNTER — OFFICE VISIT (OUTPATIENT)
Dept: ORTHOPEDICS | Facility: CLINIC | Age: 55
End: 2020-09-22
Payer: COMMERCIAL

## 2020-09-22 VITALS
BODY MASS INDEX: 27.16 KG/M2 | SYSTOLIC BLOOD PRESSURE: 125 MMHG | WEIGHT: 163 LBS | HEIGHT: 65 IN | HEART RATE: 88 BPM | DIASTOLIC BLOOD PRESSURE: 83 MMHG

## 2020-09-22 DIAGNOSIS — M75.02 ADHESIVE CAPSULITIS OF LEFT SHOULDER: Primary | ICD-10-CM

## 2020-09-22 PROCEDURE — 99999 PR PBB SHADOW E&M-EST. PATIENT-LVL IV: CPT | Mod: PBBFAC,,, | Performed by: STUDENT IN AN ORGANIZED HEALTH CARE EDUCATION/TRAINING PROGRAM

## 2020-09-22 PROCEDURE — 99999 PR PBB SHADOW E&M-EST. PATIENT-LVL IV: ICD-10-PCS | Mod: PBBFAC,,, | Performed by: STUDENT IN AN ORGANIZED HEALTH CARE EDUCATION/TRAINING PROGRAM

## 2020-09-22 PROCEDURE — 99213 OFFICE O/P EST LOW 20 MIN: CPT | Mod: 25,S$GLB,, | Performed by: STUDENT IN AN ORGANIZED HEALTH CARE EDUCATION/TRAINING PROGRAM

## 2020-09-22 PROCEDURE — 20611 DRAIN/INJ JOINT/BURSA W/US: CPT | Mod: LT,S$GLB,, | Performed by: STUDENT IN AN ORGANIZED HEALTH CARE EDUCATION/TRAINING PROGRAM

## 2020-09-22 PROCEDURE — 20611 LARGE JOINT ASPIRATION/INJECTION: L GLENOHUMERAL: ICD-10-PCS | Mod: LT,S$GLB,, | Performed by: STUDENT IN AN ORGANIZED HEALTH CARE EDUCATION/TRAINING PROGRAM

## 2020-09-22 PROCEDURE — 99213 PR OFFICE/OUTPT VISIT, EST, LEVL III, 20-29 MIN: ICD-10-PCS | Mod: 25,S$GLB,, | Performed by: STUDENT IN AN ORGANIZED HEALTH CARE EDUCATION/TRAINING PROGRAM

## 2020-09-22 RX ORDER — TRIAMCINOLONE ACETONIDE 40 MG/ML
80 INJECTION, SUSPENSION INTRA-ARTICULAR; INTRAMUSCULAR
Status: DISCONTINUED | OUTPATIENT
Start: 2020-09-22 | End: 2020-09-22 | Stop reason: HOSPADM

## 2020-09-22 RX ADMIN — TRIAMCINOLONE ACETONIDE 80 MG: 40 INJECTION, SUSPENSION INTRA-ARTICULAR; INTRAMUSCULAR at 08:09

## 2020-09-22 NOTE — PROCEDURES
Large Joint Aspiration/Injection: L glenohumeral    Date/Time: 9/22/2020 8:00 AM  Performed by: Srinivas Braswell MD  Authorized by: Srinivas Braswell MD     Consent Done?:  Yes (Verbal)  Indications:  Pain  Site marked: the procedure site was marked    Timeout: prior to procedure the correct patient, procedure, and site was verified    Prep: patient was prepped and draped in usual sterile fashion      Local anesthesia used?: Yes    Anesthesia:  Local infiltration  Local anesthetic:  Lidocaine 1% without epinephrine    Details:  Needle Size:  22 G  Ultrasonic Guidance for needle placement?: Yes    Images are saved and documented.  Approach:  Posterior  Location:  Shoulder  Site:  L glenohumeral  Medications:  80 mg triamcinolone acetonide 40 mg/mL     Patient tolerated the procedure well and there were no immediate complications

## 2020-09-22 NOTE — PROGRESS NOTES
Orthopaedic Follow-Up Visit    Last Appointment: 9/16/2020  Diagnosis: Left Shoulder adhesive capsulitis  Prior Procedure: none    Reta Navas is a 54 y.o. female who is here for f/u evaluation of LEFT Shoulder adhesive capsulitis with MRI results (see full impression below). The patient was last seen here by me on 9/16/2020 at which point we were not able to view her MRI images from outside facility. The disc has subsequently been loaded and we were able to go over the imaging today. The patient returns today reporting that the symptoms have continued and may actually be a little worse today because she was very active cleaning over the weekend. She is interested in proceeding with further treatment options.     To review her history, she has left shoulder pain and dysfunction that developed a little over a year ago.  She reports that it all started after she hit her arm against a metal pole walking in a store.  After that she began to have progressive onset of pain and stiffness in the left shoulder.  She reports pain as constant and aching over the shoulder with occasional intermittent shooting pains.  Pain is worse with certain activities such as reaching, pushing, pulling.  She also endorses night pain.  She has tried rest, activity modification, NSAIDs, physical therapy, and non-guided corticosteroid injection.  Although she does report significant benefit from physical therapy, she continues to have pain on a daily basis that limits her activities.    Patient's medications, allergies, past medical, surgical, social and family histories were reviewed and updated as appropriate.    Review of Systems   All systems reviewed were negative.  Specifically, the patient denies fever, chills, weight loss, chest pain, shortness of breath, or dyspnea on exertion.      Past Medical History:   Diagnosis Date    Abnormal Pap smear     Abnormal Pap smear of cervix     Chronic rhinitis     Depression     GERD  (gastroesophageal reflux disease)     Hyperlipidemia     Hypertension     Overactive bladder     Plantar fasciitis, bilateral     Psoriasis        Past Surgical History:   Procedure Laterality Date    CERVICAL BIOPSY  W/ LOOP ELECTRODE EXCISION      COLONOSCOPY      GALLBLADDER SURGERY N/A 10/11/2018    hx colposcopy      HYSTERECTOMY      supracervical hyst/LSO for fibroids    OOPHORECTOMY      LSO    ROBOT-ASSISTED CHOLECYSTECTOMY USING DA WILVER XI N/A 10/18/2018    Procedure: XI ROBOTIC CHOLECYSTECTOMY;  Surgeon: Robel Ferrer MD;  Location: [facility];  Service: General;  Laterality: N/A;    TUBAL LIGATION       Patient's Medications   New Prescriptions    No medications on file   Previous Medications    AMLODIPINE (NORVASC) 10 MG TABLET    Take 1 tablet (10 mg total) by mouth once daily.    DESONIDE (DESOWEN) 0.05 % CREAM    Apply topically 2 (two) times daily.    ERGOCALCIFEROL (ERGOCALCIFEROL) 50,000 UNIT CAP    TAKE 1 CAPSULE BY MOUTH WEEKLY    FEXOFENADINE (ALLEGRA) 180 MG TABLET    Take 180 mg by mouth once daily.    FLUTICASONE PROPIONATE (FLONASE) 50 MCG/ACTUATION NASAL SPRAY    2 sprays (100 mcg total) by Each Nostril route once daily.    LOSARTAN (COZAAR) 25 MG TABLET    Take 25 mg by mouth once daily.    MONTELUKAST (SINGULAIR) 10 MG TABLET    TAKE 1 TABLET(10 MG) BY MOUTH EVERY EVENING    MULTIVITAMIN/IRON/FOLIC ACID (CENTRUM ORAL)    Take 1 tablet by mouth once daily.    OMEPRAZOLE (PRILOSEC) 20 MG CAPSULE    TAKE 1 CAPSULE(20 MG) BY MOUTH EVERY DAY    TRAMADOL (ULTRAM) 50 MG TABLET    Take 1 tablet (50 mg total) by mouth every 12 (twelve) hours as needed for Pain.    TRIAMCINOLONE ACETONIDE 0.1% (KENALOG) 0.1 % CREAM    Apply topically 2 (two) times daily.   Modified Medications    No medications on file   Discontinued Medications    No medications on file       Family History   Problem Relation Age of Onset    Diabetes Brother     No Known Problems Father     Colon cancer Paternal  Grandfather     Colon cancer Paternal Uncle     Ovarian cancer Maternal Aunt     Breast cancer Paternal Grandmother        Social History     Socioeconomic History    Marital status:      Spouse name: Not on file    Number of children: Not on file    Years of education: Not on file    Highest education level: Not on file   Occupational History    Not on file   Social Needs    Financial resource strain: Not hard at all    Food insecurity     Worry: Never true     Inability: Never true    Transportation needs     Medical: No     Non-medical: No   Tobacco Use    Smoking status: Former Smoker     Packs/day: 1.00     Years: 39.00     Pack years: 39.00     Types: Vaping with nicotine     Quit date: 2013     Years since quittin.6    Smokeless tobacco: Never Used   Substance and Sexual Activity    Alcohol use: No     Alcohol/week: 0.0 standard drinks     Frequency: Monthly or less     Drinks per session: 1 or 2     Binge frequency: Never    Drug use: No    Sexual activity: Yes     Partners: Male     Birth control/protection: None, Surgical, Post-menopausal   Lifestyle    Physical activity     Days per week: 1 day     Minutes per session: 30 min    Stress: Very much   Relationships    Social connections     Talks on phone: More than three times a week     Gets together: Once a week     Attends Evangelical service: Not on file     Active member of club or organization: No     Attends meetings of clubs or organizations: Never     Relationship status:    Other Topics Concern    Not on file   Social History Narrative    Not on file       Review of patient's allergies indicates:   Allergen Reactions    Adhesive Rash    Latex, natural rubber          Objective:      Physical Exam  Patient is alert and oriented, no distress. Skin is intact. Neuro is normal with no focal motor or sensory findings.    Cervical exam is unremarkable. Intact cervical ROM. Negative Spurling's test    Physical  Exam:  RIGHT    LEFT    Atrophy:   (-)    (-)   Deformity   (-)    (-)  Scap Dyskinesis/Winging (-)    (-)    Tenderness:          Greater Tuberosity             (-)    +  Bicipital Groove  (-)    +  AC joint   (-)    (-)  Other:     ROM:  Forward Elevation 180    140  Abduction  120    80  ER (at side)  80    45  IR   T8    Back pocket    Passive ROM: same as above    Strength:   Supraspinatus  5/5    5/5  Infraspinatus  5/5    5/5  Subscap / IR  5/5    5/5     Special Tests:   Zuleta:   (-)    +   SS Stress:   (-)    (-)   Belly Press:   (-)    (-)   Lift Off:    (-)    (-)   [place]'s:   (-)    +   Speeds:   (-)    (-)   Resisted Thrower's:   (-)    +   Cross Arm Abduction:  (-)    +    Neurovascular examination  - Motor grossly intact bilaterally to shoulder abduction, elbow flexion and extension, wrist flexion and extension, and intrinsic hand musculature  - Sensation intact to light touch bilaterally in axillary, median, radial, and ulnar distributions  - Symmetrical radial pulses      Imaging:    MRI Left shoulder (9/8/2020)  Impression: low quality study, no evidence of full thickness rotator cuff tear      Assessment/Plan:   Reta Navas is a 54 y.o. female with Left shoulder adhesive capsulitis    Plan:    1. Outlined non-operative treatment plan  2. Recommended US guided CSI into Left glenohumeral joint  3. Discussed the etiology and natural history of adhesive capsulitis. It is typically a self-limiting condition consisting of a freezing, frozen, and thawing phase. It can take up to 3 years to fully recover but CSI and PT can expedite the process  4. Another referral given for PT and importance of home exercise program emphasized  5. Follow up 3 months for recheck          Srinivas Braswell MD

## 2020-09-28 ENCOUNTER — OFFICE VISIT (OUTPATIENT)
Dept: INTERNAL MEDICINE | Facility: CLINIC | Age: 55
End: 2020-09-28
Payer: COMMERCIAL

## 2020-09-28 VITALS
RESPIRATION RATE: 18 BRPM | SYSTOLIC BLOOD PRESSURE: 125 MMHG | TEMPERATURE: 99 F | BODY MASS INDEX: 31.29 KG/M2 | OXYGEN SATURATION: 96 % | HEIGHT: 60 IN | HEART RATE: 95 BPM | DIASTOLIC BLOOD PRESSURE: 82 MMHG | WEIGHT: 159.38 LBS

## 2020-09-28 DIAGNOSIS — K59.04 CHRONIC IDIOPATHIC CONSTIPATION: ICD-10-CM

## 2020-09-28 DIAGNOSIS — Z79.899 ENCOUNTER FOR LONG-TERM (CURRENT) USE OF MEDICATIONS: Primary | ICD-10-CM

## 2020-09-28 DIAGNOSIS — E66.9 OBESITY, UNSPECIFIED CLASSIFICATION, UNSPECIFIED OBESITY TYPE, UNSPECIFIED WHETHER SERIOUS COMORBIDITY PRESENT: ICD-10-CM

## 2020-09-28 DIAGNOSIS — R79.89 ELEVATED LFTS: ICD-10-CM

## 2020-09-28 DIAGNOSIS — I10 HYPERTENSION, UNSPECIFIED TYPE: ICD-10-CM

## 2020-09-28 DIAGNOSIS — K21.9 CHRONIC GERD: ICD-10-CM

## 2020-09-28 DIAGNOSIS — J30.9 ALLERGIC RHINITIS, UNSPECIFIED SEASONALITY, UNSPECIFIED TRIGGER: ICD-10-CM

## 2020-09-28 PROCEDURE — 90686 IIV4 VACC NO PRSV 0.5 ML IM: CPT | Mod: S$GLB,,, | Performed by: FAMILY MEDICINE

## 2020-09-28 PROCEDURE — 90686 FLU VACCINE (QUAD) GREATER THAN OR EQUAL TO 3YO PRESERVATIVE FREE IM: ICD-10-PCS | Mod: S$GLB,,, | Performed by: FAMILY MEDICINE

## 2020-09-28 PROCEDURE — 90471 IMMUNIZATION ADMIN: CPT | Mod: S$GLB,,, | Performed by: FAMILY MEDICINE

## 2020-09-28 PROCEDURE — 99214 OFFICE O/P EST MOD 30 MIN: CPT | Mod: 25,S$GLB,, | Performed by: FAMILY MEDICINE

## 2020-09-28 PROCEDURE — 90471 FLU VACCINE (QUAD) GREATER THAN OR EQUAL TO 3YO PRESERVATIVE FREE IM: ICD-10-PCS | Mod: S$GLB,,, | Performed by: FAMILY MEDICINE

## 2020-09-28 PROCEDURE — 99999 PR PBB SHADOW E&M-EST. PATIENT-LVL III: ICD-10-PCS | Mod: PBBFAC,,, | Performed by: FAMILY MEDICINE

## 2020-09-28 PROCEDURE — 99214 PR OFFICE/OUTPT VISIT, EST, LEVL IV, 30-39 MIN: ICD-10-PCS | Mod: 25,S$GLB,, | Performed by: FAMILY MEDICINE

## 2020-09-28 PROCEDURE — 99999 PR PBB SHADOW E&M-EST. PATIENT-LVL III: CPT | Mod: PBBFAC,,, | Performed by: FAMILY MEDICINE

## 2020-09-28 RX ORDER — OMEPRAZOLE 20 MG/1
20 CAPSULE, DELAYED RELEASE ORAL DAILY
Qty: 90 CAPSULE | Refills: 1 | Status: SHIPPED | OUTPATIENT
Start: 2020-09-28 | End: 2021-03-23 | Stop reason: SDUPTHER

## 2020-09-28 RX ORDER — CHOLECALCIFEROL (VITAMIN D3) 25 MCG
1000 TABLET ORAL DAILY
COMMUNITY

## 2020-09-28 NOTE — PROGRESS NOTES
Subjective:       Patient ID: Reta Navas is a 54 y.o. female.    Chief Complaint: Follow-up    HPI     53 yo M    Past Medical History:   Diagnosis Date    Abnormal Pap smear     Abnormal Pap smear of cervix     Chronic rhinitis     Depression     GERD (gastroesophageal reflux disease)     Hyperlipidemia     Hypertension     Overactive bladder     Plantar fasciitis, bilateral     Psoriasis      Past Surgical History:   Procedure Laterality Date    CERVICAL BIOPSY  W/ LOOP ELECTRODE EXCISION      COLONOSCOPY      GALLBLADDER SURGERY N/A 10/11/2018    hx colposcopy      HYSTERECTOMY      supracervical hyst/LSO for fibroids    OOPHORECTOMY      LSO    ROBOT-ASSISTED CHOLECYSTECTOMY USING DA WILVER XI N/A 10/18/2018    Procedure: XI ROBOTIC CHOLECYSTECTOMY;  Surgeon: Robel Ferrer MD;  [facility];  Service: General;  Laterality: N/A;    TUBAL LIGATION       Social History     Tobacco Use   Smoking Status Former Smoker    Packs/day: 1.00    Years: 39.00    Pack years: 39.00    Types: Vaping with nicotine    Quit date: 2013    Years since quittin.7   Smokeless Tobacco Never Used         Saw Ortho  Had her injection.  Seemed to help  Going to gym on her own - PT cost prohibitive.      Wt Readings from Last 5 Encounters:   20 72.3 kg (159 lb 6.3 oz)   20 73.9 kg (163 lb)   20 73.9 kg (163 lb)   20 73.9 kg (163 lb)   20 74 kg (163 lb 2.3 oz)       Trying to lose weight  Has avoided cold drinks and sweets.  She is walking.    GERD  Trying to get off it.      Allergy problematic  Finds she gets immune to certain anti-histamine.      Frequent constipation  Eating prunes.  Once every 3 days.        Review of Systems   Constitutional: Negative for chills and fever.   HENT: Negative for trouble swallowing.    Respiratory: Negative for shortness of breath.    Cardiovascular: Negative for chest pain and palpitations.   Gastrointestinal: Positive for  constipation.   Genitourinary: Negative for difficulty urinating.   Musculoskeletal: Negative for gait problem.   Neurological: Negative for dizziness.   Psychiatric/Behavioral: Negative for confusion.       Objective:       Vitals:    09/28/20 1448   BP: 125/82   Pulse:    Resp:    Temp:        Vitals:    09/28/20 1448   BP: 125/82   Pulse:    Resp:    Temp:        Physical Exam  Constitutional:       General: She is not in acute distress.     Appearance: Normal appearance. She is well-developed.   HENT:      Head: Normocephalic and atraumatic.   Eyes:      General: Lids are normal.      Conjunctiva/sclera: Conjunctivae normal.   Neck:      Musculoskeletal: Full passive range of motion without pain and normal range of motion.   Cardiovascular:      Rate and Rhythm: Normal rate and regular rhythm.      Heart sounds: Normal heart sounds.   Pulmonary:      Effort: Pulmonary effort is normal. No respiratory distress.      Breath sounds: Normal breath sounds.   Abdominal:      General: There is no distension.      Palpations: Abdomen is soft.   Musculoskeletal: Normal range of motion.   Lymphadenopathy:      Cervical: No cervical adenopathy.   Skin:     Coloration: Skin is not pale.   Neurological:      Mental Status: She is alert and oriented to person, place, and time. She is not disoriented.   Psychiatric:         Speech: Speech normal.         Behavior: Behavior normal.         Thought Content: Thought content normal.         Assessment:       1. Encounter for long-term (current) use of medications    2. Chronic GERD    3. Allergic rhinitis, unspecified seasonality, unspecified trigger    4. Elevated LFTs    5. Obesity, unspecified classification, unspecified obesity type, unspecified whether serious comorbidity present    6. Hypertension, unspecified type    7. Chronic idiopathic constipation        Plan:   HTN  Amlodipine 10  Controlled  Slightly elevated today.  Will continue to monitor.      Allergic  Rhinitis  Allegra  Flonase  Singulair  Formerly used astelin - causes issues w/ sneezing and mucus.    Elevated LFT  CMP    Obesity  Working on weight loss    Frozen shoulder  S/p injection   Doing exercise.    Constipation  Has tried fiber and laxatives.  linzess trial    6 months.

## 2020-09-30 ENCOUNTER — LAB VISIT (OUTPATIENT)
Dept: LAB | Facility: HOSPITAL | Age: 55
End: 2020-09-30
Attending: FAMILY MEDICINE
Payer: COMMERCIAL

## 2020-09-30 DIAGNOSIS — Z79.899 ENCOUNTER FOR LONG-TERM (CURRENT) USE OF MEDICATIONS: ICD-10-CM

## 2020-09-30 LAB
25(OH)D3+25(OH)D2 SERPL-MCNC: 24 NG/ML (ref 30–96)
ALBUMIN SERPL BCP-MCNC: 4.1 G/DL (ref 3.5–5.2)
ALP SERPL-CCNC: 104 U/L (ref 55–135)
ALT SERPL W/O P-5'-P-CCNC: 46 U/L (ref 10–44)
ANION GAP SERPL CALC-SCNC: 10 MMOL/L (ref 8–16)
AST SERPL-CCNC: 25 U/L (ref 10–40)
BASOPHILS # BLD AUTO: 0.07 K/UL (ref 0–0.2)
BASOPHILS NFR BLD: 0.6 % (ref 0–1.9)
BILIRUB SERPL-MCNC: 0.5 MG/DL (ref 0.1–1)
BUN SERPL-MCNC: 16 MG/DL (ref 6–20)
CALCIUM SERPL-MCNC: 9.3 MG/DL (ref 8.7–10.5)
CHLORIDE SERPL-SCNC: 105 MMOL/L (ref 95–110)
CHOLEST SERPL-MCNC: 148 MG/DL (ref 120–199)
CHOLEST/HDLC SERPL: 2.8 {RATIO} (ref 2–5)
CO2 SERPL-SCNC: 25 MMOL/L (ref 23–29)
CREAT SERPL-MCNC: 0.8 MG/DL (ref 0.5–1.4)
DIFFERENTIAL METHOD: ABNORMAL
EOSINOPHIL # BLD AUTO: 2.4 K/UL (ref 0–0.5)
EOSINOPHIL NFR BLD: 21.1 % (ref 0–8)
ERYTHROCYTE [DISTWIDTH] IN BLOOD BY AUTOMATED COUNT: 12.8 % (ref 11.5–14.5)
EST. GFR  (AFRICAN AMERICAN): >60 ML/MIN/1.73 M^2
EST. GFR  (NON AFRICAN AMERICAN): >60 ML/MIN/1.73 M^2
ESTIMATED AVG GLUCOSE: 111 MG/DL (ref 68–131)
GLUCOSE SERPL-MCNC: 96 MG/DL (ref 70–110)
HBA1C MFR BLD HPLC: 5.5 % (ref 4–5.6)
HCT VFR BLD AUTO: 45 % (ref 37–48.5)
HDLC SERPL-MCNC: 52 MG/DL (ref 40–75)
HDLC SERPL: 35.1 % (ref 20–50)
HGB BLD-MCNC: 14.5 G/DL (ref 12–16)
IMM GRANULOCYTES # BLD AUTO: 0.03 K/UL (ref 0–0.04)
IMM GRANULOCYTES NFR BLD AUTO: 0.3 % (ref 0–0.5)
LDLC SERPL CALC-MCNC: 76.4 MG/DL (ref 63–159)
LYMPHOCYTES # BLD AUTO: 1.9 K/UL (ref 1–4.8)
LYMPHOCYTES NFR BLD: 16.3 % (ref 18–48)
MCH RBC QN AUTO: 30.3 PG (ref 27–31)
MCHC RBC AUTO-ENTMCNC: 32.2 G/DL (ref 32–36)
MCV RBC AUTO: 94 FL (ref 82–98)
MONOCYTES # BLD AUTO: 0.8 K/UL (ref 0.3–1)
MONOCYTES NFR BLD: 7.1 % (ref 4–15)
NEUTROPHILS # BLD AUTO: 6.3 K/UL (ref 1.8–7.7)
NEUTROPHILS NFR BLD: 54.6 % (ref 38–73)
NONHDLC SERPL-MCNC: 96 MG/DL
NRBC BLD-RTO: 0 /100 WBC
PLATELET # BLD AUTO: 364 K/UL (ref 150–350)
PMV BLD AUTO: 10 FL (ref 9.2–12.9)
POTASSIUM SERPL-SCNC: 4.5 MMOL/L (ref 3.5–5.1)
PROT SERPL-MCNC: 7.8 G/DL (ref 6–8.4)
RBC # BLD AUTO: 4.78 M/UL (ref 4–5.4)
SODIUM SERPL-SCNC: 140 MMOL/L (ref 136–145)
TRIGL SERPL-MCNC: 98 MG/DL (ref 30–150)
TSH SERPL DL<=0.005 MIU/L-ACNC: 3.02 UIU/ML (ref 0.4–4)
WBC # BLD AUTO: 11.48 K/UL (ref 3.9–12.7)

## 2020-09-30 PROCEDURE — 82306 VITAMIN D 25 HYDROXY: CPT

## 2020-09-30 PROCEDURE — 85025 COMPLETE CBC W/AUTO DIFF WBC: CPT

## 2020-09-30 PROCEDURE — 83036 HEMOGLOBIN GLYCOSYLATED A1C: CPT

## 2020-09-30 PROCEDURE — 84443 ASSAY THYROID STIM HORMONE: CPT

## 2020-09-30 PROCEDURE — 80053 COMPREHEN METABOLIC PANEL: CPT

## 2020-09-30 PROCEDURE — 36415 COLL VENOUS BLD VENIPUNCTURE: CPT

## 2020-09-30 PROCEDURE — 80061 LIPID PANEL: CPT

## 2020-10-01 ENCOUNTER — PATIENT MESSAGE (OUTPATIENT)
Dept: INTERNAL MEDICINE | Facility: CLINIC | Age: 55
End: 2020-10-01

## 2021-01-04 ENCOUNTER — TELEPHONE (OUTPATIENT)
Dept: INTERNAL MEDICINE | Facility: CLINIC | Age: 56
End: 2021-01-04

## 2021-02-02 DIAGNOSIS — L30.9 DERMATITIS: ICD-10-CM

## 2021-02-02 DIAGNOSIS — I10 HYPERTENSION, UNSPECIFIED TYPE: ICD-10-CM

## 2021-02-02 RX ORDER — CLOBETASOL PROPIONATE 0.5 MG/G
CREAM TOPICAL
Qty: 45 G | Refills: 1 | OUTPATIENT
Start: 2021-02-02

## 2021-02-03 RX ORDER — AMLODIPINE BESYLATE 10 MG/1
10 TABLET ORAL DAILY
Qty: 90 TABLET | Refills: 1 | Status: SHIPPED | OUTPATIENT
Start: 2021-02-03 | End: 2021-07-29

## 2021-02-08 DIAGNOSIS — L40.9 PSORIASIS: ICD-10-CM

## 2021-02-08 RX ORDER — DESONIDE 0.5 MG/G
CREAM TOPICAL 2 TIMES DAILY
Qty: 60 TUBE | Refills: 0 | OUTPATIENT
Start: 2021-02-08

## 2021-03-02 ENCOUNTER — TELEPHONE (OUTPATIENT)
Dept: ORTHOPEDICS | Facility: CLINIC | Age: 56
End: 2021-03-02

## 2021-03-02 DIAGNOSIS — M25.561 RIGHT KNEE PAIN, UNSPECIFIED CHRONICITY: Primary | ICD-10-CM

## 2021-03-04 ENCOUNTER — HOSPITAL ENCOUNTER (OUTPATIENT)
Dept: RADIOLOGY | Facility: HOSPITAL | Age: 56
Discharge: HOME | End: 2021-03-04
Attending: STUDENT IN AN ORGANIZED HEALTH CARE EDUCATION/TRAINING PROGRAM
Payer: COMMERCIAL

## 2021-03-04 ENCOUNTER — OFFICE VISIT (OUTPATIENT)
Dept: ORTHOPEDICS | Facility: CLINIC | Age: 56
End: 2021-03-04
Payer: COMMERCIAL

## 2021-03-04 VITALS — HEIGHT: 60 IN | BODY MASS INDEX: 31.22 KG/M2 | WEIGHT: 159 LBS

## 2021-03-04 DIAGNOSIS — M25.561 RIGHT KNEE PAIN, UNSPECIFIED CHRONICITY: Primary | ICD-10-CM

## 2021-03-04 DIAGNOSIS — S83.281A TEAR OF LATERAL MENISCUS OF RIGHT KNEE, CURRENT, UNSPECIFIED TEAR TYPE, INITIAL ENCOUNTER: ICD-10-CM

## 2021-03-04 DIAGNOSIS — M25.561 RIGHT KNEE PAIN, UNSPECIFIED CHRONICITY: ICD-10-CM

## 2021-03-04 PROCEDURE — 73562 XR KNEE ORTHO RIGHT WITH FLEXION: ICD-10-PCS | Mod: 26,LT,, | Performed by: RADIOLOGY

## 2021-03-04 PROCEDURE — 73564 X-RAY EXAM KNEE 4 OR MORE: CPT | Mod: 26,RT,, | Performed by: RADIOLOGY

## 2021-03-04 PROCEDURE — 73562 X-RAY EXAM OF KNEE 3: CPT | Mod: 26,LT,, | Performed by: RADIOLOGY

## 2021-03-04 PROCEDURE — 99999 PR PBB SHADOW E&M-EST. PATIENT-LVL IV: ICD-10-PCS | Mod: PBBFAC,,, | Performed by: STUDENT IN AN ORGANIZED HEALTH CARE EDUCATION/TRAINING PROGRAM

## 2021-03-04 PROCEDURE — 99213 OFFICE O/P EST LOW 20 MIN: CPT | Mod: S$GLB,,, | Performed by: STUDENT IN AN ORGANIZED HEALTH CARE EDUCATION/TRAINING PROGRAM

## 2021-03-04 PROCEDURE — 99213 PR OFFICE/OUTPT VISIT, EST, LEVL III, 20-29 MIN: ICD-10-PCS | Mod: S$GLB,,, | Performed by: STUDENT IN AN ORGANIZED HEALTH CARE EDUCATION/TRAINING PROGRAM

## 2021-03-04 PROCEDURE — 99999 PR PBB SHADOW E&M-EST. PATIENT-LVL IV: CPT | Mod: PBBFAC,,, | Performed by: STUDENT IN AN ORGANIZED HEALTH CARE EDUCATION/TRAINING PROGRAM

## 2021-03-04 PROCEDURE — 73564 X-RAY EXAM KNEE 4 OR MORE: CPT | Mod: TC,RT

## 2021-03-04 PROCEDURE — 73564 XR KNEE ORTHO RIGHT WITH FLEXION: ICD-10-PCS | Mod: 26,RT,, | Performed by: RADIOLOGY

## 2021-03-04 RX ORDER — MELOXICAM 15 MG/1
15 TABLET ORAL DAILY
Qty: 30 TABLET | Refills: 1 | Status: SHIPPED | OUTPATIENT
Start: 2021-03-04 | End: 2021-03-04

## 2021-03-15 ENCOUNTER — CLINICAL SUPPORT (OUTPATIENT)
Dept: REHABILITATION | Facility: HOSPITAL | Age: 56
End: 2021-03-15
Attending: STUDENT IN AN ORGANIZED HEALTH CARE EDUCATION/TRAINING PROGRAM
Payer: COMMERCIAL

## 2021-03-15 DIAGNOSIS — R29.898 WEAKNESS OF BOTH LOWER EXTREMITIES: ICD-10-CM

## 2021-03-15 DIAGNOSIS — M25.561 RIGHT KNEE PAIN, UNSPECIFIED CHRONICITY: ICD-10-CM

## 2021-03-15 PROCEDURE — 97161 PT EVAL LOW COMPLEX 20 MIN: CPT

## 2021-03-15 PROCEDURE — 97110 THERAPEUTIC EXERCISES: CPT

## 2021-03-21 DIAGNOSIS — K21.9 CHRONIC GERD: ICD-10-CM

## 2021-03-22 ENCOUNTER — CLINICAL SUPPORT (OUTPATIENT)
Dept: REHABILITATION | Facility: HOSPITAL | Age: 56
End: 2021-03-22
Payer: COMMERCIAL

## 2021-03-22 DIAGNOSIS — R29.898 WEAKNESS OF BOTH LOWER EXTREMITIES: ICD-10-CM

## 2021-03-22 DIAGNOSIS — M25.561 RIGHT KNEE PAIN, UNSPECIFIED CHRONICITY: ICD-10-CM

## 2021-03-22 PROCEDURE — 97110 THERAPEUTIC EXERCISES: CPT

## 2021-03-24 RX ORDER — OMEPRAZOLE 20 MG/1
CAPSULE, DELAYED RELEASE ORAL
Qty: 90 CAPSULE | Refills: 1 | Status: SHIPPED | OUTPATIENT
Start: 2021-03-24 | End: 2021-12-16

## 2021-04-29 DIAGNOSIS — J30.9 CHRONIC ALLERGIC RHINITIS: ICD-10-CM

## 2021-05-03 RX ORDER — MONTELUKAST SODIUM 10 MG/1
10 TABLET ORAL NIGHTLY
Qty: 90 TABLET | Refills: 1 | Status: SHIPPED | OUTPATIENT
Start: 2021-05-03 | End: 2022-01-08 | Stop reason: SDUPTHER

## 2021-07-08 DIAGNOSIS — Z12.31 OTHER SCREENING MAMMOGRAM: ICD-10-CM

## 2021-07-28 DIAGNOSIS — I10 HYPERTENSION, UNSPECIFIED TYPE: ICD-10-CM

## 2021-07-29 DIAGNOSIS — L30.9 DERMATITIS: ICD-10-CM

## 2021-07-29 DIAGNOSIS — L40.9 PSORIASIS: ICD-10-CM

## 2021-07-29 RX ORDER — AMLODIPINE BESYLATE 10 MG/1
TABLET ORAL
Qty: 90 TABLET | Refills: 0 | Status: SHIPPED | OUTPATIENT
Start: 2021-07-29 | End: 2022-01-11

## 2021-07-30 RX ORDER — CLOBETASOL PROPIONATE 0.5 MG/G
CREAM TOPICAL
Qty: 45 G | Refills: 1 | OUTPATIENT
Start: 2021-07-30

## 2021-08-02 RX ORDER — DESONIDE 0.5 MG/G
CREAM TOPICAL
Qty: 60 G | Refills: 1 | Status: SHIPPED | OUTPATIENT
Start: 2021-08-02

## 2021-08-11 ENCOUNTER — DOCUMENTATION ONLY (OUTPATIENT)
Dept: REHABILITATION | Facility: HOSPITAL | Age: 56
End: 2021-08-11

## 2021-10-07 ENCOUNTER — PATIENT MESSAGE (OUTPATIENT)
Dept: ADMINISTRATIVE | Facility: HOSPITAL | Age: 56
End: 2021-10-07

## 2021-10-08 ENCOUNTER — PATIENT OUTREACH (OUTPATIENT)
Dept: ADMINISTRATIVE | Facility: HOSPITAL | Age: 56
End: 2021-10-08

## 2021-10-28 ENCOUNTER — OFFICE VISIT (OUTPATIENT)
Dept: INTERNAL MEDICINE | Facility: CLINIC | Age: 56
End: 2021-10-28
Payer: COMMERCIAL

## 2021-10-28 VITALS
TEMPERATURE: 98 F | DIASTOLIC BLOOD PRESSURE: 78 MMHG | SYSTOLIC BLOOD PRESSURE: 122 MMHG | OXYGEN SATURATION: 96 % | HEART RATE: 88 BPM | HEIGHT: 60 IN | BODY MASS INDEX: 29.13 KG/M2 | WEIGHT: 148.38 LBS

## 2021-10-28 DIAGNOSIS — E66.3 OVERWEIGHT (BMI 25.0-29.9): ICD-10-CM

## 2021-10-28 DIAGNOSIS — G70.00 MG (MYASTHENIA GRAVIS): ICD-10-CM

## 2021-10-28 DIAGNOSIS — L40.9 PSORIASIS: ICD-10-CM

## 2021-10-28 DIAGNOSIS — G89.29 CHRONIC MIDLINE LOW BACK PAIN WITHOUT SCIATICA: ICD-10-CM

## 2021-10-28 DIAGNOSIS — E55.9 VITAMIN D INSUFFICIENCY: ICD-10-CM

## 2021-10-28 DIAGNOSIS — L29.9 ITCHING: ICD-10-CM

## 2021-10-28 DIAGNOSIS — Z00.00 ANNUAL PHYSICAL EXAM: Primary | ICD-10-CM

## 2021-10-28 DIAGNOSIS — M54.50 CHRONIC MIDLINE LOW BACK PAIN WITHOUT SCIATICA: ICD-10-CM

## 2021-10-28 DIAGNOSIS — Z12.11 COLON CANCER SCREENING: ICD-10-CM

## 2021-10-28 PROCEDURE — 99396 PR PREVENTIVE VISIT,EST,40-64: ICD-10-PCS | Mod: 25,S$GLB,,

## 2021-10-28 PROCEDURE — 90686 FLU VACCINE (QUAD) GREATER THAN OR EQUAL TO 3YO PRESERVATIVE FREE IM: ICD-10-PCS | Mod: S$GLB,,,

## 2021-10-28 PROCEDURE — 99999 PR PBB SHADOW E&M-EST. PATIENT-LVL III: ICD-10-PCS | Mod: PBBFAC,,,

## 2021-10-28 PROCEDURE — 90471 FLU VACCINE (QUAD) GREATER THAN OR EQUAL TO 3YO PRESERVATIVE FREE IM: ICD-10-PCS | Mod: S$GLB,,,

## 2021-10-28 PROCEDURE — 99396 PREV VISIT EST AGE 40-64: CPT | Mod: 25,S$GLB,,

## 2021-10-28 PROCEDURE — 90686 IIV4 VACC NO PRSV 0.5 ML IM: CPT | Mod: S$GLB,,,

## 2021-10-28 PROCEDURE — 99999 PR PBB SHADOW E&M-EST. PATIENT-LVL III: CPT | Mod: PBBFAC,,,

## 2021-10-28 PROCEDURE — 90471 IMMUNIZATION ADMIN: CPT | Mod: S$GLB,,,

## 2021-10-28 RX ORDER — TRIAMCINOLONE ACETONIDE 1 MG/G
CREAM TOPICAL 2 TIMES DAILY
Qty: 80 EACH | Refills: 0 | Status: SHIPPED | OUTPATIENT
Start: 2021-10-28 | End: 2023-06-14

## 2021-10-28 RX ORDER — IBUPROFEN 800 MG/1
800 TABLET ORAL 3 TIMES DAILY
Qty: 30 TABLET | Refills: 2 | Status: SHIPPED | OUTPATIENT
Start: 2021-10-28 | End: 2022-03-22

## 2021-10-30 ENCOUNTER — HOSPITAL ENCOUNTER (OUTPATIENT)
Dept: RADIOLOGY | Facility: HOSPITAL | Age: 56
Discharge: HOME | End: 2021-10-30
Attending: FAMILY MEDICINE
Payer: COMMERCIAL

## 2021-10-30 VITALS — HEIGHT: 60 IN | BODY MASS INDEX: 29.13 KG/M2 | WEIGHT: 148.38 LBS

## 2021-10-30 DIAGNOSIS — Z12.31 OTHER SCREENING MAMMOGRAM: ICD-10-CM

## 2021-10-30 PROCEDURE — 77063 BREAST TOMOSYNTHESIS BI: CPT | Mod: 26,,, | Performed by: RADIOLOGY

## 2021-10-30 PROCEDURE — 77067 SCR MAMMO BI INCL CAD: CPT | Mod: TC

## 2021-10-30 PROCEDURE — 77063 MAMMO DIGITAL SCREENING BILAT WITH TOMO: ICD-10-PCS | Mod: 26,,, | Performed by: RADIOLOGY

## 2021-10-30 PROCEDURE — 77067 MAMMO DIGITAL SCREENING BILAT WITH TOMO: ICD-10-PCS | Mod: 26,,, | Performed by: RADIOLOGY

## 2021-10-30 PROCEDURE — 77067 SCR MAMMO BI INCL CAD: CPT | Mod: 26,,, | Performed by: RADIOLOGY

## 2021-12-16 DIAGNOSIS — K21.9 CHRONIC GERD: ICD-10-CM

## 2021-12-16 RX ORDER — OMEPRAZOLE 20 MG/1
CAPSULE, DELAYED RELEASE ORAL
Qty: 90 CAPSULE | Refills: 1 | Status: SHIPPED | OUTPATIENT
Start: 2021-12-16 | End: 2022-06-15

## 2022-01-03 ENCOUNTER — LAB VISIT (OUTPATIENT)
Dept: PRIMARY CARE CLINIC | Facility: OTHER | Age: 57
End: 2022-01-03
Attending: INTERNAL MEDICINE
Payer: COMMERCIAL

## 2022-01-03 DIAGNOSIS — R05.9 COUGH: ICD-10-CM

## 2022-01-03 DIAGNOSIS — R11.0 NAUSEA: ICD-10-CM

## 2022-01-03 DIAGNOSIS — Z20.822 ENCOUNTER FOR LABORATORY TESTING FOR COVID-19 VIRUS: ICD-10-CM

## 2022-01-03 DIAGNOSIS — R50.9 FEVER: ICD-10-CM

## 2022-01-03 PROCEDURE — U0003 INFECTIOUS AGENT DETECTION BY NUCLEIC ACID (DNA OR RNA); SEVERE ACUTE RESPIRATORY SYNDROME CORONAVIRUS 2 (SARS-COV-2) (CORONAVIRUS DISEASE [COVID-19]), AMPLIFIED PROBE TECHNIQUE, MAKING USE OF HIGH THROUGHPUT TECHNOLOGIES AS DESCRIBED BY CMS-2020-01-R: HCPCS | Performed by: INTERNAL MEDICINE

## 2022-01-07 DIAGNOSIS — U07.1 COVID-19 VIRUS DETECTED: ICD-10-CM

## 2022-01-07 LAB — SARS-COV-2 RNA RESP QL NAA+PROBE: DETECTED

## 2022-01-08 ENCOUNTER — PATIENT MESSAGE (OUTPATIENT)
Dept: INTERNAL MEDICINE | Facility: CLINIC | Age: 57
End: 2022-01-08
Payer: COMMERCIAL

## 2022-01-10 DIAGNOSIS — I10 HYPERTENSION, UNSPECIFIED TYPE: ICD-10-CM

## 2022-01-10 NOTE — TELEPHONE ENCOUNTER
No new care gaps identified.  Powered by [employer]. Reference number: [id number].   1/10/2022 1:18:15 PM CST

## 2022-01-11 RX ORDER — AMLODIPINE BESYLATE 10 MG/1
TABLET ORAL
Qty: 90 TABLET | Refills: 0 | Status: SHIPPED | OUTPATIENT
Start: 2022-01-11 | End: 2022-05-02

## 2022-02-23 ENCOUNTER — PATIENT MESSAGE (OUTPATIENT)
Dept: ENDOSCOPY | Facility: HOSPITAL | Age: 57
End: 2022-02-23
Payer: COMMERCIAL

## 2022-03-02 ENCOUNTER — PATIENT MESSAGE (OUTPATIENT)
Dept: RESEARCH | Facility: HOSPITAL | Age: 57
End: 2022-03-02
Payer: COMMERCIAL

## 2022-05-24 ENCOUNTER — LAB VISIT (OUTPATIENT)
Dept: LAB | Facility: HOSPITAL | Age: 57
End: 2022-05-24
Attending: FAMILY MEDICINE
Payer: COMMERCIAL

## 2022-05-24 DIAGNOSIS — E55.9 VITAMIN D INSUFFICIENCY: ICD-10-CM

## 2022-05-24 DIAGNOSIS — L29.9 ITCHING: ICD-10-CM

## 2022-05-24 DIAGNOSIS — Z00.00 ANNUAL PHYSICAL EXAM: ICD-10-CM

## 2022-05-24 LAB
25(OH)D3+25(OH)D2 SERPL-MCNC: 34 NG/ML (ref 30–96)
ALBUMIN SERPL BCP-MCNC: 3.9 G/DL (ref 3.5–5.2)
ALP SERPL-CCNC: 93 U/L (ref 55–135)
ALT SERPL W/O P-5'-P-CCNC: 21 U/L (ref 10–44)
ANION GAP SERPL CALC-SCNC: 8 MMOL/L (ref 8–16)
AST SERPL-CCNC: 20 U/L (ref 10–40)
BASOPHILS # BLD AUTO: 0.07 K/UL (ref 0–0.2)
BASOPHILS NFR BLD: 0.8 % (ref 0–1.9)
BILIRUB SERPL-MCNC: 0.2 MG/DL (ref 0.1–1)
BUN SERPL-MCNC: 10 MG/DL (ref 6–20)
CALCIUM SERPL-MCNC: 9.3 MG/DL (ref 8.7–10.5)
CHLORIDE SERPL-SCNC: 105 MMOL/L (ref 95–110)
CHOLEST SERPL-MCNC: 182 MG/DL (ref 120–199)
CHOLEST/HDLC SERPL: 4.4 {RATIO} (ref 2–5)
CO2 SERPL-SCNC: 27 MMOL/L (ref 23–29)
CREAT SERPL-MCNC: 0.7 MG/DL (ref 0.5–1.4)
DIFFERENTIAL METHOD: ABNORMAL
EOSINOPHIL # BLD AUTO: 3.1 K/UL (ref 0–0.5)
EOSINOPHIL NFR BLD: 36.6 % (ref 0–8)
ERYTHROCYTE [DISTWIDTH] IN BLOOD BY AUTOMATED COUNT: 12.7 % (ref 11.5–14.5)
EST. GFR  (AFRICAN AMERICAN): >60 ML/MIN/1.73 M^2
EST. GFR  (NON AFRICAN AMERICAN): >60 ML/MIN/1.73 M^2
GLUCOSE SERPL-MCNC: 94 MG/DL (ref 70–110)
HCT VFR BLD AUTO: 42.5 % (ref 37–48.5)
HDLC SERPL-MCNC: 41 MG/DL (ref 40–75)
HDLC SERPL: 22.5 % (ref 20–50)
HGB BLD-MCNC: 13.9 G/DL (ref 12–16)
IMM GRANULOCYTES # BLD AUTO: 0.01 K/UL (ref 0–0.04)
IMM GRANULOCYTES NFR BLD AUTO: 0.1 % (ref 0–0.5)
LDLC SERPL CALC-MCNC: 108.4 MG/DL (ref 63–159)
LYMPHOCYTES # BLD AUTO: 1.5 K/UL (ref 1–4.8)
LYMPHOCYTES NFR BLD: 18.1 % (ref 18–48)
MCH RBC QN AUTO: 30.3 PG (ref 27–31)
MCHC RBC AUTO-ENTMCNC: 32.7 G/DL (ref 32–36)
MCV RBC AUTO: 93 FL (ref 82–98)
MONOCYTES # BLD AUTO: 0.5 K/UL (ref 0.3–1)
MONOCYTES NFR BLD: 6.3 % (ref 4–15)
NEUTROPHILS # BLD AUTO: 3.2 K/UL (ref 1.8–7.7)
NEUTROPHILS NFR BLD: 38.1 % (ref 38–73)
NONHDLC SERPL-MCNC: 141 MG/DL
NRBC BLD-RTO: 0 /100 WBC
PLATELET # BLD AUTO: 320 K/UL (ref 150–450)
PMV BLD AUTO: 10.5 FL (ref 9.2–12.9)
POTASSIUM SERPL-SCNC: 4.3 MMOL/L (ref 3.5–5.1)
PROT SERPL-MCNC: 7.8 G/DL (ref 6–8.4)
RBC # BLD AUTO: 4.59 M/UL (ref 4–5.4)
SODIUM SERPL-SCNC: 140 MMOL/L (ref 136–145)
TRIGL SERPL-MCNC: 163 MG/DL (ref 30–150)
TSH SERPL DL<=0.005 MIU/L-ACNC: 2.54 UIU/ML (ref 0.4–4)
WBC # BLD AUTO: 8.38 K/UL (ref 3.9–12.7)

## 2022-05-24 PROCEDURE — 82306 VITAMIN D 25 HYDROXY: CPT

## 2022-05-24 PROCEDURE — 80053 COMPREHEN METABOLIC PANEL: CPT

## 2022-05-24 PROCEDURE — 36415 COLL VENOUS BLD VENIPUNCTURE: CPT

## 2022-05-24 PROCEDURE — 84443 ASSAY THYROID STIM HORMONE: CPT

## 2022-05-24 PROCEDURE — 80061 LIPID PANEL: CPT

## 2022-05-24 PROCEDURE — 85025 COMPLETE CBC W/AUTO DIFF WBC: CPT

## 2022-05-27 ENCOUNTER — OFFICE VISIT (OUTPATIENT)
Dept: INTERNAL MEDICINE | Facility: CLINIC | Age: 57
End: 2022-05-27
Payer: COMMERCIAL

## 2022-05-27 VITALS
WEIGHT: 147.94 LBS | DIASTOLIC BLOOD PRESSURE: 88 MMHG | HEART RATE: 73 BPM | HEIGHT: 60 IN | SYSTOLIC BLOOD PRESSURE: 118 MMHG | BODY MASS INDEX: 29.04 KG/M2 | TEMPERATURE: 98 F | OXYGEN SATURATION: 97 %

## 2022-05-27 DIAGNOSIS — J30.9 ALLERGIC RHINITIS, UNSPECIFIED SEASONALITY, UNSPECIFIED TRIGGER: ICD-10-CM

## 2022-05-27 DIAGNOSIS — R21 SKIN RASH: ICD-10-CM

## 2022-05-27 DIAGNOSIS — Z12.11 SCREENING FOR COLON CANCER: Primary | ICD-10-CM

## 2022-05-27 DIAGNOSIS — I10 HYPERTENSION, UNSPECIFIED TYPE: ICD-10-CM

## 2022-05-27 DIAGNOSIS — Z12.2 SCREENING FOR LUNG CANCER: ICD-10-CM

## 2022-05-27 PROCEDURE — 3008F BODY MASS INDEX DOCD: CPT | Mod: CPTII,S$GLB,, | Performed by: FAMILY MEDICINE

## 2022-05-27 PROCEDURE — 3079F PR MOST RECENT DIASTOLIC BLOOD PRESSURE 80-89 MM HG: ICD-10-PCS | Mod: CPTII,S$GLB,, | Performed by: FAMILY MEDICINE

## 2022-05-27 PROCEDURE — 99214 OFFICE O/P EST MOD 30 MIN: CPT | Mod: S$GLB,,, | Performed by: FAMILY MEDICINE

## 2022-05-27 PROCEDURE — 99999 PR PBB SHADOW E&M-EST. PATIENT-LVL V: CPT | Mod: PBBFAC,,, | Performed by: FAMILY MEDICINE

## 2022-05-27 PROCEDURE — 99214 PR OFFICE/OUTPT VISIT, EST, LEVL IV, 30-39 MIN: ICD-10-PCS | Mod: S$GLB,,, | Performed by: FAMILY MEDICINE

## 2022-05-27 PROCEDURE — 3008F PR BODY MASS INDEX (BMI) DOCUMENTED: ICD-10-PCS | Mod: CPTII,S$GLB,, | Performed by: FAMILY MEDICINE

## 2022-05-27 PROCEDURE — 3074F SYST BP LT 130 MM HG: CPT | Mod: CPTII,S$GLB,, | Performed by: FAMILY MEDICINE

## 2022-05-27 PROCEDURE — 99999 PR PBB SHADOW E&M-EST. PATIENT-LVL V: ICD-10-PCS | Mod: PBBFAC,,, | Performed by: FAMILY MEDICINE

## 2022-05-27 PROCEDURE — 1159F MED LIST DOCD IN RCRD: CPT | Mod: CPTII,S$GLB,, | Performed by: FAMILY MEDICINE

## 2022-05-27 PROCEDURE — 3074F PR MOST RECENT SYSTOLIC BLOOD PRESSURE < 130 MM HG: ICD-10-PCS | Mod: CPTII,S$GLB,, | Performed by: FAMILY MEDICINE

## 2022-05-27 PROCEDURE — 1159F PR MEDICATION LIST DOCUMENTED IN MEDICAL RECORD: ICD-10-PCS | Mod: CPTII,S$GLB,, | Performed by: FAMILY MEDICINE

## 2022-05-27 PROCEDURE — 3079F DIAST BP 80-89 MM HG: CPT | Mod: CPTII,S$GLB,, | Performed by: FAMILY MEDICINE

## 2022-05-27 RX ORDER — CLOTRIMAZOLE 1 %
CREAM (GRAM) TOPICAL 2 TIMES DAILY
Qty: 60 G | Refills: 1 | Status: SHIPPED | OUTPATIENT
Start: 2022-05-27 | End: 2023-06-14

## 2022-05-27 RX ORDER — AZELASTINE 1 MG/ML
1 SPRAY, METERED NASAL 2 TIMES DAILY
Qty: 30 ML | Refills: 0 | Status: SHIPPED | OUTPATIENT
Start: 2022-05-27 | End: 2024-02-06

## 2022-05-27 NOTE — PROGRESS NOTES
Subjective:       Patient ID: Reta Navas is a 56 y.o. female.    Chief Complaint: Follow-up and Rash    HPI    Patient Active Problem List   Diagnosis    Obesity    Urinary, incontinence, stress female    Abnormal Pap smear    Lateral epicondylitis (tennis elbow)    Hypertension    Chest pain    Chronic rhinitis    Muscle twitching    MG (myasthenia gravis)    Atrophic vaginitis    Weakness of both lower extremities    Knee pain, right       Past Medical History:   Diagnosis Date    Abnormal Pap smear     Abnormal Pap smear of cervix     Chronic rhinitis     Depression     GERD (gastroesophageal reflux disease)     Hyperlipidemia     Hypertension     Overactive bladder     Plantar fasciitis, bilateral     Psoriasis        Past Surgical History:   Procedure Laterality Date    CERVICAL BIOPSY  W/ LOOP ELECTRODE EXCISION      COLONOSCOPY      GALLBLADDER SURGERY N/A 10/11/2018    hx colposcopy      HYSTERECTOMY      supracervical hyst/LSO for fibroids    OOPHORECTOMY      LSO    ROBOT-ASSISTED CHOLECYSTECTOMY USING DA WILVER XI N/A 10/18/2018    Procedure: XI ROBOTIC CHOLECYSTECTOMY;  Surgeon: Robel Ferrer MD;  Location: [facility];  Service: General;  Laterality: N/A;    TUBAL LIGATION         Family History   Problem Relation Age of Onset    Diabetes Brother     No Known Problems Father     Colon cancer Paternal Grandfather     Colon cancer Paternal Uncle     Ovarian cancer Maternal Aunt     Breast cancer Paternal Grandmother        Social History     Tobacco Use   Smoking Status Former Smoker    Packs/day: 1.00    Years: 39.00    Pack years: 39.00    Types: Vaping with nicotine    Quit date: 2013    Years since quittin.3   Smokeless Tobacco Never Used       Wt Readings from Last 5 Encounters:   22 67.1 kg (147 lb 14.9 oz)   10/30/21 67.3 kg (148 lb 5.9 oz)   10/28/21 67.3 kg (148 lb 5.9 oz)   21 72.1 kg (159 lb)   20 72.3 kg (159 lb 6.3 oz)        For further HPI details, see assessment and plan.    Review of Systems    Objective:      Vitals:    05/27/22 1342   BP: 118/88   Pulse: 73   Temp: 98 °F (36.7 °C)           Physical Exam    Assessment:       1. Screening for colon cancer    2. Skin rash    3. Screening for lung cancer    4. Hypertension, unspecified type        Plan:   Screening for colon cancer  -     Ambulatory referral/consult to Endo Procedure ; Future; Expected date: 05/28/2022    Skin rash    Screening for lung cancer  -     CT Chest Lung Screening Low Dose; Future; Expected date: 05/27/2022    Hypertension, unspecified type    Other orders  -     clotrimazole (LOTRIMIN) 1 % cream; Apply topically 2 (two) times daily.  Dispense: 60 g; Refill: 1  -     azelastine (ASTELIN) 137 mcg (0.1 %) nasal spray; 1 spray (137 mcg total) by Nasal route 2 (two) times daily.  Dispense: 30 mL; Refill: 0        Rash  No contact she can recollect  Bilateral inner legs/thigh and on back  Started    Given the appearance must consider tinea infection    Will start w/ clotrimazole BID for the next week or so  If its not imprvoing I want her to let me know and we'll start topical steroids.      Screening for colon cancer  colonoscopy  This note was verbally dictated, please excuse any type errors.    HTN  amlodipine 10 mg  Controlled    Allergic rhinitis -  singulair   OTC allegra  flonase    Constipation  PRN linzess  Works well - doesn't use all the time.    Smoking history  Quit 9 years ago  vaped on/off since  Pack/day roughly when smoked  Onset age 11  LDCT    Declines vaccinations.    6 months

## 2022-06-16 ENCOUNTER — TELEPHONE (OUTPATIENT)
Dept: INTERNAL MEDICINE | Facility: CLINIC | Age: 57
End: 2022-06-16
Payer: COMMERCIAL

## 2022-06-16 NOTE — TELEPHONE ENCOUNTER
----- Message from Lou Rivera sent at 6/16/2022  8:09 AM CDT -----  Pt called in this morning regarding orders for colonoscopy please call pt back at .[phone] Thanks bs

## 2022-06-27 ENCOUNTER — OFFICE VISIT (OUTPATIENT)
Dept: INTERNAL MEDICINE | Facility: CLINIC | Age: 57
End: 2022-06-27
Payer: COMMERCIAL

## 2022-06-27 ENCOUNTER — HOSPITAL ENCOUNTER (EMERGENCY)
Facility: HOSPITAL | Age: 57
Discharge: HOME | End: 2022-06-27
Attending: EMERGENCY MEDICINE
Payer: COMMERCIAL

## 2022-06-27 VITALS
WEIGHT: 153.88 LBS | HEIGHT: 60 IN | HEART RATE: 87 BPM | BODY MASS INDEX: 30.21 KG/M2 | OXYGEN SATURATION: 97 % | SYSTOLIC BLOOD PRESSURE: 130 MMHG | TEMPERATURE: 97 F | DIASTOLIC BLOOD PRESSURE: 88 MMHG

## 2022-06-27 VITALS
SYSTOLIC BLOOD PRESSURE: 135 MMHG | HEART RATE: 74 BPM | TEMPERATURE: 98 F | DIASTOLIC BLOOD PRESSURE: 73 MMHG | WEIGHT: 154.13 LBS | OXYGEN SATURATION: 97 % | RESPIRATION RATE: 18 BRPM | BODY MASS INDEX: 30.1 KG/M2

## 2022-06-27 DIAGNOSIS — Z12.11 SCREENING FOR COLON CANCER: Primary | ICD-10-CM

## 2022-06-27 DIAGNOSIS — R07.9 CHEST PAIN: Primary | ICD-10-CM

## 2022-06-27 DIAGNOSIS — R07.9 CHEST PAIN, UNSPECIFIED TYPE: ICD-10-CM

## 2022-06-27 DIAGNOSIS — R07.89 DISCOMFORT OF CHEST WALL: ICD-10-CM

## 2022-06-27 LAB
ALBUMIN SERPL BCP-MCNC: 4 G/DL (ref 3.5–5.2)
ALP SERPL-CCNC: 100 U/L (ref 55–135)
ALT SERPL W/O P-5'-P-CCNC: 24 U/L (ref 10–44)
ANION GAP SERPL CALC-SCNC: 11 MMOL/L (ref 8–16)
AST SERPL-CCNC: 21 U/L (ref 10–40)
BASOPHILS # BLD AUTO: 0.09 K/UL (ref 0–0.2)
BASOPHILS NFR BLD: 0.8 % (ref 0–1.9)
BILIRUB SERPL-MCNC: 0.3 MG/DL (ref 0.1–1)
BUN SERPL-MCNC: 18 MG/DL (ref 6–20)
CALCIUM SERPL-MCNC: 9.4 MG/DL (ref 8.7–10.5)
CHLORIDE SERPL-SCNC: 108 MMOL/L (ref 95–110)
CK SERPL-CCNC: 105 U/L (ref 20–180)
CO2 SERPL-SCNC: 22 MMOL/L (ref 23–29)
CREAT SERPL-MCNC: 0.8 MG/DL (ref 0.5–1.4)
DIFFERENTIAL METHOD: ABNORMAL
EOSINOPHIL # BLD AUTO: 4.8 K/UL (ref 0–0.5)
EOSINOPHIL NFR BLD: 41.9 % (ref 0–8)
ERYTHROCYTE [DISTWIDTH] IN BLOOD BY AUTOMATED COUNT: 12.5 % (ref 11.5–14.5)
EST. GFR  (AFRICAN AMERICAN): >60 ML/MIN/1.73 M^2
EST. GFR  (NON AFRICAN AMERICAN): >60 ML/MIN/1.73 M^2
GLUCOSE SERPL-MCNC: 82 MG/DL (ref 70–110)
HCT VFR BLD AUTO: 39.3 % (ref 37–48.5)
HGB BLD-MCNC: 13.4 G/DL (ref 12–16)
IMM GRANULOCYTES # BLD AUTO: 0.03 K/UL (ref 0–0.04)
IMM GRANULOCYTES NFR BLD AUTO: 0.3 % (ref 0–0.5)
LYMPHOCYTES # BLD AUTO: 2.3 K/UL (ref 1–4.8)
LYMPHOCYTES NFR BLD: 20.1 % (ref 18–48)
MCH RBC QN AUTO: 30.6 PG (ref 27–31)
MCHC RBC AUTO-ENTMCNC: 34.1 G/DL (ref 32–36)
MCV RBC AUTO: 90 FL (ref 82–98)
MONOCYTES # BLD AUTO: 0.7 K/UL (ref 0.3–1)
MONOCYTES NFR BLD: 6.1 % (ref 4–15)
NEUTROPHILS # BLD AUTO: 3.5 K/UL (ref 1.8–7.7)
NEUTROPHILS NFR BLD: 30.8 % (ref 38–73)
NRBC BLD-RTO: 0 /100 WBC
PLATELET # BLD AUTO: 297 K/UL (ref 150–450)
PMV BLD AUTO: 9.8 FL (ref 9.2–12.9)
POTASSIUM SERPL-SCNC: 4.1 MMOL/L (ref 3.5–5.1)
PROT SERPL-MCNC: 8.3 G/DL (ref 6–8.4)
RBC # BLD AUTO: 4.38 M/UL (ref 4–5.4)
SODIUM SERPL-SCNC: 141 MMOL/L (ref 136–145)
TROPONIN I SERPL DL<=0.01 NG/ML-MCNC: <0.006 NG/ML (ref 0–0.03)
WBC # BLD AUTO: 11.39 K/UL (ref 3.9–12.7)

## 2022-06-27 PROCEDURE — 99285 EMERGENCY DEPT VISIT HI MDM: CPT | Mod: 25

## 2022-06-27 PROCEDURE — 3079F PR MOST RECENT DIASTOLIC BLOOD PRESSURE 80-89 MM HG: ICD-10-PCS | Mod: CPTII,S$GLB,, | Performed by: FAMILY MEDICINE

## 2022-06-27 PROCEDURE — 85025 COMPLETE CBC W/AUTO DIFF WBC: CPT | Performed by: NURSE PRACTITIONER

## 2022-06-27 PROCEDURE — 1159F PR MEDICATION LIST DOCUMENTED IN MEDICAL RECORD: ICD-10-PCS | Mod: CPTII,S$GLB,, | Performed by: FAMILY MEDICINE

## 2022-06-27 PROCEDURE — 3008F PR BODY MASS INDEX (BMI) DOCUMENTED: ICD-10-PCS | Mod: CPTII,S$GLB,, | Performed by: FAMILY MEDICINE

## 2022-06-27 PROCEDURE — 80053 COMPREHEN METABOLIC PANEL: CPT | Performed by: NURSE PRACTITIONER

## 2022-06-27 PROCEDURE — 93005 ELECTROCARDIOGRAM TRACING: CPT

## 2022-06-27 PROCEDURE — 84484 ASSAY OF TROPONIN QUANT: CPT | Performed by: NURSE PRACTITIONER

## 2022-06-27 PROCEDURE — 93010 ELECTROCARDIOGRAM REPORT: CPT | Mod: ,,, | Performed by: INTERNAL MEDICINE

## 2022-06-27 PROCEDURE — 93010 EKG 12-LEAD: ICD-10-PCS | Mod: ,,, | Performed by: INTERNAL MEDICINE

## 2022-06-27 PROCEDURE — 99214 OFFICE O/P EST MOD 30 MIN: CPT | Mod: S$GLB,,, | Performed by: FAMILY MEDICINE

## 2022-06-27 PROCEDURE — 99214 PR OFFICE/OUTPT VISIT, EST, LEVL IV, 30-39 MIN: ICD-10-PCS | Mod: S$GLB,,, | Performed by: FAMILY MEDICINE

## 2022-06-27 PROCEDURE — 3079F DIAST BP 80-89 MM HG: CPT | Mod: CPTII,S$GLB,, | Performed by: FAMILY MEDICINE

## 2022-06-27 PROCEDURE — 3075F SYST BP GE 130 - 139MM HG: CPT | Mod: CPTII,S$GLB,, | Performed by: FAMILY MEDICINE

## 2022-06-27 PROCEDURE — 1159F MED LIST DOCD IN RCRD: CPT | Mod: CPTII,S$GLB,, | Performed by: FAMILY MEDICINE

## 2022-06-27 PROCEDURE — 3008F BODY MASS INDEX DOCD: CPT | Mod: CPTII,S$GLB,, | Performed by: FAMILY MEDICINE

## 2022-06-27 PROCEDURE — 82550 ASSAY OF CK (CPK): CPT | Performed by: NURSE PRACTITIONER

## 2022-06-27 PROCEDURE — 99999 PR PBB SHADOW E&M-EST. PATIENT-LVL V: ICD-10-PCS | Mod: PBBFAC,,, | Performed by: FAMILY MEDICINE

## 2022-06-27 PROCEDURE — 3075F PR MOST RECENT SYSTOLIC BLOOD PRESS GE 130-139MM HG: ICD-10-PCS | Mod: CPTII,S$GLB,, | Performed by: FAMILY MEDICINE

## 2022-06-27 PROCEDURE — 99999 PR PBB SHADOW E&M-EST. PATIENT-LVL V: CPT | Mod: PBBFAC,,, | Performed by: FAMILY MEDICINE

## 2022-06-27 RX ORDER — ASPIRIN 325 MG
325 TABLET ORAL
Status: DISCONTINUED | OUTPATIENT
Start: 2022-06-27 | End: 2022-06-27 | Stop reason: HOSPADM

## 2022-06-27 NOTE — FIRST PROVIDER EVALUATION
Medical screening exam completed.  I have conducted a focused provider triage encounter, findings are as follows:    Brief history of present illness:  56-year-old female with complaint of left-sided chest pain off and on for the past couple weeks.  Patient sent to ER by PCP for evaluation.    There were no vitals filed for this visit.    Pertinent physical exam:BBSCTA, no chest wall tenderness

## 2022-06-28 ENCOUNTER — HOSPITAL ENCOUNTER (OUTPATIENT)
Dept: PREADMISSION TESTING | Facility: HOSPITAL | Age: 57
Discharge: HOME | End: 2022-06-28
Attending: FAMILY MEDICINE
Payer: COMMERCIAL

## 2022-06-28 DIAGNOSIS — Z12.11 SCREENING FOR COLON CANCER: ICD-10-CM

## 2022-06-28 NOTE — ED PROVIDER NOTES
SCRIBE #1 NOTE: I, Sofy Russell, am scribing for, and in the presence of, Ainsley Loza MD. I have scribed the entire note.      History      Chief Complaint   Patient presents with    Chest Pain     Intermittent CP x 2 weeks.  Pain is achy and dull.  Pt saw PCP today and was sent here.  Pt denies cardiac history.       Review of patient's allergies indicates:   Allergen Reactions    Adhesive Rash    Latex, natural rubber         HPI   HPI    6/27/2022, 7:02 PM   History obtained from the patient      History of Present Illness: Reta Navas is a 56 y.o. female patient with a PMHx of GERD, HLD, and HTN who presents to the Emergency Department for intermittent, L-sided CP which onset gradually 2 weeks PTA. Per the pt, she recently started a water aerobics class and believes she may have strained a muscle. She describes that pain as an aching, dull, numb pain, but states that a couple of days ago her pain was sharp. Today, the pt was seen by Dr. Cardoso, her PCP, for this CP and was referred to the ED for an evaluation. Symptoms are intermittent and moderate in severity. She denies the pain being worse with pressing on her chest or moving her L shoulder. No mitigating or exacerbating factors reported. No associated sxs reported. Patient denies any fever, chills, N/V/D, diaphoresis, weakness, dizziness, and all other sxs at this time. Pt denies a personal cardiac history, but states that she has a family history of CAD. No further complaints or concerns at this time.         Arrival mode: Personal vehicle     PCP: Mendoza Cardoso MD       Past Medical History:  Past Medical History:   Diagnosis Date    Abnormal Pap smear     Abnormal Pap smear of cervix     Chronic rhinitis     Depression     GERD (gastroesophageal reflux disease)     Hyperlipidemia     Hypertension     Overactive bladder     Plantar fasciitis, bilateral     Psoriasis        Past Surgical History:  Past Surgical History:   Procedure  Laterality Date    CERVICAL BIOPSY  W/ LOOP ELECTRODE EXCISION      COLONOSCOPY      GALLBLADDER SURGERY N/A 10/11/2018    hx colposcopy      HYSTERECTOMY      supracervical hyst/LSO for fibroids    OOPHORECTOMY      LSO    ROBOT-ASSISTED CHOLECYSTECTOMY USING DA WILVER XI N/A 10/18/2018    Procedure: XI ROBOTIC CHOLECYSTECTOMY;  Surgeon: Robel Ferrer MD;  Location: [facility];  Service: General;  Laterality: N/A;    TUBAL LIGATION           Family History:  Family History   Problem Relation Age of Onset    Leukemia Brother     Stroke Mother     No Known Problems Father     Colon cancer Paternal Grandfather     Colon cancer Paternal Uncle     Ovarian cancer Maternal Aunt     Stroke Maternal Aunt     Breast cancer Paternal Grandmother        Social History:  Social History     Tobacco Use    Smoking status: Former Smoker     Packs/day: 1.00     Years: 39.00     Pack years: 39.00     Types: Vaping with nicotine     Quit date: 2013     Years since quittin.4    Smokeless tobacco: Never Used   Substance and Sexual Activity    Alcohol use: No     Alcohol/week: 0.0 standard drinks    Drug use: Never    Sexual activity: Yes     Partners: Male     Birth control/protection: None, Surgical, Post-menopausal       ROS   Review of Systems   Constitutional: Negative for chills, diaphoresis and fever.   HENT: Negative for sore throat.    Respiratory: Negative for shortness of breath.    Cardiovascular: Positive for chest pain (intermittent, L-sided).   Gastrointestinal: Negative for diarrhea, nausea and vomiting.   Genitourinary: Negative for dysuria.   Musculoskeletal: Negative for back pain.   Skin: Negative for rash.   Neurological: Negative for dizziness and weakness.   Hematological: Does not bruise/bleed easily.   All other systems reviewed and are negative.    Physical Exam      Initial Vitals [22 1647]   BP Pulse Resp Temp SpO2   131/85 78 18 98.3 °F (36.8 °C) 97 %      MAP       --           Physical Exam  Nursing Notes and Vital Signs Reviewed.  Constitutional: Patient is in no acute distress. Well-developed and well-nourished.  Head: Atraumatic. Normocephalic.  Eyes: PERRL. EOM intact. Conjunctivae are not pale. No scleral icterus.  ENT: Mucous membranes are moist. Oropharynx is clear and symmetric.    Neck: Supple. Full ROM. No lymphadenopathy.  Cardiovascular: Regular rate. Regular rhythm. No murmurs, rubs, or gallops. Distal pulses are 2+ and symmetric.  Pulmonary/Chest: No respiratory distress. Clear to auscultation bilaterally. No wheezing or rales.  Abdominal: Soft and non-distended.  There is no tenderness.  No rebound, guarding, or rigidity.   Musculoskeletal: Moves all extremities. No obvious deformities. No edema.  Skin: Warm and dry.  Neurological:  Alert, awake, and appropriate.  Normal speech.  No acute focal neurological deficits are appreciated.  Psychiatric: Normal affect. Good eye contact. Appropriate in content.    ED Course    Procedures  ED Vital Signs:  Vitals:    06/27/22 1647 06/27/22 1924   BP: 131/85 135/73   Pulse: 78 74   Resp: 18 18   Temp: 98.3 °F (36.8 °C) 98.4 °F (36.9 °C)   TempSrc: Oral Oral   SpO2: 97%    Weight: 69.9 kg (154 lb 1.6 oz)        Abnormal Lab Results:  Labs Reviewed   CBC W/ AUTO DIFFERENTIAL - Abnormal; Notable for the following components:       Result Value    Eos # 4.8 (*)     Gran % 30.8 (*)     Eosinophil % 41.9 (*)     All other components within normal limits   COMPREHENSIVE METABOLIC PANEL - Abnormal; Notable for the following components:    CO2 22 (*)     All other components within normal limits   CK   TROPONIN I        All Lab Results:  Results for orders placed or performed during the hospital encounter of 06/27/22   CBC auto differential   Result Value Ref Range    WBC 11.39 3.90 - 12.70 K/uL    RBC 4.38 4.00 - 5.40 M/uL    Hemoglobin 13.4 12.0 - 16.0 g/dL    Hematocrit 39.3 37.0 - 48.5 %    MCV 90 82 - 98 fL    MCH 30.6 27.0 - 31.0 pg     MCHC 34.1 32.0 - 36.0 g/dL    RDW 12.5 11.5 - 14.5 %    Platelets 297 150 - 450 K/uL    MPV 9.8 9.2 - 12.9 fL    Immature Granulocytes 0.3 0.0 - 0.5 %    Gran # (ANC) 3.5 1.8 - 7.7 K/uL    Immature Grans (Abs) 0.03 0.00 - 0.04 K/uL    Lymph # 2.3 1.0 - 4.8 K/uL    Mono # 0.7 0.3 - 1.0 K/uL    Eos # 4.8 (H) 0.0 - 0.5 K/uL    Baso # 0.09 0.00 - 0.20 K/uL    nRBC 0 0 /100 WBC    Gran % 30.8 (L) 38.0 - 73.0 %    Lymph % 20.1 18.0 - 48.0 %    Mono % 6.1 4.0 - 15.0 %    Eosinophil % 41.9 (H) 0.0 - 8.0 %    Basophil % 0.8 0.0 - 1.9 %    Differential Method Automated    Comprehensive metabolic panel   Result Value Ref Range    Sodium 141 136 - 145 mmol/L    Potassium 4.1 3.5 - 5.1 mmol/L    Chloride 108 95 - 110 mmol/L    CO2 22 (L) 23 - 29 mmol/L    Glucose 82 70 - 110 mg/dL    BUN 18 6 - 20 mg/dL    Creatinine 0.8 0.5 - 1.4 mg/dL    Calcium 9.4 8.7 - 10.5 mg/dL    Total Protein 8.3 6.0 - 8.4 g/dL    Albumin 4.0 3.5 - 5.2 g/dL    Total Bilirubin 0.3 0.1 - 1.0 mg/dL    Alkaline Phosphatase 100 55 - 135 U/L    AST 21 10 - 40 U/L    ALT 24 10 - 44 U/L    Anion Gap 11 8 - 16 mmol/L    eGFR if African American >60 >60 mL/min/1.73 m^2    eGFR if non African American >60 >60 mL/min/1.73 m^2   CPK   Result Value Ref Range     20 - 180 U/L   Troponin I   Result Value Ref Range    Troponin I <0.006 0.000 - 0.026 ng/mL         Imaging Results:  Imaging Results          X-Ray Chest 1 View (Final result)  Result time 06/27/22 17:28:19    Final result by Nikolas Aguilera MD (06/27/22 17:28:19)                 Impression:      No acute abnormality.      Electronically signed by: Nikolas Aguilera  Date:    06/27/2022  Time:    17:28             Narrative:    EXAMINATION:  XR CHEST 1 VIEW    CLINICAL HISTORY:  Chest pain, unspecified    TECHNIQUE:  Single frontal view of the chest was performed.    COMPARISON:  Multiple priors.    FINDINGS:  The lungs are clear, with normal appearance of pulmonary vasculature and no pleural effusion  or pneumothorax.    The cardiac silhouette is normal in size. The hilar and mediastinal contours are unremarkable.    Bones are intact.                               The EKG was ordered, reviewed, and independently interpreted by the ED provider.  Interpretation time: 16:44  Rate: 78 BPM  Rhythm: normal sinus rhythm  Interpretation: No acute ST changes. No STEMI.         The Emergency Provider reviewed the vital signs and test results, which are outlined above.    ED Discussion     7:04 PM: Reassessed pt at this time. Discussed with pt all pertinent ED information and results. Discussed pt dx and plan of tx. Gave pt all f/u and return to the ED instructions. All questions and concerns were addressed at this time. Pt expresses understanding of information and instructions, and is comfortable with plan to discharge. Pt is stable for discharge.    I discussed with patient and/or family/caretaker that evaluation in the ED does not suggest any emergent or life threatening medical conditions requiring immediate intervention beyond what was provided in the ED, and I believe patient is safe for discharge.  Regardless, an unremarkable evaluation in the ED does not preclude the development or presence of a serious of life threatening condition. As such, patient was instructed to return immediately for any worsening or change in current symptoms.           ED Medication(s):  Medications   aspirin tablet 325 mg (325 mg Oral Not Given 6/27/22 1700)        Follow-up Information     Mendoza Cardoso MD. Schedule an appointment as soon as possible for a visit in 2 days.    Specialty: Family Medicine  Why: Return to the Emergency Room, If symptoms worsen  Contact information:  [address] [facility] [address]  [phone]                         Discharge Medication List as of 6/27/2022  7:04 PM            Medical Decision Making    Medical Decision Making:   Clinical Tests:   Lab Tests: Ordered and  Reviewed  Radiological Study: Ordered and Reviewed  Medical Tests: Ordered and Reviewed           Scribe Attestation:   Scribe #1: I performed the above scribed service and the documentation accurately describes the services I performed. I attest to the accuracy of the note.    Attending:   Physician Attestation Statement for Scribe #1: I, Ainsley Loza MD, personally performed the services described in this documentation, as scribed by Sofy Russell, in my presence, and it is both accurate and complete.          Clinical Impression       ICD-10-CM ICD-9-CM   1. Chest pain  R07.9 786.50       Disposition:   Disposition: Discharged  Condition: Stable         Ainsley Loza MD  06/27/22 2014

## 2022-07-13 ENCOUNTER — HOSPITAL ENCOUNTER (OUTPATIENT)
Dept: RADIOLOGY | Facility: HOSPITAL | Age: 57
Discharge: HOME | End: 2022-07-13
Attending: FAMILY MEDICINE
Payer: COMMERCIAL

## 2022-07-13 DIAGNOSIS — Z12.2 SCREENING FOR LUNG CANCER: ICD-10-CM

## 2022-07-13 PROCEDURE — 71271 CT THORAX LUNG CANCER SCR C-: CPT | Mod: TC

## 2022-07-13 PROCEDURE — 71271 CT CHEST LUNG SCREENING LOW DOSE: ICD-10-PCS | Mod: 26,,, | Performed by: RADIOLOGY

## 2022-07-13 PROCEDURE — 71271 CT THORAX LUNG CANCER SCR C-: CPT | Mod: 26,,, | Performed by: RADIOLOGY

## 2022-07-14 ENCOUNTER — OFFICE VISIT (OUTPATIENT)
Dept: INTERNAL MEDICINE | Facility: CLINIC | Age: 57
End: 2022-07-14
Payer: COMMERCIAL

## 2022-07-14 VITALS
OXYGEN SATURATION: 95 % | RESPIRATION RATE: 18 BRPM | HEART RATE: 86 BPM | DIASTOLIC BLOOD PRESSURE: 82 MMHG | TEMPERATURE: 98 F | WEIGHT: 154.56 LBS | BODY MASS INDEX: 30.18 KG/M2 | SYSTOLIC BLOOD PRESSURE: 118 MMHG

## 2022-07-14 DIAGNOSIS — N39.0 URINARY TRACT INFECTION WITHOUT HEMATURIA, SITE UNSPECIFIED: Primary | ICD-10-CM

## 2022-07-14 LAB
BACTERIA #/AREA URNS HPF: ABNORMAL /HPF
BILIRUB UR QL STRIP: NEGATIVE
CLARITY UR: CLEAR
COLOR UR: YELLOW
GLUCOSE UR QL STRIP: NEGATIVE
HGB UR QL STRIP: ABNORMAL
KETONES UR QL STRIP: NEGATIVE
LEUKOCYTE ESTERASE UR QL STRIP: ABNORMAL
MICROSCOPIC COMMENT: ABNORMAL
NITRITE UR QL STRIP: NEGATIVE
PH UR STRIP: 6 [PH] (ref 5–8)
PROT UR QL STRIP: NEGATIVE
RBC #/AREA URNS HPF: 6 /HPF (ref 0–4)
SP GR UR STRIP: 1.02 (ref 1–1.03)
SQUAMOUS #/AREA URNS HPF: 1 /HPF
URN SPEC COLLECT METH UR: ABNORMAL
UROBILINOGEN UR STRIP-ACNC: NEGATIVE EU/DL
WBC #/AREA URNS HPF: 13 /HPF (ref 0–5)

## 2022-07-14 PROCEDURE — 3074F SYST BP LT 130 MM HG: CPT | Mod: CPTII,S$GLB,, | Performed by: FAMILY MEDICINE

## 2022-07-14 PROCEDURE — 99214 OFFICE O/P EST MOD 30 MIN: CPT | Mod: S$GLB,,, | Performed by: FAMILY MEDICINE

## 2022-07-14 PROCEDURE — 81000 URINALYSIS NONAUTO W/SCOPE: CPT | Performed by: FAMILY MEDICINE

## 2022-07-14 PROCEDURE — 99999 PR PBB SHADOW E&M-EST. PATIENT-LVL IV: ICD-10-PCS | Mod: PBBFAC,,, | Performed by: FAMILY MEDICINE

## 2022-07-14 PROCEDURE — 3079F PR MOST RECENT DIASTOLIC BLOOD PRESSURE 80-89 MM HG: ICD-10-PCS | Mod: CPTII,S$GLB,, | Performed by: FAMILY MEDICINE

## 2022-07-14 PROCEDURE — 99214 PR OFFICE/OUTPT VISIT, EST, LEVL IV, 30-39 MIN: ICD-10-PCS | Mod: S$GLB,,, | Performed by: FAMILY MEDICINE

## 2022-07-14 PROCEDURE — 3079F DIAST BP 80-89 MM HG: CPT | Mod: CPTII,S$GLB,, | Performed by: FAMILY MEDICINE

## 2022-07-14 PROCEDURE — 87086 URINE CULTURE/COLONY COUNT: CPT | Performed by: FAMILY MEDICINE

## 2022-07-14 PROCEDURE — 99999 PR PBB SHADOW E&M-EST. PATIENT-LVL IV: CPT | Mod: PBBFAC,,, | Performed by: FAMILY MEDICINE

## 2022-07-14 PROCEDURE — 3074F PR MOST RECENT SYSTOLIC BLOOD PRESSURE < 130 MM HG: ICD-10-PCS | Mod: CPTII,S$GLB,, | Performed by: FAMILY MEDICINE

## 2022-07-14 PROCEDURE — 3008F PR BODY MASS INDEX (BMI) DOCUMENTED: ICD-10-PCS | Mod: CPTII,S$GLB,, | Performed by: FAMILY MEDICINE

## 2022-07-14 PROCEDURE — 3008F BODY MASS INDEX DOCD: CPT | Mod: CPTII,S$GLB,, | Performed by: FAMILY MEDICINE

## 2022-07-14 RX ORDER — PRAVASTATIN SODIUM 20 MG/1
20 TABLET ORAL DAILY
Qty: 90 TABLET | Refills: 1 | Status: SHIPPED | OUTPATIENT
Start: 2022-07-14 | End: 2022-10-17

## 2022-07-14 RX ORDER — CIPROFLOXACIN 500 MG/1
500 TABLET ORAL EVERY 12 HOURS
Qty: 10 TABLET | Refills: 0 | Status: SHIPPED | OUTPATIENT
Start: 2022-07-14 | End: 2022-11-22

## 2022-07-14 NOTE — PROGRESS NOTES
Subjective:       Patient ID: Reta Navas is a 56 y.o. female.    Chief Complaint: Urinary Tract Infection, Low-back Pain, and Urinary Frequency    HPI    Patient Active Problem List   Diagnosis    Obesity    Urinary, incontinence, stress female    Abnormal Pap smear    Lateral epicondylitis (tennis elbow)    Hypertension    Chest pain    Chronic rhinitis    Muscle twitching    MG (myasthenia gravis)    Atrophic vaginitis    Weakness of both lower extremities    Knee pain, right       Past Medical History:   Diagnosis Date    Abnormal Pap smear     Abnormal Pap smear of cervix     Chronic rhinitis     Depression     GERD (gastroesophageal reflux disease)     Hyperlipidemia     Hypertension     Overactive bladder     Plantar fasciitis, bilateral     Psoriasis        Past Surgical History:   Procedure Laterality Date    CERVICAL BIOPSY  W/ LOOP ELECTRODE EXCISION      COLONOSCOPY      GALLBLADDER SURGERY N/A 10/11/2018    hx colposcopy      HYSTERECTOMY      supracervical hyst/LSO for fibroids    OOPHORECTOMY      LSO    ROBOT-ASSISTED CHOLECYSTECTOMY USING DA WILVER XI N/A 10/18/2018    Procedure: XI ROBOTIC CHOLECYSTECTOMY;  Surgeon: Robel Ferrer MD;  Location: [facility];  Service: General;  Laterality: N/A;    TUBAL LIGATION         Family History   Problem Relation Age of Onset    Leukemia Brother     Stroke Mother     No Known Problems Father     Colon cancer Paternal Grandfather     Colon cancer Paternal Uncle     Ovarian cancer Maternal Aunt     Stroke Maternal Aunt     Breast cancer Paternal Grandmother        Social History     Tobacco Use   Smoking Status Former Smoker    Packs/day: 1.00    Years: 39.00    Pack years: 39.00    Types: Vaping with nicotine    Quit date: 2013    Years since quittin.4   Smokeless Tobacco Never Used       Wt Readings from Last 5 Encounters:   22 70.1 kg (154 lb 8.7 oz)   22 69.9 kg (154 lb 1.6 oz)   22 69.8 kg  (153 lb 14.1 oz)   06/01/22 67.6 kg (149 lb)   05/27/22 67.1 kg (147 lb 14.9 oz)       For further HPI details, see assessment and plan.    Review of Systems    Objective:      Vitals:    07/14/22 0926   BP: 118/82   Pulse: 86   Resp: 18   Temp: 97.8 °F (36.6 °C)       Physical Exam    Assessment:       1. Urinary tract infection without hematuria, site unspecified        Plan:   Urinary tract infection without hematuria, site unspecified  -     URINALYSIS  -     Urinalysis Microscopic  -     Urine culture    recent UTI  tx by gyn w/ macrobid - the culture from 6/1 indicated that the klebsiella was sensitive  Symptoms never improved  - burning  Is not an issue -  - increased frequency  - feels bloated  - some abdominal soreness  -no fever  -no chills  Urine is dark but goes light towards the end  No notable blood  Not cloudy   Not stinky  cipro    Did have some significant back pain  Hard to say what side it was on  It has happened a few times - 1x/month for last 3 months.      Recent visit - we sent her to the ER out of concern w chest pain  No concerning findings that day that warranted keeping her admitted - discharged  Since that time we have run a screening CT scan of her chest.  No concerning finding in regards to lung cancer - however there was a comment about atherosclerosis including in the coronary arteries  She has had a stress test - 3 years ago - no active concern then  No further chest pain - although it is chronic - lifelong - and comes and goes.    Lab Results   Component Value Date    LDLCALC 108.4 05/24/2022     The 10-year ASCVD risk score (Jose Dterrence SULTANA Jr., et al., 2013) is: 3.2%    Values used to calculate the score:      Age: 56 years      Sex: Female      Is Non- : No      Diabetic: No      Tobacco smoker: No      Systolic Blood Pressure: 118 mmHg      Is BP treated: Yes      HDL Cholesterol: 41 mg/dL      Total Cholesterol: 182 mg/dL    FH of heart disease -  Tells me women  "in family die of heart attacks but in advanced age.  Quit smoking in 2013.    I think we should likely start her on a statin based off this radiology comment. Would appreciate cardiology input on this matter. She has tried cholesterol medication in the past - and had some muscle aches.    I feel it would be reasonable to try her on pravastatin - monitor for muscle aches  Lab work in 6 weeks to check LFT and CK . Encouraged continued vit d    And encourage discussion w/ cardiology for recommendation on continued use.    Chart indicated myasthenia gravis - she has no knowledge of this diagnosis - cites related to a twitching issue she had in the past.  Will need to delve into this further at f/u visit - may need neurolgoy consult - no muscle issues at present - monitor closely for statin myalgia.    In 2018 she saw neurology   Wanted to rule out MG   Never did the f/u MRI  Has had no further lip twitching or abnormal muscle issues.  Lab report from neurology on MG testing "   Result is normal"  I do not suspect has MG          This note was verbally dictated, please excuse any type errors.  "

## 2022-07-15 LAB — BACTERIA UR CULT: NORMAL

## 2022-07-18 ENCOUNTER — OFFICE VISIT (OUTPATIENT)
Dept: CARDIOLOGY | Facility: CLINIC | Age: 57
End: 2022-07-18
Payer: COMMERCIAL

## 2022-07-18 ENCOUNTER — TELEPHONE (OUTPATIENT)
Dept: CARDIOLOGY | Facility: HOSPITAL | Age: 57
End: 2022-07-18
Payer: COMMERCIAL

## 2022-07-18 ENCOUNTER — PATIENT MESSAGE (OUTPATIENT)
Dept: INTERNAL MEDICINE | Facility: CLINIC | Age: 57
End: 2022-07-18
Payer: COMMERCIAL

## 2022-07-18 VITALS
HEART RATE: 86 BPM | BODY MASS INDEX: 29.95 KG/M2 | HEIGHT: 60 IN | WEIGHT: 152.56 LBS | OXYGEN SATURATION: 96 % | DIASTOLIC BLOOD PRESSURE: 80 MMHG | SYSTOLIC BLOOD PRESSURE: 122 MMHG

## 2022-07-18 DIAGNOSIS — R07.89 OTHER CHEST PAIN: Primary | ICD-10-CM

## 2022-07-18 DIAGNOSIS — Z87.891 FORMER SMOKER: ICD-10-CM

## 2022-07-18 DIAGNOSIS — I10 PRIMARY HYPERTENSION: ICD-10-CM

## 2022-07-18 DIAGNOSIS — E78.49 OTHER HYPERLIPIDEMIA: ICD-10-CM

## 2022-07-18 DIAGNOSIS — K21.9 GASTROESOPHAGEAL REFLUX DISEASE, UNSPECIFIED WHETHER ESOPHAGITIS PRESENT: ICD-10-CM

## 2022-07-18 DIAGNOSIS — R00.2 PALPITATIONS: ICD-10-CM

## 2022-07-18 PROCEDURE — 3079F PR MOST RECENT DIASTOLIC BLOOD PRESSURE 80-89 MM HG: ICD-10-PCS | Mod: CPTII,S$GLB,, | Performed by: INTERNAL MEDICINE

## 2022-07-18 PROCEDURE — 3074F SYST BP LT 130 MM HG: CPT | Mod: CPTII,S$GLB,, | Performed by: INTERNAL MEDICINE

## 2022-07-18 PROCEDURE — 3074F PR MOST RECENT SYSTOLIC BLOOD PRESSURE < 130 MM HG: ICD-10-PCS | Mod: CPTII,S$GLB,, | Performed by: INTERNAL MEDICINE

## 2022-07-18 PROCEDURE — 3008F BODY MASS INDEX DOCD: CPT | Mod: CPTII,S$GLB,, | Performed by: INTERNAL MEDICINE

## 2022-07-18 PROCEDURE — 1159F MED LIST DOCD IN RCRD: CPT | Mod: CPTII,S$GLB,, | Performed by: INTERNAL MEDICINE

## 2022-07-18 PROCEDURE — 99999 PR PBB SHADOW E&M-EST. PATIENT-LVL V: ICD-10-PCS | Mod: PBBFAC,,, | Performed by: INTERNAL MEDICINE

## 2022-07-18 PROCEDURE — 3079F DIAST BP 80-89 MM HG: CPT | Mod: CPTII,S$GLB,, | Performed by: INTERNAL MEDICINE

## 2022-07-18 PROCEDURE — 3008F PR BODY MASS INDEX (BMI) DOCUMENTED: ICD-10-PCS | Mod: CPTII,S$GLB,, | Performed by: INTERNAL MEDICINE

## 2022-07-18 PROCEDURE — 99999 PR PBB SHADOW E&M-EST. PATIENT-LVL V: CPT | Mod: PBBFAC,,, | Performed by: INTERNAL MEDICINE

## 2022-07-18 PROCEDURE — 99214 PR OFFICE/OUTPT VISIT, EST, LEVL IV, 30-39 MIN: ICD-10-PCS | Mod: S$GLB,,, | Performed by: INTERNAL MEDICINE

## 2022-07-18 PROCEDURE — 1159F PR MEDICATION LIST DOCUMENTED IN MEDICAL RECORD: ICD-10-PCS | Mod: CPTII,S$GLB,, | Performed by: INTERNAL MEDICINE

## 2022-07-18 PROCEDURE — 99214 OFFICE O/P EST MOD 30 MIN: CPT | Mod: S$GLB,,, | Performed by: INTERNAL MEDICINE

## 2022-07-18 RX ORDER — NITROGLYCERIN 0.4 MG/1
0.4 TABLET SUBLINGUAL EVERY 5 MIN PRN
Qty: 30 TABLET | Refills: 0 | Status: SHIPPED | OUTPATIENT
Start: 2022-07-18 | End: 2023-07-18

## 2022-07-18 RX ORDER — ASPIRIN 81 MG/1
81 TABLET ORAL DAILY
Qty: 90 TABLET | Refills: 1 | Status: SHIPPED | OUTPATIENT
Start: 2022-07-18 | End: 2024-02-06

## 2022-07-18 NOTE — PROGRESS NOTES
Subjective:   Patient ID:  Reta Navas is a 56 y.o. female who presents for cardiac consult of No chief complaint on file.      HPI  The patient came in today for cardiac consult of No chief complaint on file.    7/18/22  Reta Navas is a 56 y.o. female pt with HTN, overweight, GERD, h/o tobacco abuse presents for CV eval of CP.     1/8/2020 - Saloni Pérez Visit  Ms. Navas is a 54 year old female patient whose current medical conditions include HTN and atypical chest pain who presents today for follow-up. Patient previously seen by Dr. Llamas and had Losartan added to med regimen due to elevated BP. Stress echo was also ordered. She returns today and states overall is doing well. Still has occasional bouts of atypical chest pain-numbness and sharp in nature. Generally occurs at rest. Not really bothersome. No exertional symptoms. No other complaints. No SOB. No lightheadedness, dizziness, palpitations, near syncope, or syncope. No s/s of CHF. BP stable and controlled. Patient tolerating current meds. Stress echo 12/31/19 showed normal EF, no WMA.   Results discussed with patient.   Patient presents for f/u. Doing clinically well. BP controlled. Patient tolerating meds. Stress echo results/reviewed discussed. Continue same meds/mgmt.    6/27/2022, 7:02 PM   History obtained from the patient               History of Present Illness: Reta Navas is a 56 y.o. female patient with a PMHx of GERD, HLD, and HTN who presents to the Emergency Department for intermittent, L-sided CP which onset gradually 2 weeks PTA. Per the pt, she recently started a water aerobics class and believes she may have strained a muscle. She describes that pain as an aching, dull, numb pain, but states that a couple of days ago her pain was sharp. Today, the pt was seen by Dr. Cardoso, her PCP, for this CP and was referred to the ED for an evaluation. Symptoms are intermittent and moderate in severity. She denies the pain being worse  with pressing on her chest or moving her L shoulder. No mitigating or exacerbating factors reported. No associated sxs reported. Patient denies any fever, chills, N/V/D, diaphoresis, weakness, dizziness, and all other sxs at this time. Pt denies a personal cardiac history, but states that she has a family history of CAD. No further complaints or concerns at this time.     7/18/22  Pt seen in CV clinic in 2020 by Saloni Pérez, recently had been to ER last month for CP, neg enzymes and ECG. She had two episodes of palpitations while at rest, lasted a few min at least. She does not have MG now had some muscle twitching. She had smoked for 39 years quit though.     Patient feels no sob, no leg swelling, no PND, no dizziness, no syncope, no CNS symptoms.    Patient has fairly good exercise tolerance.    Patient is compliant with medications.    FH - mother - had MI and CVA    Post Exercise Imaging:     Immediate post exercise images demonstrate left ventricular function augmenting. Right ventricular function augments. Left ventricular end systolic volume decreases.   The left ventricle is globally hyperdynamic and no exercise induced wall motion abnormalities were identified.    CONCLUSIONS     1 - Normal left ventricular systolic function (EF 60-65%).     2 - Normal left ventricular diastolic function.     3 - Normal right ventricular systolic function .     4 - The estimated PA systolic pressure is 20 mmHg.     5 - Concentric hypertrophy.     6 - No wall motion abnormalities.     7 - Mild tricuspid regurgitation.     No evidence of stress induced myocardial ischemia.     This document has been electronically    SIGNED BY: Byron Puente MD On: 12/31/2019 10:54      Past Medical History:   Diagnosis Date    Abnormal Pap smear     Abnormal Pap smear of cervix     Chronic rhinitis     Depression     GERD (gastroesophageal reflux disease)     Hyperlipidemia     Hypertension     Overactive bladder     Plantar  fasciitis, bilateral     Psoriasis        Past Surgical History:   Procedure Laterality Date    CERVICAL BIOPSY  W/ LOOP ELECTRODE EXCISION      COLONOSCOPY      GALLBLADDER SURGERY N/A 10/11/2018    hx colposcopy      HYSTERECTOMY      supracervical hyst/LSO for fibroids    OOPHORECTOMY      LSO    ROBOT-ASSISTED CHOLECYSTECTOMY USING DA WILVER XI N/A 10/18/2018    Procedure: XI ROBOTIC CHOLECYSTECTOMY;  Surgeon: Robel Ferrer MD;  Location: [facility];  Service: General;  Laterality: N/A;    TUBAL LIGATION         Social History     Tobacco Use    Smoking status: Former Smoker     Packs/day: 1.00     Years: 39.00     Pack years: 39.00     Types: Vaping with nicotine     Quit date: 2013     Years since quittin.5    Smokeless tobacco: Never Used   Substance Use Topics    Alcohol use: No     Alcohol/week: 0.0 standard drinks    Drug use: Never       Family History   Problem Relation Age of Onset    Leukemia Brother     Stroke Mother     No Known Problems Father     Colon cancer Paternal Grandfather     Colon cancer Paternal Uncle     Ovarian cancer Maternal Aunt     Stroke Maternal Aunt     Breast cancer Paternal Grandmother        Patient's Medications   New Prescriptions    ASPIRIN (ECOTRIN) 81 MG EC TABLET    Take 1 tablet (81 mg total) by mouth once daily.    NITROGLYCERIN (NITROSTAT) 0.4 MG SL TABLET    Place 1 tablet (0.4 mg total) under the tongue every 5 (five) minutes as needed for Chest pain.   Previous Medications    AMLODIPINE (NORVASC) 10 MG TABLET    TAKE 1 TABLET(10 MG) BY MOUTH EVERY DAY    AZELASTINE (ASTELIN) 137 MCG (0.1 %) NASAL SPRAY    1 spray (137 mcg total) by Nasal route 2 (two) times daily.    CIPROFLOXACIN HCL (CIPRO) 500 MG TABLET    Take 1 tablet (500 mg total) by mouth every 12 (twelve) hours.    CLOTRIMAZOLE (LOTRIMIN) 1 % CREAM    Apply topically 2 (two) times daily.    DESONIDE (DESOWEN) 0.05 % CREAM    APPLY EXTERNALLY TO THE AFFECTED AREA TWICE DAILY     FEXOFENADINE (ALLEGRA) 180 MG TABLET    Take 180 mg by mouth once daily.    FLUTICASONE PROPIONATE (FLONASE) 50 MCG/ACTUATION NASAL SPRAY    2 sprays (100 mcg total) by Each Nostril route once daily.    IBUPROFEN (ADVIL,MOTRIN) 800 MG TABLET    TAKE 1 TABLET(800 MG) BY MOUTH THREE TIMES DAILY    LINACLOTIDE (LINZESS) 145 MCG CAP CAPSULE    Take 1 capsule (145 mcg total) by mouth before breakfast.    MONTELUKAST (SINGULAIR) 10 MG TABLET    Take 1 tablet (10 mg total) by mouth every evening.    MULTIVITAMIN/IRON/FOLIC ACID (CENTRUM ORAL)    Take 1 tablet by mouth once daily.    OMEPRAZOLE (PRILOSEC) 20 MG CAPSULE    TAKE 1 CAPSULE(20 MG) BY MOUTH EVERY DAY    PRAVASTATIN (PRAVACHOL) 20 MG TABLET    Take 1 tablet (20 mg total) by mouth once daily.    TRIAMCINOLONE ACETONIDE 0.1% (KENALOG) 0.1 % CREAM    Apply topically 2 (two) times daily.    VITAMIN D (VITAMIN D3) 1000 UNITS TAB    Take 1,000 Units by mouth once daily. Take 2000 units daily.   Modified Medications    No medications on file   Discontinued Medications    No medications on file       Review of Systems   Constitutional: Negative.    HENT: Negative.    Eyes: Negative.    Respiratory: Negative.    Cardiovascular: Positive for chest pain and palpitations.   Gastrointestinal: Negative.    Genitourinary: Negative.    Musculoskeletal: Negative.    Skin: Negative.    Neurological: Negative.    Endo/Heme/Allergies: Negative.    Psychiatric/Behavioral: Negative.    All 12 systems otherwise negative.      Wt Readings from Last 3 Encounters:   07/18/22 69.2 kg (152 lb 8.9 oz)   07/14/22 70.1 kg (154 lb 8.7 oz)   06/27/22 69.9 kg (154 lb 1.6 oz)     Temp Readings from Last 3 Encounters:   07/14/22 97.8 °F (36.6 °C)   06/27/22 98.4 °F (36.9 °C) (Oral)   06/27/22 97 °F (36.1 °C) (Tympanic)     BP Readings from Last 3 Encounters:   07/18/22 122/80   07/14/22 118/82   06/27/22 135/73     Pulse Readings from Last 3 Encounters:   07/18/22 86   07/14/22 86   06/27/22 74        /80 (BP Location: Left arm, Patient Position: Sitting, BP Method: Medium (Manual))   Pulse 86   Ht 5' (1.524 m)   Wt 69.2 kg (152 lb 8.9 oz)   SpO2 96%   BMI 29.79 kg/m²     Objective:   Physical Exam  Vitals and nursing note reviewed.   Constitutional:       General: She is not in acute distress.     Appearance: She is well-developed. She is not diaphoretic.   HENT:      Head: Normocephalic and atraumatic.      Nose: Nose normal.   Eyes:      General: No scleral icterus.     Conjunctiva/sclera: Conjunctivae normal.   Neck:      Thyroid: No thyromegaly.      Vascular: No JVD.   Cardiovascular:      Rate and Rhythm: Normal rate and regular rhythm.      Heart sounds: S1 normal and S2 normal. No murmur heard.    No friction rub. No gallop. No S3 or S4 sounds.   Pulmonary:      Effort: Pulmonary effort is normal. No respiratory distress.      Breath sounds: Normal breath sounds. No stridor. No wheezing or rales.   Chest:      Chest wall: No tenderness.   Abdominal:      General: Bowel sounds are normal. There is no distension.      Palpations: Abdomen is soft. There is no mass.      Tenderness: There is no abdominal tenderness. There is no rebound.   Genitourinary:     Comments: Deferred  Musculoskeletal:         General: No tenderness or deformity. Normal range of motion.      Cervical back: Normal range of motion and neck supple.   Lymphadenopathy:      Cervical: No cervical adenopathy.   Skin:     General: Skin is warm and dry.      Coloration: Skin is not pale.      Findings: No erythema or rash.   Neurological:      Mental Status: She is alert and oriented to person, place, and time.      Motor: No abnormal muscle tone.      Coordination: Coordination normal.   Psychiatric:         Behavior: Behavior normal.         Thought Content: Thought content normal.         Judgment: Judgment normal.         Lab Results   Component Value Date     06/27/2022    K 4.1 06/27/2022     06/27/2022     CO2 22 (L) 06/27/2022    BUN 18 06/27/2022    CREATININE 0.8 06/27/2022    GLU 82 06/27/2022    HGBA1C 5.5 09/30/2020    AST 21 06/27/2022    ALT 24 06/27/2022    ALBUMIN 4.0 06/27/2022    PROT 8.3 06/27/2022    BILITOT 0.3 06/27/2022    WBC 11.39 06/27/2022    HGB 13.4 06/27/2022    HCT 39.3 06/27/2022    MCV 90 06/27/2022     06/27/2022    TSH 2.538 05/24/2022    CHOL 182 05/24/2022    HDL 41 05/24/2022    LDLCALC 108.4 05/24/2022    TRIG 163 (H) 05/24/2022    BNP 14 06/07/2019     Assessment:      1. Other chest pain    2. Primary hypertension    3. Gastroesophageal reflux disease, unspecified whether esophagitis present    4. Palpitations    5. Other hyperlipidemia    6. Former smoker        Plan:   1. Chest pain - recent ER eval r/o ACS; with palpitations  - coronary calcif on recent Chest  - order nuclear stress test and 2d ECHO  - order 14 day Bardy   - start aspirin 81 mg  - give PRN NTG     2. HTN  - titrate meds    3. GERD  - cont PPI    4. Overweight  - cont low stefanie diet     5. HLD  - recently started statin   - taking fish oils    6. Former smoker  - has quit     Thank you for allowing me to participate in this patient's care. Please do not hesitate to contact me with any questions or concerns. Consult note has been forwarded to the referral physician.

## 2022-07-19 NOTE — TELEPHONE ENCOUNTER
No new care gaps identified.  [facility] Embedded Care Gaps. Reference number: [id number]. 7/19/2022   2:19:56 PM CDT

## 2022-07-20 ENCOUNTER — TELEPHONE (OUTPATIENT)
Dept: CARDIOLOGY | Facility: CLINIC | Age: 57
End: 2022-07-20
Payer: COMMERCIAL

## 2022-07-20 RX ORDER — OMEPRAZOLE 40 MG/1
40 CAPSULE, DELAYED RELEASE ORAL DAILY
Qty: 90 CAPSULE | Refills: 0 | Status: SHIPPED | OUTPATIENT
Start: 2022-07-20 | End: 2023-01-24

## 2022-07-20 NOTE — TELEPHONE ENCOUNTER
----- Message from Brook Loera sent at 7/20/2022  1:10 PM CDT -----  Contact: Patient  Patient called to consult with nurse or staff regarding her test on 8/15. She states she would like to scheduled this sooner if possible and would like a call back. Patient can be reached at [phone]. Thanks/MR

## 2022-07-20 NOTE — TELEPHONE ENCOUNTER
Pt requested for test to be done sooner. Told pt she can get test done sooner but they wont be on the same day or in the morning. Because of availability. Pt stated she will call the [place] location to get test done sooner on one day in the morning.

## 2022-07-23 DIAGNOSIS — I10 HYPERTENSION, UNSPECIFIED TYPE: ICD-10-CM

## 2022-07-23 NOTE — TELEPHONE ENCOUNTER
No new care gaps identified.  [facility] Embedded Care Gaps. Reference number: [id number]. 7/23/2022   5:42:59 AM CDT

## 2022-07-24 RX ORDER — AMLODIPINE BESYLATE 10 MG/1
TABLET ORAL
Qty: 90 TABLET | Refills: 3 | Status: SHIPPED | OUTPATIENT
Start: 2022-07-24 | End: 2023-04-17 | Stop reason: SDUPTHER

## 2022-07-25 NOTE — TELEPHONE ENCOUNTER
Refill Decision Note   Reta Navas  is requesting a refill authorization.  Brief Assessment and Rationale for Refill:  Approve     Medication Therapy Plan:       Medication Reconciliation Completed: No   Comments:     No Care Gaps recommended.     Note composed:7:10 PM 07/24/2022

## 2022-07-28 ENCOUNTER — HOSPITAL ENCOUNTER (OUTPATIENT)
Dept: CARDIOLOGY | Facility: HOSPITAL | Age: 57
Discharge: HOME | End: 2022-07-28
Attending: INTERNAL MEDICINE
Payer: COMMERCIAL

## 2022-07-28 DIAGNOSIS — I10 PRIMARY HYPERTENSION: ICD-10-CM

## 2022-07-28 DIAGNOSIS — K21.9 GASTROESOPHAGEAL REFLUX DISEASE, UNSPECIFIED WHETHER ESOPHAGITIS PRESENT: ICD-10-CM

## 2022-07-28 DIAGNOSIS — R07.89 OTHER CHEST PAIN: ICD-10-CM

## 2022-07-28 DIAGNOSIS — R00.2 PALPITATIONS: ICD-10-CM

## 2022-07-28 PROCEDURE — 93246 EXT ECG>7D<15D RECORDING: CPT | Mod: PO

## 2022-07-28 PROCEDURE — 93248 CV CARDIAC MONITOR - 3-15 DAY ADULT (CUPID ONLY): ICD-10-PCS | Mod: ,,, | Performed by: INTERNAL MEDICINE

## 2022-07-28 PROCEDURE — 93248 EXT ECG>7D<15D REV&INTERPJ: CPT | Mod: ,,, | Performed by: INTERNAL MEDICINE

## 2022-08-15 ENCOUNTER — HOSPITAL ENCOUNTER (OUTPATIENT)
Dept: RADIOLOGY | Facility: HOSPITAL | Age: 57
Discharge: HOME | End: 2022-08-15
Attending: INTERNAL MEDICINE
Payer: COMMERCIAL

## 2022-08-15 ENCOUNTER — HOSPITAL ENCOUNTER (OUTPATIENT)
Dept: CARDIOLOGY | Facility: HOSPITAL | Age: 57
Discharge: HOME | End: 2022-08-15
Attending: INTERNAL MEDICINE
Payer: COMMERCIAL

## 2022-08-15 VITALS
SYSTOLIC BLOOD PRESSURE: 122 MMHG | WEIGHT: 152 LBS | BODY MASS INDEX: 29.84 KG/M2 | DIASTOLIC BLOOD PRESSURE: 80 MMHG | HEIGHT: 60 IN

## 2022-08-15 DIAGNOSIS — I10 PRIMARY HYPERTENSION: ICD-10-CM

## 2022-08-15 DIAGNOSIS — K21.9 GASTROESOPHAGEAL REFLUX DISEASE, UNSPECIFIED WHETHER ESOPHAGITIS PRESENT: ICD-10-CM

## 2022-08-15 DIAGNOSIS — R00.2 PALPITATIONS: ICD-10-CM

## 2022-08-15 DIAGNOSIS — R07.89 OTHER CHEST PAIN: ICD-10-CM

## 2022-08-15 LAB
AORTIC ROOT ANNULUS: 2.88 CM
ASCENDING AORTA: 2.87 CM
AV INDEX (PROSTH): 1.01
AV MEAN GRADIENT: 2 MMHG
AV PEAK GRADIENT: 4 MMHG
AV VALVE AREA: 3.11 CM2
AV VELOCITY RATIO: 0.94
BSA FOR ECHO PROCEDURE: 1.71 M2
CV ECHO LV RWT: 0.57 CM
CV STRESS BASE HR: 89 BPM
DIASTOLIC BLOOD PRESSURE: 82 MMHG
DOP CALC AO PEAK VEL: 0.98 M/S
DOP CALC AO VTI: 18 CM
DOP CALC LVOT AREA: 3.1 CM2
DOP CALC LVOT DIAMETER: 1.98 CM
DOP CALC LVOT PEAK VEL: 0.92 M/S
DOP CALC LVOT STROKE VOLUME: 56.01 CM3
DOP CALC RVOT PEAK VEL: 0.75 M/S
DOP CALC RVOT VTI: 14.6 CM
DOP CALCLVOT PEAK VEL VTI: 18.2 CM
E WAVE DECELERATION TIME: 195.13 MSEC
E/A RATIO: 0.81
E/E' RATIO: 7.56 M/S
ECHO LV POSTERIOR WALL: 1.01 CM (ref 0.6–1.1)
EJECTION FRACTION: 60 %
FRACTIONAL SHORTENING: 36 % (ref 28–44)
INTERVENTRICULAR SEPTUM: 1 CM (ref 0.6–1.1)
IVC DIAMETER: 1.67 CM
IVRT: 108.47 MSEC
LA MAJOR: 4.05 CM
LA MINOR: 4.57 CM
LA WIDTH: 2.4 CM
LEFT ATRIUM SIZE: 2.98 CM
LEFT ATRIUM VOLUME INDEX MOD: 13.1 ML/M2
LEFT ATRIUM VOLUME INDEX: 15.7 ML/M2
LEFT ATRIUM VOLUME MOD: 21.73 CM3
LEFT ATRIUM VOLUME: 26.11 CM3
LEFT INTERNAL DIMENSION IN SYSTOLE: 2.28 CM (ref 2.1–4)
LEFT VENTRICLE DIASTOLIC VOLUME INDEX: 31.88 ML/M2
LEFT VENTRICLE DIASTOLIC VOLUME: 52.92 ML
LEFT VENTRICLE MASS INDEX: 64 G/M2
LEFT VENTRICLE SYSTOLIC VOLUME INDEX: 10.7 ML/M2
LEFT VENTRICLE SYSTOLIC VOLUME: 17.77 ML
LEFT VENTRICULAR INTERNAL DIMENSION IN DIASTOLE: 3.56 CM (ref 3.5–6)
LEFT VENTRICULAR MASS: 106.84 G
LV LATERAL E/E' RATIO: 7.56 M/S
LV SEPTAL E/E' RATIO: 7.56 M/S
LVOT MG: 1.8 MMHG
LVOT MV: 0.61 CM/S
MV PEAK A VEL: 0.84 M/S
MV PEAK E VEL: 0.68 M/S
MV STENOSIS PRESSURE HALF TIME: 56.59 MS
MV VALVE AREA P 1/2 METHOD: 3.89 CM2
NUC REST EJECTION FRACTION: 74
NUC STRESS EJECTION FRACTION: 71 %
OHS CV CPX 1 MINUTE RECOVERY HEART RATE: 136 BPM
OHS CV CPX 85 PERCENT MAX PREDICTED HEART RATE MALE: 132
OHS CV CPX ESTIMATED METS: 7
OHS CV CPX MAX PREDICTED HEART RATE: 156
OHS CV CPX PATIENT IS FEMALE: 1
OHS CV CPX PATIENT IS MALE: 0
OHS CV CPX PEAK DIASTOLIC BLOOD PRESSURE: 92 MMHG
OHS CV CPX PEAK HEAR RATE: 155 BPM
OHS CV CPX PEAK RATE PRESSURE PRODUCT: NORMAL
OHS CV CPX PEAK SYSTOLIC BLOOD PRESSURE: 177 MMHG
OHS CV CPX PERCENT MAX PREDICTED HEART RATE ACHIEVED: 99
OHS CV CPX RATE PRESSURE PRODUCT PRESENTING: NORMAL
PISA TR MAX VEL: 2.35 M/S
PULM VEIN S/D RATIO: 1.77
PV MEAN GRADIENT: 1.22 MMHG
PV PEAK D VEL: 0.36 M/S
PV PEAK S VEL: 0.64 M/S
PV PEAK VELOCITY: 0.96 CM/S
RA MAJOR: 4.28 CM
RA WIDTH: 2.81 CM
RIGHT VENTRICULAR END-DIASTOLIC DIMENSION: 2.63 CM
SINUS: 2.91 CM
STJ: 2.45 CM
STRESS ECHO POST EXERCISE DUR MIN: 6 MINUTES
SYSTOLIC BLOOD PRESSURE: 130 MMHG
TDI LATERAL: 0.09 M/S
TDI SEPTAL: 0.09 M/S
TDI: 0.09 M/S
TR MAX PG: 22 MMHG
TRICUSPID ANNULAR PLANE SYSTOLIC EXCURSION: 1.75 CM

## 2022-08-15 PROCEDURE — 93306 TTE W/DOPPLER COMPLETE: CPT | Mod: 26,,, | Performed by: INTERNAL MEDICINE

## 2022-08-15 PROCEDURE — 93018 CV STRESS TEST I&R ONLY: CPT | Mod: ,,, | Performed by: INTERNAL MEDICINE

## 2022-08-15 PROCEDURE — 78452 HT MUSCLE IMAGE SPECT MULT: CPT | Mod: 26,,, | Performed by: INTERNAL MEDICINE

## 2022-08-15 PROCEDURE — 93016 STRESS TEST WITH MYOCARDIAL PERFUSION (CUPID ONLY): ICD-10-PCS | Mod: ,,, | Performed by: INTERNAL MEDICINE

## 2022-08-15 PROCEDURE — 93018 STRESS TEST WITH MYOCARDIAL PERFUSION (CUPID ONLY): ICD-10-PCS | Mod: ,,, | Performed by: INTERNAL MEDICINE

## 2022-08-15 PROCEDURE — 93306 ECHO (CUPID ONLY): ICD-10-PCS | Mod: 26,,, | Performed by: INTERNAL MEDICINE

## 2022-08-15 PROCEDURE — 93017 CV STRESS TEST TRACING ONLY: CPT

## 2022-08-15 PROCEDURE — 78452 HT MUSCLE IMAGE SPECT MULT: CPT

## 2022-08-15 PROCEDURE — 93016 CV STRESS TEST SUPVJ ONLY: CPT | Mod: ,,, | Performed by: INTERNAL MEDICINE

## 2022-08-15 PROCEDURE — 93306 TTE W/DOPPLER COMPLETE: CPT

## 2022-08-15 PROCEDURE — 78452 STRESS TEST WITH MYOCARDIAL PERFUSION (CUPID ONLY): ICD-10-PCS | Mod: 26,,, | Performed by: INTERNAL MEDICINE

## 2022-08-18 LAB
OHS CV HOLTER SINUS AVERAGE HR: 81
OHS CV HOLTER SINUS MAX HR: 136
OHS CV HOLTER SINUS MIN HR: 51

## 2022-10-07 ENCOUNTER — OFFICE VISIT (OUTPATIENT)
Dept: OBSTETRICS AND GYNECOLOGY | Facility: CLINIC | Age: 57
End: 2022-10-07
Payer: COMMERCIAL

## 2022-10-07 VITALS
SYSTOLIC BLOOD PRESSURE: 116 MMHG | WEIGHT: 156.94 LBS | DIASTOLIC BLOOD PRESSURE: 74 MMHG | HEIGHT: 60 IN | BODY MASS INDEX: 30.81 KG/M2

## 2022-10-07 DIAGNOSIS — R35.0 FREQUENT URINATION: Primary | ICD-10-CM

## 2022-10-07 PROCEDURE — 3074F SYST BP LT 130 MM HG: CPT | Mod: CPTII,S$GLB,, | Performed by: NURSE PRACTITIONER

## 2022-10-07 PROCEDURE — 1159F PR MEDICATION LIST DOCUMENTED IN MEDICAL RECORD: ICD-10-PCS | Mod: CPTII,S$GLB,, | Performed by: NURSE PRACTITIONER

## 2022-10-07 PROCEDURE — 3008F BODY MASS INDEX DOCD: CPT | Mod: CPTII,S$GLB,, | Performed by: NURSE PRACTITIONER

## 2022-10-07 PROCEDURE — 99999 PR PBB SHADOW E&M-EST. PATIENT-LVL IV: ICD-10-PCS | Mod: PBBFAC,,, | Performed by: NURSE PRACTITIONER

## 2022-10-07 PROCEDURE — 99202 PR OFFICE/OUTPT VISIT, NEW, LEVL II, 15-29 MIN: ICD-10-PCS | Mod: S$GLB,,, | Performed by: NURSE PRACTITIONER

## 2022-10-07 PROCEDURE — 3078F PR MOST RECENT DIASTOLIC BLOOD PRESSURE < 80 MM HG: ICD-10-PCS | Mod: CPTII,S$GLB,, | Performed by: NURSE PRACTITIONER

## 2022-10-07 PROCEDURE — 3074F PR MOST RECENT SYSTOLIC BLOOD PRESSURE < 130 MM HG: ICD-10-PCS | Mod: CPTII,S$GLB,, | Performed by: NURSE PRACTITIONER

## 2022-10-07 PROCEDURE — 1159F MED LIST DOCD IN RCRD: CPT | Mod: CPTII,S$GLB,, | Performed by: NURSE PRACTITIONER

## 2022-10-07 PROCEDURE — 3078F DIAST BP <80 MM HG: CPT | Mod: CPTII,S$GLB,, | Performed by: NURSE PRACTITIONER

## 2022-10-07 PROCEDURE — 1160F PR REVIEW ALL MEDS BY PRESCRIBER/CLIN PHARMACIST DOCUMENTED: ICD-10-PCS | Mod: CPTII,S$GLB,, | Performed by: NURSE PRACTITIONER

## 2022-10-07 PROCEDURE — 3008F PR BODY MASS INDEX (BMI) DOCUMENTED: ICD-10-PCS | Mod: CPTII,S$GLB,, | Performed by: NURSE PRACTITIONER

## 2022-10-07 PROCEDURE — 99202 OFFICE O/P NEW SF 15 MIN: CPT | Mod: S$GLB,,, | Performed by: NURSE PRACTITIONER

## 2022-10-07 PROCEDURE — 1160F RVW MEDS BY RX/DR IN RCRD: CPT | Mod: CPTII,S$GLB,, | Performed by: NURSE PRACTITIONER

## 2022-10-07 PROCEDURE — 99999 PR PBB SHADOW E&M-EST. PATIENT-LVL IV: CPT | Mod: PBBFAC,,, | Performed by: NURSE PRACTITIONER

## 2022-10-07 NOTE — PROGRESS NOTES
Subjective:       Patient ID: Reta Navas is a 56 y.o. female.    Chief Complaint:  Urinary Tract Infection    No LMP recorded. Patient has had a hysterectomy.  History of Present Illness  Complains of urinary frequency and abdominal pain. Urine culture done by Dr. Cardoso was negative. Has been treated with multiple antibiotics and she continues to have uti symptoms . Desire referral to urology     OB History    Para Term  AB Living   5 4 4   1 4   SAB IAB Ectopic Multiple Live Births   1       4      # Outcome Date GA Lbr Cesar/2nd Weight Sex Delivery Anes PTL Lv   5 Term  38w0d  3.175 kg (7 lb)  Vag-Spont      4 Term  40w0d  3.175 kg (7 lb)  Vag-Spont      3 Term  40w0d  3.175 kg (7 lb)  Vag-Spont      2 Term  42w0d  4.309 kg (9 lb 8 oz)  Vag-Spont EPI     1 SAB                Review of Systems  Review of Systems        Objective:    Physical Exam      Assessment:     1. Frequent urination              Plan:   Reta was seen today for urinary tract infection.    Diagnoses and all orders for this visit:    Frequent urination  -     Ambulatory referral/consult to Urology; Future

## 2022-10-17 ENCOUNTER — OFFICE VISIT (OUTPATIENT)
Dept: CARDIOLOGY | Facility: CLINIC | Age: 57
End: 2022-10-17
Payer: COMMERCIAL

## 2022-10-17 VITALS
HEART RATE: 78 BPM | BODY MASS INDEX: 30.66 KG/M2 | OXYGEN SATURATION: 98 % | WEIGHT: 156.94 LBS | SYSTOLIC BLOOD PRESSURE: 120 MMHG | DIASTOLIC BLOOD PRESSURE: 84 MMHG

## 2022-10-17 DIAGNOSIS — I10 PRIMARY HYPERTENSION: Primary | ICD-10-CM

## 2022-10-17 DIAGNOSIS — R00.2 PALPITATIONS: ICD-10-CM

## 2022-10-17 DIAGNOSIS — Z87.891 FORMER SMOKER: ICD-10-CM

## 2022-10-17 DIAGNOSIS — K21.9 GASTROESOPHAGEAL REFLUX DISEASE, UNSPECIFIED WHETHER ESOPHAGITIS PRESENT: ICD-10-CM

## 2022-10-17 DIAGNOSIS — R07.89 OTHER CHEST PAIN: ICD-10-CM

## 2022-10-17 DIAGNOSIS — E78.49 OTHER HYPERLIPIDEMIA: ICD-10-CM

## 2022-10-17 PROCEDURE — 3074F SYST BP LT 130 MM HG: CPT | Mod: CPTII,S$GLB,, | Performed by: INTERNAL MEDICINE

## 2022-10-17 PROCEDURE — 99999 PR PBB SHADOW E&M-EST. PATIENT-LVL V: CPT | Mod: PBBFAC,,, | Performed by: INTERNAL MEDICINE

## 2022-10-17 PROCEDURE — 99214 OFFICE O/P EST MOD 30 MIN: CPT | Mod: S$GLB,,, | Performed by: INTERNAL MEDICINE

## 2022-10-17 PROCEDURE — 99999 PR PBB SHADOW E&M-EST. PATIENT-LVL V: ICD-10-PCS | Mod: PBBFAC,,, | Performed by: INTERNAL MEDICINE

## 2022-10-17 PROCEDURE — 3079F PR MOST RECENT DIASTOLIC BLOOD PRESSURE 80-89 MM HG: ICD-10-PCS | Mod: CPTII,S$GLB,, | Performed by: INTERNAL MEDICINE

## 2022-10-17 PROCEDURE — 3079F DIAST BP 80-89 MM HG: CPT | Mod: CPTII,S$GLB,, | Performed by: INTERNAL MEDICINE

## 2022-10-17 PROCEDURE — 3074F PR MOST RECENT SYSTOLIC BLOOD PRESSURE < 130 MM HG: ICD-10-PCS | Mod: CPTII,S$GLB,, | Performed by: INTERNAL MEDICINE

## 2022-10-17 PROCEDURE — 1160F RVW MEDS BY RX/DR IN RCRD: CPT | Mod: CPTII,S$GLB,, | Performed by: INTERNAL MEDICINE

## 2022-10-17 PROCEDURE — 1159F MED LIST DOCD IN RCRD: CPT | Mod: CPTII,S$GLB,, | Performed by: INTERNAL MEDICINE

## 2022-10-17 PROCEDURE — 1160F PR REVIEW ALL MEDS BY PRESCRIBER/CLIN PHARMACIST DOCUMENTED: ICD-10-PCS | Mod: CPTII,S$GLB,, | Performed by: INTERNAL MEDICINE

## 2022-10-17 PROCEDURE — 99214 PR OFFICE/OUTPT VISIT, EST, LEVL IV, 30-39 MIN: ICD-10-PCS | Mod: S$GLB,,, | Performed by: INTERNAL MEDICINE

## 2022-10-17 PROCEDURE — 1159F PR MEDICATION LIST DOCUMENTED IN MEDICAL RECORD: ICD-10-PCS | Mod: CPTII,S$GLB,, | Performed by: INTERNAL MEDICINE

## 2022-10-17 NOTE — PROGRESS NOTES
Subjective:   Patient ID:  Reta Navas is a 56 y.o. female who presents for cardiac consult of No chief complaint on file.      HPI  The patient came in today for cardiac consult of No chief complaint on file.    7/18/22  Reta Navas is a 56 y.o. female pt with HTN, overweight, GERD, h/o tobacco abuse presents for CV eval of CP.     1/8/2020 - Saloni Pérez Visit  Ms. Navas is a 54 year old female patient whose current medical conditions include HTN and atypical chest pain who presents today for follow-up. Patient previously seen by Dr. Llamas and had Losartan added to med regimen due to elevated BP. Stress echo was also ordered. She returns today and states overall is doing well. Still has occasional bouts of atypical chest pain-numbness and sharp in nature. Generally occurs at rest. Not really bothersome. No exertional symptoms. No other complaints. No SOB. No lightheadedness, dizziness, palpitations, near syncope, or syncope. No s/s of CHF. BP stable and controlled. Patient tolerating current meds. Stress echo 12/31/19 showed normal EF, no WMA.   Results discussed with patient.   Patient presents for f/u. Doing clinically well. BP controlled. Patient tolerating meds. Stress echo results/reviewed discussed. Continue same meds/mgmt.    6/27/2022, 7:02 PM   History obtained from the patient               History of Present Illness: Reta Navas is a 56 y.o. female patient with a PMHx of GERD, HLD, and HTN who presents to the Emergency Department for intermittent, L-sided CP which onset gradually 2 weeks PTA. Per the pt, she recently started a water aerobics class and believes she may have strained a muscle. She describes that pain as an aching, dull, numb pain, but states that a couple of days ago her pain was sharp. Today, the pt was seen by Dr. Cardoso, her PCP, for this CP and was referred to the ED for an evaluation. Symptoms are intermittent and moderate in severity. She denies the pain being worse  with pressing on her chest or moving her L shoulder. No mitigating or exacerbating factors reported. No associated sxs reported. Patient denies any fever, chills, N/V/D, diaphoresis, weakness, dizziness, and all other sxs at this time. Pt denies a personal cardiac history, but states that she has a family history of CAD. No further complaints or concerns at this time.     7/18/22  Pt seen in CV clinic in 2020 by Saloni Pérez, recently had been to ER last month for CP, neg enzymes and ECG. She had two episodes of palpitations while at rest, lasted a few min at least. She does not have MG now had some muscle twitching. She had smoked for 39 years quit though.     10/17/22  Nuclear stress test 8/2022 neg for ischemia. 2D ECHO 8/2022 with normal bi V function and valves. Bardy neg for arrhythmias. BP and HR well controlled today. BMI 30 - 156 lbs. She had to stop pravastatin due to leg pain had bladder issues. She has a lot of anxiety.     Patient feels no sob, no leg swelling, no PND, no dizziness, no syncope, no CNS symptoms.    Patient has fairly good exercise tolerance.    Patient is compliant with medications.    FH - mother - had MI and CVA    Bardy 7/2022  Conclusion    Heart rates varied between 51 and 136 BPM with an average of 81 BPM.  Total Analysis Time: 15 days and 0 hours  Predominant rhythm: NSR  PAC 0.01 %  Ectopic Atrial Rhythm (EAR)    Results for orders placed during the hospital encounter of 08/15/22    Echo    Interpretation Summary  · Concentric remodeling and normal systolic function.  · The estimated ejection fraction is 60%.  · Normal left ventricular diastolic function.  · Normal right ventricular size with normal right ventricular systolic function.      Results for orders placed during the hospital encounter of 08/15/22    Nuclear Stress - Cardiology Interpreted    Interpretation Summary    Normal myocardial perfusion scan. There is no evidence of myocardial ischemia or infarction.    The  gated perfusion images showed an ejection fraction of 74% at rest. The gated perfusion images showed an ejection fraction of 71% post stress.    There is normal wall motion at rest and post stress.    LV cavity size is normal at rest and normal at stress.    The EKG portion of this study is negative for ischemia.    The patient reported no chest pain during the stress test.    There were no arrhythmias during stress.    The exercise capacity was above average.      Post Exercise Imaging:     Immediate post exercise images demonstrate left ventricular function augmenting. Right ventricular function augments. Left ventricular end systolic volume decreases.   The left ventricle is globally hyperdynamic and no exercise induced wall motion abnormalities were identified.    CONCLUSIONS     1 - Normal left ventricular systolic function (EF 60-65%).     2 - Normal left ventricular diastolic function.     3 - Normal right ventricular systolic function .     4 - The estimated PA systolic pressure is 20 mmHg.     5 - Concentric hypertrophy.     6 - No wall motion abnormalities.     7 - Mild tricuspid regurgitation.     No evidence of stress induced myocardial ischemia.     This document has been electronically    SIGNED BY: Byron Puente MD On: 12/31/2019 10:54      Past Medical History:   Diagnosis Date    Abnormal Pap smear     Abnormal Pap smear of cervix     Chronic rhinitis     Depression     GERD (gastroesophageal reflux disease)     Hyperlipidemia     Hypertension     Overactive bladder     Plantar fasciitis, bilateral     Psoriasis        Past Surgical History:   Procedure Laterality Date    CERVICAL BIOPSY  W/ LOOP ELECTRODE EXCISION      COLONOSCOPY      GALLBLADDER SURGERY N/A 10/11/2018    hx colposcopy      HYSTERECTOMY      supracervical hyst/LSO for fibroids    OOPHORECTOMY      LSO    ROBOT-ASSISTED CHOLECYSTECTOMY USING DA WILVER XI N/A 10/18/2018    Procedure: XI ROBOTIC CHOLECYSTECTOMY;  Surgeon: Robel Ferrer  MD;  Location: [facility] OR;  Service: General;  Laterality: N/A;    TUBAL LIGATION         Social History     Tobacco Use    Smoking status: Former     Packs/day: 1.00     Years: 39.00     Pack years: 39.00     Types: Vaping with nicotine, Cigarettes     Quit date: 2013     Years since quittin.7    Smokeless tobacco: Never   Substance Use Topics    Alcohol use: No     Alcohol/week: 0.0 standard drinks    Drug use: Never       Family History   Problem Relation Age of Onset    Leukemia Brother     Stroke Mother     No Known Problems Father     Colon cancer Paternal Grandfather     Colon cancer Paternal Uncle     Ovarian cancer Maternal Aunt     Stroke Maternal Aunt     Breast cancer Paternal Grandmother        Patient's Medications   New Prescriptions    No medications on file   Previous Medications    AMLODIPINE (NORVASC) 10 MG TABLET    TAKE 1 TABLET(10 MG) BY MOUTH EVERY DAY    ASPIRIN (ECOTRIN) 81 MG EC TABLET    Take 1 tablet (81 mg total) by mouth once daily.    AZELASTINE (ASTELIN) 137 MCG (0.1 %) NASAL SPRAY    1 spray (137 mcg total) by Nasal route 2 (two) times daily.    CIPROFLOXACIN HCL (CIPRO) 500 MG TABLET    Take 1 tablet (500 mg total) by mouth every 12 (twelve) hours.    CLOTRIMAZOLE (LOTRIMIN) 1 % CREAM    Apply topically 2 (two) times daily.    DESONIDE (DESOWEN) 0.05 % CREAM    APPLY EXTERNALLY TO THE AFFECTED AREA TWICE DAILY    FEXOFENADINE (ALLEGRA) 180 MG TABLET    Take 180 mg by mouth once daily.    FLUTICASONE PROPIONATE (FLONASE) 50 MCG/ACTUATION NASAL SPRAY    2 sprays (100 mcg total) by Each Nostril route once daily.    IBUPROFEN (ADVIL,MOTRIN) 800 MG TABLET    TAKE 1 TABLET(800 MG) BY MOUTH THREE TIMES DAILY    LINACLOTIDE (LINZESS) 145 MCG CAP CAPSULE    Take 1 capsule (145 mcg total) by mouth before breakfast.    MONTELUKAST (SINGULAIR) 10 MG TABLET    Take 1 tablet (10 mg total) by mouth every evening.    MULTIVITAMIN/IRON/FOLIC ACID (CENTRUM ORAL)    Take 1 tablet by mouth once  daily.    NITROGLYCERIN (NITROSTAT) 0.4 MG SL TABLET    Place 1 tablet (0.4 mg total) under the tongue every 5 (five) minutes as needed for Chest pain.    OMEPRAZOLE (PRILOSEC) 20 MG CAPSULE    TAKE 1 CAPSULE(20 MG) BY MOUTH EVERY DAY    OMEPRAZOLE (PRILOSEC) 40 MG CAPSULE    Take 1 capsule (40 mg total) by mouth once daily.    PRAVASTATIN (PRAVACHOL) 20 MG TABLET    Take 1 tablet (20 mg total) by mouth once daily.    TRIAMCINOLONE ACETONIDE 0.1% (KENALOG) 0.1 % CREAM    Apply topically 2 (two) times daily.    VITAMIN D (VITAMIN D3) 1000 UNITS TAB    Take 1,000 Units by mouth once daily. Take 2000 units daily.   Modified Medications    No medications on file   Discontinued Medications    No medications on file       Review of Systems   Constitutional: Negative.    HENT: Negative.     Eyes: Negative.    Respiratory: Negative.     Cardiovascular:  Positive for chest pain and palpitations.   Gastrointestinal: Negative.    Genitourinary: Negative.    Musculoskeletal: Negative.    Skin: Negative.    Neurological: Negative.    Endo/Heme/Allergies: Negative.    Psychiatric/Behavioral: Negative.     All 12 systems otherwise negative.    Wt Readings from Last 3 Encounters:   10/07/22 71.2 kg (156 lb 15.5 oz)   08/15/22 68.9 kg (152 lb)   07/18/22 69.2 kg (152 lb 8.9 oz)     Temp Readings from Last 3 Encounters:   07/14/22 97.8 °F (36.6 °C)   06/27/22 98.4 °F (36.9 °C) (Oral)   06/27/22 97 °F (36.1 °C) (Tympanic)     BP Readings from Last 3 Encounters:   10/07/22 116/74   08/15/22 122/80   07/18/22 122/80     Pulse Readings from Last 3 Encounters:   07/18/22 86   07/14/22 86   06/27/22 74       There were no vitals taken for this visit.    Objective:   Physical Exam  Vitals and nursing note reviewed.   Constitutional:       General: She is not in acute distress.     Appearance: She is well-developed. She is not diaphoretic.   HENT:      Head: Normocephalic and atraumatic.      Nose: Nose normal.   Eyes:      General: No  scleral icterus.     Conjunctiva/sclera: Conjunctivae normal.   Neck:      Thyroid: No thyromegaly.      Vascular: No JVD.   Cardiovascular:      Rate and Rhythm: Normal rate and regular rhythm.      Heart sounds: S1 normal and S2 normal. No murmur heard.    No friction rub. No gallop. No S3 or S4 sounds.   Pulmonary:      Effort: Pulmonary effort is normal. No respiratory distress.      Breath sounds: Normal breath sounds. No stridor. No wheezing or rales.   Chest:      Chest wall: No tenderness.   Abdominal:      General: Bowel sounds are normal. There is no distension.      Palpations: Abdomen is soft. There is no mass.      Tenderness: There is no abdominal tenderness. There is no rebound.   Genitourinary:     Comments: Deferred  Musculoskeletal:         General: No tenderness or deformity. Normal range of motion.      Cervical back: Normal range of motion and neck supple.   Lymphadenopathy:      Cervical: No cervical adenopathy.   Skin:     General: Skin is warm and dry.      Coloration: Skin is not pale.      Findings: No erythema or rash.   Neurological:      Mental Status: She is alert and oriented to person, place, and time.      Motor: No abnormal muscle tone.      Coordination: Coordination normal.   Psychiatric:         Behavior: Behavior normal.         Thought Content: Thought content normal.         Judgment: Judgment normal.       Lab Results   Component Value Date     06/27/2022    K 4.1 06/27/2022     06/27/2022    CO2 22 (L) 06/27/2022    BUN 18 06/27/2022    CREATININE 0.8 06/27/2022    GLU 82 06/27/2022    HGBA1C 5.5 09/30/2020    AST 21 06/27/2022    ALT 24 06/27/2022    ALBUMIN 4.0 06/27/2022    PROT 8.3 06/27/2022    BILITOT 0.3 06/27/2022    WBC 11.39 06/27/2022    HGB 13.4 06/27/2022    HCT 39.3 06/27/2022    MCV 90 06/27/2022     06/27/2022    TSH 2.538 05/24/2022    CHOL 182 05/24/2022    HDL 41 05/24/2022    LDLCALC 108.4 05/24/2022    TRIG 163 (H) 05/24/2022    BNP 14  06/07/2019     Assessment:      1. Primary hypertension    2. Other chest pain    3. Gastroesophageal reflux disease, unspecified whether esophagitis present    4. Palpitations    5. Other hyperlipidemia    6. Former smoker          Plan:   1. Chest pain - recent ER eval r/o ACS; with palpitations  - coronary calcif on Chest CT  - Nuclear stress test 8/2022 neg for ischemia. 2D ECHO 8/2022 with normal bi V function and valves.   - Bardy neg for arrythmias.   - start aspirin 81 mg  - give PRN NTG     2. HTN  - titrate meds    3. GERD  - cont PPI    4. Overweight/Obesity  - cont low stefanie diet     5. HLD  - recently started statin - but could not tolerate   - needs to take fish oils    6. Former smoker  - has quit     7. Anxiety  - rec meditation     Thank you for allowing me to participate in this patient's care. Please do not hesitate to contact me with any questions or concerns. Consult note has been forwarded to the referral physician.

## 2022-11-09 ENCOUNTER — TELEPHONE (OUTPATIENT)
Dept: UROLOGY | Facility: CLINIC | Age: 57
End: 2022-11-09
Payer: COMMERCIAL

## 2022-11-09 NOTE — TELEPHONE ENCOUNTER
----- Message from Isamar Elaine sent at 11/9/2022 10:04 AM CST -----  Contact: Pt  Pt has an appt on the 16th and is calling to see if she can get a later time for her appt /and can be reached at 3 pm or later & can be reached at [phone]/thanks/sara   Can't get off from work

## 2022-11-22 ENCOUNTER — OFFICE VISIT (OUTPATIENT)
Dept: UROLOGY | Facility: CLINIC | Age: 57
End: 2022-11-22
Payer: COMMERCIAL

## 2022-11-22 VITALS
BODY MASS INDEX: 31.28 KG/M2 | SYSTOLIC BLOOD PRESSURE: 120 MMHG | DIASTOLIC BLOOD PRESSURE: 80 MMHG | WEIGHT: 160.19 LBS | HEART RATE: 72 BPM

## 2022-11-22 DIAGNOSIS — R35.0 FREQUENT URINATION: ICD-10-CM

## 2022-11-22 DIAGNOSIS — M54.50 CHRONIC MIDLINE LOW BACK PAIN WITHOUT SCIATICA: ICD-10-CM

## 2022-11-22 DIAGNOSIS — G89.29 CHRONIC MIDLINE LOW BACK PAIN WITHOUT SCIATICA: ICD-10-CM

## 2022-11-22 DIAGNOSIS — R35.1 NOCTURIA: Primary | ICD-10-CM

## 2022-11-22 PROBLEM — M25.561 KNEE PAIN, RIGHT: Status: RESOLVED | Noted: 2021-03-15 | Resolved: 2022-11-22

## 2022-11-22 PROBLEM — R29.898 WEAKNESS OF BOTH LOWER EXTREMITIES: Status: RESOLVED | Noted: 2021-03-15 | Resolved: 2022-11-22

## 2022-11-22 PROBLEM — N95.2 ATROPHIC VAGINITIS: Status: RESOLVED | Noted: 2019-04-24 | Resolved: 2022-11-22

## 2022-11-22 PROBLEM — R25.3 MUSCLE TWITCHING: Status: RESOLVED | Noted: 2018-09-19 | Resolved: 2022-11-22

## 2022-11-22 PROCEDURE — 99999 PR PBB SHADOW E&M-EST. PATIENT-LVL IV: CPT | Mod: PBBFAC,,, | Performed by: NURSE PRACTITIONER

## 2022-11-22 PROCEDURE — 3008F PR BODY MASS INDEX (BMI) DOCUMENTED: ICD-10-PCS | Mod: CPTII,S$GLB,, | Performed by: NURSE PRACTITIONER

## 2022-11-22 PROCEDURE — 99999 PR PBB SHADOW E&M-EST. PATIENT-LVL IV: ICD-10-PCS | Mod: PBBFAC,,, | Performed by: NURSE PRACTITIONER

## 2022-11-22 PROCEDURE — 3074F SYST BP LT 130 MM HG: CPT | Mod: CPTII,S$GLB,, | Performed by: NURSE PRACTITIONER

## 2022-11-22 PROCEDURE — 99204 PR OFFICE/OUTPT VISIT, NEW, LEVL IV, 45-59 MIN: ICD-10-PCS | Mod: S$GLB,,, | Performed by: NURSE PRACTITIONER

## 2022-11-22 PROCEDURE — 3079F PR MOST RECENT DIASTOLIC BLOOD PRESSURE 80-89 MM HG: ICD-10-PCS | Mod: CPTII,S$GLB,, | Performed by: NURSE PRACTITIONER

## 2022-11-22 PROCEDURE — 99204 OFFICE O/P NEW MOD 45 MIN: CPT | Mod: S$GLB,,, | Performed by: NURSE PRACTITIONER

## 2022-11-22 PROCEDURE — 1159F MED LIST DOCD IN RCRD: CPT | Mod: CPTII,S$GLB,, | Performed by: NURSE PRACTITIONER

## 2022-11-22 PROCEDURE — 3008F BODY MASS INDEX DOCD: CPT | Mod: CPTII,S$GLB,, | Performed by: NURSE PRACTITIONER

## 2022-11-22 PROCEDURE — 1159F PR MEDICATION LIST DOCUMENTED IN MEDICAL RECORD: ICD-10-PCS | Mod: CPTII,S$GLB,, | Performed by: NURSE PRACTITIONER

## 2022-11-22 PROCEDURE — 3074F PR MOST RECENT SYSTOLIC BLOOD PRESSURE < 130 MM HG: ICD-10-PCS | Mod: CPTII,S$GLB,, | Performed by: NURSE PRACTITIONER

## 2022-11-22 PROCEDURE — 3079F DIAST BP 80-89 MM HG: CPT | Mod: CPTII,S$GLB,, | Performed by: NURSE PRACTITIONER

## 2022-11-22 RX ORDER — IBUPROFEN 800 MG/1
800 TABLET ORAL 3 TIMES DAILY
Qty: 30 TABLET | Refills: 2 | Status: SHIPPED | OUTPATIENT
Start: 2022-11-22 | End: 2022-12-22

## 2022-11-22 RX ORDER — SOLIFENACIN SUCCINATE 5 MG/1
5 TABLET, FILM COATED ORAL DAILY
Qty: 90 TABLET | Refills: 0 | Status: SHIPPED | OUTPATIENT
Start: 2022-11-22 | End: 2023-02-23 | Stop reason: SDUPTHER

## 2022-11-22 NOTE — PROGRESS NOTES
Chief Complaint:   Overactive bladder    HPI:   Patient is a 57-year-old female that is presenting with lower back pain, nocturia, mixed incontinence and daytime frequency.  No history of renal stones.  No gross hematuria.  Urine in clinic was negative and PVR was 23 mL.  Patient states that she drinks 2-3 glasses of water during the day, 3-4 cokes every day.  Reports that urge incontinence is greater than stress incontinence.  Has been seen by primary care physician, gyn and prescribed antibiotics for frequent urination.  No positive urine cx  in the past 6 months to review.  Allergies:  Adhesive and Latex, natural rubber    Medications:  has a current medication list which includes the following prescription(s): amlodipine, aspirin, azelastine, clotrimazole, desonide, fexofenadine, fluticasone propionate, linaclotide, montelukast, multivitamin/iron/folic acid, nitroglycerin, omeprazole, omeprazole, triamcinolone acetonide 0.1%, vitamin d, ibuprofen, and solifenacin.    Review of Systems:  General: No fever, chills, fatigability, or weight loss.  Skin: No rashes, itching, or changes in color or texture of skin.  Chest: Denies VERONICA, cyanosis, wheezing, cough, and sputum production.  Abdomen: Appetite fine. No weight loss. Denies diarrhea, abdominal pain, hematemesis, or blood in stool.  Musculoskeletal: No joint stiffness or swelling. Denies back pain.  : As above.  All other review of systems negative.    PMH:   has a past medical history of Abnormal Pap smear, Abnormal Pap smear of cervix, Chronic rhinitis, Depression, GERD (gastroesophageal reflux disease), Hyperlipidemia, Hypertension, Overactive bladder, Plantar fasciitis, bilateral, and Psoriasis.    PSH:   has a past surgical history that includes Tubal ligation; Oophorectomy; Colonoscopy; Robot-assisted cholecystectomy using da Mildred Xi (N/A, 10/18/2018); Gallbladder surgery (N/A, 10/11/2018); hx colposcopy; Cervical biopsy w/ loop electrode excision; and  Hysterectomy.    FamHx: family history includes Breast cancer in her paternal grandmother; Colon cancer in her paternal grandfather and paternal uncle; Leukemia in her brother; No Known Problems in her father; Ovarian cancer in her maternal aunt; Stroke in her maternal aunt and mother.    SocHx:  reports that she quit smoking about 9 years ago. Her smoking use included vaping with nicotine and cigarettes. She has a 39.00 pack-year smoking history. She has never used smokeless tobacco. She reports that she does not drink alcohol and does not use drugs.      Physical Exam:  Vitals:    11/22/22 1337   BP: 120/80   Pulse: 72     General: A&Ox3, no apparent distress, no deformities  Neck: No masses, normal thyroid  Lungs: normal inspiration, no use of accessory muscles  Heart: normal pulse, no arrhythmias  Abdomen: Soft, NT, ND, no masses, no hernias, no hepatosplenomegaly  Lymphatic: Neck and groin nodes negative  Labs/Studies:   See HPI    Impression/Plan:   Overactive bladder  Patient was educated on behavior modifications needed to decrease overactive bladder symptoms heard patient states that she is had lower back pain for several months, will proceed with renal ultrasound.  VESIcare 5 mg once daily prescribed and sent to her local pharmacy.  We will see her back in 2 months for re-evaluation.

## 2022-11-30 ENCOUNTER — HOSPITAL ENCOUNTER (OUTPATIENT)
Dept: RADIOLOGY | Facility: HOSPITAL | Age: 57
Discharge: HOME | End: 2022-11-30
Attending: NURSE PRACTITIONER
Payer: COMMERCIAL

## 2022-11-30 DIAGNOSIS — R35.1 NOCTURIA: ICD-10-CM

## 2022-11-30 DIAGNOSIS — R35.0 FREQUENT URINATION: ICD-10-CM

## 2022-11-30 DIAGNOSIS — N28.89 OTHER SPECIFIED DISORDERS OF KIDNEY AND URETER: ICD-10-CM

## 2022-11-30 PROCEDURE — 76770 US RETROPERITONEAL COMPLETE: ICD-10-PCS | Mod: 26,,, | Performed by: RADIOLOGY

## 2022-11-30 PROCEDURE — 76770 US EXAM ABDO BACK WALL COMP: CPT | Mod: TC

## 2022-11-30 PROCEDURE — 76770 US EXAM ABDO BACK WALL COMP: CPT | Mod: 26,,, | Performed by: RADIOLOGY

## 2022-12-09 ENCOUNTER — HOSPITAL ENCOUNTER (OUTPATIENT)
Dept: RADIOLOGY | Facility: HOSPITAL | Age: 57
Discharge: HOME | End: 2022-12-09
Attending: NURSE PRACTITIONER
Payer: COMMERCIAL

## 2022-12-09 DIAGNOSIS — N28.89 OTHER SPECIFIED DISORDERS OF KIDNEY AND URETER: ICD-10-CM

## 2022-12-09 PROCEDURE — 25500020 PHARM REV CODE 255: Performed by: NURSE PRACTITIONER

## 2022-12-09 PROCEDURE — 74170 CT ABD WO CNTRST FLWD CNTRST: CPT | Mod: 26,,, | Performed by: RADIOLOGY

## 2022-12-09 PROCEDURE — 74170 CT ABD WO CNTRST FLWD CNTRST: CPT | Mod: TC

## 2022-12-09 PROCEDURE — 74170 CT ABDOMEN W WO CONTRAST: ICD-10-PCS | Mod: 26,,, | Performed by: RADIOLOGY

## 2022-12-09 RX ADMIN — IOHEXOL 100 ML: 350 INJECTION, SOLUTION INTRAVENOUS at 01:12

## 2022-12-10 DIAGNOSIS — N28.1 RENAL CYST: Primary | ICD-10-CM

## 2023-01-23 ENCOUNTER — TELEPHONE (OUTPATIENT)
Dept: INTERNAL MEDICINE | Facility: CLINIC | Age: 58
End: 2023-01-23
Payer: COMMERCIAL

## 2023-01-23 NOTE — TELEPHONE ENCOUNTER
----- Message from Narinder Parks sent at 1/23/2023  2:01 PM CST -----  Contact: Pt [phone]  Pt called in regards to getting an order for 3d digital mammogram please advise

## 2023-01-27 ENCOUNTER — LAB VISIT (OUTPATIENT)
Dept: LAB | Facility: HOSPITAL | Age: 58
End: 2023-01-27
Attending: INTERNAL MEDICINE
Payer: COMMERCIAL

## 2023-01-27 ENCOUNTER — OFFICE VISIT (OUTPATIENT)
Dept: DERMATOLOGY | Facility: CLINIC | Age: 58
End: 2023-01-27
Payer: COMMERCIAL

## 2023-01-27 DIAGNOSIS — L65.9 ALOPECIA: Primary | ICD-10-CM

## 2023-01-27 DIAGNOSIS — E78.49 OTHER HYPERLIPIDEMIA: ICD-10-CM

## 2023-01-27 LAB
CHOLEST SERPL-MCNC: 166 MG/DL (ref 120–199)
CHOLEST/HDLC SERPL: 4 {RATIO} (ref 2–5)
HDLC SERPL-MCNC: 41 MG/DL (ref 40–75)
HDLC SERPL: 24.7 % (ref 20–50)
LDLC SERPL CALC-MCNC: 101 MG/DL (ref 63–159)
NONHDLC SERPL-MCNC: 125 MG/DL
TRIGL SERPL-MCNC: 120 MG/DL (ref 30–150)

## 2023-01-27 PROCEDURE — 99204 PR OFFICE/OUTPT VISIT, NEW, LEVL IV, 45-59 MIN: ICD-10-PCS | Mod: S$GLB,,, | Performed by: STUDENT IN AN ORGANIZED HEALTH CARE EDUCATION/TRAINING PROGRAM

## 2023-01-27 PROCEDURE — 99999 PR PBB SHADOW E&M-EST. PATIENT-LVL IV: ICD-10-PCS | Mod: PBBFAC,,, | Performed by: STUDENT IN AN ORGANIZED HEALTH CARE EDUCATION/TRAINING PROGRAM

## 2023-01-27 PROCEDURE — 1159F PR MEDICATION LIST DOCUMENTED IN MEDICAL RECORD: ICD-10-PCS | Mod: CPTII,S$GLB,, | Performed by: STUDENT IN AN ORGANIZED HEALTH CARE EDUCATION/TRAINING PROGRAM

## 2023-01-27 PROCEDURE — 99204 OFFICE O/P NEW MOD 45 MIN: CPT | Mod: S$GLB,,, | Performed by: STUDENT IN AN ORGANIZED HEALTH CARE EDUCATION/TRAINING PROGRAM

## 2023-01-27 PROCEDURE — 1159F MED LIST DOCD IN RCRD: CPT | Mod: CPTII,S$GLB,, | Performed by: STUDENT IN AN ORGANIZED HEALTH CARE EDUCATION/TRAINING PROGRAM

## 2023-01-27 PROCEDURE — 1160F RVW MEDS BY RX/DR IN RCRD: CPT | Mod: CPTII,S$GLB,, | Performed by: STUDENT IN AN ORGANIZED HEALTH CARE EDUCATION/TRAINING PROGRAM

## 2023-01-27 PROCEDURE — 80061 LIPID PANEL: CPT | Performed by: INTERNAL MEDICINE

## 2023-01-27 PROCEDURE — 36415 COLL VENOUS BLD VENIPUNCTURE: CPT | Performed by: INTERNAL MEDICINE

## 2023-01-27 PROCEDURE — 99999 PR PBB SHADOW E&M-EST. PATIENT-LVL IV: CPT | Mod: PBBFAC,,, | Performed by: STUDENT IN AN ORGANIZED HEALTH CARE EDUCATION/TRAINING PROGRAM

## 2023-01-27 PROCEDURE — 1160F PR REVIEW ALL MEDS BY PRESCRIBER/CLIN PHARMACIST DOCUMENTED: ICD-10-PCS | Mod: CPTII,S$GLB,, | Performed by: STUDENT IN AN ORGANIZED HEALTH CARE EDUCATION/TRAINING PROGRAM

## 2023-01-27 RX ORDER — CLOBETASOL PROPIONATE 0.46 MG/ML
SOLUTION TOPICAL
COMMUNITY
Start: 2023-01-24 | End: 2023-06-14

## 2023-01-27 RX ORDER — DOXYCYCLINE HYCLATE 100 MG
TABLET ORAL
Qty: 30 TABLET | Refills: 2 | Status: SHIPPED | OUTPATIENT
Start: 2023-01-27 | End: 2023-06-14

## 2023-01-27 NOTE — PATIENT INSTRUCTIONS
Viviscal Professional Hair Growth Supplement  Cost: $43 for 60 tablets  Ingredients    Vitamin C (from Acerola Powder and as Ascorbic Acid), Biotin, AminoMar Marine Complex, Shark Powder, Mollusc Powder, Apple Extract Powder, Procyanidin B-2, L-Cystine, L-Methionine, Microcrystalline Cellulose, Maltodextrin, Hydroxypropyl Methyl Cellulose, Magnesium Stearate, Silicon Dioxide, Artificial Orange Flavoring, Modified Starch, Glycerol.    Allergy advice: Contains fish and shellfish, not recommended for those allergic to fish, shellfish, or seafood.    Direction for use  Recommended daily intake: 2 tablets  Use daily for a minimum of 3-6 months  Take 1 tablet in the morning and 1 tablet in the evening (with water, after eating)  Afterwards take 1-2 tablets daily as required to maintain healthy hair growth      Nutrafol Hair Growth Supplement  Cost: $68-88 for 120 capsules    Ingredients  Hydrolyzed fish collagen, ashwagandha, hyaluronic acid, bioperine, biotin, saw palmetto, amino acid blend    Allergy advice: Claims to be free from shellfish and wheat    Be cautious when using in patients on anti-platelet medications (Saw Palmetto is in ingredients)    Direction for use  Recommended daily intake: 4 capsules daily with a meal (2 per day is sufficient for most)  Use daily for a minimum of 3-6 months  Afterwards take 1-2 tablets daily as required to maintain healthy hair growth

## 2023-01-27 NOTE — PROGRESS NOTES
Patient Information  Name: Reta Navas  : 1965  MRN: [id number]     Referring Physician:  Dr. Uribe   Primary Care Physician:  Dr. Mendoza Cardoso MD   Date of Visit: 2023      Subjective:       Reta Navas is a 57 y.o. female who presents for   Chief Complaint   Patient presents with    Hair Loss     C/o hair loss, x 5 months      HPI  Patient with new complaint of lesion(s)  Location: scalp  Duration: 5 years  Symptoms: hair shedding  Relieving factors/Previous treatments: clobetasol ointment    Only new medication was pravastatin however it caused her severe leg pain so she discontinued that. Denies any major surgeries, weight loss, dietary changes. Denies hx of perms or relaxers. She reports +burning and itching on scalp vertex. Denies family hx of hair loss.    Patient was last seen:Visit date not found     Prior notes by myself reviewed.   Clinical documentation obtained by nursing staff reviewed.    Review of Systems   Skin:  Positive for itching. Negative for rash.      Objective:    Physical Exam   Constitutional: She appears well-developed and well-nourished. No distress.   Neurological: She is alert and oriented to person, place, and time. She is not disoriented.   Psychiatric: She has a normal mood and affect.   Skin:   Areas Examined (abnormalities noted in diagram):   Scalp / Hair Palpated and Inspected            Diagram Legend     Erythematous scaling macule/papule c/w actinic keratosis       Vascular papule c/w angioma      Pigmented verrucoid papule/plaque c/w seborrheic keratosis      Yellow umbilicated papule c/w sebaceous hyperplasia      Irregularly shaped tan macule c/w lentigo     1-2 mm smooth white papules consistent with Milia      Movable subcutaneous cyst with punctum c/w epidermal inclusion cyst      Subcutaneous movable cyst c/w pilar cyst      Firm pink to brown papule c/w dermatofibroma      Pedunculated fleshy papule(s) c/w skin tag(s)      Evenly pigmented  macule c/w junctional nevus     Mildly variegated pigmented, slightly irregular-bordered macule c/w mildly atypical nevus      Flesh colored to evenly pigmented papule c/w intradermal nevus       Pink pearly papule/plaque c/w basal cell carcinoma      Erythematous hyperkeratotic cursted plaque c/w SCC      Surgical scar with no sign of skin cancer recurrence      Open and closed comedones      Inflammatory papules and pustules      Verrucoid papule consistent consistent with wart     Erythematous eczematous patches and plaques     Dystrophic onycholytic nail with subungual debris c/w onychomycosis     Umbilicated papule    Erythematous-base heme-crusted tan verrucoid plaque consistent with inflamed seborrheic keratosis     Erythematous Silvery Scaling Plaque c/w Psoriasis     See annotation      No images are attached to the encounter or orders placed in the encounter.    [] Data reviewed  [] Independent review of test  [] Management discussed with another provider    Assessment / Plan:        Alopecia, suspect FFA  -     doxycycline (VIBRA-TABS) 100 MG tablet; Take 1 po qday with food and not within 1 hour prior to lying down  Dispense: 30 tablet; Refill: 2  - Discussed benefits and risks of doxycyline therapy including but not limited to GI discomfort, esophageal irritation/ulceration, and increased sun sensitivity. Patient was counseled to take medicine with meals and at least 1 hour before lying down.   - Patient has tried topical steroids with worsening. Does not want to consider plaquenil.           LOS NUMBER AND COMPLEXITY OF PROBLEMS    COMPLEXITY OF DATA RISK TOTAL TIME (m)   98605  18843 [] 1 self-limited or minor problem [x] Minimal to none [] No treatment recommended or patient to monitor 15-29  10-19   68828  65756 Low  [] 2 or > self limited or minor problems  [] 1 stable chronic illness  [] 1 acute, uncomplicated illness or injury Limited (2)  [] Prior external notes from each unique source  []  Review result of each unique test  [] Order each unique test []  Low  OTC medications, minor skin biopsy 30-44  20-29   11002  10936 Moderate  [x]  1 or > chronic illness with progression, exacerbation or SE of treatment  []  2 or more stable chronic illnesses  []  1 acute illness with systemic symptoms  []  1 acute complicated injury  []  1 undiagnosed new problem with uncertain prognosis Moderate (1/3 below)  []  3 or more data items        *Now includes assessment requiring independent historian  []  Independent interpretation of a test  []  Discuss management/test with another provider Moderate  [x]  Prescription drug mgmt  []  Minor surgery with risk discussed  []  Mgmt limited by social determinates 45-59  30-39   21486  74308 High  []  1 or more chronic illness with severe exacerbation, progression or SE of treatment  []  1 acute or chronic illness/injury that poses a threat to life or bodily function Extensive (2/3 below)  []  3 or more data items        *Now includes assessment requiring independent historian.  []  Independent interpretation of a test  []  Discuss management/test with another provider High  []  Major surgery with risk discussed  []  Drug therapy requiring intensive monitoring for toxicity  []  Hospitalization  []  Decision for DNR 60-74  40-54      No follow-ups on file.    Ingrid Maldonado MD, FAAD  [facility]

## 2023-03-23 ENCOUNTER — TELEPHONE (OUTPATIENT)
Dept: INTERNAL MEDICINE | Facility: CLINIC | Age: 58
End: 2023-03-23
Payer: COMMERCIAL

## 2023-03-23 NOTE — TELEPHONE ENCOUNTER
----- Message from Dilma Bailey sent at 3/23/2023  2:12 PM CDT -----  Contact: Reta Milligan  Type:  Sooner Apoointment Request    Caller is requesting a sooner appointment. Caller will not accept being placed on the waitlist and is requesting a message be sent to doctor.  Name of Caller:Reta Milligan  When is the first available appointment?scheduled 6/2/23  Symptoms:annual labs/complete lab work/Lipid Panel/Vitamin D  Would the patient rather a call back or a response via [facility]? call  Best Call Back Number:[phone]    Additional Information: Patient request to schedule complete lab work prior to visit. Please give patient a call back when possible.  Thank you,  [facility]

## 2023-03-24 DIAGNOSIS — Z00.00 ANNUAL PHYSICAL EXAM: Primary | ICD-10-CM

## 2023-04-14 DIAGNOSIS — R07.89 OTHER CHEST PAIN: Primary | ICD-10-CM

## 2023-04-17 ENCOUNTER — OFFICE VISIT (OUTPATIENT)
Dept: CARDIOLOGY | Facility: CLINIC | Age: 58
End: 2023-04-17
Payer: COMMERCIAL

## 2023-04-17 VITALS
OXYGEN SATURATION: 96 % | DIASTOLIC BLOOD PRESSURE: 82 MMHG | BODY MASS INDEX: 31.6 KG/M2 | WEIGHT: 160.94 LBS | HEIGHT: 60 IN | SYSTOLIC BLOOD PRESSURE: 132 MMHG | HEART RATE: 91 BPM

## 2023-04-17 DIAGNOSIS — I10 PRIMARY HYPERTENSION: ICD-10-CM

## 2023-04-17 DIAGNOSIS — Z13.6 ENCOUNTER FOR SCREENING FOR CARDIOVASCULAR DISORDERS: ICD-10-CM

## 2023-04-17 DIAGNOSIS — I10 HYPERTENSION, UNSPECIFIED TYPE: ICD-10-CM

## 2023-04-17 DIAGNOSIS — Z87.891 FORMER SMOKER: ICD-10-CM

## 2023-04-17 DIAGNOSIS — K21.9 GASTROESOPHAGEAL REFLUX DISEASE, UNSPECIFIED WHETHER ESOPHAGITIS PRESENT: ICD-10-CM

## 2023-04-17 DIAGNOSIS — G47.30 SLEEP APNEA, UNSPECIFIED TYPE: ICD-10-CM

## 2023-04-17 DIAGNOSIS — E78.49 OTHER HYPERLIPIDEMIA: ICD-10-CM

## 2023-04-17 DIAGNOSIS — R00.2 PALPITATIONS: ICD-10-CM

## 2023-04-17 DIAGNOSIS — R07.89 OTHER CHEST PAIN: Primary | ICD-10-CM

## 2023-04-17 PROBLEM — G70.00 MG (MYASTHENIA GRAVIS): Status: RESOLVED | Noted: 2018-09-19 | Resolved: 2023-04-17

## 2023-04-17 PROCEDURE — 99214 PR OFFICE/OUTPT VISIT, EST, LEVL IV, 30-39 MIN: ICD-10-PCS | Mod: S$GLB,,, | Performed by: INTERNAL MEDICINE

## 2023-04-17 PROCEDURE — 1160F RVW MEDS BY RX/DR IN RCRD: CPT | Mod: CPTII,S$GLB,, | Performed by: INTERNAL MEDICINE

## 2023-04-17 PROCEDURE — 3079F PR MOST RECENT DIASTOLIC BLOOD PRESSURE 80-89 MM HG: ICD-10-PCS | Mod: CPTII,S$GLB,, | Performed by: INTERNAL MEDICINE

## 2023-04-17 PROCEDURE — 1159F PR MEDICATION LIST DOCUMENTED IN MEDICAL RECORD: ICD-10-PCS | Mod: CPTII,S$GLB,, | Performed by: INTERNAL MEDICINE

## 2023-04-17 PROCEDURE — 3075F PR MOST RECENT SYSTOLIC BLOOD PRESS GE 130-139MM HG: ICD-10-PCS | Mod: CPTII,S$GLB,, | Performed by: INTERNAL MEDICINE

## 2023-04-17 PROCEDURE — 99214 OFFICE O/P EST MOD 30 MIN: CPT | Mod: S$GLB,,, | Performed by: INTERNAL MEDICINE

## 2023-04-17 PROCEDURE — 3008F BODY MASS INDEX DOCD: CPT | Mod: CPTII,S$GLB,, | Performed by: INTERNAL MEDICINE

## 2023-04-17 PROCEDURE — 99999 PR PBB SHADOW E&M-EST. PATIENT-LVL V: CPT | Mod: PBBFAC,,, | Performed by: INTERNAL MEDICINE

## 2023-04-17 PROCEDURE — 99999 PR PBB SHADOW E&M-EST. PATIENT-LVL V: ICD-10-PCS | Mod: PBBFAC,,, | Performed by: INTERNAL MEDICINE

## 2023-04-17 PROCEDURE — 3075F SYST BP GE 130 - 139MM HG: CPT | Mod: CPTII,S$GLB,, | Performed by: INTERNAL MEDICINE

## 2023-04-17 PROCEDURE — 1160F PR REVIEW ALL MEDS BY PRESCRIBER/CLIN PHARMACIST DOCUMENTED: ICD-10-PCS | Mod: CPTII,S$GLB,, | Performed by: INTERNAL MEDICINE

## 2023-04-17 PROCEDURE — 1159F MED LIST DOCD IN RCRD: CPT | Mod: CPTII,S$GLB,, | Performed by: INTERNAL MEDICINE

## 2023-04-17 PROCEDURE — 3079F DIAST BP 80-89 MM HG: CPT | Mod: CPTII,S$GLB,, | Performed by: INTERNAL MEDICINE

## 2023-04-17 PROCEDURE — 3008F PR BODY MASS INDEX (BMI) DOCUMENTED: ICD-10-PCS | Mod: CPTII,S$GLB,, | Performed by: INTERNAL MEDICINE

## 2023-04-17 RX ORDER — IBUPROFEN 800 MG/1
800 TABLET ORAL 3 TIMES DAILY
COMMUNITY
Start: 2022-12-23

## 2023-04-17 RX ORDER — AMLODIPINE BESYLATE 10 MG/1
10 TABLET ORAL DAILY
Qty: 90 TABLET | Refills: 3 | Status: SHIPPED | OUTPATIENT
Start: 2023-04-17

## 2023-04-17 NOTE — PROGRESS NOTES
Subjective:   Patient ID:  Reta Navas is a 57 y.o. female who presents for cardiac consult of Chest Pain, Heartburn, and Follow-up        HPI  The patient came in today for cardiac consult of Chest Pain, Heartburn, and Follow-up      Reta Navas is a 57 y.o. female pt with HTN, HLD - elevated Tgs, obesity, GERD, h/o tobacco abuse presents for follow up CV eval.       1/8/2020 - Saloni Pérez Visit  Ms. Navas is a 54 year old female patient whose current medical conditions include HTN and atypical chest pain who presents today for follow-up. Patient previously seen by Dr. Llamas and had Losartan added to med regimen due to elevated BP. Stress echo was also ordered. She returns today and states overall is doing well. Still has occasional bouts of atypical chest pain-numbness and sharp in nature. Generally occurs at rest. Not really bothersome. No exertional symptoms. No other complaints. No SOB. No lightheadedness, dizziness, palpitations, near syncope, or syncope. No s/s of CHF. BP stable and controlled. Patient tolerating current meds. Stress echo 12/31/19 showed normal EF, no WMA.   Results discussed with patient.   Patient presents for f/u. Doing clinically well. BP controlled. Patient tolerating meds. Stress echo results/reviewed discussed. Continue same meds/mgmt.    6/27/2022, 7:02 PM   History obtained from the patient               History of Present Illness: Reta Navas is a 56 y.o. female patient with a PMHx of GERD, HLD, and HTN who presents to the Emergency Department for intermittent, L-sided CP which onset gradually 2 weeks PTA. Per the pt, she recently started a water aerobics class and believes she may have strained a muscle. She describes that pain as an aching, dull, numb pain, but states that a couple of days ago her pain was sharp. Today, the pt was seen by Dr. Cardoso, her PCP, for this CP and was referred to the ED for an evaluation. Symptoms are intermittent and moderate in  severity. She denies the pain being worse with pressing on her chest or moving her L shoulder. No mitigating or exacerbating factors reported. No associated sxs reported. Patient denies any fever, chills, N/V/D, diaphoresis, weakness, dizziness, and all other sxs at this time. Pt denies a personal cardiac history, but states that she has a family history of CAD. No further complaints or concerns at this time.     7/18/22  Pt seen in CV clinic in 2020 by Saloni Pérez, recently had been to ER last month for CP, neg enzymes and ECG. She had two episodes of palpitations while at rest, lasted a few min at least. She does not have MG now had some muscle twitching. She had smoked for 39 years quit though.     10/17/22  Nuclear stress test 8/2022 neg for ischemia. 2D ECHO 8/2022 with normal bi V function and valves. Bardy neg for arrhythmias. BP and HR well controlled today. BMI 30 - 156 lbs. She had to stop pravastatin due to leg pain had bladder issues. She has a lot of anxiety.     4/17/23  BP and HR well controlled. BMI 31 - 160 lbs. She still has occ CP maybe due to reflux, is back on PPI - Prilosec 20mg.     Patient feels no sob, no leg swelling, no PND, no dizziness, no syncope, no CNS symptoms.    Patient has fairly good exercise tolerance.    Patient is compliant with medications.    FH - mother - had MI and CVA    Bardy 7/2022  Conclusion    Heart rates varied between 51 and 136 BPM with an average of 81 BPM.  Total Analysis Time: 15 days and 0 hours  Predominant rhythm: NSR  PAC 0.01 %  Ectopic Atrial Rhythm (EAR)    Results for orders placed during the hospital encounter of 08/15/22    Echo    Interpretation Summary  · Concentric remodeling and normal systolic function.  · The estimated ejection fraction is 60%.  · Normal left ventricular diastolic function.  · Normal right ventricular size with normal right ventricular systolic function.      Results for orders placed during the hospital encounter of  08/15/22    Nuclear Stress - Cardiology Interpreted    Interpretation Summary    Normal myocardial perfusion scan. There is no evidence of myocardial ischemia or infarction.    The gated perfusion images showed an ejection fraction of 74% at rest. The gated perfusion images showed an ejection fraction of 71% post stress.    There is normal wall motion at rest and post stress.    LV cavity size is normal at rest and normal at stress.    The EKG portion of this study is negative for ischemia.    The patient reported no chest pain during the stress test.    There were no arrhythmias during stress.    The exercise capacity was above average.      Post Exercise Imaging:     Immediate post exercise images demonstrate left ventricular function augmenting. Right ventricular function augments. Left ventricular end systolic volume decreases.   The left ventricle is globally hyperdynamic and no exercise induced wall motion abnormalities were identified.    CONCLUSIONS     1 - Normal left ventricular systolic function (EF 60-65%).     2 - Normal left ventricular diastolic function.     3 - Normal right ventricular systolic function .     4 - The estimated PA systolic pressure is 20 mmHg.     5 - Concentric hypertrophy.     6 - No wall motion abnormalities.     7 - Mild tricuspid regurgitation.     No evidence of stress induced myocardial ischemia.     This document has been electronically    SIGNED BY: Byron Puente MD On: 12/31/2019 10:54      Past Medical History:   Diagnosis Date    Abnormal Pap smear     Abnormal Pap smear of cervix     Chronic rhinitis     Depression     GERD (gastroesophageal reflux disease)     Hyperlipidemia     Hypertension     Overactive bladder     Plantar fasciitis, bilateral     Psoriasis        Past Surgical History:   Procedure Laterality Date    CERVICAL BIOPSY  W/ LOOP ELECTRODE EXCISION      COLONOSCOPY      GALLBLADDER SURGERY N/A 10/11/2018    hx colposcopy      HYSTERECTOMY       supracervical hyst/LSO for fibroids    OOPHORECTOMY      LSO    ROBOT-ASSISTED CHOLECYSTECTOMY USING DA WILVER XI N/A 10/18/2018    Procedure: XI ROBOTIC CHOLECYSTECTOMY;  Surgeon: Robel Ferrer MD;  Location: [facility];  Service: General;  Laterality: N/A;    TUBAL LIGATION         Social History     Tobacco Use    Smoking status: Former     Packs/day: 1.00     Years: 39.00     Pack years: 39.00     Types: Vaping with nicotine, Cigarettes     Quit date: 1/16/2013     Years since quitting: 10.2    Smokeless tobacco: Never   Substance Use Topics    Alcohol use: No     Alcohol/week: 0.0 standard drinks    Drug use: Never       Family History   Problem Relation Age of Onset    Leukemia Brother     Stroke Mother     No Known Problems Father     Colon cancer Paternal Grandfather     Colon cancer Paternal Uncle     Ovarian cancer Maternal Aunt     Stroke Maternal Aunt     Breast cancer Paternal Grandmother        Patient's Medications   New Prescriptions    No medications on file   Previous Medications    ASPIRIN (ECOTRIN) 81 MG EC TABLET    Take 1 tablet (81 mg total) by mouth once daily.    AZELASTINE (ASTELIN) 137 MCG (0.1 %) NASAL SPRAY    1 spray (137 mcg total) by Nasal route 2 (two) times daily.    CLOBETASOL (TEMOVATE) 0.05 % EXTERNAL SOLUTION    Apply topically.    CLOTRIMAZOLE (LOTRIMIN) 1 % CREAM    Apply topically 2 (two) times daily.    DESONIDE (DESOWEN) 0.05 % CREAM    APPLY EXTERNALLY TO THE AFFECTED AREA TWICE DAILY    DOXYCYCLINE (VIBRA-TABS) 100 MG TABLET    Take 1 po qday with food and not within 1 hour prior to lying down    FEXOFENADINE (ALLEGRA) 180 MG TABLET    Take 180 mg by mouth once daily.    FLUTICASONE PROPIONATE (FLONASE) 50 MCG/ACTUATION NASAL SPRAY    2 sprays (100 mcg total) by Each Nostril route once daily.    IBUPROFEN (ADVIL,MOTRIN) 800 MG TABLET    Take 800 mg by mouth 3 (three) times daily.    LINACLOTIDE (LINZESS) 145 MCG CAP CAPSULE    Take 1 capsule (145 mcg total) by mouth before  breakfast.    MONTELUKAST (SINGULAIR) 10 MG TABLET    TAKE 1 TABLET BY MOUTH ONCE DAILY IN THE EVENING    MULTIVITAMIN/IRON/FOLIC ACID (CENTRUM ORAL)    Take 1 tablet by mouth once daily.    NITROGLYCERIN (NITROSTAT) 0.4 MG SL TABLET    Place 1 tablet (0.4 mg total) under the tongue every 5 (five) minutes as needed for Chest pain.    OMEPRAZOLE (PRILOSEC) 20 MG CAPSULE    TAKE 1 CAPSULE(20 MG) BY MOUTH EVERY DAY    OMEPRAZOLE (PRILOSEC) 40 MG CAPSULE    Take 1 capsule by mouth once daily    SOLIFENACIN (VESICARE) 5 MG TABLET    Take 1 tablet (5 mg total) by mouth once daily.    TRIAMCINOLONE ACETONIDE 0.1% (KENALOG) 0.1 % CREAM    Apply topically 2 (two) times daily.    VITAMIN D (VITAMIN D3) 1000 UNITS TAB    Take 1,000 Units by mouth once daily. Take 2000 units daily.   Modified Medications    Modified Medication Previous Medication    AMLODIPINE (NORVASC) 10 MG TABLET amLODIPine (NORVASC) 10 MG tablet       Take 1 tablet (10 mg total) by mouth once daily.    TAKE 1 TABLET(10 MG) BY MOUTH EVERY DAY   Discontinued Medications    No medications on file       Review of Systems   Constitutional: Negative.    HENT: Negative.     Eyes: Negative.    Respiratory: Negative.     Cardiovascular:  Positive for chest pain and palpitations.   Gastrointestinal: Negative.    Genitourinary: Negative.    Musculoskeletal: Negative.    Skin: Negative.    Neurological: Negative.    Endo/Heme/Allergies: Negative.    Psychiatric/Behavioral: Negative.     All 12 systems otherwise negative.    Wt Readings from Last 3 Encounters:   04/17/23 73 kg (160 lb 15 oz)   11/22/22 72.7 kg (160 lb 2.6 oz)   10/17/22 71.2 kg (156 lb 15.5 oz)     Temp Readings from Last 3 Encounters:   07/14/22 97.8 °F (36.6 °C)   06/27/22 98.4 °F (36.9 °C) (Oral)   06/27/22 97 °F (36.1 °C) (Tympanic)     BP Readings from Last 3 Encounters:   04/17/23 132/82   11/22/22 120/80   10/17/22 120/84     Pulse Readings from Last 3 Encounters:   04/17/23 91   11/22/22 72    10/17/22 78       /82   Pulse 91   Ht 5' (1.524 m)   Wt 73 kg (160 lb 15 oz)   SpO2 96%   BMI 31.43 kg/m²     Objective:   Physical Exam  Vitals and nursing note reviewed.   Constitutional:       General: She is not in acute distress.     Appearance: She is well-developed. She is not diaphoretic.   HENT:      Head: Normocephalic and atraumatic.      Nose: Nose normal.   Eyes:      General: No scleral icterus.     Conjunctiva/sclera: Conjunctivae normal.   Neck:      Thyroid: No thyromegaly.      Vascular: No JVD.   Cardiovascular:      Rate and Rhythm: Normal rate and regular rhythm.      Heart sounds: S1 normal and S2 normal. No murmur heard.    No friction rub. No gallop. No S3 or S4 sounds.   Pulmonary:      Effort: Pulmonary effort is normal. No respiratory distress.      Breath sounds: Normal breath sounds. No stridor. No wheezing or rales.   Chest:      Chest wall: No tenderness.   Abdominal:      General: Bowel sounds are normal. There is no distension.      Palpations: Abdomen is soft. There is no mass.      Tenderness: There is no abdominal tenderness. There is no rebound.   Genitourinary:     Comments: Deferred  Musculoskeletal:         General: No tenderness or deformity. Normal range of motion.      Cervical back: Normal range of motion and neck supple.   Lymphadenopathy:      Cervical: No cervical adenopathy.   Skin:     General: Skin is warm and dry.      Coloration: Skin is not pale.      Findings: No erythema or rash.   Neurological:      Mental Status: She is alert and oriented to person, place, and time.      Motor: No abnormal muscle tone.      Coordination: Coordination normal.   Psychiatric:         Behavior: Behavior normal.         Thought Content: Thought content normal.         Judgment: Judgment normal.       Lab Results   Component Value Date     06/27/2022    K 4.1 06/27/2022     06/27/2022    CO2 22 (L) 06/27/2022    BUN 18 06/27/2022    CREATININE 0.8 06/27/2022     GLU 82 06/27/2022    HGBA1C 5.5 09/30/2020    AST 21 06/27/2022    ALT 24 06/27/2022    ALBUMIN 4.0 06/27/2022    PROT 8.3 06/27/2022    BILITOT 0.3 06/27/2022    WBC 11.39 06/27/2022    HGB 13.4 06/27/2022    HCT 39.3 06/27/2022    MCV 90 06/27/2022     06/27/2022    TSH 2.538 05/24/2022    CHOL 166 01/27/2023    HDL 41 01/27/2023    LDLCALC 101.0 01/27/2023    TRIG 120 01/27/2023    BNP 14 06/07/2019     Assessment:      1. Other chest pain    2. Primary hypertension    3. Gastroesophageal reflux disease, unspecified whether esophagitis present    4. Palpitations    5. Other hyperlipidemia    6. Former smoker    7. BMI 30.0-30.9,adult    8. Sleep apnea, unspecified type    9. Hypertension, unspecified type    10. Encounter for screening for cardiovascular disorders            Plan:   1. Chest pain - had ER eval r/o ACS; with palpitations  - coronary calcif on Chest CT  - Nuclear stress test 8/2022 neg for ischemia. 2D ECHO 8/2022 with normal bi V function and valves  - Bardy neg for arrythmias.   - cont aspirin 81 mg  - give PRN NTG   - r/o LISETTE   - order CA score     2. HTN  - titrate meds    3. GERD  - cont PPI  - refer to GI - low CV risk for EGD    4. Overweight/Obesity - BMI 31 - 160 lbs.   - cont low stefanie diet     5. HLD - improving Tgs  - recently started statin - but could not tolerate   - needs to take fish oils    6. Former smoker  - has quit     7. Anxiety  - rec meditation     Thank you for allowing me to participate in this patient's care. Please do not hesitate to contact me with any questions or concerns. Consult note has been forwarded to the referral physician.

## 2023-04-18 ENCOUNTER — PATIENT MESSAGE (OUTPATIENT)
Dept: PULMONOLOGY | Facility: CLINIC | Age: 58
End: 2023-04-18
Payer: COMMERCIAL

## 2023-04-18 ENCOUNTER — PATIENT MESSAGE (OUTPATIENT)
Dept: GASTROENTEROLOGY | Facility: CLINIC | Age: 58
End: 2023-04-18
Payer: COMMERCIAL

## 2023-04-28 NOTE — TELEPHONE ENCOUNTER
No care due was identified.  [facility] Embedded Care Due Messages. Reference number: [id number].   4/28/2023 3:42:27 PM CDT

## 2023-04-28 NOTE — TELEPHONE ENCOUNTER
No care due was identified.  [facility] Embedded Care Due Messages. Reference number: [id number].   4/28/2023 3:40:07 PM CDT

## 2023-04-29 RX ORDER — OMEPRAZOLE 40 MG/1
CAPSULE, DELAYED RELEASE ORAL
Qty: 90 CAPSULE | Refills: 0 | Status: SHIPPED | OUTPATIENT
Start: 2023-04-29 | End: 2023-06-14 | Stop reason: SDUPTHER

## 2023-04-29 RX ORDER — OMEPRAZOLE 40 MG/1
CAPSULE, DELAYED RELEASE ORAL
Qty: 90 CAPSULE | Refills: 0 | OUTPATIENT
Start: 2023-04-29

## 2023-04-29 NOTE — TELEPHONE ENCOUNTER
Refill Decision Note   Reta Navas  is requesting a refill authorization.  Brief Assessment and Rationale for Refill:  Approve     Medication Therapy Plan:       Medication Reconciliation Completed: No   Comments:     No Care Gaps recommended.     Note composed:10:41 AM 04/29/2023

## 2023-04-29 NOTE — TELEPHONE ENCOUNTER
Refill Decision Note   Reta Navas  is requesting a refill authorization.  Brief Assessment and Rationale for Refill:  Quick Discontinue     Medication Therapy Plan:       Medication Reconciliation Completed: No   Comments:     No Care Gaps recommended.     Note composed:10:42 AM 04/29/2023

## 2023-05-01 ENCOUNTER — HOSPITAL ENCOUNTER (OUTPATIENT)
Dept: RADIOLOGY | Facility: HOSPITAL | Age: 58
Discharge: HOME | End: 2023-05-01
Attending: INTERNAL MEDICINE
Payer: COMMERCIAL

## 2023-05-01 DIAGNOSIS — Z13.6 ENCOUNTER FOR SCREENING FOR CARDIOVASCULAR DISORDERS: ICD-10-CM

## 2023-05-01 DIAGNOSIS — R07.89 OTHER CHEST PAIN: ICD-10-CM

## 2023-05-01 PROCEDURE — 75571 CT CALCIUM SCORING CARDIAC: ICD-10-PCS | Mod: 26,,, | Performed by: RADIOLOGY

## 2023-05-01 PROCEDURE — 75571 CT HRT W/O DYE W/CA TEST: CPT | Mod: 26,,, | Performed by: RADIOLOGY

## 2023-05-01 PROCEDURE — 75571 CT HRT W/O DYE W/CA TEST: CPT | Mod: TC

## 2023-05-03 ENCOUNTER — OFFICE VISIT (OUTPATIENT)
Dept: UROLOGY | Facility: CLINIC | Age: 58
End: 2023-05-03
Payer: COMMERCIAL

## 2023-05-03 VITALS
SYSTOLIC BLOOD PRESSURE: 114 MMHG | BODY MASS INDEX: 31.6 KG/M2 | DIASTOLIC BLOOD PRESSURE: 79 MMHG | HEART RATE: 84 BPM | WEIGHT: 160.94 LBS | TEMPERATURE: 98 F | HEIGHT: 60 IN

## 2023-05-03 DIAGNOSIS — N28.1 RENAL CYST: ICD-10-CM

## 2023-05-03 DIAGNOSIS — R35.0 FREQUENT URINATION: Primary | ICD-10-CM

## 2023-05-03 LAB
BILIRUB SERPL-MCNC: NORMAL MG/DL
BLOOD URINE, POC: NORMAL
CLARITY, POC UA: CLEAR
COLOR, POC UA: YELLOW
GLUCOSE UR QL STRIP: NORMAL
KETONES UR QL STRIP: NORMAL
LEUKOCYTE ESTERASE URINE, POC: NORMAL
NITRITE, POC UA: NORMAL
PH, POC UA: 5.5
PROTEIN, POC: NORMAL
SPECIFIC GRAVITY, POC UA: 1.03
UROBILINOGEN, POC UA: 0.2

## 2023-05-03 PROCEDURE — 3008F PR BODY MASS INDEX (BMI) DOCUMENTED: ICD-10-PCS | Mod: CPTII,S$GLB,, | Performed by: NURSE PRACTITIONER

## 2023-05-03 PROCEDURE — 3078F PR MOST RECENT DIASTOLIC BLOOD PRESSURE < 80 MM HG: ICD-10-PCS | Mod: CPTII,S$GLB,, | Performed by: NURSE PRACTITIONER

## 2023-05-03 PROCEDURE — 51798 US URINE CAPACITY MEASURE: CPT | Mod: S$GLB,,, | Performed by: NURSE PRACTITIONER

## 2023-05-03 PROCEDURE — 51798 PR MEAS,POST-VOID RES,US,NON-IMAGING: ICD-10-PCS | Mod: S$GLB,,, | Performed by: NURSE PRACTITIONER

## 2023-05-03 PROCEDURE — 99214 OFFICE O/P EST MOD 30 MIN: CPT | Mod: S$GLB,,, | Performed by: NURSE PRACTITIONER

## 2023-05-03 PROCEDURE — 81002 POCT URINE DIPSTICK WITHOUT MICROSCOPE: ICD-10-PCS | Mod: S$GLB,,, | Performed by: NURSE PRACTITIONER

## 2023-05-03 PROCEDURE — 3078F DIAST BP <80 MM HG: CPT | Mod: CPTII,S$GLB,, | Performed by: NURSE PRACTITIONER

## 2023-05-03 PROCEDURE — 1159F MED LIST DOCD IN RCRD: CPT | Mod: CPTII,S$GLB,, | Performed by: NURSE PRACTITIONER

## 2023-05-03 PROCEDURE — 3008F BODY MASS INDEX DOCD: CPT | Mod: CPTII,S$GLB,, | Performed by: NURSE PRACTITIONER

## 2023-05-03 PROCEDURE — 99214 PR OFFICE/OUTPT VISIT, EST, LEVL IV, 30-39 MIN: ICD-10-PCS | Mod: S$GLB,,, | Performed by: NURSE PRACTITIONER

## 2023-05-03 PROCEDURE — 3074F PR MOST RECENT SYSTOLIC BLOOD PRESSURE < 130 MM HG: ICD-10-PCS | Mod: CPTII,S$GLB,, | Performed by: NURSE PRACTITIONER

## 2023-05-03 PROCEDURE — 99999 PR PBB SHADOW E&M-EST. PATIENT-LVL V: ICD-10-PCS | Mod: PBBFAC,,, | Performed by: NURSE PRACTITIONER

## 2023-05-03 PROCEDURE — 3074F SYST BP LT 130 MM HG: CPT | Mod: CPTII,S$GLB,, | Performed by: NURSE PRACTITIONER

## 2023-05-03 PROCEDURE — 1159F PR MEDICATION LIST DOCUMENTED IN MEDICAL RECORD: ICD-10-PCS | Mod: CPTII,S$GLB,, | Performed by: NURSE PRACTITIONER

## 2023-05-03 PROCEDURE — 81002 URINALYSIS NONAUTO W/O SCOPE: CPT | Mod: S$GLB,,, | Performed by: NURSE PRACTITIONER

## 2023-05-03 PROCEDURE — 99999 PR PBB SHADOW E&M-EST. PATIENT-LVL V: CPT | Mod: PBBFAC,,, | Performed by: NURSE PRACTITIONER

## 2023-05-03 RX ORDER — SOLIFENACIN SUCCINATE 10 MG/1
10 TABLET, FILM COATED ORAL DAILY
Qty: 30 TABLET | Refills: 11 | Status: SHIPPED | OUTPATIENT
Start: 2023-05-03 | End: 2024-03-15 | Stop reason: DRUGHIGH

## 2023-05-03 NOTE — PATIENT INSTRUCTIONS
OAB  Increase Vesicare 10 mg once daily  PT for pelvic floor training   RTC in 6-8 weeks     Renal cyst  Renal Ultrasound at the end of the year

## 2023-05-03 NOTE — PROGRESS NOTES
Chief Complaint:   Overactive bladder  Renal cysts    HPI:   Patient is a 57-year-old that is presenting as a follow-up to overactive bladder.  Patient states that she is currently on VESIcare 5 mg once daily, however, nocturia has increased 3 times nightly and urge incontinence has increased over the last 6 months.  Urine in clinic is negative and PVR is 1 mL.  Patient is being followed by this clinic for renal cysts, renal ultrasound indicated a complex cyst, however, CT scan renal mass protocol indicated a nonenhancing cyst.  Patient denies gross hematuria.  11/22/2022  Patient is a 57-year-old female that is presenting with lower back pain, nocturia, mixed incontinence and daytime frequency.  No history of renal stones.  No gross hematuria.  Urine in clinic was negative and PVR was 23 mL.  Patient states that she drinks 2-3 glasses of water during the day, 3-4 cokes every day.  Reports that urge incontinence is greater than stress incontinence.  Has been seen by primary care physician, gyn and prescribed antibiotics for frequent urination.  No positive urine cx  in the past 6 months to review.    Allergies:  Adhesive and Latex, natural rubber    Medications:  has a current medication list which includes the following prescription(s): amlodipine, aspirin, azelastine, clobetasol, clotrimazole, desonide, doxycycline, fexofenadine, fluticasone propionate, ibuprofen, linaclotide, montelukast, multivitamin/iron/folic acid, nitroglycerin, omeprazole, omeprazole, triamcinolone acetonide 0.1%, vitamin d, and solifenacin.    Review of Systems:  General: No fever, chills, fatigability, or weight loss.  Skin: No rashes, itching, or changes in color or texture of skin.  Chest: Denies VERONICA, cyanosis, wheezing, cough, and sputum production.  Abdomen: Appetite fine. No weight loss. Denies diarrhea, abdominal pain, hematemesis, or blood in stool.  Musculoskeletal: No joint stiffness or swelling. Denies back pain.  : As  above.  All other review of systems negative.    PMH:   has a past medical history of Abnormal Pap smear, Abnormal Pap smear of cervix, Chronic rhinitis, Depression, GERD (gastroesophageal reflux disease), Hyperlipidemia, Hypertension, Overactive bladder, Plantar fasciitis, bilateral, and Psoriasis.    PSH:   has a past surgical history that includes Tubal ligation; Oophorectomy; Colonoscopy; Robot-assisted cholecystectomy using da Mildred Xi (N/A, 10/18/2018); Gallbladder surgery (N/A, 10/11/2018); hx colposcopy; Cervical biopsy w/ loop electrode excision; and Hysterectomy.    FamHx: family history includes Breast cancer in her paternal grandmother; Colon cancer in her paternal grandfather and paternal uncle; Leukemia in her brother; No Known Problems in her father; Ovarian cancer in her maternal aunt; Stroke in her maternal aunt and mother.    SocHx:  reports that she quit smoking about 10 years ago. Her smoking use included vaping with nicotine and cigarettes. She has a 39.00 pack-year smoking history. She has never used smokeless tobacco. She reports that she does not drink alcohol and does not use drugs.      Physical Exam:  Vitals:    05/03/23 1528   BP: 114/79   Pulse: 84   Temp: 98.4 °F (36.9 °C)     General: A&Ox3, no apparent distress, no deformities  Neck: No masses, normal thyroid  Lungs: normal inspiration, no use of accessory muscles  Heart: normal pulse, no arrhythmias  Abdomen: Soft, NT, ND, no masses, no hernias, no hepatosplenomegaly  Lymphatic: Neck and groin nodes negative  Skin: The skin is warm and dry. No jaundice.  Ext: No c/c/e.    Labs/Studies:   12/09/2022  EXAMINATION:  CT ABDOMEN W WO CONTRAST     CLINICAL HISTORY:  Renal mass; Other specified disorders of kidney and ureter     TECHNIQUE:  Low dose axial images, sagittal and coronal reformations were obtained from the lung bases to the iliac crest prior to and following the IV administration of 100 mL of Omnipaque 350 .  Oral contrast was  not given.  Multiphase technique was utilized per institution renal mass protocol.     COMPARISON:  Renal ultrasound dated 11/30/2022     FINDINGS:  Visualized Chest: Lung bases are clear     Abdomen:     Liver: Within normal limits.     Gallbladder and biliary: Within normal limits.     Spleen: Within normal limits.     Pancreas: Within normal limits.     Adrenals: Within normal limits.     Kidneys: The complex cyst seen at the upper pole of the left kidney described on previous ultrasound measures smaller in size at 2.4 cm with some barely perceptible internal septation noted.  There is no associated significant internal enhancement.  Several additional subcentimeter hypoattenuating foci are seen involving the kidneys bilaterally which are too small to reliably characterize but also statistically likely represent cysts. No enhancing renal masses visualized. No hydronephrosis, hydroureter, or stone visualized.     Bowel: Numerous diverticular seen throughout the visualized colon with no findings to suggest acute diverticulitis.  No bowel wall thickening or evidence of obstruction.  The appendix appears normal.  Note that the bowel is not imaged in its entirety as the pelvis was excluded from the examination.     Peritoneum: No ascites or pneumoperitoneum.     Abdominal adenopathy: None.     Vasculature: Within normal limits.     Bones: No acute findings.     Miscellaneous: None.     Impression:     1. Bosniak 2 cyst at the upper pole of the left kidney.  No suspicious renal lesions visualized.  2. Diverticulosis without evidence of acute diverticulitis noting that the bowel is not imaged in its entirety on this examination.    Impression/Plan:   1. Overactive bladder  Patient increase VESIcare to 10 mg once daily and was referred to  pelvic floor training.  Patient return to clinic in 6-8 weeks for re-evaluation.    2. Renal cysts  Repeat renal ultrasound at the end of the year with a visit to review.

## 2023-05-05 ENCOUNTER — HOSPITAL ENCOUNTER (OUTPATIENT)
Dept: RADIOLOGY | Facility: HOSPITAL | Age: 58
Discharge: HOME | End: 2023-05-05
Attending: STUDENT IN AN ORGANIZED HEALTH CARE EDUCATION/TRAINING PROGRAM
Payer: COMMERCIAL

## 2023-05-05 DIAGNOSIS — Z12.31 SCREENING MAMMOGRAM, ENCOUNTER FOR: ICD-10-CM

## 2023-05-05 PROCEDURE — 77063 MAMMO DIGITAL SCREENING BILAT WITH TOMO: ICD-10-PCS | Mod: 26,,, | Performed by: RADIOLOGY

## 2023-05-05 PROCEDURE — 77067 SCR MAMMO BI INCL CAD: CPT | Mod: TC,PO

## 2023-05-05 PROCEDURE — 77063 BREAST TOMOSYNTHESIS BI: CPT | Mod: 26,,, | Performed by: RADIOLOGY

## 2023-05-05 PROCEDURE — 77067 MAMMO DIGITAL SCREENING BILAT WITH TOMO: ICD-10-PCS | Mod: 26,,, | Performed by: RADIOLOGY

## 2023-05-05 PROCEDURE — 77067 SCR MAMMO BI INCL CAD: CPT | Mod: 26,,, | Performed by: RADIOLOGY

## 2023-06-09 ENCOUNTER — LAB VISIT (OUTPATIENT)
Dept: LAB | Facility: HOSPITAL | Age: 58
End: 2023-06-09
Attending: FAMILY MEDICINE
Payer: COMMERCIAL

## 2023-06-09 DIAGNOSIS — Z00.00 ANNUAL PHYSICAL EXAM: ICD-10-CM

## 2023-06-09 LAB
ALBUMIN SERPL BCP-MCNC: 3.8 G/DL (ref 3.5–5.2)
ALP SERPL-CCNC: 108 U/L (ref 55–135)
ALT SERPL W/O P-5'-P-CCNC: 25 U/L (ref 10–44)
ANION GAP SERPL CALC-SCNC: 11 MMOL/L (ref 8–16)
AST SERPL-CCNC: 21 U/L (ref 10–40)
BASOPHILS # BLD AUTO: 0.1 K/UL (ref 0–0.2)
BASOPHILS NFR BLD: 0.9 % (ref 0–1.9)
BILIRUB SERPL-MCNC: 0.3 MG/DL (ref 0.1–1)
BUN SERPL-MCNC: 10 MG/DL (ref 6–20)
CALCIUM SERPL-MCNC: 9.4 MG/DL (ref 8.7–10.5)
CHLORIDE SERPL-SCNC: 104 MMOL/L (ref 95–110)
CO2 SERPL-SCNC: 24 MMOL/L (ref 23–29)
CREAT SERPL-MCNC: 0.8 MG/DL (ref 0.5–1.4)
DIFFERENTIAL METHOD: ABNORMAL
EOSINOPHIL # BLD AUTO: 5 K/UL (ref 0–0.5)
EOSINOPHIL NFR BLD: 46.1 % (ref 0–8)
ERYTHROCYTE [DISTWIDTH] IN BLOOD BY AUTOMATED COUNT: 13.4 % (ref 11.5–14.5)
EST. GFR  (NO RACE VARIABLE): >60 ML/MIN/1.73 M^2
ESTIMATED AVG GLUCOSE: 105 MG/DL (ref 68–131)
GLUCOSE SERPL-MCNC: 90 MG/DL (ref 70–110)
HBA1C MFR BLD: 5.3 % (ref 4–5.6)
HCT VFR BLD AUTO: 42.5 % (ref 37–48.5)
HGB BLD-MCNC: 13.9 G/DL (ref 12–16)
IMM GRANULOCYTES # BLD AUTO: 0.01 K/UL (ref 0–0.04)
IMM GRANULOCYTES NFR BLD AUTO: 0.1 % (ref 0–0.5)
LYMPHOCYTES # BLD AUTO: 1.8 K/UL (ref 1–4.8)
LYMPHOCYTES NFR BLD: 16.5 % (ref 18–48)
MCH RBC QN AUTO: 29.8 PG (ref 27–31)
MCHC RBC AUTO-ENTMCNC: 32.7 G/DL (ref 32–36)
MCV RBC AUTO: 91 FL (ref 82–98)
MONOCYTES # BLD AUTO: 0.6 K/UL (ref 0.3–1)
MONOCYTES NFR BLD: 5.2 % (ref 4–15)
NEUTROPHILS # BLD AUTO: 3.4 K/UL (ref 1.8–7.7)
NEUTROPHILS NFR BLD: 31.2 % (ref 38–73)
NRBC BLD-RTO: 0 /100 WBC
PLATELET # BLD AUTO: 339 K/UL (ref 150–450)
PLATELET BLD QL SMEAR: ABNORMAL
PMV BLD AUTO: 10.2 FL (ref 9.2–12.9)
POTASSIUM SERPL-SCNC: 3.9 MMOL/L (ref 3.5–5.1)
PROT SERPL-MCNC: 7.8 G/DL (ref 6–8.4)
RBC # BLD AUTO: 4.67 M/UL (ref 4–5.4)
SODIUM SERPL-SCNC: 139 MMOL/L (ref 136–145)
TSH SERPL DL<=0.005 MIU/L-ACNC: 3.15 UIU/ML (ref 0.4–4)
WBC # BLD AUTO: 10.88 K/UL (ref 3.9–12.7)

## 2023-06-09 PROCEDURE — 83036 HEMOGLOBIN GLYCOSYLATED A1C: CPT | Performed by: FAMILY MEDICINE

## 2023-06-09 PROCEDURE — 84443 ASSAY THYROID STIM HORMONE: CPT | Performed by: FAMILY MEDICINE

## 2023-06-09 PROCEDURE — 36415 COLL VENOUS BLD VENIPUNCTURE: CPT | Performed by: FAMILY MEDICINE

## 2023-06-09 PROCEDURE — 85025 COMPLETE CBC W/AUTO DIFF WBC: CPT | Performed by: FAMILY MEDICINE

## 2023-06-09 PROCEDURE — 80053 COMPREHEN METABOLIC PANEL: CPT | Performed by: FAMILY MEDICINE

## 2023-06-14 ENCOUNTER — OFFICE VISIT (OUTPATIENT)
Dept: INTERNAL MEDICINE | Facility: CLINIC | Age: 58
End: 2023-06-14
Payer: COMMERCIAL

## 2023-06-14 ENCOUNTER — OFFICE VISIT (OUTPATIENT)
Dept: UROLOGY | Facility: CLINIC | Age: 58
End: 2023-06-14
Payer: COMMERCIAL

## 2023-06-14 VITALS
BODY MASS INDEX: 30.99 KG/M2 | HEIGHT: 60 IN | RESPIRATION RATE: 18 BRPM | SYSTOLIC BLOOD PRESSURE: 117 MMHG | DIASTOLIC BLOOD PRESSURE: 83 MMHG | WEIGHT: 157.88 LBS | HEART RATE: 81 BPM

## 2023-06-14 VITALS
WEIGHT: 154 LBS | HEART RATE: 80 BPM | SYSTOLIC BLOOD PRESSURE: 112 MMHG | TEMPERATURE: 98 F | DIASTOLIC BLOOD PRESSURE: 78 MMHG | RESPIRATION RATE: 18 BRPM | BODY MASS INDEX: 30.08 KG/M2 | OXYGEN SATURATION: 98 %

## 2023-06-14 DIAGNOSIS — Z00.00 ANNUAL PHYSICAL EXAM: ICD-10-CM

## 2023-06-14 DIAGNOSIS — I10 HYPERTENSION, UNSPECIFIED TYPE: ICD-10-CM

## 2023-06-14 DIAGNOSIS — J30.9 ALLERGIC RHINITIS, UNSPECIFIED SEASONALITY, UNSPECIFIED TRIGGER: ICD-10-CM

## 2023-06-14 DIAGNOSIS — K21.9 GASTROESOPHAGEAL REFLUX DISEASE, UNSPECIFIED WHETHER ESOPHAGITIS PRESENT: Primary | ICD-10-CM

## 2023-06-14 DIAGNOSIS — R35.1 NOCTURIA: ICD-10-CM

## 2023-06-14 DIAGNOSIS — N32.81 OAB (OVERACTIVE BLADDER): Primary | ICD-10-CM

## 2023-06-14 LAB
BILIRUB SERPL-MCNC: NORMAL MG/DL
BLOOD URINE, POC: NORMAL
CLARITY, POC UA: CLEAR
COLOR, POC UA: YELLOW
GLUCOSE UR QL STRIP: NORMAL
KETONES UR QL STRIP: NORMAL
LEUKOCYTE ESTERASE URINE, POC: NORMAL
NITRITE, POC UA: NORMAL
PH, POC UA: 6
PROTEIN, POC: NORMAL
SPECIFIC GRAVITY, POC UA: 1.03
UROBILINOGEN, POC UA: 0.2

## 2023-06-14 PROCEDURE — 1159F MED LIST DOCD IN RCRD: CPT | Mod: CPTII,S$GLB,, | Performed by: NURSE PRACTITIONER

## 2023-06-14 PROCEDURE — 3074F SYST BP LT 130 MM HG: CPT | Mod: CPTII,S$GLB,, | Performed by: FAMILY MEDICINE

## 2023-06-14 PROCEDURE — 99214 OFFICE O/P EST MOD 30 MIN: CPT | Mod: 25,S$GLB,, | Performed by: FAMILY MEDICINE

## 2023-06-14 PROCEDURE — 3078F DIAST BP <80 MM HG: CPT | Mod: CPTII,S$GLB,, | Performed by: FAMILY MEDICINE

## 2023-06-14 PROCEDURE — 99396 PREV VISIT EST AGE 40-64: CPT | Mod: S$GLB,,, | Performed by: FAMILY MEDICINE

## 2023-06-14 PROCEDURE — 99214 PR OFFICE/OUTPT VISIT, EST, LEVL IV, 30-39 MIN: ICD-10-PCS | Mod: S$GLB,,, | Performed by: NURSE PRACTITIONER

## 2023-06-14 PROCEDURE — 3008F PR BODY MASS INDEX (BMI) DOCUMENTED: ICD-10-PCS | Mod: CPTII,S$GLB,, | Performed by: FAMILY MEDICINE

## 2023-06-14 PROCEDURE — 99214 PR OFFICE/OUTPT VISIT, EST, LEVL IV, 30-39 MIN: ICD-10-PCS | Mod: 25,S$GLB,, | Performed by: FAMILY MEDICINE

## 2023-06-14 PROCEDURE — 81002 POCT URINE DIPSTICK WITHOUT MICROSCOPE: ICD-10-PCS | Mod: S$GLB,,, | Performed by: NURSE PRACTITIONER

## 2023-06-14 PROCEDURE — 99396 PR PREVENTIVE VISIT,EST,40-64: ICD-10-PCS | Mod: S$GLB,,, | Performed by: FAMILY MEDICINE

## 2023-06-14 PROCEDURE — 3074F PR MOST RECENT SYSTOLIC BLOOD PRESSURE < 130 MM HG: ICD-10-PCS | Mod: CPTII,S$GLB,, | Performed by: FAMILY MEDICINE

## 2023-06-14 PROCEDURE — 1159F PR MEDICATION LIST DOCUMENTED IN MEDICAL RECORD: ICD-10-PCS | Mod: CPTII,S$GLB,, | Performed by: NURSE PRACTITIONER

## 2023-06-14 PROCEDURE — 3044F HG A1C LEVEL LT 7.0%: CPT | Mod: CPTII,S$GLB,, | Performed by: FAMILY MEDICINE

## 2023-06-14 PROCEDURE — 81002 URINALYSIS NONAUTO W/O SCOPE: CPT | Mod: S$GLB,,, | Performed by: NURSE PRACTITIONER

## 2023-06-14 PROCEDURE — 1159F PR MEDICATION LIST DOCUMENTED IN MEDICAL RECORD: ICD-10-PCS | Mod: CPTII,S$GLB,, | Performed by: FAMILY MEDICINE

## 2023-06-14 PROCEDURE — 99214 OFFICE O/P EST MOD 30 MIN: CPT | Mod: S$GLB,,, | Performed by: NURSE PRACTITIONER

## 2023-06-14 PROCEDURE — 3079F PR MOST RECENT DIASTOLIC BLOOD PRESSURE 80-89 MM HG: ICD-10-PCS | Mod: CPTII,S$GLB,, | Performed by: NURSE PRACTITIONER

## 2023-06-14 PROCEDURE — 3078F PR MOST RECENT DIASTOLIC BLOOD PRESSURE < 80 MM HG: ICD-10-PCS | Mod: CPTII,S$GLB,, | Performed by: FAMILY MEDICINE

## 2023-06-14 PROCEDURE — 3008F BODY MASS INDEX DOCD: CPT | Mod: CPTII,S$GLB,, | Performed by: NURSE PRACTITIONER

## 2023-06-14 PROCEDURE — 3008F PR BODY MASS INDEX (BMI) DOCUMENTED: ICD-10-PCS | Mod: CPTII,S$GLB,, | Performed by: NURSE PRACTITIONER

## 2023-06-14 PROCEDURE — 3079F DIAST BP 80-89 MM HG: CPT | Mod: CPTII,S$GLB,, | Performed by: NURSE PRACTITIONER

## 2023-06-14 PROCEDURE — 3074F SYST BP LT 130 MM HG: CPT | Mod: CPTII,S$GLB,, | Performed by: NURSE PRACTITIONER

## 2023-06-14 PROCEDURE — 3008F BODY MASS INDEX DOCD: CPT | Mod: CPTII,S$GLB,, | Performed by: FAMILY MEDICINE

## 2023-06-14 PROCEDURE — 3044F PR MOST RECENT HEMOGLOBIN A1C LEVEL <7.0%: ICD-10-PCS | Mod: CPTII,S$GLB,, | Performed by: NURSE PRACTITIONER

## 2023-06-14 PROCEDURE — 99999 PR PBB SHADOW E&M-EST. PATIENT-LVL IV: CPT | Mod: PBBFAC,,, | Performed by: FAMILY MEDICINE

## 2023-06-14 PROCEDURE — 99999 PR PBB SHADOW E&M-EST. PATIENT-LVL IV: ICD-10-PCS | Mod: PBBFAC,,, | Performed by: FAMILY MEDICINE

## 2023-06-14 PROCEDURE — 99999 PR PBB SHADOW E&M-EST. PATIENT-LVL IV: CPT | Mod: PBBFAC,,, | Performed by: NURSE PRACTITIONER

## 2023-06-14 PROCEDURE — 1159F MED LIST DOCD IN RCRD: CPT | Mod: CPTII,S$GLB,, | Performed by: FAMILY MEDICINE

## 2023-06-14 PROCEDURE — 99999 PR PBB SHADOW E&M-EST. PATIENT-LVL IV: ICD-10-PCS | Mod: PBBFAC,,, | Performed by: NURSE PRACTITIONER

## 2023-06-14 PROCEDURE — 3044F PR MOST RECENT HEMOGLOBIN A1C LEVEL <7.0%: ICD-10-PCS | Mod: CPTII,S$GLB,, | Performed by: FAMILY MEDICINE

## 2023-06-14 PROCEDURE — 3074F PR MOST RECENT SYSTOLIC BLOOD PRESSURE < 130 MM HG: ICD-10-PCS | Mod: CPTII,S$GLB,, | Performed by: NURSE PRACTITIONER

## 2023-06-14 PROCEDURE — 3044F HG A1C LEVEL LT 7.0%: CPT | Mod: CPTII,S$GLB,, | Performed by: NURSE PRACTITIONER

## 2023-06-14 RX ORDER — ALBUTEROL SULFATE 90 UG/1
2 AEROSOL, METERED RESPIRATORY (INHALATION) EVERY 6 HOURS PRN
Qty: 18 G | Refills: 6 | Status: SHIPPED | OUTPATIENT
Start: 2023-06-14 | End: 2024-02-26 | Stop reason: SDUPTHER

## 2023-06-14 RX ORDER — OMEPRAZOLE 40 MG/1
40 CAPSULE, DELAYED RELEASE ORAL DAILY
Qty: 180 CAPSULE | Refills: 1 | Status: SHIPPED | OUTPATIENT
Start: 2023-06-14

## 2023-06-14 NOTE — PROGRESS NOTES
"Subjective:       Patient ID: Reta Navas is a 57 y.o. female.    Chief Complaint: Annual Exam (Her only complaint today are her "allergies" - congestion: chest and sinus, productive "green" cough, sneezing, itchy eyes, HA. Advises that she feels "like I can't get enough air in.")    HPI    57    Patient Active Problem List   Diagnosis    Obesity    Urinary, incontinence, stress female    Hypertension    GERD (gastroesophageal reflux disease)       Past Medical History:   Diagnosis Date    Abnormal Pap smear     Abnormal Pap smear of cervix     Chronic rhinitis     Depression     GERD (gastroesophageal reflux disease)     Hyperlipidemia     Hypertension     Overactive bladder     Plantar fasciitis, bilateral     Psoriasis        Past Surgical History:   Procedure Laterality Date    CERVICAL BIOPSY  W/ LOOP ELECTRODE EXCISION      COLONOSCOPY      GALLBLADDER SURGERY N/A 10/11/2018    hx colposcopy      HYSTERECTOMY      supracervical hyst/LSO for fibroids    OOPHORECTOMY      LSO    ROBOT-ASSISTED CHOLECYSTECTOMY USING DA WILVER XI N/A 10/18/2018    Procedure: XI ROBOTIC CHOLECYSTECTOMY;  Surgeon: Robel Ferrer MD;  Location: [facility];  Service: General;  Laterality: N/A;    TUBAL LIGATION         Family History   Problem Relation Age of Onset    Ovarian cancer Mother     Stroke Mother     No Known Problems Father     Ovarian cancer Maternal Aunt     Stroke Maternal Aunt     Colon cancer Paternal Uncle     Breast cancer Paternal Grandmother     Colon cancer Paternal Grandfather     Leukemia Brother        Social History     Tobacco Use   Smoking Status Former    Packs/day: 1.00    Years: 39.00    Pack years: 39.00    Types: Vaping with nicotine, Cigarettes    Start date:     Quit date:     Years since quittin.4    Passive exposure: Past   Smokeless Tobacco Never       Wt Readings from Last 5 Encounters:   23 69.9 kg (154 lb)   23 73 kg (160 lb 15 oz)   23 73 kg (160 lb 15 oz)   22 " 72.7 kg (160 lb 2.6 oz)   10/17/22 71.2 kg (156 lb 15.5 oz)       For further HPI details, see assessment and plan.    Review of Systems   Constitutional:  Negative for chills and fever.   HENT:  Negative for voice change.    Eyes:  Negative for visual disturbance.   Respiratory:  Negative for shortness of breath.    Cardiovascular:  Negative for chest pain, palpitations and leg swelling.   Gastrointestinal:  Negative for constipation and diarrhea.   Genitourinary:  Negative for difficulty urinating.   Musculoskeletal:  Negative for gait problem.   Skin:  Negative for rash.   Neurological:  Negative for dizziness and light-headedness.   Psychiatric/Behavioral:  Negative for dysphoric mood.      Objective:      Vitals:    06/14/23 0805   BP: 112/78   Pulse: 80   Resp: 18   Temp: 97.7 °F (36.5 °C)       Physical Exam  Constitutional:       General: She is not in acute distress.     Appearance: Normal appearance. She is well-developed.   Cardiovascular:      Rate and Rhythm: Normal rate and regular rhythm.   Pulmonary:      Effort: Pulmonary effort is normal. No respiratory distress.      Breath sounds: Normal breath sounds.   Musculoskeletal:         General: Normal range of motion.   Neurological:      Mental Status: She is alert. She is not disoriented.   Psychiatric:         Mood and Affect: Mood normal.         Speech: Speech normal.       Assessment:       1. Gastroesophageal reflux disease, unspecified whether esophagitis present    2. Allergic rhinitis, unspecified seasonality, unspecified trigger    3. Annual physical exam    4. Hypertension, unspecified type        Plan:   Gastroesophageal reflux disease, unspecified whether esophagitis present  -     Ambulatory referral/consult to Gastroenterology; Future; Expected date: 06/21/2023    Allergic rhinitis, unspecified seasonality, unspecified trigger    Annual physical exam    Hypertension, unspecified type    Other orders  -     omeprazole (PRILOSEC) 40 MG  capsule; Take 1 capsule (40 mg total) by mouth once daily.  Dispense: 180 capsule; Refill: 1  -     albuterol (VENTOLIN HFA) 90 mcg/actuation inhaler; Inhale 2 puffs into the lungs every 6 (six) hours as needed for Shortness of Breath. Rescue  Dispense: 18 g; Refill: 6      Annual exam   Lifestyle   Exercise  Patient walks 20 minutes for her exercise.  I suspect this is not meeting CDC physical activity guidelines.  She seems to be in agreement with me.  Encourage increases in her exercise habits and the addition of muscle strengthening activities.      Patient does not smoke.  She does not vape.  She does not drink alcohol.  She does no drugs.  She wears her seatbelt.    Immunizations   I encouraged patient to get the shingles shot.  She does not want to do it yet out of fear of pain.  Her father encourage his her to get the shot as well.  We will revisit this in the future.  Declines any further COVID boosters.  Otherwise up-to-date on vaccines.      Cancer screening  Colonoscopy  Will defer to her upcoming GI appointment (see below)  Breast cancer screening is up-to-date.  She will be due in 11 months.    Cervical cancer screening  She is followed by Gynecology he previously here and then [facility] but planning on returning to [facility].  Her last Pap smear was June of last year.  Negative for intraepithelial lesions or malignancy cellular changes associated with atrophy are present.      Lab work  Reviewed recent lab work with patient   No active concerns at present.      Reviewed drug list   Noted ibuprofen 800.  She uses it for some low back issues.  Discussed the risk of frequent use of nonsteroidal anti-inflammatory drugs including gastric lining concerns and renal function concerns.    Problem based visit   See above and below for HPI/review of systems/vital/physical exam/assessment and plan.    Allergic rhinitis & GERD  This is an ongoing and her primary complaint.  She is using Astelin nasal spray, Flonase  nasal spray, oral antihistamine Allegra, and Singulair.  Patient does not regularly use her nasal sprays as they make her sneeze..  She uses the Flonase on a regular basis but not the Astelin.  Encouraged she tried a bit better compliance see if it will improve her symptoms  Her symptoms are not resolved.  I suspect gastroesophageal reflux disease may be a contributing factor as she is still suffering with heartburn symptoms.  We will consult Gastroenterology for further evaluation and consideration of upper and lower endoscopy.  In the interim I want her to double her proton pump inhibitor to twice daily usage of 40 mg to see if that alleviates her heartburn symptoms and improves her allergy symptoms.    Patient has no formal diagnosis of asthma.  Although she does report a history of a asthma attack.  She was given albuterol.  She does report she feels that walking in the heat contributes to some trouble breathing.  Plan to provide her with an albuterol inhaler for as needed use.  If she finds she needs an increased frequency plan to consult pulmonology for pulmonary function testing and deeper evaluation.    Obesity  Her body mass index is 30.08.  She knows she needs to lose weight as this will improve her reflux.  She states she is been on vacation lot that contributes.  Discussed CDC physical activity guidelines of 150 minutes week of moderate intensity aerobic activity and 2 days week of muscle strengthening activity.  Discussed my primary emphasis on weight loss is finding a consistent caloric deficit    Hypertension  Well controlled with amlodipine 10  Continue med    Anxiety  Bridges in particular.  Doesn't want to treat or address at present.  Revisit if worsens       6 month f/u    This note was verbally dictated, please excuse any type errors.

## 2023-06-14 NOTE — PROGRESS NOTES
Chief Complaint:   OAB    HPI:   Patient is a 57-year-old female that is presenting as a follow-up to an increase in VESIcare.  Patient states that VESIcare 10 mg once daily has decrease her nocturia twice nightly and daytime frequency has resolved.  Patient states she was unable to follow-up with PT pelvic floor training and is requesting a new referral.  05/03/2023  Patient is a 57-year-old that is presenting as a follow-up to overactive bladder.  Patient states that she is currently on VESIcare 5 mg once daily, however, nocturia has increased 3 times nightly and urge incontinence has increased over the last 6 months.  Urine in clinic is negative and PVR is 1 mL.  Patient is being followed by this clinic for renal cysts, renal ultrasound indicated a complex cyst, however, CT scan renal mass protocol indicated a nonenhancing cyst.  Patient denies gross hematuria.  11/22/2022  Patient is a 57-year-old female that is presenting with lower back pain, nocturia, mixed incontinence and daytime frequency.  No history of renal stones.  No gross hematuria.  Urine in clinic was negative and PVR was 23 mL.  Patient states that she drinks 2-3 glasses of water during the day, 3-4 cokes every day.  Reports that urge incontinence is greater than stress incontinence.  Has been seen by primary care physician, gyn and prescribed antibiotics for frequent urination.  No positive urine cx  in the past 6 months to review.    Allergies:  Adhesive and Latex, natural rubber    Medications:  has a current medication list which includes the following prescription(s): albuterol, amlodipine, aspirin, azelastine, desonide, fexofenadine, fluticasone propionate, ibuprofen, linaclotide, montelukast, nitroglycerin, omeprazole, solifenacin, and vitamin d.    Review of Systems:  General: No fever, chills, fatigability, or weight loss.  Skin: No rashes, itching, or changes in color or texture of skin.  Chest: Denies VERONICA, cyanosis, wheezing, cough, and  sputum production.  Abdomen: Appetite fine. No weight loss. Denies diarrhea, abdominal pain, hematemesis, or blood in stool.  Musculoskeletal: No joint stiffness or swelling. Denies back pain.  : As above.  All other review of systems negative.    PMH:   has a past medical history of Abnormal Pap smear, Abnormal Pap smear of cervix, Chronic rhinitis, Depression, GERD (gastroesophageal reflux disease), Hyperlipidemia, Hypertension, Overactive bladder, Plantar fasciitis, bilateral, and Psoriasis.    PSH:   has a past surgical history that includes Tubal ligation; Oophorectomy; Colonoscopy; Robot-assisted cholecystectomy using da Mildred Xi (N/A, 10/18/2018); Gallbladder surgery (N/A, 10/11/2018); hx colposcopy; Cervical biopsy w/ loop electrode excision; and Hysterectomy.    FamHx: family history includes Breast cancer in her paternal grandmother; Colon cancer in her paternal grandfather and paternal uncle; Leukemia in her brother; No Known Problems in her father; Ovarian cancer in her maternal aunt and mother; Stroke in her maternal aunt and mother.    SocHx:  reports that she quit smoking about 4 years ago. Her smoking use included vaping with nicotine and cigarettes. She started smoking about 49 years ago. She has a 39.00 pack-year smoking history. She has been exposed to tobacco smoke. She has never used smokeless tobacco. She reports that she does not drink alcohol and does not use drugs.      Physical Exam:  General: A&Ox3, no apparent distress, no deformities  Neck: No masses, normal thyroid  Lungs: normal inspiration, no use of accessory muscles  Heart: normal pulse, no arrhythmias  Abdomen: Soft, NT, ND, no masses, no hernias, no hepatosplenomegaly  Lymphatic: Neck and groin nodes negative  Impression/Plan:   Overactive bladder  Patient has had a 90% resolution in all overactive bladder symptoms, including nocturia.  Urine in clinic is negative and PVR was 14 mL.  Patient denies adverse side effects, will  remain on current dose.  New referral for PT pelvic floor training was sent and patient to return to clinic in 3 months for re-evaluation.

## 2023-06-17 ENCOUNTER — PATIENT MESSAGE (OUTPATIENT)
Dept: INTERNAL MEDICINE | Facility: CLINIC | Age: 58
End: 2023-06-17
Payer: COMMERCIAL

## 2023-06-19 RX ORDER — OMEPRAZOLE 40 MG/1
40 CAPSULE, DELAYED RELEASE ORAL
Qty: 180 CAPSULE | Refills: 1 | Status: SHIPPED | OUTPATIENT
Start: 2023-06-19 | End: 2024-06-18

## 2023-06-21 ENCOUNTER — OFFICE VISIT (OUTPATIENT)
Dept: GASTROENTEROLOGY | Facility: CLINIC | Age: 58
End: 2023-06-21
Payer: COMMERCIAL

## 2023-06-21 VITALS
BODY MASS INDEX: 31.03 KG/M2 | WEIGHT: 158.06 LBS | HEART RATE: 80 BPM | HEIGHT: 60 IN | DIASTOLIC BLOOD PRESSURE: 76 MMHG | SYSTOLIC BLOOD PRESSURE: 110 MMHG

## 2023-06-21 DIAGNOSIS — Z12.11 COLON CANCER SCREENING: ICD-10-CM

## 2023-06-21 DIAGNOSIS — K21.9 GASTROESOPHAGEAL REFLUX DISEASE, UNSPECIFIED WHETHER ESOPHAGITIS PRESENT: ICD-10-CM

## 2023-06-21 PROCEDURE — 3074F PR MOST RECENT SYSTOLIC BLOOD PRESSURE < 130 MM HG: ICD-10-PCS | Mod: CPTII,S$GLB,, | Performed by: INTERNAL MEDICINE

## 2023-06-21 PROCEDURE — 99999 PR PBB SHADOW E&M-EST. PATIENT-LVL V: ICD-10-PCS | Mod: PBBFAC,,, | Performed by: INTERNAL MEDICINE

## 2023-06-21 PROCEDURE — 3044F HG A1C LEVEL LT 7.0%: CPT | Mod: CPTII,S$GLB,, | Performed by: INTERNAL MEDICINE

## 2023-06-21 PROCEDURE — 3074F SYST BP LT 130 MM HG: CPT | Mod: CPTII,S$GLB,, | Performed by: INTERNAL MEDICINE

## 2023-06-21 PROCEDURE — 3078F DIAST BP <80 MM HG: CPT | Mod: CPTII,S$GLB,, | Performed by: INTERNAL MEDICINE

## 2023-06-21 PROCEDURE — 3008F PR BODY MASS INDEX (BMI) DOCUMENTED: ICD-10-PCS | Mod: CPTII,S$GLB,, | Performed by: INTERNAL MEDICINE

## 2023-06-21 PROCEDURE — 99204 PR OFFICE/OUTPT VISIT, NEW, LEVL IV, 45-59 MIN: ICD-10-PCS | Mod: S$GLB,,, | Performed by: INTERNAL MEDICINE

## 2023-06-21 PROCEDURE — 99999 PR PBB SHADOW E&M-EST. PATIENT-LVL V: CPT | Mod: PBBFAC,,, | Performed by: INTERNAL MEDICINE

## 2023-06-21 PROCEDURE — 3044F PR MOST RECENT HEMOGLOBIN A1C LEVEL <7.0%: ICD-10-PCS | Mod: CPTII,S$GLB,, | Performed by: INTERNAL MEDICINE

## 2023-06-21 PROCEDURE — 1159F MED LIST DOCD IN RCRD: CPT | Mod: CPTII,S$GLB,, | Performed by: INTERNAL MEDICINE

## 2023-06-21 PROCEDURE — 1160F PR REVIEW ALL MEDS BY PRESCRIBER/CLIN PHARMACIST DOCUMENTED: ICD-10-PCS | Mod: CPTII,S$GLB,, | Performed by: INTERNAL MEDICINE

## 2023-06-21 PROCEDURE — 1160F RVW MEDS BY RX/DR IN RCRD: CPT | Mod: CPTII,S$GLB,, | Performed by: INTERNAL MEDICINE

## 2023-06-21 PROCEDURE — 99204 OFFICE O/P NEW MOD 45 MIN: CPT | Mod: S$GLB,,, | Performed by: INTERNAL MEDICINE

## 2023-06-21 PROCEDURE — 3078F PR MOST RECENT DIASTOLIC BLOOD PRESSURE < 80 MM HG: ICD-10-PCS | Mod: CPTII,S$GLB,, | Performed by: INTERNAL MEDICINE

## 2023-06-21 PROCEDURE — 1159F PR MEDICATION LIST DOCUMENTED IN MEDICAL RECORD: ICD-10-PCS | Mod: CPTII,S$GLB,, | Performed by: INTERNAL MEDICINE

## 2023-06-21 PROCEDURE — 3008F BODY MASS INDEX DOCD: CPT | Mod: CPTII,S$GLB,, | Performed by: INTERNAL MEDICINE

## 2023-06-21 NOTE — ASSESSMENT & PLAN NOTE
Plan:  -Patient was started on PPI at BID dosing a couple of days ago   -We will give her time to see if the medication works  -Patient was instructed to take PPI 30 mins prior to a meal   -Avoid trigger foods   -GERD lifestyle modifications   -Counseled on weight loss   -If above regimen does not work, we can discuss EGD at next visit

## 2023-06-21 NOTE — PROGRESS NOTES
Clinic Consult:  [facility] Consultation Note    Reason for Consult:  Diagnoses of Gastroesophageal reflux disease, unspecified whether esophagitis present and Colon cancer screening were pertinent to this visit.    PCP: Mendoza Cardoso   [id number] / [facility] [id number]    HPI:  This is a 57 y.o. female here for evaluation of GERD.   She has been experiencing it for several decades.  It is now getting worst.   She feels burning in chest.  PCP increased PPI to BID dosing.   She just started it a couple of days ago.   She does not take medication 30 mins prior to a meal.   Trigger foods are greasy foods.   Last colonoscopy in 2025.       ROS:  As per HPI       Medical History:   Past Medical History:   Diagnosis Date    Abnormal Pap smear     Abnormal Pap smear of cervix     Chronic rhinitis     Depression     GERD (gastroesophageal reflux disease)     Hyperlipidemia     Hypertension     Overactive bladder     Plantar fasciitis, bilateral     Psoriasis        Surgical History:  Past Surgical History:   Procedure Laterality Date    CERVICAL BIOPSY  W/ LOOP ELECTRODE EXCISION      COLONOSCOPY      GALLBLADDER SURGERY N/A 10/11/2018    hx colposcopy      HYSTERECTOMY      supracervical hyst/LSO for fibroids    OOPHORECTOMY      LSO    ROBOT-ASSISTED CHOLECYSTECTOMY USING DA WILVER XI N/A 10/18/2018    Procedure: XI ROBOTIC CHOLECYSTECTOMY;  Surgeon: Robel Ferrer MD;  Location: [facility];  Service: General;  Laterality: N/A;    TUBAL LIGATION         Family History:   Family History   Problem Relation Age of Onset    Ovarian cancer Mother     Stroke Mother     No Known Problems Father     Ovarian cancer Maternal Aunt     Stroke Maternal Aunt     Colon cancer Paternal Uncle     Breast cancer Paternal Grandmother     Colon cancer Paternal Grandfather     Leukemia Brother        Social History:   Social History     Tobacco Use    Smoking status: Former     Packs/day: 1.00     Years: 39.00     Pack  years: 39.00     Types: Vaping with nicotine, Cigarettes     Start date:      Quit date: 2019     Years since quittin.4     Passive exposure: Past    Smokeless tobacco: Never   Substance Use Topics    Alcohol use: No     Alcohol/week: 0.0 standard drinks    Drug use: Never       Allergies: Reviewed    Home Medications:   Medication List with Changes/Refills   Current Medications    ALBUTEROL (VENTOLIN HFA) 90 MCG/ACTUATION INHALER    Inhale 2 puffs into the lungs every 6 (six) hours as needed for Shortness of Breath. Rescue    AMLODIPINE (NORVASC) 10 MG TABLET    Take 1 tablet (10 mg total) by mouth once daily.    ASPIRIN (ECOTRIN) 81 MG EC TABLET    Take 1 tablet (81 mg total) by mouth once daily.    AZELASTINE (ASTELIN) 137 MCG (0.1 %) NASAL SPRAY    1 spray (137 mcg total) by Nasal route 2 (two) times daily.    DESONIDE (DESOWEN) 0.05 % CREAM    APPLY EXTERNALLY TO THE AFFECTED AREA TWICE DAILY    FEXOFENADINE (ALLEGRA) 180 MG TABLET    Take 180 mg by mouth once daily.    FLUTICASONE PROPIONATE (FLONASE) 50 MCG/ACTUATION NASAL SPRAY    2 sprays (100 mcg total) by Each Nostril route once daily.    IBUPROFEN (ADVIL,MOTRIN) 800 MG TABLET    Take 800 mg by mouth 3 (three) times daily.    LINACLOTIDE (LINZESS) 145 MCG CAP CAPSULE    Take 1 capsule (145 mcg total) by mouth before breakfast.    MONTELUKAST (SINGULAIR) 10 MG TABLET    TAKE 1 TABLET BY MOUTH ONCE DAILY IN THE EVENING    NITROGLYCERIN (NITROSTAT) 0.4 MG SL TABLET    Place 1 tablet (0.4 mg total) under the tongue every 5 (five) minutes as needed for Chest pain.    OMEPRAZOLE (PRILOSEC) 40 MG CAPSULE    Take 1 capsule (40 mg total) by mouth once daily.    OMEPRAZOLE (PRILOSEC) 40 MG CAPSULE    Take 1 capsule (40 mg total) by mouth 2 (two) times daily before meals.    SOLIFENACIN (VESICARE) 10 MG TABLET    Take 1 tablet (10 mg total) by mouth once daily.    VITAMIN D (VITAMIN D3) 1000 UNITS TAB    Take 1,000 Units by mouth once daily. Take 2000 units  daily.         Physical Exam:  Vital Signs:  /76   Pulse 80   Ht 5' (1.524 m)   Wt 71.7 kg (158 lb 1.1 oz)   BMI 30.87 kg/m²   Body mass index is 30.87 kg/m².    Physical Exam  Constitutional:       Appearance: Normal appearance.   HENT:      Head: Normocephalic.   Eyes:      Conjunctiva/sclera: Conjunctivae normal.   Pulmonary:      Effort: Pulmonary effort is normal.   Abdominal:      General: There is no distension.      Palpations: Abdomen is soft.      Tenderness: There is no abdominal tenderness. There is no guarding.   Neurological:      Mental Status: She is alert.        Labs: Pertinent labs reviewed.          Assessment:  Problem List Items Addressed This Visit          GI    Colon cancer screening    Current Assessment & Plan     Plan:   -Patient due for colonoscopy in 2025.          GERD (gastroesophageal reflux disease)    Current Assessment & Plan     Plan:  -Patient was started on PPI at BID dosing a couple of days ago   -We will give her time to see if the medication works  -Patient was instructed to take PPI 30 mins prior to a meal   -Avoid trigger foods   -GERD lifestyle modifications   -Counseled on weight loss   -If above regimen does not work, we can discuss EGD at next visit           Gastroesophageal reflux disease, unspecified whether esophagitis present  -     Ambulatory referral/consult to Gastroenterology  -     Ambulatory referral/consult to Gastroenterology    Colon cancer screening                Follow up in about 3 months (around 9/21/2023).    Order summary:  No orders of the defined types were placed in this encounter.      Thank you so much for allowing me to participate in the care of Reta Potter MD  Gastroenterology and Hepatology  [facility]

## 2023-08-21 NOTE — TELEPHONE ENCOUNTER
Refill Routing Note   Medication(s) are not appropriate for processing by [facility] for the following reason(s):      Medication outside of protocol    [facility] action(s):  Route Care Due:  None identified            Appointments  past 12m or future 3m with PCP    Date Provider   Last Visit   6/14/2023 Mendoza Cardoso MD   Next Visit   12/15/2023 Mendoza Cardoso MD   ED visits in past 90 days: 0        Note composed:4:50 PM 08/21/2023

## 2023-08-22 RX ORDER — LINACLOTIDE 145 UG/1
145 CAPSULE, GELATIN COATED ORAL
Qty: 90 CAPSULE | Refills: 0 | Status: SHIPPED | OUTPATIENT
Start: 2023-08-22 | End: 2023-11-13

## 2023-10-16 DIAGNOSIS — R00.2 PALPITATIONS: ICD-10-CM

## 2023-10-16 DIAGNOSIS — R07.89 OTHER CHEST PAIN: Primary | ICD-10-CM

## 2023-10-23 ENCOUNTER — PATIENT MESSAGE (OUTPATIENT)
Dept: INTERNAL MEDICINE | Facility: CLINIC | Age: 58
End: 2023-10-23
Payer: COMMERCIAL

## 2023-10-25 ENCOUNTER — PATIENT MESSAGE (OUTPATIENT)
Dept: GASTROENTEROLOGY | Facility: CLINIC | Age: 58
End: 2023-10-25
Payer: COMMERCIAL

## 2023-11-01 ENCOUNTER — PATIENT MESSAGE (OUTPATIENT)
Dept: GASTROENTEROLOGY | Facility: CLINIC | Age: 58
End: 2023-11-01
Payer: COMMERCIAL

## 2023-11-12 NOTE — TELEPHONE ENCOUNTER
No care due was identified.  [facility] Embedded Care Due Messages. Reference number: [id number].   11/12/2023 11:21:01 AM CST

## 2023-11-13 RX ORDER — LINACLOTIDE 145 UG/1
145 CAPSULE, GELATIN COATED ORAL
Qty: 90 CAPSULE | Refills: 0 | Status: SHIPPED | OUTPATIENT
Start: 2023-11-13 | End: 2024-02-06

## 2023-11-13 NOTE — TELEPHONE ENCOUNTER
Refill Routing Note   Medication(s) are not appropriate for processing by [facility] for the following reason(s):      Medication outside of protocol    [facility] action(s):  Route Care Due:  None identified            Appointments  past 12m or future 3m with PCP    Date Provider   Last Visit   6/14/2023 Mendoza Cardoso MD   Next Visit   12/15/2023 Mendoza Cardoso MD   ED visits in past 90 days: 0        Note composed:2:13 PM 11/13/2023

## 2023-11-14 DIAGNOSIS — J30.9 CHRONIC ALLERGIC RHINITIS: ICD-10-CM

## 2023-11-14 RX ORDER — MONTELUKAST SODIUM 10 MG/1
TABLET ORAL
Qty: 90 TABLET | Refills: 2 | Status: SHIPPED | OUTPATIENT
Start: 2023-11-14

## 2023-11-14 NOTE — TELEPHONE ENCOUNTER
No care due was identified.  [facility] Embedded Care Due Messages. Reference number: [id number].   11/14/2023 2:48:50 PM CST

## 2023-11-14 NOTE — TELEPHONE ENCOUNTER
Refill Decision Note   Reta Navas  is requesting a refill authorization.  Brief Assessment and Rationale for Refill:  Approve     Medication Therapy Plan:         Comments:     Note composed:2:56 PM 11/14/2023             Appointments     Last Visit   6/14/2023 Mendoza Cardoso MD   Next Visit   12/15/2023 Mendoza Cardoso MD

## 2023-11-27 NOTE — PATIENT INSTRUCTIONS
- mucinex (not D/DM) twice daily   - increase fluids and rest   - humidifier at night   - sleep with your head elevated as much as possible   - throat lozenges all day long   - tylenol as needed for pain/body aches    - continue Flonase spray     Please resume your norvasc   
Pt endorsed thoughts of suicide/hopeless  -pt denies any active plan to commit suicide   -psych consulted, pt does not need 1-1 observation and deemed pt a low suicide risk  -continue with home klonopin 0.5mg daily, cymbalta 30mg daily and trazodone 50mg daily

## 2024-01-02 ENCOUNTER — OFFICE VISIT (OUTPATIENT)
Dept: INTERNAL MEDICINE | Facility: CLINIC | Age: 59
End: 2024-01-02
Payer: COMMERCIAL

## 2024-01-02 VITALS
OXYGEN SATURATION: 96 % | HEART RATE: 91 BPM | SYSTOLIC BLOOD PRESSURE: 126 MMHG | DIASTOLIC BLOOD PRESSURE: 80 MMHG | TEMPERATURE: 98 F

## 2024-01-02 DIAGNOSIS — B96.89 ACUTE BACTERIAL SINUSITIS: ICD-10-CM

## 2024-01-02 DIAGNOSIS — J01.90 ACUTE BACTERIAL SINUSITIS: ICD-10-CM

## 2024-01-02 DIAGNOSIS — J20.9 ACUTE BRONCHITIS, UNSPECIFIED ORGANISM: Primary | ICD-10-CM

## 2024-01-02 PROCEDURE — 3074F SYST BP LT 130 MM HG: CPT | Mod: CPTII,S$GLB,, | Performed by: FAMILY MEDICINE

## 2024-01-02 PROCEDURE — 1159F MED LIST DOCD IN RCRD: CPT | Mod: CPTII,S$GLB,, | Performed by: FAMILY MEDICINE

## 2024-01-02 PROCEDURE — 99999 PR PBB SHADOW E&M-EST. PATIENT-LVL III: CPT | Mod: PBBFAC,,, | Performed by: FAMILY MEDICINE

## 2024-01-02 PROCEDURE — 1160F RVW MEDS BY RX/DR IN RCRD: CPT | Mod: CPTII,S$GLB,, | Performed by: FAMILY MEDICINE

## 2024-01-02 PROCEDURE — 3079F DIAST BP 80-89 MM HG: CPT | Mod: CPTII,S$GLB,, | Performed by: FAMILY MEDICINE

## 2024-01-02 PROCEDURE — 99214 OFFICE O/P EST MOD 30 MIN: CPT | Mod: S$GLB,,, | Performed by: FAMILY MEDICINE

## 2024-01-02 RX ORDER — DOXYCYCLINE 100 MG/1
100 CAPSULE ORAL 2 TIMES DAILY
Qty: 14 CAPSULE | Refills: 0 | Status: SHIPPED | OUTPATIENT
Start: 2024-01-02 | End: 2024-01-09

## 2024-01-02 NOTE — PROGRESS NOTES
Subjective:       Patient ID: Reta Navas is a 58 y.o. female.    Chief Complaint: COVID-19 Concerns    PCP: Dr. Cardoso    Patient is new to me. Patient presents to clinic today for congestion. She reports her symptoms started Tuesday 12/26/23. She tested positive for covid on Saturday 12/30/23. She reports congestion with cough productive of green sputum. She has been taking mucinex with plenty of fluids and also taking dayquil/nyquil. She reports avoiding decongestants. She has not used her albuterol. She also reports blood tinged nasal drainage. No fever, chills, shortness of breath or chest pain. She states she does not have myasthenia gravis. Patient is otherwise without concerns today.        Review of Systems   Constitutional:  Negative for chills, fatigue, fever and unexpected weight change.   HENT:  Positive for congestion.    Eyes:  Negative for visual disturbance.   Respiratory:  Positive for cough. Negative for shortness of breath.    Cardiovascular:  Negative for chest pain.   Musculoskeletal:  Negative for myalgias.   Neurological:  Negative for headaches.       Objective:      Physical Exam  Vitals reviewed.   Constitutional:       General: She is not in acute distress.     Appearance: She is well-developed.   HENT:      Head: Normocephalic and atraumatic.   Eyes:      General: Lids are normal. No scleral icterus.     Extraocular Movements: Extraocular movements intact.      Conjunctiva/sclera: Conjunctivae normal.      Pupils: Pupils are equal, round, and reactive to light.   Cardiovascular:      Rate and Rhythm: Normal rate and regular rhythm.      Heart sounds: No murmur heard.     No friction rub. No gallop.   Pulmonary:      Effort: Pulmonary effort is normal.      Breath sounds: Normal breath sounds. No decreased breath sounds, wheezing, rhonchi or rales.   Neurological:      Mental Status: She is alert and oriented to person, place, and time.      Cranial Nerves: No cranial nerve deficit.       Gait: Gait normal.   Psychiatric:         Mood and Affect: Mood and affect normal.         Assessment:       1. Acute bronchitis, unspecified organism    2. Acute bacterial sinusitis        Plan:   1. Acute bronchitis, unspecified organism  -     doxycycline (VIBRAMYCIN) 100 MG Cap; Take 1 capsule (100 mg total) by mouth 2 (two) times daily. for 7 days  Dispense: 14 capsule; Refill: 0    2. Acute bacterial sinusitis  -     doxycycline (VIBRAMYCIN) 100 MG Cap; Take 1 capsule (100 mg total) by mouth 2 (two) times daily. for 7 days  Dispense: 14 capsule; Refill: 0      Advised rest and fluids.  Advised albuterol prn for cough.  Advised follow up if worse/persistent.

## 2024-01-16 NOTE — TELEPHONE ENCOUNTER
No care due was identified.  [facility] Embedded Care Due Messages. Reference number: [id number].   6/19/2023 9:04:32 AM CDT   I have personally seen, examined, and participated in the care of this patient.

## 2024-02-06 ENCOUNTER — OFFICE VISIT (OUTPATIENT)
Dept: GASTROENTEROLOGY | Facility: CLINIC | Age: 59
End: 2024-02-06
Payer: COMMERCIAL

## 2024-02-06 VITALS — BODY MASS INDEX: 30.47 KG/M2 | HEIGHT: 60 IN | WEIGHT: 155.19 LBS

## 2024-02-06 DIAGNOSIS — K63.5 POLYP OF COLON, UNSPECIFIED PART OF COLON, UNSPECIFIED TYPE: Primary | ICD-10-CM

## 2024-02-06 PROCEDURE — 1160F RVW MEDS BY RX/DR IN RCRD: CPT | Mod: CPTII,S$GLB,, | Performed by: INTERNAL MEDICINE

## 2024-02-06 PROCEDURE — 99214 OFFICE O/P EST MOD 30 MIN: CPT | Mod: S$GLB,,, | Performed by: INTERNAL MEDICINE

## 2024-02-06 PROCEDURE — 3008F BODY MASS INDEX DOCD: CPT | Mod: CPTII,S$GLB,, | Performed by: INTERNAL MEDICINE

## 2024-02-06 PROCEDURE — 1159F MED LIST DOCD IN RCRD: CPT | Mod: CPTII,S$GLB,, | Performed by: INTERNAL MEDICINE

## 2024-02-06 PROCEDURE — 99999 PR PBB SHADOW E&M-EST. PATIENT-LVL IV: CPT | Mod: PBBFAC,,, | Performed by: INTERNAL MEDICINE

## 2024-02-06 RX ORDER — LINACLOTIDE 145 UG/1
145 CAPSULE, GELATIN COATED ORAL
Qty: 90 CAPSULE | Refills: 0 | Status: SHIPPED | OUTPATIENT
Start: 2024-02-06

## 2024-02-06 NOTE — TELEPHONE ENCOUNTER
No care due was identified.  [facility] Embedded Care Due Messages. Reference number: [id number].   2/06/2024 1:44:55 PM CST

## 2024-02-06 NOTE — PROGRESS NOTES
Clinic Progress Note:  [facility] Progress Note    Reason for Visit:  The encounter diagnosis was Polyp of colon, unspecified part of colon, unspecified type.    PCP: Mendoza Cardoso       HPI:    This is a 58 y.o. female here for evaluation of GERD.   She has been experiencing it for several decades.  At last visit, symptoms were worst.   She felt burning in her chest.  PCP increased PPI to BID dosing.   Trigger foods are greasy foods.   She took the BID dosing for 3 days and symptoms improved.   She is currently taking PPI once a day and symptoms are controlled.   Last colonoscopy in 2015.   She had 3 colonoscopies in the past and reported polyps.   Grandfather with colon cancer.         ROS:  As per HPI       Allergies: Reviewed    Home Medications:   Medication List with Changes/Refills   Current Medications    ALBUTEROL (VENTOLIN HFA) 90 MCG/ACTUATION INHALER    Inhale 2 puffs into the lungs every 6 (six) hours as needed for Shortness of Breath. Rescue    AMLODIPINE (NORVASC) 10 MG TABLET    Take 1 tablet (10 mg total) by mouth once daily.    ASPIRIN (ECOTRIN) 81 MG EC TABLET    Take 1 tablet (81 mg total) by mouth once daily.    AZELASTINE (ASTELIN) 137 MCG (0.1 %) NASAL SPRAY    1 spray (137 mcg total) by Nasal route 2 (two) times daily.    DESONIDE (DESOWEN) 0.05 % CREAM    APPLY EXTERNALLY TO THE AFFECTED AREA TWICE DAILY    FEXOFENADINE (ALLEGRA) 180 MG TABLET    Take 180 mg by mouth once daily.    FLUTICASONE PROPIONATE (FLONASE) 50 MCG/ACTUATION NASAL SPRAY    2 sprays (100 mcg total) by Each Nostril route once daily.    IBUPROFEN (ADVIL,MOTRIN) 800 MG TABLET    Take 800 mg by mouth 3 (three) times daily.    LINZESS 145 MCG CAP CAPSULE    TAKE 1 CAPSULE BY MOUTH BEFORE BREAKFAST    MONTELUKAST (SINGULAIR) 10 MG TABLET    TAKE 1 TABLET BY MOUTH ONCE DAILY IN THE EVENING    NITROGLYCERIN (NITROSTAT) 0.4 MG SL TABLET    Place 1 tablet (0.4 mg total) under the tongue every 5 (five) minutes as  needed for Chest pain.    OMEPRAZOLE (PRILOSEC) 40 MG CAPSULE    Take 1 capsule (40 mg total) by mouth once daily.    OMEPRAZOLE (PRILOSEC) 40 MG CAPSULE    Take 1 capsule (40 mg total) by mouth 2 (two) times daily before meals.    SOLIFENACIN (VESICARE) 10 MG TABLET    Take 1 tablet (10 mg total) by mouth once daily.    VITAMIN D (VITAMIN D3) 1000 UNITS TAB    Take 1,000 Units by mouth once daily. Take 2000 units daily.         Physical Exam:  Vital Signs:  Ht 5' (1.524 m)   Wt 70.4 kg (155 lb 3.3 oz)   BMI 30.31 kg/m²   Body mass index is 30.31 kg/m².    Physical Exam  Constitutional:       Appearance: Normal appearance.   HENT:      Head: Normocephalic.   Eyes:      Conjunctiva/sclera: Conjunctivae normal.   Abdominal:      General: There is no distension.      Palpations: Abdomen is soft.      Tenderness: There is no abdominal tenderness. There is no guarding.   Neurological:      Mental Status: She is alert.          Labs: Pertinent labs reviewed.        Assessment:               GI     Colon polyps     Current Assessment & Plan       Plan:   -Will plan colonoscopy at this time.            GERD (gastroesophageal reflux disease)     Current Assessment & Plan       Plan:  -Symptoms are controlled on PPI once daily.   -Patient was instructed to take PPI 30 mins prior to a meal   -Avoid trigger foods   -GERD lifestyle modifications   -Counseled on weight loss                     Follow up if symptoms worsen or fail to improve.    Order summary:  Orders Placed This Encounter   Procedures    Ambulatory referral/consult to Endo Procedure        Thank you so much for allowing me to participate in the care of Reta Potter MD  Gastroenterology and Hepatology  [facility]

## 2024-02-07 ENCOUNTER — HOSPITAL ENCOUNTER (OUTPATIENT)
Dept: PREADMISSION TESTING | Facility: HOSPITAL | Age: 59
Discharge: HOME | End: 2024-02-07
Attending: INTERNAL MEDICINE
Payer: COMMERCIAL

## 2024-02-07 DIAGNOSIS — K63.5 POLYP OF COLON, UNSPECIFIED PART OF COLON, UNSPECIFIED TYPE: ICD-10-CM

## 2024-02-08 ENCOUNTER — OFFICE VISIT (OUTPATIENT)
Dept: SLEEP MEDICINE | Facility: CLINIC | Age: 59
End: 2024-02-08
Payer: COMMERCIAL

## 2024-02-08 VITALS
BODY MASS INDEX: 30.64 KG/M2 | SYSTOLIC BLOOD PRESSURE: 114 MMHG | WEIGHT: 156.06 LBS | DIASTOLIC BLOOD PRESSURE: 72 MMHG | HEIGHT: 60 IN | HEART RATE: 80 BPM | OXYGEN SATURATION: 95 % | RESPIRATION RATE: 21 BRPM

## 2024-02-08 DIAGNOSIS — I10 PRIMARY HYPERTENSION: ICD-10-CM

## 2024-02-08 DIAGNOSIS — G47.33 OSA (OBSTRUCTIVE SLEEP APNEA): Primary | ICD-10-CM

## 2024-02-08 PROCEDURE — 3074F SYST BP LT 130 MM HG: CPT | Mod: CPTII,S$GLB,, | Performed by: INTERNAL MEDICINE

## 2024-02-08 PROCEDURE — 3008F BODY MASS INDEX DOCD: CPT | Mod: CPTII,S$GLB,, | Performed by: INTERNAL MEDICINE

## 2024-02-08 PROCEDURE — 1160F RVW MEDS BY RX/DR IN RCRD: CPT | Mod: CPTII,S$GLB,, | Performed by: INTERNAL MEDICINE

## 2024-02-08 PROCEDURE — 99999 PR PBB SHADOW E&M-EST. PATIENT-LVL V: CPT | Mod: PBBFAC,,, | Performed by: INTERNAL MEDICINE

## 2024-02-08 PROCEDURE — 3078F DIAST BP <80 MM HG: CPT | Mod: CPTII,S$GLB,, | Performed by: INTERNAL MEDICINE

## 2024-02-08 PROCEDURE — 99204 OFFICE O/P NEW MOD 45 MIN: CPT | Mod: S$GLB,,, | Performed by: INTERNAL MEDICINE

## 2024-02-08 PROCEDURE — 1159F MED LIST DOCD IN RCRD: CPT | Mod: CPTII,S$GLB,, | Performed by: INTERNAL MEDICINE

## 2024-02-08 NOTE — PROGRESS NOTES
Pulmonary Outpatient  Visit     Subjective:       Patient ID: Reta Navas is a 58 y.o. female.    Social History     Tobacco Use   Smoking Status Former    Current packs/day: 0.00    Average packs/day: 1 pack/day for 39.0 years (39.0 ttl pk-yrs)    Types: Vaping with nicotine, Cigarettes    Start date:     Quit date: 2019    Years since quittin.1    Passive exposure: Past   Smokeless Tobacco Never            Chief Complaint: Sleep Apnea      Reta Navas is 58 y.o.  Referred by Byron Puente MD  [id number] [facility]  [facility] [place] [address]   Concern for LISETTE  Accompanied by Spouse  Works in office  Bed time  Wake time  Snoring, Apneas, daytime fatigue  Bedtime 11:00 p.m. wake up time 6:00 a.m.   Sleep latency 10 minutes   Ever since childhood trouble getting enough sleep [place] prefer to go to bed early   Denies any restless leg   Denies any sleep attacks   No signs of narcolepsy   No sleep paralysis   No hallucinations   No sleepwalking   Legs hot at night   No naps   Generally worry too much and no outward distress.    Taken melatonin 10 mg for the last 3 years                 Review of Systems   Constitutional:  Positive for fatigue.   Respiratory:  Positive for apnea, snoring and somnolence.    Psychiatric/Behavioral:  Positive for sleep disturbance.        Outpatient Encounter Medications as of 2024   Medication Sig Dispense Refill    amLODIPine (NORVASC) 10 MG tablet Take 1 tablet (10 mg total) by mouth once daily. 90 tablet 3    desonide (DESOWEN) 0.05 % cream APPLY EXTERNALLY TO THE AFFECTED AREA TWICE DAILY 60 g 1    fexofenadine (ALLEGRA) 180 MG tablet Take 180 mg by mouth once daily.      fluticasone propionate (FLONASE) 50 mcg/actuation nasal spray 2 sprays (100 mcg total) by Each Nostril route once daily. 3 Bottle 1    ibuprofen (ADVIL,MOTRIN) 800 MG tablet Take 800 mg by mouth 3 (three) times daily.      LINZESS 145 mcg Cap capsule TAKE 1  CAPSULE BY MOUTH BEFORE BREAKFAST 90 capsule 0    montelukast (SINGULAIR) 10 mg tablet TAKE 1 TABLET BY MOUTH ONCE DAILY IN THE EVENING 90 tablet 2    omeprazole (PRILOSEC) 40 MG capsule Take 1 capsule (40 mg total) by mouth once daily. 180 capsule 1    omeprazole (PRILOSEC) 40 MG capsule Take 1 capsule (40 mg total) by mouth 2 (two) times daily before meals. 180 capsule 1    vitamin D (VITAMIN D3) 1000 units Tab Take 1,000 Units by mouth once daily. Take 2000 units daily.      albuterol (VENTOLIN HFA) 90 mcg/actuation inhaler Inhale 2 puffs into the lungs every 6 (six) hours as needed for Shortness of Breath. Rescue 18 g 6    aspirin (ECOTRIN) 81 MG EC tablet Take 1 tablet (81 mg total) by mouth once daily. 90 tablet 1    azelastine (ASTELIN) 137 mcg (0.1 %) nasal spray 1 spray (137 mcg total) by Nasal route 2 (two) times daily. (Patient taking differently: 1 spray by Nasal route 2 (two) times daily as needed.) 30 mL 0    nitroGLYCERIN (NITROSTAT) 0.4 MG SL tablet Place 1 tablet (0.4 mg total) under the tongue every 5 (five) minutes as needed for Chest pain. 30 tablet 0    solifenacin (VESICARE) 10 MG tablet Take 1 tablet (10 mg total) by mouth once daily. (Patient not taking: Reported on 2/6/2024) 30 tablet 11     No facility-administered encounter medications on file as of 2/8/2024.       The following portions of the patient's history were reviewed and updated as appropriate: She  has a past medical history of Abnormal Pap smear, Abnormal Pap smear of cervix, Chronic rhinitis, Depression, GERD (gastroesophageal reflux disease), Hyperlipidemia, Hypertension, LISETTE (obstructive sleep apnea) (2/8/2024), Overactive bladder, Plantar fasciitis, bilateral, and Psoriasis.  She does not have any pertinent problems on file.  She  has a past surgical history that includes Tubal ligation; Oophorectomy; Colonoscopy; Robot-assisted cholecystectomy using da Mildred Xi (N/A, 10/18/2018); Gallbladder surgery (N/A, 10/11/2018); hx  colposcopy; Cervical biopsy w/ loop electrode excision; and Hysterectomy.  Her family history includes Breast cancer in her paternal grandmother; Colon cancer in her paternal grandfather and paternal uncle; Leukemia in her brother; No Known Problems in her father; Ovarian cancer in her maternal aunt and mother; Stroke in her maternal aunt and mother.  She  reports that she quit smoking about 5 years ago. Her smoking use included vaping with nicotine and cigarettes. She started smoking about 50 years ago. She has a 39.0 pack-year smoking history. She has been exposed to tobacco smoke. She has never used smokeless tobacco. She reports that she does not drink alcohol and does not use drugs.  She has a current medication list which includes the following prescription(s): amlodipine, desonide, fexofenadine, fluticasone propionate, ibuprofen, linzess, montelukast, omeprazole, omeprazole, vitamin d, albuterol, aspirin, azelastine, nitroglycerin, and solifenacin.  Current Outpatient Medications on File Prior to Visit   Medication Sig Dispense Refill    amLODIPine (NORVASC) 10 MG tablet Take 1 tablet (10 mg total) by mouth once daily. 90 tablet 3    desonide (DESOWEN) 0.05 % cream APPLY EXTERNALLY TO THE AFFECTED AREA TWICE DAILY 60 g 1    fexofenadine (ALLEGRA) 180 MG tablet Take 180 mg by mouth once daily.      fluticasone propionate (FLONASE) 50 mcg/actuation nasal spray 2 sprays (100 mcg total) by Each Nostril route once daily. 3 Bottle 1    ibuprofen (ADVIL,MOTRIN) 800 MG tablet Take 800 mg by mouth 3 (three) times daily.      LINZESS 145 mcg Cap capsule TAKE 1 CAPSULE BY MOUTH BEFORE BREAKFAST 90 capsule 0    montelukast (SINGULAIR) 10 mg tablet TAKE 1 TABLET BY MOUTH ONCE DAILY IN THE EVENING 90 tablet 2    omeprazole (PRILOSEC) 40 MG capsule Take 1 capsule (40 mg total) by mouth once daily. 180 capsule 1    omeprazole (PRILOSEC) 40 MG capsule Take 1 capsule (40 mg total) by mouth 2 (two) times daily before meals. 180  capsule 1    vitamin D (VITAMIN D3) 1000 units Tab Take 1,000 Units by mouth once daily. Take 2000 units daily.      albuterol (VENTOLIN HFA) 90 mcg/actuation inhaler Inhale 2 puffs into the lungs every 6 (six) hours as needed for Shortness of Breath. Rescue 18 g 6    aspirin (ECOTRIN) 81 MG EC tablet Take 1 tablet (81 mg total) by mouth once daily. 90 tablet 1    azelastine (ASTELIN) 137 mcg (0.1 %) nasal spray 1 spray (137 mcg total) by Nasal route 2 (two) times daily. (Patient taking differently: 1 spray by Nasal route 2 (two) times daily as needed.) 30 mL 0    nitroGLYCERIN (NITROSTAT) 0.4 MG SL tablet Place 1 tablet (0.4 mg total) under the tongue every 5 (five) minutes as needed for Chest pain. 30 tablet 0    solifenacin (VESICARE) 10 MG tablet Take 1 tablet (10 mg total) by mouth once daily. (Patient not taking: Reported on 2/6/2024) 30 tablet 11     No current facility-administered medications on file prior to visit.     She is allergic to adhesive and latex, natural rubber..      BP Readings from Last 3 Encounters:   02/08/24 114/72   01/02/24 126/80   06/21/23 110/76     Snoring / Sleep:     Starbuck Questionnaire (validated LISETTE screening questionnaire)    YES -- Snoring/apnea    YES -- Fatigue    Body mass index is 30.48 kg/m².  (>25 is overweight, >30 is obese)    Blood Pressure = Hypertension  (PreHTN 120-139/80-89, Stg1 140-159/90-99, Stg2 >160/>100)  Starbuck = 3 of three LISETTE categories are positive (high risk is 2-3 positive categories)     [place] Sleepiness Scale TOTAL =          2/8/2024     4:01 PM   EPWORTH SLEEPINESS SCALE   Sitting and reading 0   Watching TV 3   Sitting, inactive in a public place (e.g. a theatre or a meeting) 0   As a passenger in a car for an hour without a break 0   Lying down to rest in the afternoon when circumstances permit 2   Sitting and talking to someone 0   Sitting quietly after a lunch without alcohol 0   In a car, while stopped for a few minutes in traffic 0   Total  "score 5      (validated sleepiness questionnaire with a higher score indicating greater sleepiness; range 0-24)      STOP-Bang Questionnaire (validated LISETTE screening questionnaire)  Negative unless checked off.  [x] Snoring    [x]  Tired/Fatigued/Sleepy  [x] Obstruction (apneas/choking)  [x] Pressure (HTN)  [] BMI >35  [x] Age >50  [] Neck >40 cm  [] Gender male   STOP-Bang = 4 (low risk 0-2,high risk 3-8)    Neck circumference 14"         MMRC Dyspnea Scale (4 is worst)     [x] MMRC 0: Dyspneic on strenuous excercise (0 points)    [] MMRC 1: Dyspneic on walking a slight hill (0 points)    [] MMRC 2: Dyspneic on walking level ground; must stop occasionally due to breathlessness (1 point)    [] MMRC 3: Must stop for breathlessness after walking 100 yards or after a few minutes (2 points)    [] MMRC 4: Cannot leave house; breathless on dressing/undressing (3 points)                          No data to display                          Objective:     Vital Signs (Most Recent)  Vital Signs  Pulse: 80  Resp: (!) 21  SpO2: 95 %  BP: 114/72  Height and Weight  Height: 5' (152.4 cm)  Weight: 70.8 kg (156 lb 1.4 oz)  BSA (Calculated - sq m): 1.73 sq meters  BMI (Calculated): 30.5  Weight in (lb) to have BMI = 25: 127.7]  Wt Readings from Last 2 Encounters:   02/08/24 70.8 kg (156 lb 1.4 oz)   02/06/24 70.4 kg (155 lb 3.3 oz)       Physical Exam  Vitals and nursing note reviewed.   Constitutional:       Appearance: She is normal weight.      Comments: Neck 14"  Malampati score 4   HENT:      Head: Normocephalic and atraumatic.      Nose: Nose normal.   Eyes:      Pupils: Pupils are equal, round, and reactive to light.   Cardiovascular:      Rate and Rhythm: Normal rate and regular rhythm.      Pulses: Normal pulses.      Heart sounds: Normal heart sounds.   Pulmonary:      Effort: Pulmonary effort is normal.      Breath sounds: Normal breath sounds.   Abdominal:      General: Bowel sounds are normal.      Palpations: Abdomen " "is soft.   Musculoskeletal:      Cervical back: Normal range of motion.   Skin:     General: Skin is warm.   Neurological:      General: No focal deficit present.      Mental Status: She is alert and oriented to person, place, and time.   Psychiatric:         Mood and Affect: Mood normal.          Laboratory  Lab Results   Component Value Date    WBC 10.88 06/09/2023    RBC 4.67 06/09/2023    HGB 13.9 06/09/2023    HCT 42.5 06/09/2023    MCV 91 06/09/2023    MCH 29.8 06/09/2023    MCHC 32.7 06/09/2023    RDW 13.4 06/09/2023     06/09/2023    MPV 10.2 06/09/2023    GRAN 3.4 06/09/2023    GRAN 31.2 (L) 06/09/2023    LYMPH 1.8 06/09/2023    LYMPH 16.5 (L) 06/09/2023    MONO 0.6 06/09/2023    MONO 5.2 06/09/2023    EOS 5.0 (H) 06/09/2023    BASO 0.10 06/09/2023    EOSINOPHIL 46.1 (H) 06/09/2023    BASOPHIL 0.9 06/09/2023       BMP  Lab Results   Component Value Date     06/09/2023    K 3.9 06/09/2023     06/09/2023    CO2 24 06/09/2023    BUN 10 06/09/2023    CREATININE 0.8 06/09/2023    CALCIUM 9.4 06/09/2023    ANIONGAP 11 06/09/2023    ESTGFRAFRICA >60 06/27/2022    EGFRNONAA >60 06/27/2022    AST 21 06/09/2023    ALT 25 06/09/2023    PROT 7.8 06/09/2023          No results found for: "IGE"     No results found for: "ASPERGILLUS"  No results found for: "AFUMIGATUSCL"     No results found for: "ACE"     Diagnostic Results:  I have personally reviewed today the following studies:    Mammo Digital Screening Bilat w/ Amaury  Narrative: Result:  Mammo Digital Screening Bilat w/ Amaury    History:  Patient is 57 y.o. and is seen for a screening mammogram.    Films Compared:  Compared to: 10/30/2021 Mammo Digital Screening Bilat w/ Amaury and   06/30/2020 Mammo Digital Screening Bilat w/ Amaury     Findings:   This procedure was performed using tomosynthesis.   Computer-aided detection was utilized in the interpretation of this   examination.    The breasts have scattered areas of fibroglandular density. There is " no   evidence of suspicious masses, microcalcifications or architectural   distortion.  Impression:    No mammographic evidence of malignancy.    BI-RADS Category 1: Negative    Recommendation:  Routine screening mammogram in 1 year is recommended.    Your estimated lifetime risk of breast cancer (to age 85) based on   Tyrer-Cuzick risk assessment model is 7.81 %.  According to the American   Cancer Society, patients with a lifetime breast cancer risk of 20% or   higher might benefit from supplemental screening tests. ??        Assessment/Plan:     Problem List Items Addressed This Visit       Hypertension     Stable on Norvasc         LISETTE (obstructive sleep apnea) - Primary    Relevant Orders    Home Sleep Study      If negative for sleep apnea may need to see ENT.      My recommendation at this point would be to set up a HOME SLEEP TEST or INLAB POLYSOMNOGRAPHY  through the [facility] ( [phone].    We have discussed weight loss , non supine position, good sleep hygiene, and  other interventions to foster good natural healthy sleep.  We have discussed behavioral modifications, as well.  After her study, she  could certainly try PAP therapy or out suitable alternatives      Follow up in about 4 weeks (around 3/7/2024), or HSAT,.    This note was prepared using voice recognition system and is likely to have sound alike errors that may have been overlooked even after proof reading.  Please call me with any questions    Discussed diagnosis, its evaluation, treatment and usual course. All questions answered.    Thank you for the courtesy of participating in the care of this patient    Brian Duran MD      Personal Diagnostic Review  []  CXR    []  ECHO    []  ONSAT    []  6MWD    []  LABS    []  CHEST CT    []  PET CT    []  Biopsy results

## 2024-02-19 ENCOUNTER — HOSPITAL ENCOUNTER (OUTPATIENT)
Dept: SLEEP MEDICINE | Facility: HOSPITAL | Age: 59
Discharge: HOME | End: 2024-02-19
Attending: INTERNAL MEDICINE
Payer: COMMERCIAL

## 2024-02-19 DIAGNOSIS — G47.33 OSA (OBSTRUCTIVE SLEEP APNEA): ICD-10-CM

## 2024-02-19 PROCEDURE — 95806 SLEEP STUDY UNATT&RESP EFFT: CPT | Performed by: INTERNAL MEDICINE

## 2024-02-19 PROCEDURE — 95806 SLEEP STUDY UNATT&RESP EFFT: CPT | Mod: 26,,, | Performed by: INTERNAL MEDICINE

## 2024-02-19 NOTE — PROCEDURES
PHYSICIAN INTERPRETATION AND COMMENTS: Findings are consistent with moderate obstructive sleep apnea (LISETTE).  CLINICAL HISTORY: 58 year old female presented with: 13.25 inch neck, BMI of 30.5, an [place] sleepiness score of 8,  history of hypertension, heart disease and symptoms of nocturnal snoring, waking up choking and witnessed apneas.  Based on the clinical history, the patient has a high pre-test probability of having Moderate LISETTE.  SLEEP STUDY FINDINGS: Patient underwent a 1 night Home Sleep Test and by behavioral criteria, slept for approximately 6.7  hours, with a sleep latency of 6 minutes and a sleep efficiency of 98%. Mild sleep disordered breathing (AHI=9) is noted  based on a 4% hypopnea desaturation criteria. When considering more subtle measures of sleep disordered breathing, the  overall respiratory disturbance index is moderate(RDI=21) based on a 1% hypopnea desaturation criteria with confirmation  by surrogate arousal indicators. The apneas/hypopneas are accompanied by minimal oxygen desaturation (percent time  below 90% SpO2: 0%, Min SpO2: 78.6%). The average desaturation across all sleep disordered breathing events is 2.6%.  Snoring occurs for 28.3% (30 dB) of the study, 3.7% is very loud. The mean pulse rate is 71.7 BPM, with frequent pulse rate  variability (54 events with >= 6 BPM increase/decrease per hour).  TREATMENT CONSIDERATIONS: Consider an attended CPAP titration study, given the reported Heart disease. Consider nasal  continuous positive airway pressure based on the RDI severity and co-morbidities. A mandibular advancement splint  (MAS) or referral to an ENT surgeon for modification to the airway should be considered to reduce the potential  contribution of LISETTE on existing diseases if the patient prefers an alternative therapy or the CPAP trial is unsuccessful.    Dear Brian Duran MD  [address] [address] [facility]  [place] [address]/Mendoza Cardoso MD         The sleep study  "that you ordered is complete.  You have ordered sleep LAB services to perform the sleep study for Reta Navas .      Please find Sleep Study result in  the "Media tab" of Chart Review menu.        You can look  for the report in the  Media by the document type "Sleep Study Documents". Alphabetizing  "Document type" column helps to find the SLEEP STUDY report  Faster.       As the ordering provider, you are responsible for reviewing the results and implementing a treatment plan with your patient.    If you need a Sleep Medicine provider to explain the sleep study findings and arrange treatment for the patient, please refer patient for consultation to our Sleep Clinic via [facility] with Ambulatory Consult Sleep.     To do that please place an order for an  "Ambulatory Consult Sleep" -  order , it will go to our clinic work queue for our staff  to contact the patient for an appointment.      For any questions, please contact our sleep lab  staff at [phone] to talk to clinical staff          Brian Duran MD   "

## 2024-02-21 ENCOUNTER — TELEPHONE (OUTPATIENT)
Dept: PULMONOLOGY | Facility: CLINIC | Age: 59
End: 2024-02-21
Payer: COMMERCIAL

## 2024-02-21 NOTE — TELEPHONE ENCOUNTER
----- Message from Briseyda Marti sent at 2/21/2024 10:11 AM CST -----  Name of Who is Calling:FERCHO VERDUGO [[id number]]        What is the request in detail:Pt would like a callback to [facility] virtual appt to discuss results.Please advise thank you       Can the clinic reply by [facility]:NO        What Number to Call Back if not in [facility]:.Telephone Information:  Mobile          [phone]

## 2024-02-28 ENCOUNTER — TELEPHONE (OUTPATIENT)
Dept: INTERNAL MEDICINE | Facility: CLINIC | Age: 59
End: 2024-02-28
Payer: COMMERCIAL

## 2024-02-28 NOTE — TELEPHONE ENCOUNTER
Patient is calling in regards to receiving a message about scheduling an us retroperitoneal. Please give her a call back at [phone].

## 2024-02-28 NOTE — TELEPHONE ENCOUNTER
----- Message from Jaclyn Sebastian sent at 2/28/2024  8:16 AM CST -----  Contact: Reta Bautista is calling in regards to having questions and concerns about a message received from pt portal.  Please call back at .[phone]        Thanks

## 2024-02-29 ENCOUNTER — TELEPHONE (OUTPATIENT)
Dept: INTERNAL MEDICINE | Facility: CLINIC | Age: 59
End: 2024-02-29
Payer: COMMERCIAL

## 2024-02-29 NOTE — TELEPHONE ENCOUNTER
----- Message from Kye Eric sent at 2/29/2024 12:16 PM CST -----  Contact: self  Pt is asking for an return call in reference to questions she has ,please call back at .[phone] Thx CJ

## 2024-02-29 NOTE — TELEPHONE ENCOUNTER
Informed, message was sent yesterday to Ms. Lidiasimon Cardenas staff to address her concern. Also, she can sent a message through portal to her department. Patient verbally agreed.

## 2024-03-13 ENCOUNTER — OFFICE VISIT (OUTPATIENT)
Dept: PULMONOLOGY | Facility: CLINIC | Age: 59
End: 2024-03-13
Payer: COMMERCIAL

## 2024-03-13 VITALS — DIASTOLIC BLOOD PRESSURE: 92 MMHG | SYSTOLIC BLOOD PRESSURE: 130 MMHG

## 2024-03-13 DIAGNOSIS — I10 PRIMARY HYPERTENSION: ICD-10-CM

## 2024-03-13 DIAGNOSIS — G47.33 OSA (OBSTRUCTIVE SLEEP APNEA): Primary | ICD-10-CM

## 2024-03-13 PROCEDURE — 3075F SYST BP GE 130 - 139MM HG: CPT | Mod: CPTII,95,, | Performed by: INTERNAL MEDICINE

## 2024-03-13 PROCEDURE — 99213 OFFICE O/P EST LOW 20 MIN: CPT | Mod: 95,,, | Performed by: INTERNAL MEDICINE

## 2024-03-13 PROCEDURE — 3080F DIAST BP >= 90 MM HG: CPT | Mod: CPTII,95,, | Performed by: INTERNAL MEDICINE

## 2024-03-13 PROCEDURE — 1159F MED LIST DOCD IN RCRD: CPT | Mod: CPTII,95,, | Performed by: INTERNAL MEDICINE

## 2024-03-13 PROCEDURE — 1160F RVW MEDS BY RX/DR IN RCRD: CPT | Mod: CPTII,95,, | Performed by: INTERNAL MEDICINE

## 2024-03-13 NOTE — PROGRESS NOTES
The patient location is: [place], [place]   The chief complaint leading to consultation is:   Chief Complaint   Patient presents with    Sleep Apnea        Visit type: audiovisual    Face to Face time with patient: 5 min  30 minutes of total time spent on the encounter, which includes face to face time and non-face to face time preparing to see the patient (eg, review of tests), Obtaining and/or reviewing separately obtained history, Documenting clinical information in the electronic or other health record, Independently interpreting results (not separately reported) and communicating results to the patient/family/caregiver, or Care coordination (not separately reported).         Each patient to whom he or she provides medical services by telemedicine is:  (1) informed of the relationship between the physician and patient and the respective role of any other health care provider with respect to management of the patient; and (2) notified that he or she may decline to receive medical services by telemedicine and may withdraw from such care at any time.    Notes:                                           Pulmonary Outpatient  Visit     Subjective:       Patient ID: Reta Navas is a 58 y.o. female.    Social History     Tobacco Use   Smoking Status Former    Current packs/day: 0.00    Average packs/day: 1 pack/day for 39.0 years (39.0 ttl pk-yrs)    Types: Vaping with nicotine, Cigarettes    Start date:     Quit date: 2019    Years since quittin.2    Passive exposure: Past   Smokeless Tobacco Never            Chief Complaint: Sleep Apnea      Reta Navas is 58 y.o.  Referred by Byron Puente MD  [id number] [place] [address]   Concern for LISETTE  Accompanied by Spouse  Works in office  Bed time  Wake time  Snoring, Apneas, daytime fatigue  Bedtime 11:00 p.m. wake up time 6:00 a.m.   Sleep latency 10 minutes   Ever since childhood trouble getting enough sleep [place] prefer to go to bed early    Denies any restless leg   Denies any sleep attacks   No signs of narcolepsy   No sleep paralysis   No hallucinations   No sleepwalking   Legs hot at night   No naps   Generally worry too much and no outward distress.    Taken melatonin 10 mg for the last 3 years       03/13/2024  Followup  Study showed Moderate LISETTE  Goals of CPAP discussed  Ordered placed                Review of Systems   Constitutional:  Positive for fatigue.   Respiratory:  Positive for apnea, snoring and somnolence.    Psychiatric/Behavioral:  Positive for sleep disturbance.        Outpatient Encounter Medications as of 3/13/2024   Medication Sig Dispense Refill    albuterol (VENTOLIN HFA) 90 mcg/actuation inhaler Inhale 2 puffs into the lungs every 6 (six) hours as needed for Shortness of Breath. Rescue 54 g 1    amLODIPine (NORVASC) 10 MG tablet Take 1 tablet (10 mg total) by mouth once daily. 90 tablet 3    desonide (DESOWEN) 0.05 % cream APPLY EXTERNALLY TO THE AFFECTED AREA TWICE DAILY 60 g 1    fexofenadine (ALLEGRA) 180 MG tablet Take 180 mg by mouth once daily.      fluticasone propionate (FLONASE) 50 mcg/actuation nasal spray 2 sprays (100 mcg total) by Each Nostril route once daily. 3 Bottle 1    ibuprofen (ADVIL,MOTRIN) 800 MG tablet Take 800 mg by mouth 3 (three) times daily.      LINZESS 145 mcg Cap capsule TAKE 1 CAPSULE BY MOUTH BEFORE BREAKFAST 90 capsule 0    montelukast (SINGULAIR) 10 mg tablet TAKE 1 TABLET BY MOUTH ONCE DAILY IN THE EVENING 90 tablet 2    omeprazole (PRILOSEC) 40 MG capsule Take 1 capsule (40 mg total) by mouth once daily. 180 capsule 1    omeprazole (PRILOSEC) 40 MG capsule Take 1 capsule (40 mg total) by mouth 2 (two) times daily before meals. 180 capsule 1    solifenacin (VESICARE) 10 MG tablet Take 1 tablet (10 mg total) by mouth once daily. 30 tablet 11    vitamin D (VITAMIN D3) 1000 units Tab Take 1,000 Units by mouth once daily. Take 2000 units daily.      aspirin (ECOTRIN) 81 MG EC tablet Take 1  tablet (81 mg total) by mouth once daily. 90 tablet 1    azelastine (ASTELIN) 137 mcg (0.1 %) nasal spray 1 spray (137 mcg total) by Nasal route 2 (two) times daily. (Patient taking differently: 1 spray by Nasal route 2 (two) times daily as needed.) 30 mL 0    nitroGLYCERIN (NITROSTAT) 0.4 MG SL tablet Place 1 tablet (0.4 mg total) under the tongue every 5 (five) minutes as needed for Chest pain. 30 tablet 0     No facility-administered encounter medications on file as of 3/13/2024.       The following portions of the patient's history were reviewed and updated as appropriate: She  has a past medical history of Abnormal Pap smear, Abnormal Pap smear of cervix, Chronic rhinitis, Depression, GERD (gastroesophageal reflux disease), Hyperlipidemia, Hypertension, LISETTE (obstructive sleep apnea) (2/8/2024), Overactive bladder, Plantar fasciitis, bilateral, and Psoriasis.  She does not have any pertinent problems on file.  She  has a past surgical history that includes Tubal ligation; Oophorectomy; Colonoscopy; Robot-assisted cholecystectomy using da Mildred Xi (N/A, 10/18/2018); Gallbladder surgery (N/A, 10/11/2018); hx colposcopy; Cervical biopsy w/ loop electrode excision; and Hysterectomy.  Her family history includes Breast cancer in her paternal grandmother; Colon cancer in her paternal grandfather and paternal uncle; Leukemia in her brother; No Known Problems in her father; Ovarian cancer in her maternal aunt and mother; Stroke in her maternal aunt and mother.  She  reports that she quit smoking about 5 years ago. Her smoking use included vaping with nicotine and cigarettes. She started smoking about 50 years ago. She has a 39.0 pack-year smoking history. She has been exposed to tobacco smoke. She has never used smokeless tobacco. She reports that she does not drink alcohol and does not use drugs.  She has a current medication list which includes the following prescription(s): albuterol, amlodipine, desonide,  fexofenadine, fluticasone propionate, ibuprofen, linzess, montelukast, omeprazole, omeprazole, solifenacin, vitamin d, aspirin, azelastine, and nitroglycerin.  Current Outpatient Medications on File Prior to Visit   Medication Sig Dispense Refill    albuterol (VENTOLIN HFA) 90 mcg/actuation inhaler Inhale 2 puffs into the lungs every 6 (six) hours as needed for Shortness of Breath. Rescue 54 g 1    amLODIPine (NORVASC) 10 MG tablet Take 1 tablet (10 mg total) by mouth once daily. 90 tablet 3    desonide (DESOWEN) 0.05 % cream APPLY EXTERNALLY TO THE AFFECTED AREA TWICE DAILY 60 g 1    fexofenadine (ALLEGRA) 180 MG tablet Take 180 mg by mouth once daily.      fluticasone propionate (FLONASE) 50 mcg/actuation nasal spray 2 sprays (100 mcg total) by Each Nostril route once daily. 3 Bottle 1    ibuprofen (ADVIL,MOTRIN) 800 MG tablet Take 800 mg by mouth 3 (three) times daily.      LINZESS 145 mcg Cap capsule TAKE 1 CAPSULE BY MOUTH BEFORE BREAKFAST 90 capsule 0    montelukast (SINGULAIR) 10 mg tablet TAKE 1 TABLET BY MOUTH ONCE DAILY IN THE EVENING 90 tablet 2    omeprazole (PRILOSEC) 40 MG capsule Take 1 capsule (40 mg total) by mouth once daily. 180 capsule 1    omeprazole (PRILOSEC) 40 MG capsule Take 1 capsule (40 mg total) by mouth 2 (two) times daily before meals. 180 capsule 1    solifenacin (VESICARE) 10 MG tablet Take 1 tablet (10 mg total) by mouth once daily. 30 tablet 11    vitamin D (VITAMIN D3) 1000 units Tab Take 1,000 Units by mouth once daily. Take 2000 units daily.      aspirin (ECOTRIN) 81 MG EC tablet Take 1 tablet (81 mg total) by mouth once daily. 90 tablet 1    azelastine (ASTELIN) 137 mcg (0.1 %) nasal spray 1 spray (137 mcg total) by Nasal route 2 (two) times daily. (Patient taking differently: 1 spray by Nasal route 2 (two) times daily as needed.) 30 mL 0    nitroGLYCERIN (NITROSTAT) 0.4 MG SL tablet Place 1 tablet (0.4 mg total) under the tongue every 5 (five) minutes as needed for Chest pain.  "30 tablet 0     No current facility-administered medications on file prior to visit.     She is allergic to adhesive and latex, natural rubber..      BP Readings from Last 3 Encounters:   03/13/24 (!) 130/92   02/08/24 114/72   01/02/24 126/80     Snoring / Sleep:     [place] Questionnaire (validated LISETTE screening questionnaire)    YES -- Snoring/apnea    YES -- Fatigue    There is no height or weight on file to calculate BMI.  (>25 is overweight, >30 is obese)    Blood Pressure = Hypertension  (PreHTN 120-139/80-89, Stg1 140-159/90-99, Stg2 >160/>100)  [place] = 3 of three LISETTE categories are positive (high risk is 2-3 positive categories)     [place] Sleepiness Scale TOTAL =          2/8/2024     4:01 PM   EPWORTH SLEEPINESS SCALE   Sitting and reading 0   Watching TV 3   Sitting, inactive in a public place (e.g. a theatre or a meeting) 0   As a passenger in a car for an hour without a break 0   Lying down to rest in the afternoon when circumstances permit 2   Sitting and talking to someone 0   Sitting quietly after a lunch without alcohol 0   In a car, while stopped for a few minutes in traffic 0   Total score 5      (validated sleepiness questionnaire with a higher score indicating greater sleepiness; range 0-24)      STOP-Bang Questionnaire (validated LISETTE screening questionnaire)  Negative unless checked off.  [x] Snoring    [x]  Tired/Fatigued/Sleepy  [x] Obstruction (apneas/choking)  [x] Pressure (HTN)  [] BMI >35  [x] Age >50  [] Neck >40 cm  [] Gender male   STOP-Bang = 4 (low risk 0-2,high risk 3-8)    Neck circumference 14"         MMRC Dyspnea Scale (4 is worst)     [x] MMRC 0: Dyspneic on strenuous excercise (0 points)    [] MMRC 1: Dyspneic on walking a slight hill (0 points)    [] MMRC 2: Dyspneic on walking level ground; must stop occasionally due to breathlessness (1 point)    [] MMRC 3: Must stop for breathlessness after walking 100 yards or after a few minutes (2 points)    [] MMRC 4: Cannot leave " "house; breathless on dressing/undressing (3 points)                3/13/2024     1:09 PM   EPWORTH SLEEPINESS SCALE   Sitting and reading 0   Watching TV 3   Sitting, inactive in a public place (e.g. a theatre or a meeting) 0   As a passenger in a car for an hour without a break 0   Lying down to rest in the afternoon when circumstances permit 3   Sitting and talking to someone 0   Sitting quietly after a lunch without alcohol 0   In a car, while stopped for a few minutes in traffic 0   Total score 6                  No data to display                          Objective:     Vital Signs (Most Recent)  Vital Signs  BP: (!) 130/92]  Wt Readings from Last 2 Encounters:   02/08/24 70.8 kg (156 lb 1.4 oz)   02/06/24 70.4 kg (155 lb 3.3 oz)       Physical Exam  Vitals and nursing note reviewed.   Constitutional:       Appearance: She is normal weight.      Comments: Neck 14"  Malampati score 4   HENT:      Head: Normocephalic and atraumatic.      Nose: Nose normal.   Eyes:      Pupils: Pupils are equal, round, and reactive to light.   Cardiovascular:      Rate and Rhythm: Normal rate and regular rhythm.      Pulses: Normal pulses.      Heart sounds: Normal heart sounds.   Pulmonary:      Effort: Pulmonary effort is normal.      Breath sounds: Normal breath sounds.   Abdominal:      General: Bowel sounds are normal.      Palpations: Abdomen is soft.   Musculoskeletal:      Cervical back: Normal range of motion.   Skin:     General: Skin is warm.   Neurological:      General: No focal deficit present.      Mental Status: She is alert and oriented to person, place, and time.   Psychiatric:         Mood and Affect: Mood normal.          Laboratory  Lab Results   Component Value Date    WBC 10.88 06/09/2023    RBC 4.67 06/09/2023    HGB 13.9 06/09/2023    HCT 42.5 06/09/2023    MCV 91 06/09/2023    MCH 29.8 06/09/2023    MCHC 32.7 06/09/2023    RDW 13.4 06/09/2023     06/09/2023    MPV 10.2 06/09/2023    GRAN 3.4 " "06/09/2023    GRAN 31.2 (L) 06/09/2023    LYMPH 1.8 06/09/2023    LYMPH 16.5 (L) 06/09/2023    MONO 0.6 06/09/2023    MONO 5.2 06/09/2023    EOS 5.0 (H) 06/09/2023    BASO 0.10 06/09/2023    EOSINOPHIL 46.1 (H) 06/09/2023    BASOPHIL 0.9 06/09/2023       BMP  Lab Results   Component Value Date     06/09/2023    K 3.9 06/09/2023     06/09/2023    CO2 24 06/09/2023    BUN 10 06/09/2023    CREATININE 0.8 06/09/2023    CALCIUM 9.4 06/09/2023    ANIONGAP 11 06/09/2023    ESTGFRAFRICA >60 06/27/2022    EGFRNONAA >60 06/27/2022    AST 21 06/09/2023    ALT 25 06/09/2023    PROT 7.8 06/09/2023          No results found for: "IGE"     No results found for: "ASPERGILLUS"  No results found for: "AFUMIGATUSCL"     No results found for: "ACE"     Diagnostic Results:  I have personally reviewed today the following studies:    Mammo Digital Screening Bilat w/ Amaury  Narrative: Result:  Mammo Digital Screening Bilat w/ Amaury    History:  Patient is 57 y.o. and is seen for a screening mammogram.    Films Compared:  Compared to: 10/30/2021 Mammo Digital Screening Bilat w/ Amaury and   06/30/2020 Mammo Digital Screening Bilat w/ Amaury     Findings:   This procedure was performed using tomosynthesis.   Computer-aided detection was utilized in the interpretation of this   examination.    The breasts have scattered areas of fibroglandular density. There is no   evidence of suspicious masses, microcalcifications or architectural   distortion.  Impression:    No mammographic evidence of malignancy.    BI-RADS Category 1: Negative    Recommendation:  Routine screening mammogram in 1 year is recommended.    Your estimated lifetime risk of breast cancer (to age 85) based on   Tyrer-Cuzick risk assessment model is 7.81 %.  According to the American   Cancer Society, patients with a lifetime breast cancer risk of 20% or   higher might benefit from supplemental screening tests. ??        PHYSICIAN INTERPRETATION AND COMMENTS: Findings are " consistent with moderate obstructive sleep apnea (LISETTE).  CLINICAL HISTORY: 58 year old female presented with: 13.25 inch neck, BMI of 30.5, an [place] sleepiness score of 8,  history of hypertension, heart disease and symptoms of nocturnal snoring, waking up choking and witnessed apneas.  Based on the clinical history, the patient has a high pre-test probability of having Moderate LISETTE.  SLEEP STUDY FINDINGS: Patient underwent a 1 night Home Sleep Test and by behavioral criteria, slept for approximately 6.7  hours, with a sleep latency of 6 minutes and a sleep efficiency of 98%. Mild sleep disordered breathing (AHI=9) is noted  based on a 4% hypopnea desaturation criteria. When considering more subtle measures of sleep disordered breathing, the  overall respiratory disturbance index is moderate(RDI=21) based on a 1% hypopnea desaturation criteria with confirmation  by surrogate arousal indicators. The apneas/hypopneas are accompanied by minimal oxygen desaturation (percent time  below 90% SpO2: 0%, Min SpO2: 78.6%). The average desaturation across all sleep disordered breathing events is 2.6%.  Snoring occurs for 28.3% (30 dB) of the study, 3.7% is very loud. The mean pulse rate is 71.7 BPM, with frequent pulse rate  variability (54 events with >= 6 BPM increase/decrease per hour).  TREATMENT CONSIDERATIONS: Consider an attended CPAP titration study, given the reported Heart disease. Consider nasal  continuous positive airway pressure based on the RDI severity and co-morbidities. A mandibular advancement splint  (MAS) or referral to an ENT surgeon for modification to the airway should be considered to reduce the potential  contribution of LISETTE on existing diseases if the patient prefers an alternative therapy or the CPAP trial is unsuccessful.    Assessment/Plan:     Problem List Items Addressed This Visit       Hypertension    LISETTE (obstructive sleep apnea) - Primary    Relevant Orders    CPAP FOR HOME USE        Treatment Options for Sleep Disordered Breathing discussed:     [x]    Continuous Positive Airway Pressure (CPAP).  []    Surgical options  []    Oral appliances   []    Behavioral approaches.   []    Weight loss.   []    Avoiding alcohol and sedative medication.  []    Treat other underlying medical conditions eg. nasal allergies  []     INSPIRE  []     Provent/Theravent       Discussed therapeutic goals for positive airway pressure therapy(CPAP or BiPAP):   Ideal is usage 100% of nights for 6 - 8 hours per night.   Minimum usage is 70% of night for at least 4 hours per night used.  Mask choices discussed.  Patient expressed understanding. All Questions answered.      Follow up in about 10 weeks (around 5/22/2024), or CPAP , Download.    This note was prepared using voice recognition system and is likely to have sound alike errors that may have been overlooked even after proof reading.  Please call me with any questions    Discussed diagnosis, its evaluation, treatment and usual course. All questions answered.    Thank you for the courtesy of participating in the care of this patient    Brian Duran MD      Personal Diagnostic Review  []  CXR    []  ECHO    []  ONSAT    []  6MWD    []  LABS    []  CHEST CT    []  PET CT    []  Biopsy results

## 2024-03-15 ENCOUNTER — OFFICE VISIT (OUTPATIENT)
Dept: OBSTETRICS AND GYNECOLOGY | Facility: CLINIC | Age: 59
End: 2024-03-15
Payer: COMMERCIAL

## 2024-03-15 VITALS
DIASTOLIC BLOOD PRESSURE: 71 MMHG | SYSTOLIC BLOOD PRESSURE: 125 MMHG | WEIGHT: 155.63 LBS | HEIGHT: 60 IN | BODY MASS INDEX: 30.55 KG/M2

## 2024-03-15 DIAGNOSIS — N95.9 MENOPAUSAL AND POSTMENOPAUSAL DISORDER: ICD-10-CM

## 2024-03-15 DIAGNOSIS — N32.81 OVERACTIVE BLADDER: ICD-10-CM

## 2024-03-15 DIAGNOSIS — Z12.4 CERVICAL CANCER SCREENING: ICD-10-CM

## 2024-03-15 DIAGNOSIS — Z01.419 ENCOUNTER FOR GYNECOLOGICAL EXAMINATION WITHOUT ABNORMAL FINDING: Primary | ICD-10-CM

## 2024-03-15 PROCEDURE — 88175 CYTOPATH C/V AUTO FLUID REDO: CPT | Performed by: NURSE PRACTITIONER

## 2024-03-15 PROCEDURE — 3074F SYST BP LT 130 MM HG: CPT | Mod: CPTII,S$GLB,, | Performed by: NURSE PRACTITIONER

## 2024-03-15 PROCEDURE — 3078F DIAST BP <80 MM HG: CPT | Mod: CPTII,S$GLB,, | Performed by: NURSE PRACTITIONER

## 2024-03-15 PROCEDURE — 3008F BODY MASS INDEX DOCD: CPT | Mod: CPTII,S$GLB,, | Performed by: NURSE PRACTITIONER

## 2024-03-15 PROCEDURE — 1160F RVW MEDS BY RX/DR IN RCRD: CPT | Mod: CPTII,S$GLB,, | Performed by: NURSE PRACTITIONER

## 2024-03-15 PROCEDURE — 1159F MED LIST DOCD IN RCRD: CPT | Mod: CPTII,S$GLB,, | Performed by: NURSE PRACTITIONER

## 2024-03-15 PROCEDURE — 99999 PR PBB SHADOW E&M-EST. PATIENT-LVL IV: CPT | Mod: PBBFAC,,, | Performed by: NURSE PRACTITIONER

## 2024-03-15 PROCEDURE — 99396 PREV VISIT EST AGE 40-64: CPT | Mod: S$GLB,,, | Performed by: NURSE PRACTITIONER

## 2024-03-15 PROCEDURE — 87624 HPV HI-RISK TYP POOLED RSLT: CPT | Performed by: NURSE PRACTITIONER

## 2024-03-15 RX ORDER — ESTRADIOL 0.1 MG/G
CREAM VAGINAL
Qty: 42.5 G | Refills: 3 | Status: SHIPPED | OUTPATIENT
Start: 2024-03-15 | End: 2025-03-14

## 2024-03-15 RX ORDER — SOLIFENACIN SUCCINATE 5 MG/1
5 TABLET, FILM COATED ORAL DAILY
Qty: 90 TABLET | Refills: 3 | Status: SHIPPED | OUTPATIENT
Start: 2024-03-15 | End: 2024-04-23

## 2024-03-15 NOTE — PROGRESS NOTES
Subjective:       Patient ID: Reta Navas is a 58 y.o. female.    Chief Complaint:  Well Woman      History of Present Illness  HPI  Annual Exam-Postmenopausal   presents for annual exam. The patient is sexually active with her ; dryness.  Ky with relief. GYN screening history: last pap: approximate date 2022 and was normal and last mammogram: approximate date 2023 and was normal. Hx of colpo and LEEP <20 years ago.  Supracervical hyst.  The patient has never taken hormone replacement therapy. Patient denies post-menopausal vaginal bleeding. The patient wears seatbelts: yes. The patient participates in regular exercise: no. Has the patient ever been transfused or tattooed?: yes. Tattoos.  The patient reports that there is not domestic violence in her life.    OAB -- stopped vesicare r/t constipation.  Doing PFT exercises.  Urgency.      GYN & OB History  No LMP recorded. Patient has had a hysterectomy.   Date of Last Pap: 3/16/2024    OB History    Para Term  AB Living   5 4 4   1 4   SAB IAB Ectopic Multiple Live Births   1       4      # Outcome Date GA Lbr Cesar/2nd Weight Sex Delivery Anes PTL Lv   5 Term  38w0d  3.175 kg (7 lb)  Vag-Spont   DANIELLA   4 Term  40w0d  3.175 kg (7 lb)  Vag-Spont   DANIELLA   3 Term  40w0d  3.175 kg (7 lb)  Vag-Spont   DANIELLA   2 Term  42w0d  4.309 kg (9 lb 8 oz)  Vag-Spont EPI  DANIELLA   1 SAB                Review of Systems  Review of Systems   Constitutional:  Negative for activity change, appetite change, chills, fatigue and fever.   HENT:  Negative for nasal congestion and mouth sores.    Respiratory:  Negative for cough, shortness of breath and wheezing.    Cardiovascular:  Negative for chest pain.   Gastrointestinal:  Negative for abdominal pain, constipation, diarrhea, nausea and vomiting.   Endocrine: Negative for hair loss and hot flashes.   Genitourinary:  Positive for frequency, urgency and vaginal dryness. Negative for bladder incontinence,  decreased libido, dysmenorrhea, dyspareunia, dysuria, genital sores, hematuria, hot flashes, menorrhagia, menstrual problem, pelvic pain, vaginal discharge, vaginal pain, urinary incontinence, postcoital bleeding, postmenopausal bleeding and vaginal odor.   Musculoskeletal:  Negative for back pain.   Integumentary:  Negative for breast mass, nipple discharge, breast skin changes and breast tenderness.   Neurological:  Negative for headaches.   Hematological:  Negative for adenopathy.   Psychiatric/Behavioral:  Negative for depression and sleep disturbance. The patient is not nervous/anxious.    All other systems reviewed and are negative.  Breast: Negative for breast self exam, lump, mass, mastodynia, nipple discharge, skin changes and tenderness          Objective:      Physical Exam:   Constitutional: She is oriented to person, place, and time. She appears well-developed and well-nourished. No distress.    HENT:   Head: Normocephalic and atraumatic.   Nose: Nose normal.    Eyes: Pupils are equal, round, and reactive to light. Conjunctivae and EOM are normal. Right eye exhibits no discharge. Left eye exhibits no discharge.     Cardiovascular:  Normal rate, regular rhythm and normal heart sounds.      Exam reveals no gallop, no friction rub, no clubbing, no cyanosis and no edema.       No murmur heard.   Pulmonary/Chest: Effort normal and breath sounds normal. No respiratory distress. She has no decreased breath sounds. She has no wheezes. She has no rhonchi. She has no rales. She exhibits no tenderness. Right breast exhibits tenderness. Right breast exhibits no inverted nipple, no mass, no nipple discharge, no skin change, no bleeding and no swelling. Left breast exhibits no inverted nipple, no mass, no nipple discharge, no skin change, no tenderness, no bleeding and no swelling. Breasts are symmetrical.        Abdominal: Soft. Bowel sounds are normal. She exhibits no distension. There is no abdominal tenderness.  There is no rebound and no guarding. Hernia confirmed negative in the right inguinal area and confirmed negative in the left inguinal area.     Genitourinary:    Inguinal canal, right adnexa and left adnexa normal.   Rectum:      No external hemorrhoid.            Pelvic exam was performed with patient in the lithotomy position.   The external female genitalia was normal.   No external genitalia lesions identified,Genitalia hair distrobution normal .     Labial bartholins normal.There is no rash, tenderness, lesion or injury on the right labia. There is no rash, tenderness, lesion or injury on the left labia. Right adnexum displays no mass, no tenderness and no fullness. Left adnexum displays no mass, no tenderness and no fullness. No erythema, vaginal discharge, tenderness or bleeding in the vagina.    No foreign body in the vagina.      No signs of injury in the vagina.   Vaginal atrophy noted. Cervix is absent.Uterus is absent. Normal urethral meatus.Urethral Meatus exhibits: urethral lesionUrethra findings: no urethral mass, no tenderness, no urethral scarring and prolapsedBladder findings: no bladder distention and no bladder tenderness          Musculoskeletal: Normal range of motion and moves all extremeties.      Lymphadenopathy: No inguinal adenopathy noted on the right or left side.    Neurological: She is alert and oriented to person, place, and time.    Skin: Skin is warm and dry. No rash noted. She is not diaphoretic. No cyanosis or erythema. No pallor. Nails show no clubbing.    Psychiatric: She has a normal mood and affect. Her speech is normal and behavior is normal. Judgment and thought content normal.             Assessment:        1. Encounter for gynecological examination without abnormal finding    2. Overactive bladder    3. Cervical cancer screening    4. Menopausal and postmenopausal disorder               Plan:   Did fine on 5mg dose of vesicare; would like to restart that  Rx sent and  reviewed risks/benefits    Pt accepts trial of estrace cream  rx sent and instructed on use; nightly x2 weeks then twice a week.  Discussed oil based lubricants for dryness; RTC if not effective.    Med check 2 one month    Ensure adequate calcium and vitamin D intake and daily weight bearing exercises.    Reviewed updated recommendations for pap smears (every 3 years) in low risk patients.  Recommend annual pelvic exams.  Reviewed recommendations for annual CBE.  Next pap due in 2025.   RTC 1 year or sooner prn.  To PCP for other health maintenance.  mammo scheduled, continue yearly until age 74        Encounter for gynecological examination without abnormal finding  -     Liquid-Based Pap Smear, Screening  -     HPV High Risk Genotypes, PCR    Overactive bladder  -     solifenacin (VESICARE) 5 MG tablet; Take 1 tablet (5 mg total) by mouth once daily.  Dispense: 90 tablet; Refill: 3    Cervical cancer screening  -     Liquid-Based Pap Smear, Screening  -     HPV High Risk Genotypes, PCR    Menopausal and postmenopausal disorder  -     estradioL (ESTRACE) 0.01 % (0.1 mg/gram) vaginal cream; Place 1 g vaginally once daily for 14 days, THEN 1 g twice a week.  Dispense: 42.5 g; Refill: 3

## 2024-03-19 LAB
HPV HR 12 DNA SPEC QL NAA+PROBE: NEGATIVE
HPV16 AG SPEC QL: NEGATIVE
HPV18 DNA SPEC QL NAA+PROBE: NEGATIVE

## 2024-03-22 LAB
FINAL PATHOLOGIC DIAGNOSIS: NORMAL
Lab: NORMAL

## 2024-04-22 ENCOUNTER — HOSPITAL ENCOUNTER (OUTPATIENT)
Dept: PREADMISSION TESTING | Facility: HOSPITAL | Age: 59
Discharge: HOME | End: 2024-04-22
Attending: INTERNAL MEDICINE
Payer: COMMERCIAL

## 2024-04-23 ENCOUNTER — LAB VISIT (OUTPATIENT)
Dept: LAB | Facility: HOSPITAL | Age: 59
End: 2024-04-23
Attending: FAMILY MEDICINE
Payer: COMMERCIAL

## 2024-04-23 ENCOUNTER — NURSE TRIAGE (OUTPATIENT)
Dept: ADMINISTRATIVE | Facility: CLINIC | Age: 59
End: 2024-04-23
Payer: COMMERCIAL

## 2024-04-23 ENCOUNTER — OFFICE VISIT (OUTPATIENT)
Dept: INTERNAL MEDICINE | Facility: CLINIC | Age: 59
End: 2024-04-23
Payer: COMMERCIAL

## 2024-04-23 VITALS
BODY MASS INDEX: 29.72 KG/M2 | WEIGHT: 151.38 LBS | HEIGHT: 60 IN | DIASTOLIC BLOOD PRESSURE: 76 MMHG | OXYGEN SATURATION: 95 % | HEART RATE: 96 BPM | SYSTOLIC BLOOD PRESSURE: 122 MMHG

## 2024-04-23 DIAGNOSIS — N39.3 URINARY, INCONTINENCE, STRESS FEMALE: ICD-10-CM

## 2024-04-23 DIAGNOSIS — G47.33 OSA (OBSTRUCTIVE SLEEP APNEA): ICD-10-CM

## 2024-04-23 DIAGNOSIS — N30.00 ACUTE CYSTITIS WITHOUT HEMATURIA: Primary | ICD-10-CM

## 2024-04-23 DIAGNOSIS — I10 HYPERTENSION, UNSPECIFIED TYPE: ICD-10-CM

## 2024-04-23 DIAGNOSIS — Z79.899 ENCOUNTER FOR LONG-TERM (CURRENT) USE OF MEDICATIONS: ICD-10-CM

## 2024-04-23 DIAGNOSIS — K21.9 GASTROESOPHAGEAL REFLUX DISEASE, UNSPECIFIED WHETHER ESOPHAGITIS PRESENT: ICD-10-CM

## 2024-04-23 DIAGNOSIS — K63.5 POLYP OF COLON, UNSPECIFIED PART OF COLON, UNSPECIFIED TYPE: ICD-10-CM

## 2024-04-23 DIAGNOSIS — Z87.891 HISTORY OF SMOKING: ICD-10-CM

## 2024-04-23 DIAGNOSIS — K59.09 CHRONIC CONSTIPATION: ICD-10-CM

## 2024-04-23 DIAGNOSIS — J30.9 ALLERGIC RHINITIS, UNSPECIFIED SEASONALITY, UNSPECIFIED TRIGGER: ICD-10-CM

## 2024-04-23 LAB
ALBUMIN SERPL BCP-MCNC: 4 G/DL (ref 3.5–5.2)
ALP SERPL-CCNC: 116 U/L (ref 55–135)
ALT SERPL W/O P-5'-P-CCNC: 23 U/L (ref 10–44)
ANION GAP SERPL CALC-SCNC: 9 MMOL/L (ref 8–16)
AST SERPL-CCNC: 20 U/L (ref 10–40)
BACTERIA #/AREA URNS HPF: ABNORMAL /HPF
BASOPHILS # BLD AUTO: 0.07 K/UL (ref 0–0.2)
BASOPHILS NFR BLD: 0.6 % (ref 0–1.9)
BILIRUB SERPL-MCNC: 0.4 MG/DL (ref 0.1–1)
BILIRUB UR QL STRIP: NEGATIVE
BUN SERPL-MCNC: 12 MG/DL (ref 6–20)
CALCIUM SERPL-MCNC: 9.7 MG/DL (ref 8.7–10.5)
CHLORIDE SERPL-SCNC: 104 MMOL/L (ref 95–110)
CHOLEST SERPL-MCNC: 165 MG/DL (ref 120–199)
CHOLEST/HDLC SERPL: 3.7 {RATIO} (ref 2–5)
CLARITY UR: ABNORMAL
CO2 SERPL-SCNC: 25 MMOL/L (ref 23–29)
COLOR UR: YELLOW
CREAT SERPL-MCNC: 0.8 MG/DL (ref 0.5–1.4)
DIFFERENTIAL METHOD BLD: ABNORMAL
EOSINOPHIL # BLD AUTO: 1.9 K/UL (ref 0–0.5)
EOSINOPHIL NFR BLD: 15.5 % (ref 0–8)
ERYTHROCYTE [DISTWIDTH] IN BLOOD BY AUTOMATED COUNT: 13.3 % (ref 11.5–14.5)
EST. GFR  (NO RACE VARIABLE): >60 ML/MIN/1.73 M^2
ESTIMATED AVG GLUCOSE: 111 MG/DL (ref 68–131)
GLUCOSE SERPL-MCNC: 92 MG/DL (ref 70–110)
GLUCOSE UR QL STRIP: NEGATIVE
HBA1C MFR BLD: 5.5 % (ref 4–5.6)
HCT VFR BLD AUTO: 42.2 % (ref 37–48.5)
HDLC SERPL-MCNC: 45 MG/DL (ref 40–75)
HDLC SERPL: 27.3 % (ref 20–50)
HGB BLD-MCNC: 14.1 G/DL (ref 12–16)
HGB UR QL STRIP: ABNORMAL
HYALINE CASTS #/AREA URNS LPF: 0 /LPF
IMM GRANULOCYTES # BLD AUTO: 0.02 K/UL (ref 0–0.04)
IMM GRANULOCYTES NFR BLD AUTO: 0.2 % (ref 0–0.5)
KETONES UR QL STRIP: NEGATIVE
LDLC SERPL CALC-MCNC: 94.4 MG/DL (ref 63–159)
LEUKOCYTE ESTERASE UR QL STRIP: ABNORMAL
LYMPHOCYTES # BLD AUTO: 1.8 K/UL (ref 1–4.8)
LYMPHOCYTES NFR BLD: 15.4 % (ref 18–48)
MCH RBC QN AUTO: 30.4 PG (ref 27–31)
MCHC RBC AUTO-ENTMCNC: 33.4 G/DL (ref 32–36)
MCV RBC AUTO: 91 FL (ref 82–98)
MICROSCOPIC COMMENT: ABNORMAL
MONOCYTES # BLD AUTO: 0.8 K/UL (ref 0.3–1)
MONOCYTES NFR BLD: 6.5 % (ref 4–15)
NEUTROPHILS # BLD AUTO: 7.4 K/UL (ref 1.8–7.7)
NEUTROPHILS NFR BLD: 61.8 % (ref 38–73)
NITRITE UR QL STRIP: POSITIVE
NONHDLC SERPL-MCNC: 120 MG/DL
NRBC BLD-RTO: 0 /100 WBC
PH UR STRIP: 6 [PH] (ref 5–8)
PLATELET # BLD AUTO: 327 K/UL (ref 150–450)
PMV BLD AUTO: 10.2 FL (ref 9.2–12.9)
POTASSIUM SERPL-SCNC: 4 MMOL/L (ref 3.5–5.1)
PROT SERPL-MCNC: 8.2 G/DL (ref 6–8.4)
PROT UR QL STRIP: ABNORMAL
RBC # BLD AUTO: 4.64 M/UL (ref 4–5.4)
RBC #/AREA URNS HPF: >100 /HPF (ref 0–4)
SODIUM SERPL-SCNC: 138 MMOL/L (ref 136–145)
SP GR UR STRIP: >1.03 (ref 1–1.03)
SQUAMOUS #/AREA URNS HPF: 4 /HPF
TRIGL SERPL-MCNC: 128 MG/DL (ref 30–150)
TSH SERPL DL<=0.005 MIU/L-ACNC: 2.34 UIU/ML (ref 0.4–4)
URN SPEC COLLECT METH UR: ABNORMAL
VIT B12 SERPL-MCNC: 419 PG/ML (ref 210–950)
WBC # BLD AUTO: 11.95 K/UL (ref 3.9–12.7)
WBC #/AREA URNS HPF: >100 /HPF (ref 0–5)
WBC CLUMPS URNS QL MICRO: ABNORMAL

## 2024-04-23 PROCEDURE — 82607 VITAMIN B-12: CPT | Performed by: FAMILY MEDICINE

## 2024-04-23 PROCEDURE — 85025 COMPLETE CBC W/AUTO DIFF WBC: CPT | Performed by: FAMILY MEDICINE

## 2024-04-23 PROCEDURE — 36415 COLL VENOUS BLD VENIPUNCTURE: CPT | Performed by: FAMILY MEDICINE

## 2024-04-23 PROCEDURE — G2211 COMPLEX E/M VISIT ADD ON: HCPCS | Mod: S$GLB,,, | Performed by: FAMILY MEDICINE

## 2024-04-23 PROCEDURE — 99214 OFFICE O/P EST MOD 30 MIN: CPT | Mod: S$GLB,,, | Performed by: FAMILY MEDICINE

## 2024-04-23 PROCEDURE — 87186 SC STD MICRODIL/AGAR DIL: CPT | Performed by: FAMILY MEDICINE

## 2024-04-23 PROCEDURE — 84443 ASSAY THYROID STIM HORMONE: CPT | Performed by: FAMILY MEDICINE

## 2024-04-23 PROCEDURE — 3008F BODY MASS INDEX DOCD: CPT | Mod: CPTII,S$GLB,, | Performed by: FAMILY MEDICINE

## 2024-04-23 PROCEDURE — 1159F MED LIST DOCD IN RCRD: CPT | Mod: CPTII,S$GLB,, | Performed by: FAMILY MEDICINE

## 2024-04-23 PROCEDURE — 87077 CULTURE AEROBIC IDENTIFY: CPT | Performed by: FAMILY MEDICINE

## 2024-04-23 PROCEDURE — 83036 HEMOGLOBIN GLYCOSYLATED A1C: CPT | Performed by: FAMILY MEDICINE

## 2024-04-23 PROCEDURE — 3078F DIAST BP <80 MM HG: CPT | Mod: CPTII,S$GLB,, | Performed by: FAMILY MEDICINE

## 2024-04-23 PROCEDURE — 81000 URINALYSIS NONAUTO W/SCOPE: CPT | Performed by: FAMILY MEDICINE

## 2024-04-23 PROCEDURE — 80053 COMPREHEN METABOLIC PANEL: CPT | Performed by: FAMILY MEDICINE

## 2024-04-23 PROCEDURE — 99999 PR PBB SHADOW E&M-EST. PATIENT-LVL IV: CPT | Mod: PBBFAC,,, | Performed by: FAMILY MEDICINE

## 2024-04-23 PROCEDURE — 80061 LIPID PANEL: CPT | Performed by: FAMILY MEDICINE

## 2024-04-23 PROCEDURE — 3074F SYST BP LT 130 MM HG: CPT | Mod: CPTII,S$GLB,, | Performed by: FAMILY MEDICINE

## 2024-04-23 PROCEDURE — 87086 URINE CULTURE/COLONY COUNT: CPT | Performed by: FAMILY MEDICINE

## 2024-04-23 PROCEDURE — 87088 URINE BACTERIA CULTURE: CPT | Performed by: FAMILY MEDICINE

## 2024-04-23 RX ORDER — AMLODIPINE BESYLATE 10 MG/1
10 TABLET ORAL
Qty: 90 TABLET | Refills: 0 | OUTPATIENT
Start: 2024-04-23

## 2024-04-23 RX ORDER — NITROFURANTOIN 25; 75 MG/1; MG/1
100 CAPSULE ORAL 2 TIMES DAILY
Qty: 10 CAPSULE | Refills: 0 | Status: SHIPPED | OUTPATIENT
Start: 2024-04-23

## 2024-04-23 RX ORDER — AMLODIPINE BESYLATE 10 MG/1
10 TABLET ORAL DAILY
Qty: 90 TABLET | Refills: 3 | Status: SHIPPED | OUTPATIENT
Start: 2024-04-23

## 2024-04-23 NOTE — TELEPHONE ENCOUNTER
Pt calling with co urinary pain and she said that she starts urinating and then it feels like razors towards the end. Pt triaged and care advice to the office now and she has an appt already with MD today and will keep the appt and pt told to call back if any fever or back pain or any other questions or concerns. Pt said that she was going to cancel since she was up all night with her  but told not to cancel because no other appts available                     Reason for Disposition   SEVERE pain with urination    Additional Information   Negative: Shock suspected (e.g., cold/pale/clammy skin, too weak to stand, low BP, rapid pulse)   Negative: Sounds like a life-threatening emergency to the triager   Pain or burning with passing urine (urination) and female   Negative: Shock suspected (e.g., cold/pale/clammy skin, too weak to stand, low BP, rapid pulse)   Negative: Sounds like a life-threatening emergency to the triager   Negative: Unable to urinate (or only a few drops) and bladder feels very full   Negative: Vomiting   Negative: Patient sounds very sick or weak to the triager    Protocols used: Urinary Symptoms-A-OH, Urination Pain - Female-A-OH

## 2024-04-23 NOTE — TELEPHONE ENCOUNTER
No care due was identified.  [facility] Embedded Care Due Messages. Reference number: [id number].   4/23/2024 2:26:23 PM CDT

## 2024-04-23 NOTE — PROGRESS NOTES
Subjective:       Patient ID: Reta Navas is a 58 y.o. female.    Chief Complaint: f/u visit  Urinary Tract Infection         Patient Active Problem List   Diagnosis    Obesity    Urinary, incontinence, stress female    Hypertension    Colon cancer screening    GERD (gastroesophageal reflux disease)    LISETTE (obstructive sleep apnea)    Chronic constipation    History of smoking    Allergic rhinitis    Acute cystitis without hematuria       Past Medical History:   Diagnosis Date    Abnormal Pap smear     Abnormal Pap smear of cervix     Chronic rhinitis     Depression     GERD (gastroesophageal reflux disease)     Hyperlipidemia     Hypertension     LISETTE (obstructive sleep apnea) 2024    Overactive bladder     Plantar fasciitis, bilateral     Psoriasis        Past Surgical History:   Procedure Laterality Date    CERVICAL BIOPSY  W/ LOOP ELECTRODE EXCISION      HYSTERECTOMY      supracervical hyst/LSO for fibroids    OOPHORECTOMY      LSO    ROBOT-ASSISTED CHOLECYSTECTOMY USING DA WILVER XI N/A 10/18/2018    Procedure: XI ROBOTIC CHOLECYSTECTOMY;  Surgeon: Robel Ferrer MD;  Location: [facility];  Service: General;  Laterality: N/A;    TUBAL LIGATION         Family History   Problem Relation Name Age of Onset    Ovarian cancer Mother      Stroke Mother      No Known Problems Father      Leukemia Brother      Breast cancer Paternal Grandmother          mastectomy    Heart attack Paternal Grandmother  82        COD    Colon cancer Paternal Grandfather      Ovarian cancer Maternal Aunt      Stroke Maternal Aunt      Colon cancer Paternal Uncle         Social History     Tobacco Use   Smoking Status Former    Current packs/day: 0.00    Average packs/day: 1 pack/day for 39.0 years (39.0 ttl pk-yrs)    Types: Vaping with nicotine, Cigarettes    Start date:     Quit date: 2019    Years since quittin.3    Passive exposure: Past   Smokeless Tobacco Never       Wt Readings from Last 5 Encounters:   24 68.6 kg (151  lb 5.5 oz)   03/15/24 70.6 kg (155 lb 10.3 oz)   02/08/24 70.8 kg (156 lb 1.4 oz)   02/06/24 70.4 kg (155 lb 3.3 oz)   06/21/23 71.7 kg (158 lb 1.1 oz)       For further HPI details, see assessment and plan.    Review of Systems    Objective:      Vitals:    04/23/24 1610   BP: 122/76   Pulse: 96       Physical Exam  Constitutional:       General: She is not in acute distress.     Appearance: She is not ill-appearing.   Pulmonary:      Effort: Pulmonary effort is normal. No respiratory distress.   Neurological:      General: No focal deficit present.      Mental Status: She is alert.   Psychiatric:         Mood and Affect: Mood normal.         Behavior: Behavior normal.         Assessment:       1. Acute cystitis without hematuria    2. Hypertension, unspecified type    3. Allergic rhinitis, unspecified seasonality, unspecified trigger    4. Encounter for long-term (current) use of medications    5. History of smoking    6. Urinary, incontinence, stress female    7. Gastroesophageal reflux disease, unspecified whether esophagitis present    8. LISETTE (obstructive sleep apnea)    9. Chronic constipation    10. Polyp of colon, unspecified part of colon, unspecified type        Plan:     For follow-up on chronic disease  Of note however she does have an acute issue     Acute issue   Patient woke up this morning with dysuria.  She feels like she is urinating last.  No change to urination.  No fevers.  No chills.  No confusion.  No blood noted.  Suspect UTI but will obtain urine studies.  Starting empiric antibiotics with nitrofurantoin 100 mg twice daily.  We will call if urine does not appear to be infected or if culture indicates alternative agent as necessary     Chronic issues    lisette  Patient is making efforts at utilizing her CPAP.  Strongly encourage use    Gastroesophageal reflux disease  Controlled with PPI.  Continue medication     Colon polyps  Patient saw Gastroenterology in February and colonoscopy was ordered.   She does plan to do this but not just yet    Overactive bladder   Patient has found some benefit with physical therapy.  Ongoing pelvic floor physical therapy.  She did not like the medication that was prescribed for this issue as it causes constipation.  Removed VESIcare from drug list    Hypertension  Blood pressure well controlled  Continue amlodipine 10.  Will provide a refill today    Chronic constipation  Patient was still uses Linzess on an as needed basis.  Continue medication    Allergic rhinitis   There maybe some degree of asthma not formally diagnosed.  She does periodically use albuterol.  Seems sufficient as she only uses it as needed  P.r.n. use of Astelin.  Continue  Prn Allegra.  Continue  PRN flonase  Daily Singulair  Patient wants to see an allergist.  We will arrange consultation.     History of smoking   Plan to update CT    Was not interested in vaccination today    6 mo f/u  This note was verbally dictated, please excuse any type errors.

## 2024-04-24 DIAGNOSIS — Z00.00 ROUTINE ADULT HEALTH MAINTENANCE: ICD-10-CM

## 2024-04-24 DIAGNOSIS — R07.89 OTHER CHEST PAIN: Primary | ICD-10-CM

## 2024-04-26 LAB — BACTERIA UR CULT: ABNORMAL

## 2024-04-29 ENCOUNTER — TELEPHONE (OUTPATIENT)
Dept: ALLERGY | Facility: CLINIC | Age: 59
End: 2024-04-29
Payer: COMMERCIAL

## 2024-04-29 ENCOUNTER — TELEPHONE (OUTPATIENT)
Dept: INTERNAL MEDICINE | Facility: CLINIC | Age: 59
End: 2024-04-29
Payer: COMMERCIAL

## 2024-04-29 NOTE — TELEPHONE ENCOUNTER
----- Message from Rachel Green sent at 4/29/2024 10:13 AM CDT -----  Contact: self   Patient would like to get a referral.  Referral to what specialty:  Ortho  Does the patient want the referral with a specific physician:  No   [facility]   Reason (be specific):  Lower back pain   Does the patient already have the specialty clinic appointment scheduled:  no   If yes, what date is the appointment scheduled:     Is the insurance listed in Epic correct? (this is important for a referral):  yes   Advised patient that once provider approves this either a nurse or  will return their call?: yes   Would the patient like a call back, or a response through their [facility] portal?:   call back   Comments:

## 2024-04-30 ENCOUNTER — OFFICE VISIT (OUTPATIENT)
Dept: ALLERGY | Facility: CLINIC | Age: 59
End: 2024-04-30
Payer: COMMERCIAL

## 2024-04-30 ENCOUNTER — LAB VISIT (OUTPATIENT)
Dept: LAB | Facility: HOSPITAL | Age: 59
End: 2024-04-30
Attending: STUDENT IN AN ORGANIZED HEALTH CARE EDUCATION/TRAINING PROGRAM
Payer: COMMERCIAL

## 2024-04-30 VITALS
DIASTOLIC BLOOD PRESSURE: 77 MMHG | HEART RATE: 82 BPM | BODY MASS INDEX: 30.4 KG/M2 | TEMPERATURE: 99 F | WEIGHT: 155.63 LBS | SYSTOLIC BLOOD PRESSURE: 114 MMHG

## 2024-04-30 DIAGNOSIS — J31.0 CHRONIC RHINITIS: Primary | ICD-10-CM

## 2024-04-30 DIAGNOSIS — J31.0 CHRONIC RHINITIS: ICD-10-CM

## 2024-04-30 DIAGNOSIS — L24.5 IRRITANT CONTACT DERMATITIS DUE TO OTHER CHEMICAL PRODUCTS: ICD-10-CM

## 2024-04-30 DIAGNOSIS — J30.9 ALLERGIC RHINITIS, UNSPECIFIED SEASONALITY, UNSPECIFIED TRIGGER: ICD-10-CM

## 2024-04-30 DIAGNOSIS — R09.82 POST-NASAL DRIP: ICD-10-CM

## 2024-04-30 PROCEDURE — 86003 ALLG SPEC IGE CRUDE XTRC EA: CPT | Performed by: STUDENT IN AN ORGANIZED HEALTH CARE EDUCATION/TRAINING PROGRAM

## 2024-04-30 PROCEDURE — 1159F MED LIST DOCD IN RCRD: CPT | Mod: CPTII,S$GLB,, | Performed by: STUDENT IN AN ORGANIZED HEALTH CARE EDUCATION/TRAINING PROGRAM

## 2024-04-30 PROCEDURE — 3078F DIAST BP <80 MM HG: CPT | Mod: CPTII,S$GLB,, | Performed by: STUDENT IN AN ORGANIZED HEALTH CARE EDUCATION/TRAINING PROGRAM

## 2024-04-30 PROCEDURE — 36415 COLL VENOUS BLD VENIPUNCTURE: CPT | Performed by: STUDENT IN AN ORGANIZED HEALTH CARE EDUCATION/TRAINING PROGRAM

## 2024-04-30 PROCEDURE — 99999 PR PBB SHADOW E&M-EST. PATIENT-LVL IV: CPT | Mod: PBBFAC,,, | Performed by: STUDENT IN AN ORGANIZED HEALTH CARE EDUCATION/TRAINING PROGRAM

## 2024-04-30 PROCEDURE — 1160F RVW MEDS BY RX/DR IN RCRD: CPT | Mod: CPTII,S$GLB,, | Performed by: STUDENT IN AN ORGANIZED HEALTH CARE EDUCATION/TRAINING PROGRAM

## 2024-04-30 PROCEDURE — 3008F BODY MASS INDEX DOCD: CPT | Mod: CPTII,S$GLB,, | Performed by: STUDENT IN AN ORGANIZED HEALTH CARE EDUCATION/TRAINING PROGRAM

## 2024-04-30 PROCEDURE — 82785 ASSAY OF IGE: CPT | Performed by: STUDENT IN AN ORGANIZED HEALTH CARE EDUCATION/TRAINING PROGRAM

## 2024-04-30 PROCEDURE — 99204 OFFICE O/P NEW MOD 45 MIN: CPT | Mod: S$GLB,,, | Performed by: STUDENT IN AN ORGANIZED HEALTH CARE EDUCATION/TRAINING PROGRAM

## 2024-04-30 PROCEDURE — 3074F SYST BP LT 130 MM HG: CPT | Mod: CPTII,S$GLB,, | Performed by: STUDENT IN AN ORGANIZED HEALTH CARE EDUCATION/TRAINING PROGRAM

## 2024-04-30 PROCEDURE — 3044F HG A1C LEVEL LT 7.0%: CPT | Mod: CPTII,S$GLB,, | Performed by: STUDENT IN AN ORGANIZED HEALTH CARE EDUCATION/TRAINING PROGRAM

## 2024-04-30 RX ORDER — MINERAL OIL
180 ENEMA (ML) RECTAL DAILY
Qty: 90 TABLET | Refills: 3 | Status: SHIPPED | OUTPATIENT
Start: 2024-04-30 | End: 2025-04-30

## 2024-04-30 RX ORDER — MONTELUKAST SODIUM 10 MG/1
10 TABLET ORAL DAILY
Qty: 90 TABLET | Refills: 3 | Status: SHIPPED | OUTPATIENT
Start: 2024-04-30 | End: 2025-04-30

## 2024-04-30 RX ORDER — FLUTICASONE PROPIONATE 50 MCG
1 SPRAY, SUSPENSION (ML) NASAL 2 TIMES DAILY
Qty: 16 G | Refills: 11 | Status: SHIPPED | OUTPATIENT
Start: 2024-04-30 | End: 2025-04-30

## 2024-04-30 RX ORDER — AZELASTINE 1 MG/ML
1 SPRAY, METERED NASAL 2 TIMES DAILY
Qty: 30 ML | Refills: 11 | Status: SHIPPED | OUTPATIENT
Start: 2024-04-30 | End: 2025-04-30

## 2024-04-30 NOTE — PROGRESS NOTES
Allergy and Immunology  New Patient Clinic Note    Date: 4/30/2024  Chief Complaint   Patient presents with    Allergies     Referred by: Mendoza Cardoso MD  [id number] [place] [address]    History  Reta Navas is a 58 y.o. female being seen as a New Patient today.    Chronic Rhinitis   - Onset: Childhood with improvement in young adulthood, subsequent worsening as getting older   - Symptoms: Congestion, rhinorrhea, PND  - Suspected triggers include: Environmental v overproduction   - Pattern: Perennial  - Medications: Flonase and Astelin PRN - patient is using Zyrtec and Singular   - [place] noting patient not using ideal technique for intranasal sprays     Chronic or Inducible Urticaria  - No hx of chronic urticaria  Asthma   - No hx of asthma     CRSwNP  - No hx of CRSwNP     Eczema   - No hx of eczema     Eosinophilic Esophagitis  - No hx of eosinophilic esophagitis     Food Allergy  - Oral tingling to walnuts    Drug Allergy  - No hx of drug allergy     Recurrent Infections  - No hx of recurrent infections     Venom Allergy  - No hx of venom allergy     Environmental History  Home:  Pets in the home: cats (1).  [place]: Carpeting in the bedrooms   Climate Control: air filters and central or room air conditioning  Dust Mite Controls: Dust mite controls are not in place.  Smoking:  not current - quit 12 years ago   Tobacco Smoke in Home: no  Occupation:  - main building     Allergies, PMH, PSH, Social, and Family History were reviewed.    Review of patient's allergies indicates:   Allergen Reactions    Adhesive Rash    Latex, natural rubber       Past Medical History:   Diagnosis Date    Abnormal Pap smear     Abnormal Pap smear of cervix     Chronic rhinitis     Depression     GERD (gastroesophageal reflux disease)     Hyperlipidemia     Hypertension     LISETTE (obstructive sleep apnea) 2/8/2024    Overactive bladder     Plantar fasciitis, bilateral     Psoriasis      Past  Surgical History:   Procedure Laterality Date    CERVICAL BIOPSY  W/ LOOP ELECTRODE EXCISION      HYSTERECTOMY      supracervical hyst/LSO for fibroids    OOPHORECTOMY      LSO    ROBOT-ASSISTED CHOLECYSTECTOMY USING DA WILVER XI N/A 10/18/2018    Procedure: XI ROBOTIC CHOLECYSTECTOMY;  Surgeon: Robel Ferrer MD;  Location: [facility];  Service: General;  Laterality: N/A;    TUBAL LIGATION       Social History     Social History Narrative    Not on file     S/he reports that she quit smoking about 5 years ago. Her smoking use included vaping with nicotine and cigarettes. She started smoking about 50 years ago. She has a 39 pack-year smoking history. She has been exposed to tobacco smoke. She has never used smokeless tobacco. She reports that she does not drink alcohol and does not use drugs.    Current Outpatient Medications on File Prior to Visit   Medication Sig Dispense Refill    albuterol (VENTOLIN HFA) 90 mcg/actuation inhaler Inhale 2 puffs into the lungs every 6 (six) hours as needed for Shortness of Breath. Rescue 54 g 1    amLODIPine (NORVASC) 10 MG tablet Take 1 tablet (10 mg total) by mouth once daily. 90 tablet 3    estradioL (ESTRACE) 0.01 % (0.1 mg/gram) vaginal cream Place 1 g vaginally once daily for 14 days, THEN 1 g twice a week. 42.5 g 3    fexofenadine (ALLEGRA) 180 MG tablet Take 180 mg by mouth once daily.      fluticasone propionate (FLONASE) 50 mcg/actuation nasal spray 2 sprays (100 mcg total) by Each Nostril route once daily. 3 Bottle 1    LINZESS 145 mcg Cap capsule TAKE 1 CAPSULE BY MOUTH BEFORE BREAKFAST 90 capsule 0    montelukast (SINGULAIR) 10 mg tablet TAKE 1 TABLET BY MOUTH ONCE DAILY IN THE EVENING 90 tablet 2    omeprazole (PRILOSEC) 40 MG capsule Take 1 capsule (40 mg total) by mouth once daily. 180 capsule 1    vitamin D (VITAMIN D3) 1000 units Tab Take 1,000 Units by mouth once daily. Take 2000 units daily.      aspirin (ECOTRIN) 81 MG EC tablet Take 1 tablet (81 mg total) by mouth  once daily. 90 tablet 1    nitrofurantoin, macrocrystal-monohydrate, (MACROBID) 100 MG capsule Take 1 capsule (100 mg total) by mouth 2 (two) times daily. (Patient not taking: Reported on 4/30/2024) 10 capsule 0    nitroGLYCERIN (NITROSTAT) 0.4 MG SL tablet Place 1 tablet (0.4 mg total) under the tongue every 5 (five) minutes as needed for Chest pain. 30 tablet 0     No current facility-administered medications on file prior to visit.     Physical Examination  Vitals:    04/30/24 1518   BP: 114/77   Pulse: 82   Temp: 98.8 °F (37.1 °C)     GENERAL:  female in no apparent distress and well developed and well nourished  HEAD:  Normocephalic, without obvious abnormality, atraumatic  EYES: sclera anicteric, conjunctiva normochromic  EARS: normal TM's and external ear canals both ears  NOSE: without erythema or discharge, clear discharge, turbinates normal    OROPHARYNX: moist mucous membranes without erythema, exudates or petechiae  LYMPH NODES: normal, supple, no lymphadenopathy  LUNGS: clear to auscultation, no wheezes, rales or rhonchi, symmetric air entry.  HEART: normal rate, regular rhythm, normal S1, S2, no murmurs, rubs, clicks or gallops.  ABDOMEN: soft, nontender, nondistended, no masses or organomegaly.  MUSCULOSKELETAL: no gross joint deformity or swelling.  NEURO: alert, oriented, normal speech, no focal findings or movement disorder noted.  SKIN: normal coloration and turgor, no rashes, no suspicious skin lesions noted.     Assessment/Plan:   Problem List Items Addressed This Visit          ENT    Allergic rhinitis    Chronic rhinitis - Primary    Current Assessment & Plan     - Partial control at this time   - Ordered Flonase 1 SEN BID   - Ordered Astelin 1 SEN BID   - Educated on proper use of intranasal spray  - Ordered Zyrtec 10 mg daily   - Ordered Singular 10 mg daily   - Ordered Serum IgE to Zone 6 Aeroallergens   - If positive Serum IgE, patient would be ideal for candidate for AIT  - Will continue  to monitor and reassess          Relevant Medications    azelastine (ASTELIN) 137 mcg (0.1 %) nasal spray    fluticasone propionate (FLONASE) 50 mcg/actuation nasal spray    montelukast (SINGULAIR) 10 mg tablet    fexofenadine (ALLEGRA) 180 MG tablet    Other Relevant Orders    Allergen Profile, Zone 6    Rast Allergen-Latex    Post-nasal drip    Relevant Medications    azelastine (ASTELIN) 137 mcg (0.1 %) nasal spray    fluticasone propionate (FLONASE) 50 mcg/actuation nasal spray    montelukast (SINGULAIR) 10 mg tablet    fexofenadine (ALLEGRA) 180 MG tablet    Other Relevant Orders    Allergen Profile, Zone 6    Rast Allergen-Latex     Other Visit Diagnoses       Irritant contact dermatitis due to other chemical products        Relevant Orders    Rast Allergen-Latex          Follow up:  Follow up in about 6 weeks (around 6/11/2024).    Selena Glover MD   [facility]  Allergy and Immunology

## 2024-05-01 PROBLEM — R09.82 POST-NASAL DRIP: Status: ACTIVE | Noted: 2024-05-01

## 2024-05-01 PROBLEM — J31.0 CHRONIC RHINITIS: Status: ACTIVE | Noted: 2024-05-01

## 2024-05-01 NOTE — ASSESSMENT & PLAN NOTE
- Partial control at this time   - Ordered Flonase 1 SEN BID   - Ordered Astelin 1 SEN BID   - Educated on proper use of intranasal spray  - Ordered Zyrtec 10 mg daily   - Ordered Singular 10 mg daily   - Ordered Serum IgE to Zone 6 Aeroallergens   - If positive Serum IgE, patient would be ideal for candidate for AIT  - Will continue to monitor and reassess

## 2024-05-02 DIAGNOSIS — G89.29 CHRONIC LOW BACK PAIN, UNSPECIFIED BACK PAIN LATERALITY, UNSPECIFIED WHETHER SCIATICA PRESENT: Primary | ICD-10-CM

## 2024-05-02 DIAGNOSIS — M54.50 CHRONIC LOW BACK PAIN, UNSPECIFIED BACK PAIN LATERALITY, UNSPECIFIED WHETHER SCIATICA PRESENT: Primary | ICD-10-CM

## 2024-05-03 LAB
A ALTERNATA IGE QN: <0.1 KU/L
A FUMIGATUS IGE QN: <0.1 KU/L
ALLERGEN BOXELDER MAPLE TREE IGE: <0.1 KU/L
ALLERGEN LATEX IGE: <0.1 KU/L
ALLERGEN MAPLE (BOX ELDER) CLASS: NORMAL
ALLERGEN MULBERRY CLASS: NORMAL
ALLERGEN MULBERRY TREE IGE: <0.1 KU/L
ALLERGEN PIGWEED IGE: <0.1 KU/L
ALLERGEN WALNUT TREE IGE: <0.1 KU/L
BERMUDA GRASS IGE QN: <0.1 KU/L
C HERBARUM IGE QN: <0.1 KU/L
CAT DANDER IGE QN: <0.1 KU/L
COMMON PIGWEED CLASS: NORMAL
COMMON RAGWEED IGE QN: <0.1 KU/L
D FARINAE IGE QN: <0.1 KU/L
D PTERONYSS IGE QN: <0.1 KU/L
DEPRECATED A ALTERNATA IGE RAST QL: NORMAL
DEPRECATED A FUMIGATUS IGE RAST QL: NORMAL
DEPRECATED BERMUDA GRASS IGE RAST QL: NORMAL
DEPRECATED C HERBARUM IGE RAST QL: NORMAL
DEPRECATED CAT DANDER IGE RAST QL: NORMAL
DEPRECATED COMMON RAGWEED IGE RAST QL: NORMAL
DEPRECATED D FARINAE IGE RAST QL: NORMAL
DEPRECATED D PTERONYSS IGE RAST QL: NORMAL
DEPRECATED DOG DANDER IGE RAST QL: NORMAL
DEPRECATED ELDER IGE RAST QL: NORMAL
DEPRECATED MOUSE URINE PROT IGE RAST QL: NORMAL
DEPRECATED MT JUNIPER IGE RAST QL: NORMAL
DEPRECATED P NOTATUM IGE RAST QL: NORMAL
DEPRECATED PECAN/HICK TREE IGE RAST QL: NORMAL
DEPRECATED ROACH IGE RAST QL: NORMAL
DEPRECATED SILVER BIRCH IGE RAST QL: NORMAL
DEPRECATED TIMOTHY IGE RAST QL: NORMAL
DEPRECATED WHITE ELM IGE RAST QL: NORMAL
DEPRECATED WHITE OAK IGE RAST QL: NORMAL
DOG DANDER IGE QN: <0.1 KU/L
ELDER IGE QN: <0.1 KU/L
IGE: 24.3 IU/ML
LATEX CLASS: NORMAL
MOUSE URINE PROT IGE QN: <0.1 KU/L
MT JUNIPER IGE QN: <0.1 KU/L
P NOTATUM IGE QN: <0.1 KU/L
PECAN/HICK TREE IGE QN: <0.1 KU/L
RAST ALLERGEN INTERPRETATION: NORMAL
ROACH IGE QN: <0.1 KU/L
SILVER BIRCH IGE QN: <0.1 KU/L
TIMOTHY IGE QN: <0.1 KU/L
WALNUT TREE CLASS: NORMAL
WHITE ELM IGE QN: <0.1 KU/L
WHITE OAK IGE QN: <0.1 KU/L

## 2024-05-06 ENCOUNTER — OFFICE VISIT (OUTPATIENT)
Dept: CARDIOLOGY | Facility: CLINIC | Age: 59
End: 2024-05-06
Payer: COMMERCIAL

## 2024-05-06 ENCOUNTER — HOSPITAL ENCOUNTER (OUTPATIENT)
Dept: CARDIOLOGY | Facility: HOSPITAL | Age: 59
Discharge: HOME | End: 2024-05-06
Attending: INTERNAL MEDICINE
Payer: COMMERCIAL

## 2024-05-06 VITALS
SYSTOLIC BLOOD PRESSURE: 124 MMHG | HEART RATE: 77 BPM | DIASTOLIC BLOOD PRESSURE: 88 MMHG | WEIGHT: 154.31 LBS | HEIGHT: 60 IN | OXYGEN SATURATION: 96 % | BODY MASS INDEX: 30.29 KG/M2

## 2024-05-06 DIAGNOSIS — Z00.00 ROUTINE ADULT HEALTH MAINTENANCE: ICD-10-CM

## 2024-05-06 DIAGNOSIS — Z87.891 FORMER SMOKER: ICD-10-CM

## 2024-05-06 DIAGNOSIS — K21.9 GASTROESOPHAGEAL REFLUX DISEASE, UNSPECIFIED WHETHER ESOPHAGITIS PRESENT: ICD-10-CM

## 2024-05-06 DIAGNOSIS — E78.49 OTHER HYPERLIPIDEMIA: ICD-10-CM

## 2024-05-06 DIAGNOSIS — R07.89 OTHER CHEST PAIN: Primary | ICD-10-CM

## 2024-05-06 DIAGNOSIS — I10 PRIMARY HYPERTENSION: ICD-10-CM

## 2024-05-06 DIAGNOSIS — R07.89 OTHER CHEST PAIN: ICD-10-CM

## 2024-05-06 DIAGNOSIS — G47.30 SLEEP APNEA, UNSPECIFIED TYPE: ICD-10-CM

## 2024-05-06 DIAGNOSIS — R00.2 PALPITATIONS: ICD-10-CM

## 2024-05-06 PROCEDURE — 93010 ELECTROCARDIOGRAM REPORT: CPT | Mod: ,,, | Performed by: INTERNAL MEDICINE

## 2024-05-06 PROCEDURE — 3074F SYST BP LT 130 MM HG: CPT | Mod: CPTII,S$GLB,, | Performed by: INTERNAL MEDICINE

## 2024-05-06 PROCEDURE — G2211 COMPLEX E/M VISIT ADD ON: HCPCS | Mod: S$GLB,,, | Performed by: INTERNAL MEDICINE

## 2024-05-06 PROCEDURE — 3008F BODY MASS INDEX DOCD: CPT | Mod: CPTII,S$GLB,, | Performed by: INTERNAL MEDICINE

## 2024-05-06 PROCEDURE — 99999 PR PBB SHADOW E&M-EST. PATIENT-LVL V: CPT | Mod: PBBFAC,,, | Performed by: INTERNAL MEDICINE

## 2024-05-06 PROCEDURE — 99214 OFFICE O/P EST MOD 30 MIN: CPT | Mod: S$GLB,,, | Performed by: INTERNAL MEDICINE

## 2024-05-06 PROCEDURE — 1160F RVW MEDS BY RX/DR IN RCRD: CPT | Mod: CPTII,S$GLB,, | Performed by: INTERNAL MEDICINE

## 2024-05-06 PROCEDURE — 3044F HG A1C LEVEL LT 7.0%: CPT | Mod: CPTII,S$GLB,, | Performed by: INTERNAL MEDICINE

## 2024-05-06 PROCEDURE — 1159F MED LIST DOCD IN RCRD: CPT | Mod: CPTII,S$GLB,, | Performed by: INTERNAL MEDICINE

## 2024-05-06 PROCEDURE — 93005 ELECTROCARDIOGRAM TRACING: CPT | Mod: PO

## 2024-05-06 PROCEDURE — 3079F DIAST BP 80-89 MM HG: CPT | Mod: CPTII,S$GLB,, | Performed by: INTERNAL MEDICINE

## 2024-05-06 NOTE — Clinical Note
Hey good morning, can you add EGD to the Cscope? She is having more ongoing CP/reflux issues. Cardiac wise she is doing well and stable. Thanks!

## 2024-05-06 NOTE — PROGRESS NOTES
Subjective:   Patient ID:  Reta Navas is a 58 y.o. female who presents for cardiac consult of Annual Exam and Chest Pain (Comes and goes. )        HPI  The patient came in today for cardiac consult of Annual Exam and Chest Pain (Comes and goes. )      Reta Navas is a 58 y.o. female pt with HTN, HLD - elevated Tgs, obesity, GERD, h/o tobacco abuse presents for follow up CV eval.     4/17/23  BP and HR well controlled. BMI 31 - 160 lbs. She still has occ CP maybe due to reflux, is back on PPI - Prilosec 20mg.       5/6/24  CA score - 33 - low risk.  BP and HR well controlled. BMI 30  -154 lbs   She will have Cscope.   She has chronic CP.   Has LISETTE diagnosed.       Procedure Note    PHYSICIAN INTERPRETATION AND COMMENTS: Findings are consistent with moderate obstructive sleep apnea (LISETTE).       Calcium score:     0-10: Very little coronary heart disease risk     11 through 100: Low to moderate coronary heart disease risk     101 through 400: Moderate to high coronary heart disease risk     Greater than 401: High coronary heart disease risk.     SCORE SUMMARY     Your total calcium score is 33     Incidental findings: None     Impression:     Your total calcium score is 33.  Moderate to high coronary heart disease risk.        Electronically signed by:Francois Lott  Date:                                            05/01/2023  Time:                                           11:17    FH - mother - had MI and CVA    Bardy 7/2022  Conclusion    Heart rates varied between 51 and 136 BPM with an average of 81 BPM.  Total Analysis Time: 15 days and 0 hours  Predominant rhythm: NSR  PAC 0.01 %  Ectopic Atrial Rhythm (EAR)    Results for orders placed during the hospital encounter of 08/15/22    Echo    Interpretation Summary  · Concentric remodeling and normal systolic function.  · The estimated ejection fraction is 60%.  · Normal left ventricular diastolic function.  · Normal right ventricular size with normal right  ventricular systolic function.      Results for orders placed during the hospital encounter of 08/15/22    Nuclear Stress - Cardiology Interpreted    Interpretation Summary    Normal myocardial perfusion scan. There is no evidence of myocardial ischemia or infarction.    The gated perfusion images showed an ejection fraction of 74% at rest. The gated perfusion images showed an ejection fraction of 71% post stress.    There is normal wall motion at rest and post stress.    LV cavity size is normal at rest and normal at stress.    The EKG portion of this study is negative for ischemia.    The patient reported no chest pain during the stress test.    There were no arrhythmias during stress.    The exercise capacity was above average.      Post Exercise Imaging:     Immediate post exercise images demonstrate left ventricular function augmenting. Right ventricular function augments. Left ventricular end systolic volume decreases.   The left ventricle is globally hyperdynamic and no exercise induced wall motion abnormalities were identified.    CONCLUSIONS     1 - Normal left ventricular systolic function (EF 60-65%).     2 - Normal left ventricular diastolic function.     3 - Normal right ventricular systolic function .     4 - The estimated PA systolic pressure is 20 mmHg.     5 - Concentric hypertrophy.     6 - No wall motion abnormalities.     7 - Mild tricuspid regurgitation.     No evidence of stress induced myocardial ischemia.     This document has been electronically    SIGNED BY: Byron Puente MD On: 12/31/2019 10:54      Past Medical History:   Diagnosis Date    Abnormal Pap smear     Abnormal Pap smear of cervix     Chronic rhinitis     Depression     GERD (gastroesophageal reflux disease)     Hyperlipidemia     Hypertension     LISETTE (obstructive sleep apnea) 2/8/2024    Overactive bladder     Plantar fasciitis, bilateral     Psoriasis        Past Surgical History:   Procedure Laterality Date    CERVICAL BIOPSY   W/ LOOP ELECTRODE EXCISION      HYSTERECTOMY      supracervical hyst/LSO for fibroids    OOPHORECTOMY      LSO    ROBOT-ASSISTED CHOLECYSTECTOMY USING DA WILVER XI N/A 10/18/2018    Procedure: XI ROBOTIC CHOLECYSTECTOMY;  Surgeon: Robel Ferrer MD;  Location: [facility];  Service: General;  Laterality: N/A;    TUBAL LIGATION         Social History     Tobacco Use    Smoking status: Former     Current packs/day: 0.00     Average packs/day: 1 pack/day for 39.0 years (39.0 ttl pk-yrs)     Types: Vaping with nicotine, Cigarettes     Start date:      Quit date:      Years since quittin.3     Passive exposure: Past    Smokeless tobacco: Never   Substance Use Topics    Alcohol use: No     Alcohol/week: 0.0 standard drinks of alcohol    Drug use: Never       Family History   Problem Relation Name Age of Onset    Ovarian cancer Mother      Stroke Mother      No Known Problems Father      Leukemia Brother      Breast cancer Paternal Grandmother          mastectomy    Heart attack Paternal Grandmother  82        COD    Colon cancer Paternal Grandfather      Ovarian cancer Maternal Aunt      Stroke Maternal Aunt      Colon cancer Paternal Uncle         Patient's Medications   New Prescriptions    No medications on file   Previous Medications    ALBUTEROL (VENTOLIN HFA) 90 MCG/ACTUATION INHALER    Inhale 2 puffs into the lungs every 6 (six) hours as needed for Shortness of Breath. Rescue    AMLODIPINE (NORVASC) 10 MG TABLET    Take 1 tablet (10 mg total) by mouth once daily.    ASPIRIN (ECOTRIN) 81 MG EC TABLET    Take 1 tablet (81 mg total) by mouth once daily.    AZELASTINE (ASTELIN) 137 MCG (0.1 %) NASAL SPRAY    1 spray (137 mcg total) by Nasal route 2 (two) times daily.    AZELASTINE (ASTELIN) 137 MCG (0.1 %) NASAL SPRAY    1 spray (137 mcg total) by Nasal route 2 (two) times daily.    ESTRADIOL (ESTRACE) 0.01 % (0.1 MG/GRAM) VAGINAL CREAM    Place 1 g vaginally once daily for 14 days, THEN 1 g twice a week.     FEXOFENADINE (ALLEGRA) 180 MG TABLET    Take 180 mg by mouth once daily.    FEXOFENADINE (ALLEGRA) 180 MG TABLET    Take 1 tablet (180 mg total) by mouth once daily.    FLUTICASONE PROPIONATE (FLONASE) 50 MCG/ACTUATION NASAL SPRAY    2 sprays (100 mcg total) by Each Nostril route once daily.    FLUTICASONE PROPIONATE (FLONASE) 50 MCG/ACTUATION NASAL SPRAY    1 spray (50 mcg total) by Each Nostril route 2 (two) times a day.    LINZESS 145 MCG CAP CAPSULE    TAKE 1 CAPSULE BY MOUTH BEFORE BREAKFAST    MONTELUKAST (SINGULAIR) 10 MG TABLET    TAKE 1 TABLET BY MOUTH ONCE DAILY IN THE EVENING    MONTELUKAST (SINGULAIR) 10 MG TABLET    Take 1 tablet (10 mg total) by mouth once daily.    NITROFURANTOIN, MACROCRYSTAL-MONOHYDRATE, (MACROBID) 100 MG CAPSULE    Take 1 capsule (100 mg total) by mouth 2 (two) times daily.    NITROGLYCERIN (NITROSTAT) 0.4 MG SL TABLET    Place 1 tablet (0.4 mg total) under the tongue every 5 (five) minutes as needed for Chest pain.    OMEPRAZOLE (PRILOSEC) 40 MG CAPSULE    Take 1 capsule (40 mg total) by mouth once daily.    VITAMIN D (VITAMIN D3) 1000 UNITS TAB    Take 1,000 Units by mouth once daily. Take 2000 units daily.   Modified Medications    No medications on file   Discontinued Medications    No medications on file       Review of Systems   Constitutional: Negative.    HENT: Negative.     Eyes: Negative.    Respiratory: Negative.     Cardiovascular:  Positive for chest pain and palpitations.   Gastrointestinal: Negative.    Genitourinary: Negative.    Musculoskeletal: Negative.    Skin: Negative.    Neurological: Negative.    Endo/Heme/Allergies: Negative.    Psychiatric/Behavioral: Negative.     All 12 systems otherwise negative.      Wt Readings from Last 3 Encounters:   05/06/24 70 kg (154 lb 5.2 oz)   04/30/24 70.6 kg (155 lb 10.3 oz)   04/23/24 68.6 kg (151 lb 5.5 oz)     Temp Readings from Last 3 Encounters:   04/30/24 98.8 °F (37.1 °C)   01/02/24 98.4 °F (36.9 °C)   06/14/23 97.7  °F (36.5 °C)     BP Readings from Last 3 Encounters:   05/06/24 124/88   04/30/24 114/77   04/23/24 122/76     Pulse Readings from Last 3 Encounters:   05/06/24 77   04/30/24 82   04/23/24 96       /88 (BP Location: Left arm, Patient Position: Sitting, BP Method: Medium (Manual))   Pulse 77   Ht 5' (1.524 m)   Wt 70 kg (154 lb 5.2 oz)   SpO2 96%   BMI 30.14 kg/m²     Objective:   Physical Exam  Vitals and nursing note reviewed.   Constitutional:       General: She is not in acute distress.     Appearance: She is well-developed. She is not diaphoretic.   HENT:      Head: Normocephalic and atraumatic.      Nose: Nose normal.   Eyes:      General: No scleral icterus.     Conjunctiva/sclera: Conjunctivae normal.   Neck:      Thyroid: No thyromegaly.      Vascular: No JVD.   Cardiovascular:      Rate and Rhythm: Normal rate and regular rhythm.      Heart sounds: S1 normal and S2 normal. No murmur heard.     No friction rub. No gallop. No S3 or S4 sounds.   Pulmonary:      Effort: Pulmonary effort is normal. No respiratory distress.      Breath sounds: Normal breath sounds. No stridor. No wheezing or rales.   Chest:      Chest wall: No tenderness.   Abdominal:      General: Bowel sounds are normal. There is no distension.      Palpations: Abdomen is soft. There is no mass.      Tenderness: There is no abdominal tenderness. There is no rebound.   Genitourinary:     Comments: Deferred  Musculoskeletal:         General: No tenderness or deformity. Normal range of motion.      Cervical back: Normal range of motion and neck supple.   Lymphadenopathy:      Cervical: No cervical adenopathy.   Skin:     General: Skin is warm and dry.      Coloration: Skin is not pale.      Findings: No erythema or rash.   Neurological:      Mental Status: She is alert and oriented to person, place, and time.      Motor: No abnormal muscle tone.      Coordination: Coordination normal.   Psychiatric:         Behavior: Behavior normal.          Thought Content: Thought content normal.         Judgment: Judgment normal.         Lab Results   Component Value Date     04/23/2024    K 4.0 04/23/2024     04/23/2024    CO2 25 04/23/2024    BUN 12 04/23/2024    CREATININE 0.8 04/23/2024    GLU 92 04/23/2024    HGBA1C 5.5 04/23/2024    AST 20 04/23/2024    ALT 23 04/23/2024    ALBUMIN 4.0 04/23/2024    PROT 8.2 04/23/2024    BILITOT 0.4 04/23/2024    WBC 11.95 04/23/2024    HGB 14.1 04/23/2024    HCT 42.2 04/23/2024    MCV 91 04/23/2024     04/23/2024    TSH 2.344 04/23/2024    CHOL 165 04/23/2024    HDL 45 04/23/2024    LDLCALC 94.4 04/23/2024    TRIG 128 04/23/2024    BNP 14 06/07/2019     Assessment:      1. Other chest pain    2. Palpitations    3. Gastroesophageal reflux disease, unspecified whether esophagitis present    4. Primary hypertension    5. Former smoker    6. BMI 30.0-30.9,adult    7. Sleep apnea, unspecified type    8. Other hyperlipidemia              Plan:   1. Chest pain - had ER eval r/o ACS; with palpitations  - coronary calcif on Chest CT  - prior Nuclear stress test 8/2022 neg for ischemia. 2D ECHO 8/2022 with normal bi V function and valves  - Bardy neg for arrythmias.   - cont aspirin 81 mg  - give PRN NTG   - CA score - 33 - low risk.  In 2023    2. HTN  - titrate meds    3. GERD  - cont PPI  - refer to GI - low CV risk for EGD - needs EGD asap   - low CV risk for Cscope    4. Overweight/Obesity - BMI 31 - 160 lbs.  BMI 30  -154 lbs   - cont low stefanie diet     5. HLD - improving Tgs  - recently started statin - but could not tolerate in past   - needs to take fish oils    6. Former smoker  - has quit     7. Anxiety  - rec meditation     8. LISETTE  - has CPAP now     Visit today included increased complexity associated with the care of the episodic problem chest pain addressed and managing the longitudinal care of the patient due to the serious and/or complex managed problem(s) .      Thank you for allowing me to  participate in this patient's care. Please do not hesitate to contact me with any questions or concerns. Consult note has been forwarded to the referral physician.

## 2024-05-07 ENCOUNTER — PATIENT MESSAGE (OUTPATIENT)
Dept: GASTROENTEROLOGY | Facility: CLINIC | Age: 59
End: 2024-05-07
Payer: COMMERCIAL

## 2024-05-07 ENCOUNTER — HOSPITAL ENCOUNTER (OUTPATIENT)
Dept: RADIOLOGY | Facility: HOSPITAL | Age: 59
Discharge: HOME | End: 2024-05-07
Attending: FAMILY MEDICINE
Payer: COMMERCIAL

## 2024-05-07 DIAGNOSIS — Z12.31 ENCOUNTER FOR SCREENING MAMMOGRAM FOR BREAST CANCER: ICD-10-CM

## 2024-05-07 LAB
OHS QRS DURATION: 68 MS
OHS QTC CALCULATION: 436 MS

## 2024-05-07 PROCEDURE — 77067 SCR MAMMO BI INCL CAD: CPT | Mod: TC,PO

## 2024-05-07 PROCEDURE — 77067 SCR MAMMO BI INCL CAD: CPT | Mod: 26,,, | Performed by: RADIOLOGY

## 2024-05-07 PROCEDURE — 77063 BREAST TOMOSYNTHESIS BI: CPT | Mod: 26,,, | Performed by: RADIOLOGY

## 2024-05-08 ENCOUNTER — TELEPHONE (OUTPATIENT)
Dept: INTERNAL MEDICINE | Facility: CLINIC | Age: 59
End: 2024-05-08
Payer: COMMERCIAL

## 2024-05-08 NOTE — TELEPHONE ENCOUNTER
Called. Patient stating she received the imaging order that has been approved by insurance with certification number. Informed she can keep it for a record and keep the visit for imaging. Patient verbally understood.

## 2024-05-08 NOTE — TELEPHONE ENCOUNTER
----- Message from Cinthia Marte sent at 5/8/2024  9:50 AM CDT -----  Contact: [phone]  Patient called in requesting a call back , she said she have certification # [id number] for CT scan approval for April 24,2024 - July 22,2024 , please call back  [phone]

## 2024-05-10 ENCOUNTER — TELEPHONE (OUTPATIENT)
Dept: PREADMISSION TESTING | Facility: HOSPITAL | Age: 59
End: 2024-05-10
Payer: COMMERCIAL

## 2024-05-10 NOTE — TELEPHONE ENCOUNTER
----- Message from Rusty Navas LPN sent at 5/10/2024  9:11 AM CDT -----  Good morning,  Dr Potter would like to add an EGD to the Colonoscopy. Also she was seen by Cardiology.  If you need me to do anything, please let me know.  Thanks so much!  Rusty  ----- Message -----  From: Donna Potter MD  Sent: 5/7/2024   8:58 AM CDT  To: Byron Puente MD; Rusty Navas LPN    Sure.  I can take care of it.     Rusty, I do not see that this patient is scheduled for procedure yet.   Can you send a message to schedulers asking to add EGD to her colonoscopy, please.     Thanks     Dr Potter  ----- Message -----  From: Byron Puente MD  Sent: 5/6/2024   9:41 AM CDT  To: Donna Potter MD    Hey good morning, can you add EGD to the Cscope? She is having more ongoing CP/reflux issues. Cardiac wise she is doing well and stable. Thanks!

## 2024-05-17 ENCOUNTER — HOSPITAL ENCOUNTER (OUTPATIENT)
Dept: RADIOLOGY | Facility: HOSPITAL | Age: 59
Discharge: HOME | End: 2024-05-17
Attending: FAMILY MEDICINE
Payer: COMMERCIAL

## 2024-05-17 DIAGNOSIS — Z87.891 HISTORY OF SMOKING: ICD-10-CM

## 2024-05-17 PROCEDURE — 71271 CT THORAX LUNG CANCER SCR C-: CPT | Mod: TC

## 2024-05-17 PROCEDURE — 71271 CT THORAX LUNG CANCER SCR C-: CPT | Mod: 26,,, | Performed by: RADIOLOGY

## 2024-05-20 ENCOUNTER — TELEPHONE (OUTPATIENT)
Dept: PULMONOLOGY | Facility: CLINIC | Age: 59
End: 2024-05-20
Payer: COMMERCIAL

## 2024-05-20 NOTE — TELEPHONE ENCOUNTER
----- Message from Tanika Amos sent at 5/20/2024  9:15 AM CDT -----  Who Called: Pt    What is the request in detail: Requesting call back to discuss rescheduling follow up appt. Pt requesting after May and after 3pm time. Please advise    Can the clinic reply by [facility]? No    Best Call Back Number: [phone]      Additional Information:

## 2024-05-22 NOTE — PROGRESS NOTES
New Patient Chronic Pain Note (Initial Visit)    Referring Physician: Mendoza Cardoso MD    PCP: Mendoza Cardoso MD    Chief Complaint:   Chief Complaint   Patient presents with    Low-back Pain        SUBJECTIVE:    Reta Navas is a 58 y.o. female with past medical history significant for hypertension, urinary incontinence, obesity, GERD, obstructive sleep apnea, nicotine dependence who presents to the clinic for the evaluation of lower back and leg pain.  Patient reports pain began approximately 20 years prior without inciting accident or injury.  Today she reports pain which is constant which is rated a 4/10.  Pain at its best is a 2/10 and at its worse is a 10/10.  She reports pain which she believes a separate in a bandlike distribution in the lower back as well as pain which radiates down bilateral lower extremities to the feet in no particular dermatomal distribution.  Pain is described as aching in the back and numbness in the legs.  Pain can be exacerbated by ambulation exceeding half a mi, sleeping exceeding 6 hours and with prolonged sitting.  Pain is marginally improved with hot baths, alternating ibuprofen and Tylenol and position changes.  Patient has not trialed antispasmodics or membrane stabilizing agents.  She is undergone conventional land-based physical therapy out of state as well as chiropractic manipulation with ineffective relief.  She is continued physician directed physical therapy exercises at home over the last 8 weeks from 03/23/2024 through 05/23/2024 with marginal improvement in her symptoms.  Of note patient has traveling to [place] 05/24/[place]24 and would like pharmacologic management to help manage her symptoms.    Patient denies night fever/night sweats, urinary incontinence, bowel incontinence, significant weight loss, and significant motor weakness.  Patient reports loss of sensations.      Pain Disability Index Review:         5/23/2024     8:46 AM   Last 3 PDI  Scores   Pain Disability Index (PDI) 64       Non-Pharmacologic Treatments:  Physical Therapy/Home Exercise: yes  Ice/Heat:yes  TENS: no  Acupuncture: no  Massage: no  Chiropractic: yes    Other: no      Pain Medications:  - Anti-Coagulants: Aspirin    Pain Procedures:   None    Past Medical History:   Diagnosis Date    Abnormal Pap smear     Abnormal Pap smear of cervix     Chronic rhinitis     Depression     GERD (gastroesophageal reflux disease)     Hyperlipidemia     Hypertension     LISETTE (obstructive sleep apnea) 2/8/2024    Overactive bladder     Plantar fasciitis, bilateral     Psoriasis      Past Surgical History:   Procedure Laterality Date    CERVICAL BIOPSY  W/ LOOP ELECTRODE EXCISION      HYSTERECTOMY      supracervical hyst/LSO for fibroids    OOPHORECTOMY      LSO    ROBOT-ASSISTED CHOLECYSTECTOMY USING DA WILVER XI N/A 10/18/2018    Procedure: XI ROBOTIC CHOLECYSTECTOMY;  Surgeon: Robel Ferrer MD;  Location: [facility];  Service: General;  Laterality: N/A;    TUBAL LIGATION       Review of patient's allergies indicates:   Allergen Reactions    Adhesive Rash    Latex, natural rubber        Current Outpatient Medications   Medication Sig    albuterol (VENTOLIN HFA) 90 mcg/actuation inhaler Inhale 2 puffs into the lungs every 6 (six) hours as needed for Shortness of Breath. Rescue    amLODIPine (NORVASC) 10 MG tablet Take 1 tablet (10 mg total) by mouth once daily.    azelastine (ASTELIN) 137 mcg (0.1 %) nasal spray 1 spray (137 mcg total) by Nasal route 2 (two) times daily.    estradioL (ESTRACE) 0.01 % (0.1 mg/gram) vaginal cream Place 1 g vaginally once daily for 14 days, THEN 1 g twice a week.    fexofenadine (ALLEGRA) 180 MG tablet Take 180 mg by mouth once daily.    fexofenadine (ALLEGRA) 180 MG tablet Take 1 tablet (180 mg total) by mouth once daily.    fluticasone propionate (FLONASE) 50 mcg/actuation nasal spray 2 sprays (100 mcg total) by Each Nostril route once daily.    fluticasone propionate  (FLONASE) 50 mcg/actuation nasal spray 1 spray (50 mcg total) by Each Nostril route 2 (two) times a day.    LINZESS 145 mcg Cap capsule TAKE 1 CAPSULE BY MOUTH BEFORE BREAKFAST    montelukast (SINGULAIR) 10 mg tablet TAKE 1 TABLET BY MOUTH ONCE DAILY IN THE EVENING    montelukast (SINGULAIR) 10 mg tablet Take 1 tablet (10 mg total) by mouth once daily.    nitrofurantoin, macrocrystal-monohydrate, (MACROBID) 100 MG capsule Take 1 capsule (100 mg total) by mouth 2 (two) times daily.    omeprazole (PRILOSEC) 40 MG capsule Take 1 capsule (40 mg total) by mouth once daily.    vitamin D (VITAMIN D3) 1000 units Tab Take 1,000 Units by mouth once daily. Take 2000 units daily.    aspirin (ECOTRIN) 81 MG EC tablet Take 1 tablet (81 mg total) by mouth once daily.    gabapentin (NEURONTIN) 300 MG capsule Take 1 capsule (300 mg total) by mouth every evening for 3 days, THEN 1 capsule (300 mg total) 2 (two) times daily for 3 days, THEN 1 capsule (300 mg total) 3 (three) times daily for 3 days, THEN 2 capsules (600 mg total) 2 (two) times daily for 3 days, THEN 2 capsules (600 mg total) 3 (three) times daily for 18 days.    methocarbamoL (ROBAXIN) 500 MG Tab Take 1 tablet (500 mg total) by mouth 2 (two) times daily as needed (muscle spasms).    nitroGLYCERIN (NITROSTAT) 0.4 MG SL tablet Place 1 tablet (0.4 mg total) under the tongue every 5 (five) minutes as needed for Chest pain.     No current facility-administered medications for this visit.       Review of Systems     GENERAL:  No weight loss, malaise or fevers.  HEENT:   No recent changes in vision or hearing  NECK:  Negative for lumps, no difficulty with swallowing.  RESPIRATORY:  Negative for cough, wheezing or shortness of breath, patient denies any recent URI.  CARDIOVASCULAR:  Negative for chest pain or palpitations.  GI:  Negative for abdominal discomfort, blood in stools or black stools or change in bowel habits.  MUSCULOSKELETAL:  See HPI.  SKIN:  Negative for  lesions, rash, and itching.  PSYCH:  No mood disorder or recent psychosocial stressors.   HEMATOLOGY/LYMPHOLOGY:  Negative for prolonged bleeding, bruising easily or swollen nodes.    NEURO:   No history of syncope, paralysis, seizures or tremors.  All other reviewed and negative other than HPI.    OBJECTIVE:    /83   Pulse 80   Resp 17   Ht 5' (1.524 m)   Wt 69.4 kg (153 lb)   BMI 29.88 kg/m²       Physical Exam    GENERAL: Well appearing, in no acute distress, alert and oriented x3.  PSYCH:  Mood and affect appropriate.  SKIN: Skin color, texture, turgor normal, no rashes or lesions.  HEAD/FACE:  Normocephalic, atraumatic. Cranial nerves grossly intact.    CV: RRR with palpation of the radial artery.  PULM: No evidence of respiratory difficulty, symmetric chest rise.  GI:  Soft and non-tender.    BACK: Straight leg raising in the sitting and supine positions is negative to radicular pain.  pain to palpation over the facet joints of the lumbar spine or spinous processes. Normal range of motion without pain reproduction.  EXTREMITIES: Peripheral joint ROM is full and pain free without obvious instability or laxity in all four extremities. No deformities, edema, or skin discoloration. Good capillary refill.  MUSCULOSKELETAL: Able to stand on heels & toes.   Shoulder, hip, and knee provocative maneuvers are negative.  There is no pain with palpation over the sacroiliac joints bilaterally.  Gaenslen's, Distraction/Compression and  FABERs test is negative.  Facet loading test is positive bilaterally.   Bilateral upper and lower extremity strength is normal and symmetric.  No atrophy or tone abnormalities are noted.    RIGHT Lower extremity: Hip flexion 5/5, Hip Abduction 5/5, Hip Adduction 5/5, Knee extension 5/5, Knee flexion 5/5, Ankle dorsiflexion5/5, Extensor hallucis longus 5/5, Ankle plantarflexion 5/5  LEFT Lower extremity:  Hip flexion 5/5, Hip Abduction 5/5,Hip Adduction 5/5, Knee extension 5/5, Knee  flexion 5/5, Ankle dorsiflexion 5/5, Extensor hallucis longus 5/5, Ankle plantarflexion 5/5  -Normal testing knee (patellar) jerk and ankle (achilles) jerk    NEURO: Bilateral upper and lower extremity coordination and muscle stretch reflexes are physiologic and symmetric. No loss of sensation is noted.  GAIT: normal.    Imagin19    X-Ray Cervical Spine AP And Lateral    Narrative  EXAMINATION:  XR CERVICAL SPINE AP LATERAL    CLINICAL HISTORY:  neck pain;    COMPARISON:  None    FINDINGS  There is slight reversal of the normal lordotic curve which could be positional or due to muscle spasm.  No acute cervical fracture or significant subluxation.    Impression  1.  Slight reversal of normal lordotic curve which could be positional or due to muscle spasm.  No acute cervical fracture or significant subluxation.          ASSESSMENT: 58 y.o. year old female with     1. Chronic low back pain, unspecified back pain laterality, unspecified whether sciatica present  Ambulatory referral/consult to Pain Clinic      2. Lumbar radiculopathy  MRI Lumbar Spine Without Contrast    X-Ray Lumbar Complete Including Flex And Ext    Ambulatory referral/consult to Physical/Occupational Therapy      3. Lumbar facet arthropathy  X-Ray Lumbar Complete Including Flex And Ext    Ambulatory referral/consult to Physical/Occupational Therapy      4. Lumbar spondylosis  Ambulatory referral/consult to Physical/Occupational Therapy      5. Myofascial pain syndrome of lumbar spine  HME - OTHER            PLAN:   - Interventions:  We discussed considering bilateral L3-5 lumbar medial branch block to address axial back pain versus lumbar epidural to address lumbar radiculopathy.  We will 1st review lumbar x-ray and lumbar MRI.. Explained the risks and benefits of the procedure in detail with the patient today in clinic along with alternative treatment options    - Anticoagulation use: Yes aspirin  --primary prophylaxis     report:   Reviewed and consistent with medication use as prescribed.    - Medications:  -We have discussed temporarily starting an anti spasmodic, Robaxin 500 mg twice daily  to see if this helps with myofascial pain.  We have discussed potential deleterious side effects associated with this medication including  dizziness, drowsiness, stomach upset, nausea vomiting or blurred vision.  Patient expresses understanding.    -  We have discussed starting gabapentin gabapentin to a therapeutic goal.  Patient will increase  medication according to the following algorithm.  We have reviewed potential side effects of this medication including daytime somnolence, weight gain and peripheral edema  Gabapentin Titration:  Day 1: 300mg qHS  Day 4: 300mg AM, 300mg qHS  Day 7: 300mg TID  Day 10: 300mg AM, 300mg afternoon, 600mg qHS  Day 13: 300mg AM, 600mg afternoon, 600mg qHS  Day 16+: and after 600mg TID      - Therapy:   We discussed initiating physical therapy to help manage the patient/s painful condition. The patient was counseled that muscle strengthening will improve the long term prognosis in regards to pain and may also help increase range of motion and mobility. They were told that one of the goals of physical therapy is that they learn how to do the exercises so that they can do them independently at home daily upon completion. The patient's questions were answered and they were agreeable to this course. A referral for INT physical therapy was provided to the patient.    - Imaging:  X-ray lumbar spine and MRI lumbar spine to better evaluate pain    - Follow up visit: return to clinic in 4-6 weeks    The above plan and management options were discussed at length with patient. Patient is in agreement with the above and verbalized understanding.    - I discussed the goals of interventional chronic pain management with the patient on today's visit. We discussed a multimodal and systematic approach to pain.  This includes diagnostic  and therapeutic injections, adjuvant pharmacologic treatment, physical therapy, and at times psychiatry.  I emphasized the importance of regular exercise, core strengthening and stretching, diet and weight loss as a cornerstone of long-term pain management.    - This condition does not require this patient to take time off of work, and the primary goal of our Pain Management services is to improve the patient's functional capacity.  - Patient Questions: Answered all of the patient's questions regarding diagnoses, therapy, treatment and next steps        Franco Santillan MD  Interventional Pain Management  [facility]    Disclaimer:  This note was prepared using voice recognition system and is likely to have sound alike errors that may have been overlooked even after proof reading.  Please call me with any questions

## 2024-05-23 ENCOUNTER — OFFICE VISIT (OUTPATIENT)
Dept: PAIN MEDICINE | Facility: CLINIC | Age: 59
End: 2024-05-23
Payer: COMMERCIAL

## 2024-05-23 ENCOUNTER — HOSPITAL ENCOUNTER (OUTPATIENT)
Dept: RADIOLOGY | Facility: HOSPITAL | Age: 59
Discharge: HOME | End: 2024-05-23
Attending: ANESTHESIOLOGY
Payer: COMMERCIAL

## 2024-05-23 VITALS
HEART RATE: 80 BPM | RESPIRATION RATE: 17 BRPM | DIASTOLIC BLOOD PRESSURE: 83 MMHG | SYSTOLIC BLOOD PRESSURE: 130 MMHG | BODY MASS INDEX: 30.04 KG/M2 | WEIGHT: 153 LBS | HEIGHT: 60 IN

## 2024-05-23 DIAGNOSIS — M54.50 CHRONIC LOW BACK PAIN, UNSPECIFIED BACK PAIN LATERALITY, UNSPECIFIED WHETHER SCIATICA PRESENT: Primary | ICD-10-CM

## 2024-05-23 DIAGNOSIS — M54.16 LUMBAR RADICULOPATHY: ICD-10-CM

## 2024-05-23 DIAGNOSIS — G89.29 CHRONIC LOW BACK PAIN, UNSPECIFIED BACK PAIN LATERALITY, UNSPECIFIED WHETHER SCIATICA PRESENT: Primary | ICD-10-CM

## 2024-05-23 DIAGNOSIS — M47.816 LUMBAR SPONDYLOSIS: ICD-10-CM

## 2024-05-23 DIAGNOSIS — M79.18 MYOFASCIAL PAIN SYNDROME OF LUMBAR SPINE: ICD-10-CM

## 2024-05-23 DIAGNOSIS — M47.816 LUMBAR FACET ARTHROPATHY: ICD-10-CM

## 2024-05-23 PROCEDURE — 3008F BODY MASS INDEX DOCD: CPT | Mod: CPTII,S$GLB,, | Performed by: ANESTHESIOLOGY

## 2024-05-23 PROCEDURE — 3075F SYST BP GE 130 - 139MM HG: CPT | Mod: CPTII,S$GLB,, | Performed by: ANESTHESIOLOGY

## 2024-05-23 PROCEDURE — 1159F MED LIST DOCD IN RCRD: CPT | Mod: CPTII,S$GLB,, | Performed by: ANESTHESIOLOGY

## 2024-05-23 PROCEDURE — 72110 X-RAY EXAM L-2 SPINE 4/>VWS: CPT | Mod: TC,FY,PO

## 2024-05-23 PROCEDURE — 99204 OFFICE O/P NEW MOD 45 MIN: CPT | Mod: S$GLB,,, | Performed by: ANESTHESIOLOGY

## 2024-05-23 PROCEDURE — 3079F DIAST BP 80-89 MM HG: CPT | Mod: CPTII,S$GLB,, | Performed by: ANESTHESIOLOGY

## 2024-05-23 PROCEDURE — 3044F HG A1C LEVEL LT 7.0%: CPT | Mod: CPTII,S$GLB,, | Performed by: ANESTHESIOLOGY

## 2024-05-23 PROCEDURE — 72110 X-RAY EXAM L-2 SPINE 4/>VWS: CPT | Mod: 26,,, | Performed by: RADIOLOGY

## 2024-05-23 PROCEDURE — 99999 PR PBB SHADOW E&M-EST. PATIENT-LVL V: CPT | Mod: PBBFAC,,, | Performed by: ANESTHESIOLOGY

## 2024-05-23 RX ORDER — METHOCARBAMOL 500 MG/1
500 TABLET, FILM COATED ORAL 2 TIMES DAILY PRN
Qty: 60 TABLET | Refills: 0 | Status: SHIPPED | OUTPATIENT
Start: 2024-05-23

## 2024-05-23 RX ORDER — GABAPENTIN 300 MG/1
CAPSULE ORAL
Qty: 138 CAPSULE | Refills: 0 | Status: SHIPPED | OUTPATIENT
Start: 2024-05-23 | End: 2024-06-22

## 2024-06-02 ENCOUNTER — HOSPITAL ENCOUNTER (OUTPATIENT)
Dept: RADIOLOGY | Facility: HOSPITAL | Age: 59
Discharge: HOME | End: 2024-06-02
Attending: ANESTHESIOLOGY
Payer: COMMERCIAL

## 2024-06-02 DIAGNOSIS — M54.16 LUMBAR RADICULOPATHY: ICD-10-CM

## 2024-06-02 PROCEDURE — 72148 MRI LUMBAR SPINE W/O DYE: CPT | Mod: TC

## 2024-06-02 PROCEDURE — 72148 MRI LUMBAR SPINE W/O DYE: CPT | Mod: 26,,, | Performed by: RADIOLOGY

## 2024-06-03 ENCOUNTER — CLINICAL SUPPORT (OUTPATIENT)
Dept: REHABILITATION | Facility: HOSPITAL | Age: 59
End: 2024-06-03
Attending: ANESTHESIOLOGY
Payer: COMMERCIAL

## 2024-06-03 DIAGNOSIS — R52 PAIN: Primary | ICD-10-CM

## 2024-06-03 DIAGNOSIS — R29.3 POOR POSTURE: ICD-10-CM

## 2024-06-03 DIAGNOSIS — M47.816 LUMBAR FACET ARTHROPATHY: ICD-10-CM

## 2024-06-03 DIAGNOSIS — M47.816 LUMBAR SPONDYLOSIS: ICD-10-CM

## 2024-06-03 DIAGNOSIS — M53.86 DECREASED ROM OF LUMBAR SPINE: ICD-10-CM

## 2024-06-03 DIAGNOSIS — M54.16 LUMBAR RADICULOPATHY: ICD-10-CM

## 2024-06-03 PROCEDURE — 97110 THERAPEUTIC EXERCISES: CPT | Mod: PN

## 2024-06-03 PROCEDURE — 97112 NEUROMUSCULAR REEDUCATION: CPT | Mod: PN

## 2024-06-03 PROCEDURE — 97161 PT EVAL LOW COMPLEX 20 MIN: CPT | Mod: PN

## 2024-06-03 PROCEDURE — 97530 THERAPEUTIC ACTIVITIES: CPT | Mod: PN

## 2024-06-03 NOTE — PLAN OF CARE
[facility]   Physical Therapy Initial Evaluation        Name: Reta Navas  Clinic Number: [id number]    Therapy Diagnosis:   Encounter Diagnoses   Name Primary?    Lumbar radiculopathy     Lumbar facet arthropathy     Lumbar spondylosis     Pain Yes    Decreased ROM of lumbar spine     Poor posture      Physician: Franco Santillan MD    Physician Orders: PT Eval and Treat   Medical Diagnosis from Referral: Lumbar radiculopathy   Lumbar facet arthropathy, Lumbar spondylosis  Evaluation Date: 6/3/2024  Authorization Period Expiration: 12/31/2024  Plan of Care Expiration: 8/16/24  Progress Note Due: 30 days  Visit # / Visits authorized: 1/ 1   FOTO: 1/3  Precautions: Standard, Standard    Time In: 4:02  Time Out: 4:57  Total Billable Time: 55 minutes (low Complexity Evaluation, Therapeutic Exercise - 8, Manual Therapy - 0, Therapeutic activities- 12, Neuro muscular re education- 10)      Subjective   Date of onset: 30 years  History of current condition - Reta reports: her back pain has progressively gotten worse and  now pain with walking and prolong sitting. Patient reports intermittent numbness in B feet. She also reports numbness at times in R UE with sitting to R and numbness in L UE with sitting to L at times.      Medical History:   Past Medical History:   Diagnosis Date    Abnormal Pap smear     Abnormal Pap smear of cervix     Chronic rhinitis     Depression     GERD (gastroesophageal reflux disease)     Hyperlipidemia     Hypertension     LISETTE (obstructive sleep apnea) 2/8/2024    Overactive bladder     Plantar fasciitis, bilateral     Psoriasis        Surgical History:   Reta Navas  has a past surgical history that includes Tubal ligation; Oophorectomy; Robot-assisted cholecystectomy using da Mildred Xi (N/A, 10/18/2018); Cervical biopsy w/ loop electrode excision; and Hysterectomy.    Medications:   Reta has a current medication list which includes the following  prescription(s): albuterol, amlodipine, aspirin, azelastine, estradiol, fexofenadine, fexofenadine, fluticasone propionate, fluticasone propionate, gabapentin, linzess, methocarbamol, montelukast, montelukast, nitrofurantoin (macrocrystal-monohydrate), nitroglycerin, omeprazole, and vitamin d.    Allergies:   Review of patient's allergies indicates:   Allergen Reactions    Adhesive Rash    Latex, natural rubber         Imaging, X-ray of lumbar spine:  1.  No evidence of acute injury or significant degenerative changes.  2.  Normal alignment is maintained over the full range of motion through flexion and extension.    MRI of lumbar spine: Mild multilevel disc degeneration as above with no significant central or foraminal stenosis. No acute abnormality.     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  Prior Level of Function: independent  Current Level of Function: pain after walking 1/2 block and pain after prolong sitting, increase pain with increase activity, and pain with sleeping    Pain:   Current 5/10, worst 10/10, best 2/10   Location: bilateral low back to mid back / L > R, numbing pain up back with walking  Description: Aching  Aggravating Factors: Sitting, Standing, and Walking, increase activity  Easing Factors: massage, heating pad, hot bath, rest, and over the counter ibuprofen    Pts goals: decrease pain and pain management    Objective     Posture: R hand dominant, sitting PPT with forward head, forward head and rounding shoulders, decrease lumbar lordosis  Palpation: TTP lumbar paraspinals L > R, increase muscle tension also noted  Sensation: intact to light touch  Range of Motion/Strength:     Thoracolumbar AROM Pain/Dysfunction with Movement   Flexion WNL's Painful in both directions   Extension WFL's Repeated extension felt better   Right side bending  Past joint line    Left side bending Past joint line    Right rotation 25% limited painful   Left rotation 25% limited painful         L/E MMT Right Left Pain/Dysfunction with Movement   Hip Flexion 5/5 5/5    Hip Extension 5/5 5/5    Hip Abduction 5/5 5/5    Hip Adduction 5/5 5/5    Knee Flexion 5/5 5/5    Knee Extension 5/5 5/5    Ankle DF 5/5 5/5    Ankle PF 5/5 5/5    Big Toe Extension 5/5 5/5        Joint Mobility:   - Lumbar: normal    Flexibility: hamstring tightness B    Special Tests: bed mobility is slow with complaints of popping in back with no pain complaints, SLR (-), held shoulders and head off table for 60 secs compensating with neck    Gait Analysis:Without AD Assistance none  Deviations: nothing remarkable      Other: 6 sit to stands with no UE support in 30 secs        Intake Outcome Measure for FOTO lumbar spine Survey    Therapist reviewed FOTO scores for Reta Navas on 6/3/2024.   FOTO documents entered into [employer] - see Media section.    Intake Score: 49%       TREATMENT   Treatment Time In: 4:02  Treatment Time Out: 4:57  Total Treatment time separate from Evaluation: 30 minutes    Reta received therapeutic exercises to develop ROM and flexibility for 8 minutes including:  Hamstring stretch with strap  Open books  AROM    Reta received the following manual therapy techniques:  were applied to the:  for 0 minutes, includin      neuromuscular re-education activities to improve: Balance, Coordination, Kinesthetic, Sense, Proprioception, and Posture for 10 minutes. The following activities were included:  Pelvic tilts with TA activation  Posture education in sitting using a pillow to facilitate upright posture    therapeutic activities to improve functional performance for 12  minutes, including:  -HEP issued and reviewed with patient, form and technique reviewed, benefits and frequency discussed  - bed mobility  - sit to stands    Home Exercises Provided and Patient Education Provided        Education/Self-Care provided:   Patient educated on the impairments noted above and the effects of physical therapy intervention to improve overall condition and QOL.   Patient was educated on all the above exercise prior/during/after for proper posture, positioning, and execution for safe performance with home exercise program.   Patient educated on postural awareness and the use of a lumbar roll when in a seated position to reduce stress and maintain optimal alignment of the spine.     Written Home Exercises Provided: yes.  Exercises were reviewed and Reta was able to demonstrate them prior to the end of the session.  Reta demonstrated good  understanding of the education provided.     See EMR under Patient Instructions for exercises provided 6/3/2024.      Assessment   Reta is a 58 y.o. female referred to outpatient Physical Therapy with a medical diagnosis of Lumbar radiculopathy,  Lumbar facet arthropathy, Lumbar spondylosis. Pt presents with pain, limited ROM/ flexibility, posture deficits, and weakness. Patient would benefit from skilled PT intervention for pain management, ROM/ flexibility, posture education/ retraining, and strengthening to improve quality of life and return to PRIOR LEVEL OF FUNCTION.    Pt prognosis is Good.   Pt will benefit from skilled outpatient Physical Therapy to address the deficits stated above and in the chart below, provide pt/family education, and to maximize pt's level of independence.     Plan of care discussed with patient: Yes  Pt's spiritual, cultural and educational needs considered and patient is agreeable to the plan of care and goals as stated below:     Anticipated Barriers for therapy: compliance/ chronicity of condition       Medical Necessity is demonstrated by the following  History  Co-morbidities and personal factors that may impact the plan of care [] LOW: no personal factors / co-morbidities  [x] MODERATE: 1-2 personal factors / co-morbidities  [] HIGH:  3+ personal factors / co-morbidities     Moderate / High Support Documentation:   Co-morbidities affecting plan of care:    pain  Chronicity of condition  Personal Factors:   no deficits      Examination  Body Structures and Functions, activity limitations and participation restrictions that may impact the plan of care [x] LOW: addressing 1-2 elements  [] MODERATE: 3+ elements  [] HIGH: 4+ elements (please support below)     Moderate / High Support Documentation:       Clinical Presentation [x] LOW: stable  [] MODERATE: Evolving  [] HIGH: Unstable      Decision Making/ Complexity Score: low         Goals:  STG's 2 weeks  Patient will be independent with 50% of HEP    LTG's 10 weeks  Patient will improve FOTO functional measure  score  to 63 %  in order to improve overall QOL & return to PLOF   2. Patient will report an overall decrease in pain with ADL's and functional mobility  3. Patient will demonstrate improved strength and endurance and perform 10 sit to stands in 30 secs with no UE support  4. Patient will improve lumbar AROM to improve functional mobility and ADL's  5. Patient will be more aware of posture throughout the day to reduce stress  and maintain optimal alignment of the spine  6. Patient will be independent with HEP and pain management  Plan   Plan of care Certification: 6/3/2024 to 8/16/24.    Outpatient Physical Therapy 2 times weekly for 10 weeks to include the following interventions: Cervical/Lumbar Traction, Electrical Stimulation PRN, Gait Training, Manual Therapy, Moist Heat/ Ice, Neuromuscular Re-ed, Patient Education, Self Care, Therapeutic Activities, Therapeutic Exercise, and Ultrasound, ASTYM, Kinesiotaping PRN, Functional Dry Needling    Doris Young, PT        I CERTIFY THE NEED FOR THESE SERVICES FURNISHED UNDER THIS PLAN OF TREATMENT AND WHILE UNDER MY CARE   Physician's comments:     Physician's Signature: ___________________________________________________

## 2024-06-04 PROBLEM — R52 PAIN: Status: ACTIVE | Noted: 2024-06-04

## 2024-06-04 PROBLEM — M53.86 DECREASED ROM OF LUMBAR SPINE: Status: ACTIVE | Noted: 2024-06-04

## 2024-06-04 PROBLEM — R29.3 POOR POSTURE: Status: ACTIVE | Noted: 2024-06-04

## 2024-06-10 NOTE — PROGRESS NOTES
[facility]   Physical Therapy Treatment Note      Name: Reta Navas  Clinic Number: [id number]    Therapy Diagnosis:   Encounter Diagnoses   Name Primary?    Pain Yes    Poor posture      Physician: Franco Santillan MD    Visit Date: 6/11/2024    Physician Orders: PT Eval and Treat   Medical Diagnosis from Referral: Lumbar radiculopathy   Lumbar facet arthropathy, Lumbar spondylosis  Evaluation Date: 6/3/2024  Authorization Period Expiration: 12/31/2024  Plan of Care Expiration: 8/16/24  Progress Note Due: 30 days  Visit # / Visits authorized: 1/ 20 + eval  FOTO: 1/3  Precautions: Standard    PTA Visit #: 0/5     Time In: 7:00  Time Out: 7:54  Total Billable Time: 54 minutes    Subjective     Pt reports: no new complaints. Patient reports she has been using lumbar support while at work.   She was compliant with home exercise program.  Response to previous treatment: good  Functional change: n/a at this time    Pain: 3-4/10  Location: bilateral low back      Objective      Objective Measures updated at progress report unless specified.     Treatment     Reta received the following treatments:    Reta received the following manual therapy techniques applied for (0) minutes, including:      Reta received therapeutic exercises to develop strength, endurance, ROM, flexibility, posture, and core stabilization for (15)  minutes including:  Nustep for ROM, strength, and endurance x 8 minutes R:4  Incline gastroc stretch 3 x 30 secs  Long sitting hamstring stretch 3 x 30 secs  LTR over the ball 1 x 20 reps/ painful to L on L side      Reta participated in neuromuscular re-education activities to improve: Balance, Coordination, Kinesthetic, Sense, Proprioception, and Posture for (24)  minutes., including:  Pelvic tilts with TA activation 1 x 30 reps  Hooklying with marches with TA activation 2 x 10 reps/ discomfort reported  Bridges with TA activation 3 x 10 reps  Ball lifts 3 x 10 reps  -Posture  education in standing to soften knees and put pelvis in neutral position to activate hip and core musculature  -scapula retraction with G TB 3 x 10 reps with good posture and core activated  -shoulder extension with G TB 3 x 10 reps with TA activation and to open posture up      Reta participated in dynamic functional therapeutic activities to improve functional performance for (15)  minutes, including:  HEP reviewed  Lumbar decompression positioning/ pain management  Sit to stands with core activated 3 x 10 reps   Patient Education and Home Exercises       Education provided:   - HEP reviewed and reissued   - lumbar decompression techniques reviewed for pain management    Written Home Exercises Provided: Patient instructed to cont prior HEP. Exercises were reviewed and Reta was able to demonstrate them prior to the end of the session.  Reta demonstrated good  understanding of the education provided. See EMR under Patient Instructions for exercises provided during therapy sessions    Assessment     First visit after initial evaluation. Patient tolerated new exercises with mild difficulty. She is learning how to engage core musculature. Posture education in standing and patient demonstrated and verbalized understanding. Cuing provided to soften knees and put pelvis in neutral position to activate hip and core musculature.  PT will progress patient as tolerated.     Reta Is progressing well towards her goals.   Pt prognosis is Good.     Pt will continue to benefit from skilled outpatient physical therapy to address the deficits listed in the problem list box on initial evaluation, provide pt/family education and to maximize pt's level of independence in the home and community environment.     Pt's spiritual, cultural and educational needs considered and pt agreeable to plan of care and goals.     Anticipated barriers to physical therapy: compliance/ chronicity of condition     Goals: STG's 2 weeks  Patient will be  independent with 50% of HEP Progressing     LTG's 10 weeks  Patient will improve FOTO functional measure  score  to 63 %  in order to improve overall QOL & return to PLOF   2. Patient will report an overall decrease in pain with ADL's and functional mobility  3. Patient will demonstrate improved strength and endurance and perform 10 sit to stands in 30 secs with no UE support  4. Patient will improve lumbar AROM to improve functional mobility and ADL's  5. Patient will be more aware of posture throughout the day to reduce stress  and maintain optimal alignment of the spine  6. Patient will be independent with HEP and pain management    Plan     Plan of care Certification: 6/3/2024 to 8/16/24.     Outpatient Physical Therapy 2 times weekly for 10 weeks to include the following interventions: Cervical/Lumbar Traction, Electrical Stimulation PRN, Gait Training, Manual Therapy, Moist Heat/ Ice, Neuromuscular Re-ed, Patient Education, Self Care, Therapeutic Activities, Therapeutic Exercise, and Ultrasound, ASTYM, Kinesiotaping PRN, Functional Dry Needling      Doris Young, PT

## 2024-06-11 ENCOUNTER — CLINICAL SUPPORT (OUTPATIENT)
Dept: REHABILITATION | Facility: HOSPITAL | Age: 59
End: 2024-06-11
Payer: COMMERCIAL

## 2024-06-11 DIAGNOSIS — R52 PAIN: Primary | ICD-10-CM

## 2024-06-11 DIAGNOSIS — R29.3 POOR POSTURE: ICD-10-CM

## 2024-06-11 PROCEDURE — 97530 THERAPEUTIC ACTIVITIES: CPT | Mod: PN

## 2024-06-11 PROCEDURE — 97110 THERAPEUTIC EXERCISES: CPT | Mod: PN

## 2024-06-11 PROCEDURE — 97112 NEUROMUSCULAR REEDUCATION: CPT | Mod: PN

## 2024-06-13 ENCOUNTER — CLINICAL SUPPORT (OUTPATIENT)
Dept: REHABILITATION | Facility: HOSPITAL | Age: 59
End: 2024-06-13
Payer: COMMERCIAL

## 2024-06-13 DIAGNOSIS — R52 PAIN: Primary | ICD-10-CM

## 2024-06-13 DIAGNOSIS — R29.3 POOR POSTURE: ICD-10-CM

## 2024-06-13 PROCEDURE — 97110 THERAPEUTIC EXERCISES: CPT | Mod: PN

## 2024-06-13 PROCEDURE — 97112 NEUROMUSCULAR REEDUCATION: CPT | Mod: PN

## 2024-06-13 PROCEDURE — 97530 THERAPEUTIC ACTIVITIES: CPT | Mod: PN

## 2024-06-13 NOTE — PROGRESS NOTES
[facility]   Physical Therapy Treatment Note      Name: Reta Navas  Clinic Number: [id number]    Therapy Diagnosis:   Encounter Diagnoses   Name Primary?    Pain Yes    Poor posture      Physician: Franco Santillan MD    Visit Date: 6/13/2024    Physician Orders: PT Eval and Treat   Medical Diagnosis from Referral: Lumbar radiculopathy   Lumbar facet arthropathy, Lumbar spondylosis  Evaluation Date: 6/3/2024  Authorization Period Expiration: 12/31/2024  Plan of Care Expiration: 8/16/24  Progress Note Due: 30 days  Visit # / Visits authorized: 2/ 20 + eval  FOTO: 1/3  Precautions: Standard    PTA Visit #: 0/5     Time In: 7:00  Time Out: 8:00  Total Billable Time: 60 minutes    Subjective     Pt reports: she is sore in her abdominals today.  She was compliant with home exercise program.  Response to previous treatment: good  Functional change: n/a at this time    Pain: 5/ 10  Location: bilateral low back      Objective      Objective Measures updated at progress report unless specified.     Treatment     Reta received the following treatments:    Reta received the following manual therapy techniques applied for (0) minutes, including:      Reta received therapeutic exercises to develop strength, endurance, ROM, flexibility, posture, and core stabilization for (15)  minutes including:  Nustep for ROM, strength, and endurance x 8 minutes R:4  Incline gastroc stretch 3 x 30 secs  Long sitting hamstring stretch 3 x 30 secs  LTR over the ball 1 x 20 reps/ painful to L on L side      Reta participated in neuromuscular re-education activities to improve: Balance, Coordination, Kinesthetic, Sense, Proprioception, and Posture for 30  minutes., including:  Pelvic tilts with TA activation 1 x 30 reps  Hooklying with marches with TA activation 2 x 10 reps/ discomfort reported  Bridges with TA activation 3 x 10 reps  Ball lifts 3 x 10 reps  -scapula retraction with G TB 3 x 10 reps with good posture  and core activated  -shoulder extension with G TB 3 x 10 reps with TA activation and to open posture up      Reta participated in dynamic functional therapeutic activities to improve functional performance for (15)  minutes, including:  HEP reviewed  Lumbar decompression positioning/ pain management  Sit to stands with core activated 3 x 10 reps   TRX low rows 3 x 10 reps with core engaged  Patient Education and Home Exercises       Education provided:   - HEP reviewed and reissued   - lumbar decompression techniques reviewed for pain management    Written Home Exercises Provided: Patient instructed to cont prior HEP. Exercises were reviewed and Reta was able to demonstrate them prior to the end of the session.  Reta demonstrated good  understanding of the education provided. See EMR under Patient Instructions for exercises provided during therapy sessions    Assessment   Patient presents with  moderate low back pain and soreness in abdominals. She tolerated treatment with difficulty. She continues to learn how to engage core musculature. PT will progress patient as tolerated.       Reta Is progressing well towards her goals.   Pt prognosis is Good.     Pt will continue to benefit from skilled outpatient physical therapy to address the deficits listed in the problem list box on initial evaluation, provide pt/family education and to maximize pt's level of independence in the home and community environment.     Pt's spiritual, cultural and educational needs considered and pt agreeable to plan of care and goals.     Anticipated barriers to physical therapy: compliance/ chronicity of condition     Goals: STG's 2 weeks  Patient will be independent with 50% of HEP Progressing     LTG's 10 weeks  Patient will improve FOTO functional measure  score  to 63 %  in order to improve overall QOL & return to PLOF   2. Patient will report an overall decrease in pain with ADL's and functional mobility  3. Patient will demonstrate  improved strength and endurance and perform 10 sit to stands in 30 secs with no UE support  4. Patient will improve lumbar AROM to improve functional mobility and ADL's  5. Patient will be more aware of posture throughout the day to reduce stress  and maintain optimal alignment of the spine  6. Patient will be independent with HEP and pain management    Plan     Plan of care Certification: 6/3/2024 to 8/16/24.     Outpatient Physical Therapy 2 times weekly for 10 weeks to include the following interventions: Cervical/Lumbar Traction, Electrical Stimulation PRN, Gait Training, Manual Therapy, Moist Heat/ Ice, Neuromuscular Re-ed, Patient Education, Self Care, Therapeutic Activities, Therapeutic Exercise, and Ultrasound, ASTYM, Kinesiotaping PRN, Functional Dry Needling      Doris Young, PT

## 2024-07-10 ENCOUNTER — TELEPHONE (OUTPATIENT)
Dept: PAIN MEDICINE | Facility: CLINIC | Age: 59
End: 2024-07-10
Payer: COMMERCIAL

## 2024-07-15 RX ORDER — OMEPRAZOLE 40 MG/1
40 CAPSULE, DELAYED RELEASE ORAL DAILY
Qty: 90 CAPSULE | Refills: 3 | Status: SHIPPED | OUTPATIENT
Start: 2024-07-15

## 2024-07-15 NOTE — TELEPHONE ENCOUNTER
No care due was identified.  [facility] Embedded Care Due Messages. Reference number: [id number].   7/15/2024 2:40:49 PM CDT

## 2024-07-16 NOTE — TELEPHONE ENCOUNTER
Refill Decision Note   Reta Navas  is requesting a refill authorization.  Brief Assessment and Rationale for Refill:  Approve     Medication Therapy Plan:        Comments:     Note composed:9:49 PM 07/15/2024

## 2024-07-28 NOTE — TELEPHONE ENCOUNTER
No care due was identified.  [facility] Embedded Care Due Messages. Reference number: [id number].   7/28/2024 4:29:35 PM CDT

## 2024-07-29 NOTE — TELEPHONE ENCOUNTER
Refill Routing Note   Medication(s) are not appropriate for processing by [facility] for the following reason(s):        Outside of protocol    [facility] action(s):  Route        Medication Therapy Plan: Total daily dose exceeds maintenance dosing for PPI      Appointments  past 12m or future 3m with PCP    Date Provider   Last Visit   4/23/2024 Mendoza Cardoso MD   Next Visit   8/6/2024 Mendoza Cardoso MD   ED visits in past 90 days: 0        Note composed:4:55 PM 07/29/2024

## 2024-07-30 RX ORDER — OMEPRAZOLE 40 MG/1
CAPSULE, DELAYED RELEASE ORAL
Qty: 180 CAPSULE | Refills: 0 | Status: SHIPPED | OUTPATIENT
Start: 2024-07-30

## 2024-09-12 NOTE — LETTER
September 16, 2020      Oliva Carson PA-C  [address] [place] [address]           [facility]  [address]  [place] [address]  Phone: [phone]  Fax: [phone]          Patient: Reta Navas   MR Number: [id number]   YOB: 1965   Date of Visit: 9/16/2020       Dear Oliva Carson:    Thank you for referring Reta Navas to me for evaluation. Attached you will find relevant portions of my assessment and plan of care.    If you have questions, please do not hesitate to call me. I look forward to following Reta Navas along with you.    Sincerely,    Srinivas Braswell MD    Enclosure  CC:  No Recipients    If you would like to receive this communication electronically, please contact [email or web address] or [phone] to request more information on [employer] Link access.    For providers and/or their staff who would like to refer a patient to [facility], please contact us through our one-stop-shop provider referral line, [facility], at [phone].    If you feel you have received this communication in error or would no longer like to receive these types of communications, please e-mail [email or web address]         
no

## 2024-10-21 DIAGNOSIS — R07.89 OTHER CHEST PAIN: Primary | ICD-10-CM

## 2024-10-21 DIAGNOSIS — Z00.00 ROUTINE ADULT HEALTH MAINTENANCE: ICD-10-CM

## 2024-11-13 ENCOUNTER — OFFICE VISIT (OUTPATIENT)
Dept: INTERNAL MEDICINE | Facility: CLINIC | Age: 59
End: 2024-11-13
Payer: COMMERCIAL

## 2024-11-13 VITALS
DIASTOLIC BLOOD PRESSURE: 88 MMHG | TEMPERATURE: 96 F | SYSTOLIC BLOOD PRESSURE: 104 MMHG | HEIGHT: 60 IN | HEART RATE: 87 BPM | BODY MASS INDEX: 29.64 KG/M2 | OXYGEN SATURATION: 97 % | WEIGHT: 151 LBS

## 2024-11-13 DIAGNOSIS — Z00.00 ANNUAL PHYSICAL EXAM: Primary | ICD-10-CM

## 2024-11-13 DIAGNOSIS — Z79.899 ENCOUNTER FOR LONG-TERM (CURRENT) USE OF MEDICATIONS: ICD-10-CM

## 2024-11-13 DIAGNOSIS — K21.9 GASTROESOPHAGEAL REFLUX DISEASE WITHOUT ESOPHAGITIS: ICD-10-CM

## 2024-11-13 DIAGNOSIS — Z87.891 HISTORY OF SMOKING: ICD-10-CM

## 2024-11-13 DIAGNOSIS — G47.33 OSA (OBSTRUCTIVE SLEEP APNEA): ICD-10-CM

## 2024-11-13 DIAGNOSIS — Z12.31 ENCOUNTER FOR SCREENING MAMMOGRAM FOR MALIGNANT NEOPLASM OF BREAST: ICD-10-CM

## 2024-11-13 DIAGNOSIS — K59.09 CHRONIC CONSTIPATION: ICD-10-CM

## 2024-11-13 DIAGNOSIS — I10 PRIMARY HYPERTENSION: ICD-10-CM

## 2024-11-13 DIAGNOSIS — N28.1 RENAL CYST, LEFT: ICD-10-CM

## 2024-11-13 DIAGNOSIS — J30.9 ALLERGIC RHINITIS, UNSPECIFIED SEASONALITY, UNSPECIFIED TRIGGER: ICD-10-CM

## 2024-11-13 DIAGNOSIS — N39.3 URINARY, INCONTINENCE, STRESS FEMALE: ICD-10-CM

## 2024-11-13 DIAGNOSIS — Z12.11 COLON CANCER SCREENING: ICD-10-CM

## 2024-11-13 PROCEDURE — 3008F BODY MASS INDEX DOCD: CPT | Mod: CPTII,S$GLB,, | Performed by: FAMILY MEDICINE

## 2024-11-13 PROCEDURE — 1159F MED LIST DOCD IN RCRD: CPT | Mod: CPTII,S$GLB,, | Performed by: FAMILY MEDICINE

## 2024-11-13 PROCEDURE — 3074F SYST BP LT 130 MM HG: CPT | Mod: CPTII,S$GLB,, | Performed by: FAMILY MEDICINE

## 2024-11-13 PROCEDURE — 3044F HG A1C LEVEL LT 7.0%: CPT | Mod: CPTII,S$GLB,, | Performed by: FAMILY MEDICINE

## 2024-11-13 PROCEDURE — 99396 PREV VISIT EST AGE 40-64: CPT | Mod: S$GLB,,, | Performed by: FAMILY MEDICINE

## 2024-11-13 PROCEDURE — 99214 OFFICE O/P EST MOD 30 MIN: CPT | Mod: 25,S$GLB,, | Performed by: FAMILY MEDICINE

## 2024-11-13 PROCEDURE — 99999 PR PBB SHADOW E&M-EST. PATIENT-LVL V: CPT | Mod: PBBFAC,,, | Performed by: FAMILY MEDICINE

## 2024-11-13 PROCEDURE — 3079F DIAST BP 80-89 MM HG: CPT | Mod: CPTII,S$GLB,, | Performed by: FAMILY MEDICINE

## 2024-11-13 RX ORDER — ALBUTEROL SULFATE 90 UG/1
2 INHALANT RESPIRATORY (INHALATION) EVERY 6 HOURS PRN
Qty: 54 G | Refills: 1 | Status: SHIPPED | OUTPATIENT
Start: 2024-11-13

## 2024-11-13 NOTE — PROGRESS NOTES
Subjective:       Patient ID: Reta Navas is a 59 y.o. female.    Chief Complaint: Annual Exam, Cyst (On left side kidney), and Back Pain    HPI    Patient Active Problem List   Diagnosis    Obesity    Urinary, incontinence, stress female    Hypertension    Colon cancer screening    GERD (gastroesophageal reflux disease)    LISETTE (obstructive sleep apnea)    Chronic constipation    History of smoking    Allergic rhinitis    Acute cystitis without hematuria    Chronic rhinitis    Post-nasal drip    Pain    Poor posture       Past Medical History:   Diagnosis Date    Abnormal Pap smear     Abnormal Pap smear of cervix     Chronic rhinitis     Depression     GERD (gastroesophageal reflux disease)     Hyperlipidemia     Hypertension     LISETTE (obstructive sleep apnea) 2024    Overactive bladder     Plantar fasciitis, bilateral     Psoriasis        Past Surgical History:   Procedure Laterality Date    CERVICAL BIOPSY  W/ LOOP ELECTRODE EXCISION      HYSTERECTOMY      supracervical hyst/LSO for fibroids    OOPHORECTOMY      LSO    ROBOT-ASSISTED CHOLECYSTECTOMY USING DA WILVER XI N/A 10/18/2018    Procedure: XI ROBOTIC CHOLECYSTECTOMY;  Surgeon: Robel Ferrer MD;  Location: [facility];  Service: General;  Laterality: N/A;    TUBAL LIGATION         Family History   Problem Relation Name Age of Onset    Ovarian cancer Mother      Stroke Mother      No Known Problems Father      Leukemia Brother      Breast cancer Paternal Grandmother          mastectomy    Heart attack Paternal Grandmother  82        COD    Colon cancer Paternal Grandfather      Ovarian cancer Maternal Aunt      Stroke Maternal Aunt      Colon cancer Paternal Uncle         Social History     Tobacco Use   Smoking Status Former    Current packs/day: 0.00    Average packs/day: 1 pack/day for 39.0 years (39.0 ttl pk-yrs)    Types: Vaping with nicotine, Cigarettes    Start date:     Quit date: 2019    Years since quittin.8    Passive exposure: Past    Smokeless Tobacco Never       Wt Readings from Last 5 Encounters:   11/13/24 68.5 kg (151 lb 0.2 oz)   05/23/24 69.4 kg (153 lb)   05/06/24 70 kg (154 lb 5.2 oz)   04/30/24 70.6 kg (155 lb 10.3 oz)   04/23/24 68.6 kg (151 lb 5.5 oz)       History of Present Illness    CHIEF COMPLAINT:  Annual wellness visit and discussion of multiple health issues, including back pain and previously identified renal cyst.    HPI:  Patient reports severe back pain significantly impacting exercise capacity. Previously able to walk a mile, now limited to a short block due to pain. Consulted pain management physician, Dr. Santillan, on May 23rd for chronic low back pain and lumbar radiculopathy. MRI showed mild multilevel disc degeneration without significant central or foraminal stenosis or acute abnormalities. Prescribed gabapentin and Robaxin for pain management. Patient is hesitant to take full prescribed dose due to concerns about medication burden. Took one gabapentin and one Robaxin over past 3 days with minimal efficacy.    Left kidney cyst discovered during lung cancer screening. Saw urologist nurse practitioner in November 2022 who suggested malignancy. Follow-up CT in December recommended renal ultrasound and follow-up visit after 12 months, now overdue.    Reports ongoing allergy symptoms with her reports of negative allergy testing. Uses Flonase and Allegra as needed, and takes Singulair daily for symptom management.    Reports occasional anxiety with associated breathing difficulties, managed with albuterol. Has sleep issues, typically sleeping 5 hours per night.    Has significant stress due to caring for 40-year-old son who recently returned home after synthetic marijuana overdose. Providing full-time care, including transportation to speech and physical therapy. Care responsibilities potentially exacerbating back pain due to wheelchair transfers.    Denies smoking, drug use, and frequent alcohol consumption. Denies  underlying lung disease.    MEDICATIONS:  Patient is on Amlodipine 10 mg daily for hypertension and Aspirin 81 mg daily. She uses Aslan and Flonase nasal sprays as needed for allergies, along with Allegra. Singulair (Montelukast) is taken daily for allergies. Albuterol is used as needed for breathing issues related to anxiety. Patient takes Linzess as needed for constipation and Prilosec 40 mg daily for reflux. Nitroglycerin is prescribed for heart disease concerns but has never been used.    MEDICAL HISTORY:  Patient has a history of sleep apnea, GERD, overactive bladder, hypertension, chronic constipation, and heart disease. A renal cyst on her left kidney was discovered during lung cancer screening. Patient has an upcoming colonoscopy scheduled. She reports intolerance to statins due to leg pain.  TEST RESULTS:  Patient's cholesterol panel from April was within normal limits. A sleep study diagnosed the patient with sleep apnea, and she was prescribed CPAP.    IMAGING:  A lung cancer screening in May incidentally found a cyst on the patient's left kidney. A CT in December of last y  ear evaluated a renal mass, recommending a follow-up renal ultrasound after 12 months. An MRI of the Lumbar Spine on May 23rd demonstrated mild multilevel disc degeneration with no significant central or foraminal stenosis and no acute abnormalities.    SOCIAL HISTORY:  Patient quit smoking 10 years ago. She is a social drinker with occasional alcohol use and denies illicit drug use. Patient is .         Objective:      Vitals:    11/13/24 0826   BP: 104/88   Pulse: 87   Temp: (!) 95.7 °F (35.4 °C)     BP Readings from Last 3 Encounters:   11/13/24 104/88   05/23/24 130/83   05/06/24 124/88       Physical Exam    Assessment:       1. Annual physical exam    2. Encounter for long-term (current) use of medications    3. Encounter for screening mammogram for malignant neoplasm of breast    4. Renal cyst, left    5. History of  smoking    6. Allergic rhinitis, unspecified seasonality, unspecified trigger    7. Primary hypertension    8. Urinary, incontinence, stress female    9. Chronic constipation    10. Gastroesophageal reflux disease without esophagitis    11. LISETTE (obstructive sleep apnea)    12. Colon cancer screening        Plan:   Annual exam:  Discussed wellness/prevention of disease/ lifestyle/vaccines/cancer screening/updating annual labs    Problem based visit.  See above and below for HPI/review of systems/vital/physical exam/assessment and plan to prevent unnecessary duplication    Assessment & Plan    PLAN SUMMARY:  Refilled albuterol as needed  Continued Singulair, Allegra, Flonase/Astelin for allergies  Continued Robaxin 500 mg twice daily as needed  Started gabapentin 300 mg, 1 pill 3 times daily as needed for back pain  Ordered CBC, liver function, kidney function, electrolytes, glucose, thyroid panel  Planned urology referral for renal mass evaluation  Discontinued vaginal estrogen cream  Ordered renal ultrasound for left kidney cyst follow-up  Continued Prilosec 40 mg daily for GERD  Continued Linzess as needed for constipation  Continued amlodipine 10 mg daily and aspirin 81 mg daily  Administered flu vaccination in office  Follow up in 6 months or as needed  Schedule colonoscopy for colon cancer screening    PREVENTIVE CARE:  Reviewed annual preventive care needs, including vaccinations and cancer screenings.  Ordered CBC, liver function, kidney function, electrolytes, glucose, thyroid panel as routine annual labs.  Follow up for colonoscopy scheduling for colon cancer screening.    HYPERTENSION:  Assessed chronic conditions: hypertension, GERD, allergies, chronic low back pain.  Explained rationale for monitoring blood pressure at home due to low in-office reading.  Patient to monitor blood pressure at home and report if consistently low or experiencing dizziness/lightheadedness.  Continued amlodipine 10 mg daily for  hypertension, aspirin 81 mg daily.  Contact the office if experiencing dizziness or lightheadedness related to blood pressure medication.    GERD:  Continued Prilosec (omeprazole) 40 mg daily for GERD.    ALLERGIES:  Continued Singulair (montelukast) daily for allergies, Allegra and Flonase/Astelin nasal sprays as needed for allergies.    CHRONIC LOW BACK PAIN:  Considered recent pain management consultation and MRI findings.  Recommend gabapentin trial for chronic low back pain management.  Started gabapentin 300 mg, 1 pill 3 times daily as needed for back pain.  Continued Robaxin (methocarbamol) 500 mg twice daily as needed for muscle relaxation.    RENAL CYST:  Ordered renal ultrasound to follow up on previously identified left kidney cyst.  Planned urology referral for further evaluation of renal mass.  Referred to urology for evaluation of renal mass after renal ultrasound.    SLEEP APNEA:  Discussed importance of CPAP use for sleep apnea management.  Patient to resume CPAP use for sleep apnea management.    MEDICATIONS/SUPPLEMENTS:  Evaluated medication regimen, removing duplicates and unnecessary prescriptions.  Clarified that albuterol is not indicated for anxiety-related breathing difficulties.  Continued albuterol as needed for breathing difficulties, Linzess as needed for constipation.  Discontinued vaginal estrogen cream.  Refilled albuterol as needed.    FLU VACCINATION:  Encouraged     FOLLOW UP:  Follow up in 6 months or as needed.           This note was verbally dictated, please excuse any type errors.

## 2024-11-14 ENCOUNTER — HOSPITAL ENCOUNTER (OUTPATIENT)
Dept: RADIOLOGY | Facility: HOSPITAL | Age: 59
Discharge: HOME | End: 2024-11-14
Attending: FAMILY MEDICINE
Payer: COMMERCIAL

## 2024-11-14 ENCOUNTER — OFFICE VISIT (OUTPATIENT)
Dept: UROLOGY | Facility: CLINIC | Age: 59
End: 2024-11-14
Payer: COMMERCIAL

## 2024-11-14 VITALS
SYSTOLIC BLOOD PRESSURE: 125 MMHG | RESPIRATION RATE: 16 BRPM | WEIGHT: 151 LBS | DIASTOLIC BLOOD PRESSURE: 88 MMHG | BODY MASS INDEX: 29.49 KG/M2 | HEART RATE: 75 BPM

## 2024-11-14 DIAGNOSIS — N28.1 RENAL CYST, LEFT: ICD-10-CM

## 2024-11-14 DIAGNOSIS — N28.1 RENAL CYST, LEFT: Primary | ICD-10-CM

## 2024-11-14 DIAGNOSIS — N32.81 OAB (OVERACTIVE BLADDER): ICD-10-CM

## 2024-11-14 LAB
BILIRUB UR QL STRIP: NEGATIVE
GLUCOSE UR QL STRIP: NEGATIVE
KETONES UR QL STRIP: NEGATIVE
LEUKOCYTE ESTERASE UR QL STRIP: NEGATIVE
PH, POC UA: 6
POC BLOOD, URINE: POSITIVE
POC NITRATES, URINE: NEGATIVE
PROT UR QL STRIP: NEGATIVE
SP GR UR STRIP: 1.03 (ref 1–1.03)
UROBILINOGEN UR STRIP-ACNC: 0.2 (ref 0.1–1.1)

## 2024-11-14 PROCEDURE — 81003 URINALYSIS AUTO W/O SCOPE: CPT | Mod: QW,S$GLB,, | Performed by: UROLOGY

## 2024-11-14 PROCEDURE — 3079F DIAST BP 80-89 MM HG: CPT | Mod: CPTII,S$GLB,, | Performed by: UROLOGY

## 2024-11-14 PROCEDURE — 3074F SYST BP LT 130 MM HG: CPT | Mod: CPTII,S$GLB,, | Performed by: UROLOGY

## 2024-11-14 PROCEDURE — 3008F BODY MASS INDEX DOCD: CPT | Mod: CPTII,S$GLB,, | Performed by: UROLOGY

## 2024-11-14 PROCEDURE — 3044F HG A1C LEVEL LT 7.0%: CPT | Mod: CPTII,S$GLB,, | Performed by: UROLOGY

## 2024-11-14 PROCEDURE — 1160F RVW MEDS BY RX/DR IN RCRD: CPT | Mod: CPTII,S$GLB,, | Performed by: UROLOGY

## 2024-11-14 PROCEDURE — 1159F MED LIST DOCD IN RCRD: CPT | Mod: CPTII,S$GLB,, | Performed by: UROLOGY

## 2024-11-14 PROCEDURE — 99213 OFFICE O/P EST LOW 20 MIN: CPT | Mod: S$GLB,,, | Performed by: UROLOGY

## 2024-11-14 PROCEDURE — 99999 PR PBB SHADOW E&M-EST. PATIENT-LVL IV: CPT | Mod: PBBFAC,,, | Performed by: UROLOGY

## 2024-11-14 PROCEDURE — 76770 US EXAM ABDO BACK WALL COMP: CPT | Mod: 26,,, | Performed by: RADIOLOGY

## 2024-11-14 PROCEDURE — 76770 US EXAM ABDO BACK WALL COMP: CPT | Mod: TC

## 2024-11-14 RX ORDER — MIRABEGRON 50 MG/1
50 TABLET, FILM COATED, EXTENDED RELEASE ORAL DAILY
Qty: 30 TABLET | Refills: 11 | Status: SHIPPED | OUTPATIENT
Start: 2024-11-14 | End: 2025-11-14

## 2024-11-14 NOTE — PROGRESS NOTES
Chief Complaint:   OAB    HPI:   11/14/2024 - returns today for follow-up and review of her renal ultrasound, this shows stable left-sided renal cyst, she notes that the VESIcare made her really constipated so she stopped taking it, bladder symptoms are back to baseline, no gross hematuria or dysuria    Patient is a 57-year-old female that is presenting as a follow-up to an increase in VESIcare.  Patient states that VESIcare 10 mg once daily has decrease her nocturia twice nightly and daytime frequency has resolved.  Patient states she was unable to follow-up with PT pelvic floor training and is requesting a new referral.  05/03/2023  Patient is a 57-year-old that is presenting as a follow-up to overactive bladder.  Patient states that she is currently on VESIcare 5 mg once daily, however, nocturia has increased 3 times nightly and urge incontinence has increased over the last 6 months.  Urine in clinic is negative and PVR is 1 mL.  Patient is being followed by this clinic for renal cysts, renal ultrasound indicated a complex cyst, however, CT scan renal mass protocol indicated a nonenhancing cyst.  Patient denies gross hematuria.  11/22/2022  Patient is a 57-year-old female that is presenting with lower back pain, nocturia, mixed incontinence and daytime frequency.  No history of renal stones.  No gross hematuria.  Urine in clinic was negative and PVR was 23 mL.  Patient states that she drinks 2-3 glasses of water during the day, 3-4 cokes every day.  Reports that urge incontinence is greater than stress incontinence.  Has been seen by primary care physician, gyn and prescribed antibiotics for frequent urination.  No positive urine cx  in the past 6 months to review.    Allergies:  Adhesive and Latex, natural rubber    Medications:  has a current medication list which includes the following prescription(s): albuterol, amlodipine, azelastine, fexofenadine, fluticasone propionate, fluarix triv [id number] (pf),  linzess, methocarbamol, montelukast, omeprazole, vitamin d, aspirin, gabapentin, and nitroglycerin.    Review of Systems:  General: No fever, chills  Skin: No rashes  Chest:  Denies cough and sputum production  Heart: Denies chest pain  Resp: Denies dyspnea  Abdomen: Denies diarrhea, abdominal pain, hematemesis, or blood in stool.  Musculoskeletal: No joint stiffness or swelling. Denies back pain.  : see HPI  Neuro: no dizziness or weakness      PMH:   has a past medical history of Abnormal Pap smear, Abnormal Pap smear of cervix, Chronic rhinitis, Depression, GERD (gastroesophageal reflux disease), Hyperlipidemia, Hypertension, LISETTE (obstructive sleep apnea) (2/8/2024), Overactive bladder, Plantar fasciitis, bilateral, and Psoriasis.    PSH:   has a past surgical history that includes Tubal ligation; Oophorectomy; Robot-assisted cholecystectomy using da Mildred Xi (N/A, 10/18/2018); Cervical biopsy w/ loop electrode excision; and Hysterectomy.    FamHx: family history includes Breast cancer in her paternal grandmother; Colon cancer in her paternal grandfather and paternal uncle; Heart attack (age of onset: 82) in her paternal grandmother; Leukemia in her brother; No Known Problems in her father; Ovarian cancer in her maternal aunt and mother; Stroke in her maternal aunt and mother.    SocHx:  reports that she quit smoking about 5 years ago. Her smoking use included vaping with nicotine and cigarettes. She started smoking about 50 years ago. She has a 39 pack-year smoking history. She has been exposed to tobacco smoke. She has never used smokeless tobacco. She reports that she does not drink alcohol and does not use drugs.      Physical Exam:  General: awake, alert, cooperative  Head: NC/AT  Ears: external ears normal  Eyes: sclera normal  Lungs: normal inspiration, NAD  Heart: well-perfused  Skin: The skin is warm and dry.  Ext: No c/c/e.  Neuro: grossly intact, AOx3    Lab Results   Component Value Date    WBC  11.95 04/23/2024    HGB 14.1 04/23/2024    HCT 42.2 04/23/2024     04/23/2024     04/23/2024    K 4.0 04/23/2024     04/23/2024    CREATININE 0.8 04/23/2024    BUN 12 04/23/2024    CO2 25 04/23/2024    TSH 2.344 04/23/2024    HGBA1C 5.5 04/23/2024    CHOL 165 04/23/2024    TRIG 128 04/23/2024    HDL 45 04/23/2024    ALT 23 04/23/2024    AST 20 04/23/2024     Impression/Plan:   59 y.o. F with:    OAB - switch to mirabegron, SEs reviewed, f/u 6-8 weeks for symptom check    Renal Cysts - ultrasounds shows stable cysts, continue to monitor    Victor M Bai MD

## 2024-11-15 ENCOUNTER — PATIENT MESSAGE (OUTPATIENT)
Dept: INTERNAL MEDICINE | Facility: CLINIC | Age: 59
End: 2024-11-15
Payer: COMMERCIAL

## 2024-11-19 ENCOUNTER — HOSPITAL ENCOUNTER (OUTPATIENT)
Dept: PREADMISSION TESTING | Facility: HOSPITAL | Age: 59
Discharge: HOME | End: 2024-11-19
Attending: FAMILY MEDICINE
Payer: COMMERCIAL

## 2024-11-19 DIAGNOSIS — Z12.31 ENCOUNTER FOR SCREENING MAMMOGRAM FOR MALIGNANT NEOPLASM OF BREAST: ICD-10-CM

## 2024-11-20 ENCOUNTER — TELEPHONE (OUTPATIENT)
Dept: CARDIOLOGY | Facility: CLINIC | Age: 59
End: 2024-11-20
Payer: COMMERCIAL

## 2024-11-20 DIAGNOSIS — Z00.00 ROUTINE HEALTH MAINTENANCE: ICD-10-CM

## 2024-11-20 DIAGNOSIS — I10 PRIMARY HYPERTENSION: Primary | ICD-10-CM

## 2024-11-20 NOTE — TELEPHONE ENCOUNTER
Attempted to contact patient to schedule;patient number isn't Not Available I schedule her for 12/4 with .      ----- Message from Byron Puente MD sent at 11/20/2024  3:58 PM CST -----  Contact: pt  OK please schedule eval in clinic with an ECG and will discuss preop risk eval. Thanks  ----- Message -----  From: Cipriano Tripp MA  Sent: 11/20/2024   3:20 PM CST  To: Byron Puente MD    Please Advise  ----- Message -----  From: Reta Edwards  Sent: 11/20/2024  11:29 AM CST  To: Cindi Matthews Staff    Type: Orders Request    What orders/ testing are being requested?  Endoscopy     Is there a future appointment scheduled for the patient with PCP? no    When?     Would you prefer a response via [facility]?    Comments:  pt is needing to have that done along with her colonoscopy.  She will have appt for scheduling w/endo on 12/2 and need to get this order in before that date.

## 2024-11-21 ENCOUNTER — TELEPHONE (OUTPATIENT)
Dept: PREADMISSION TESTING | Facility: HOSPITAL | Age: 59
End: 2024-11-21
Payer: COMMERCIAL

## 2024-11-21 ENCOUNTER — TELEPHONE (OUTPATIENT)
Dept: CARDIOLOGY | Facility: HOSPITAL | Age: 59
End: 2024-11-21
Payer: COMMERCIAL

## 2024-11-21 NOTE — TELEPHONE ENCOUNTER
Patient stated she needs  a order for a upper GI that Dr. Puente ordered at last visit, and once before. Patient was informed Dr. Puente placed a referral for GI consult.  I explained A gastroenterology consultation is an appointment with a medical professional specializing in the diagnosis and treatment of conditions affecting the esophagus, stomach, small and large intestines, liver, pancreas, and gallbladder.    Patient stated she have an upcoming VV with GI on 12/02/2024 for a colonoscopy , not a upper GI and she would like to have both procedures the same day.    Patient was asked to discuss at GI VV on 12/02/2024 about a upper GI.  Patient also informed of upcoming appointment with Dr. Puente on 12/02/2024 and can also discuss in clinic.   Patient verbalized understanding.

## 2024-11-21 NOTE — TELEPHONE ENCOUNTER
----- Message from Ev sent at 11/21/2024 11:28 AM CST -----  Name of Caller:.Ann Marie Eloise   Best Call Back Number: [phone]  Additional Information: Patient is requesting a call back regarding an order for upper gi.

## 2024-11-21 NOTE — TELEPHONE ENCOUNTER
----- Message from Mendoza Cardoso MD sent at 11/20/2024  3:59 PM CST -----  Order changed. Sorry and thanks  ----- Message -----  From: Chastity Torres LPN  Sent: 11/19/2024   9:20 AM CST  To: Mendoza Cardoso MD    Please clarify diagnosis code to use for colonoscopy. Currently it is z12.31 breast cancer screening or should it be z12.11 colon cancer screening. Thanks for your time.

## 2024-11-26 ENCOUNTER — TELEPHONE (OUTPATIENT)
Dept: PREADMISSION TESTING | Facility: HOSPITAL | Age: 59
End: 2024-11-26
Payer: COMMERCIAL

## 2024-11-26 RX ORDER — NITROGLYCERIN 0.4 MG/1
0.4 TABLET SUBLINGUAL EVERY 5 MIN PRN
Qty: 30 TABLET | Refills: 0 | Status: SHIPPED | OUTPATIENT
Start: 2024-11-26 | End: 2025-11-26

## 2024-11-26 NOTE — TELEPHONE ENCOUNTER
----- Message from Lila sent at 11/26/2024  8:14 AM CST -----  Regarding: RE: r/s PAT call?  Called pt today, left a voice message and number for pt to call back to reschedule her appt- Thanks  ----- Message -----  From: Melva Guardado RN  Sent: 11/22/2024   4:31 PM CST  To: Lila Case  Subject: r/s PAT call?                                      ----- Message -----  From: Ev Brown  Sent: 11/21/2024  11:32 AM CST  To: [facility] Pre-Admit Clinical Support Pool    Name of Caller:.Reta Navas   When is the first available appointment?12/02/2024  Best Call Back Number: [phone]  Additional Information: Patient would like to reschedule the appointment to a different date please.

## 2024-12-02 ENCOUNTER — OFFICE VISIT (OUTPATIENT)
Dept: CARDIOLOGY | Facility: CLINIC | Age: 59
End: 2024-12-02
Payer: COMMERCIAL

## 2024-12-02 ENCOUNTER — HOSPITAL ENCOUNTER (OUTPATIENT)
Dept: PREADMISSION TESTING | Facility: HOSPITAL | Age: 59
Discharge: HOME | End: 2024-12-02
Attending: INTERNAL MEDICINE
Payer: COMMERCIAL

## 2024-12-02 VITALS
SYSTOLIC BLOOD PRESSURE: 124 MMHG | DIASTOLIC BLOOD PRESSURE: 82 MMHG | BODY MASS INDEX: 30.27 KG/M2 | HEART RATE: 103 BPM | OXYGEN SATURATION: 99 % | WEIGHT: 155 LBS

## 2024-12-02 DIAGNOSIS — Z01.810 PREOP CARDIOVASCULAR EXAM: ICD-10-CM

## 2024-12-02 DIAGNOSIS — Z12.11 COLON CANCER SCREENING: Primary | ICD-10-CM

## 2024-12-02 DIAGNOSIS — K21.9 GASTROESOPHAGEAL REFLUX DISEASE, UNSPECIFIED WHETHER ESOPHAGITIS PRESENT: ICD-10-CM

## 2024-12-02 DIAGNOSIS — E78.49 OTHER HYPERLIPIDEMIA: ICD-10-CM

## 2024-12-02 DIAGNOSIS — Z87.891 FORMER SMOKER: ICD-10-CM

## 2024-12-02 DIAGNOSIS — R07.89 OTHER CHEST PAIN: ICD-10-CM

## 2024-12-02 DIAGNOSIS — I10 HYPERTENSION, UNSPECIFIED TYPE: ICD-10-CM

## 2024-12-02 DIAGNOSIS — I10 PRIMARY HYPERTENSION: Primary | ICD-10-CM

## 2024-12-02 DIAGNOSIS — R00.2 PALPITATIONS: ICD-10-CM

## 2024-12-02 PROCEDURE — 3079F DIAST BP 80-89 MM HG: CPT | Mod: CPTII,S$GLB,, | Performed by: INTERNAL MEDICINE

## 2024-12-02 PROCEDURE — 99214 OFFICE O/P EST MOD 30 MIN: CPT | Mod: S$GLB,,, | Performed by: INTERNAL MEDICINE

## 2024-12-02 PROCEDURE — 3074F SYST BP LT 130 MM HG: CPT | Mod: CPTII,S$GLB,, | Performed by: INTERNAL MEDICINE

## 2024-12-02 PROCEDURE — 3008F BODY MASS INDEX DOCD: CPT | Mod: CPTII,S$GLB,, | Performed by: INTERNAL MEDICINE

## 2024-12-02 PROCEDURE — G2211 COMPLEX E/M VISIT ADD ON: HCPCS | Mod: S$GLB,,, | Performed by: INTERNAL MEDICINE

## 2024-12-02 PROCEDURE — 1160F RVW MEDS BY RX/DR IN RCRD: CPT | Mod: CPTII,S$GLB,, | Performed by: INTERNAL MEDICINE

## 2024-12-02 PROCEDURE — 1159F MED LIST DOCD IN RCRD: CPT | Mod: CPTII,S$GLB,, | Performed by: INTERNAL MEDICINE

## 2024-12-02 PROCEDURE — 99999 PR PBB SHADOW E&M-EST. PATIENT-LVL IV: CPT | Mod: PBBFAC,,, | Performed by: INTERNAL MEDICINE

## 2024-12-02 PROCEDURE — 3044F HG A1C LEVEL LT 7.0%: CPT | Mod: CPTII,S$GLB,, | Performed by: INTERNAL MEDICINE

## 2024-12-02 RX ORDER — SODIUM, POTASSIUM,MAG SULFATES 17.5-3.13G
1 SOLUTION, RECONSTITUTED, ORAL ORAL DAILY
Qty: 1 KIT | Refills: 0 | Status: SHIPPED | OUTPATIENT
Start: 2024-12-02 | End: 2024-12-04

## 2024-12-02 NOTE — Clinical Note
Hi pt has more GERD sx as well - can you add on EGD with Cscope? I will say she is low risk for both. Thanks

## 2024-12-02 NOTE — PROGRESS NOTES
Subjective:   Patient ID:  Reta Navas is a 59 y.o. female who presents for cardiac consult of No chief complaint on file.        HPI  The patient came in today for cardiac consult of No chief complaint on file.      Reta Navas is a 59 y.o. female pt with HTN, HLD - elevated Tgs, obesity, GERD, h/o tobacco abuse presents for follow up CV eval.     4/17/23  BP and HR well controlled. BMI 31 - 160 lbs. She still has occ CP maybe due to reflux, is back on PPI - Prilosec 20mg.       5/6/24  CA score - 33 - low risk.  BP and HR well controlled. BMI 30  -154 lbs   She will have Cscope.   She has chronic CP.   Has LISETTE diagnosed.       12/2/24  She has upcoming Cscope.   BP and HR stable. BMI 30 - 155 lbs   She still has atypical CP - wants to discuss tx for GERD    Procedure Note    PHYSICIAN INTERPRETATION AND COMMENTS: Findings are consistent with moderate obstructive sleep apnea (LISETTE).       Calcium score:     0-10: Very little coronary heart disease risk     11 through 100: Low to moderate coronary heart disease risk     101 through 400: Moderate to high coronary heart disease risk     Greater than 401: High coronary heart disease risk.     SCORE SUMMARY     Your total calcium score is 33     Incidental findings: None     Impression:     Your total calcium score is 33.  Moderate to high coronary heart disease risk.        Electronically signed by:Francois Lott  Date:                                            05/01/2023  Time:                                           11:17    FH - mother - had MI and CVA    Bardy 7/2022  Conclusion    Heart rates varied between 51 and 136 BPM with an average of 81 BPM.  Total Analysis Time: 15 days and 0 hours  Predominant rhythm: NSR  PAC 0.01 %  Ectopic Atrial Rhythm (EAR)    Results for orders placed during the hospital encounter of 08/15/22    Echo    Interpretation Summary  · Concentric remodeling and normal systolic function.  · The estimated ejection fraction is  60%.  · Normal left ventricular diastolic function.  · Normal right ventricular size with normal right ventricular systolic function.      Results for orders placed during the hospital encounter of 08/15/22    Nuclear Stress - Cardiology Interpreted    Interpretation Summary    Normal myocardial perfusion scan. There is no evidence of myocardial ischemia or infarction.    The gated perfusion images showed an ejection fraction of 74% at rest. The gated perfusion images showed an ejection fraction of 71% post stress.    There is normal wall motion at rest and post stress.    LV cavity size is normal at rest and normal at stress.    The EKG portion of this study is negative for ischemia.    The patient reported no chest pain during the stress test.    There were no arrhythmias during stress.    The exercise capacity was above average.      Post Exercise Imaging:     Immediate post exercise images demonstrate left ventricular function augmenting. Right ventricular function augments. Left ventricular end systolic volume decreases.   The left ventricle is globally hyperdynamic and no exercise induced wall motion abnormalities were identified.    CONCLUSIONS     1 - Normal left ventricular systolic function (EF 60-65%).     2 - Normal left ventricular diastolic function.     3 - Normal right ventricular systolic function .     4 - The estimated PA systolic pressure is 20 mmHg.     5 - Concentric hypertrophy.     6 - No wall motion abnormalities.     7 - Mild tricuspid regurgitation.     No evidence of stress induced myocardial ischemia.     This document has been electronically    SIGNED BY: Byron Puente MD On: 12/31/2019 10:54      Past Medical History:   Diagnosis Date    Abnormal Pap smear     Abnormal Pap smear of cervix     Chronic rhinitis     Depression     GERD (gastroesophageal reflux disease)     Hyperlipidemia     Hypertension     LISETTE (obstructive sleep apnea) 2/8/2024    Overactive bladder     Plantar  fasciitis, bilateral     Psoriasis        Past Surgical History:   Procedure Laterality Date    CERVICAL BIOPSY  W/ LOOP ELECTRODE EXCISION      HYSTERECTOMY      supracervical hyst/LSO for fibroids    OOPHORECTOMY      LSO    ROBOT-ASSISTED CHOLECYSTECTOMY USING DA WILVER XI N/A 10/18/2018    Procedure: XI ROBOTIC CHOLECYSTECTOMY;  Surgeon: Robel Ferrer MD;  Location: [facility];  Service: General;  Laterality: N/A;    TUBAL LIGATION         Social History     Tobacco Use    Smoking status: Former     Current packs/day: 0.00     Average packs/day: 1 pack/day for 39.0 years (39.0 ttl pk-yrs)     Types: Vaping with nicotine, Cigarettes     Start date:      Quit date:      Years since quittin.9     Passive exposure: Past    Smokeless tobacco: Never   Substance Use Topics    Alcohol use: No     Alcohol/week: 0.0 standard drinks of alcohol    Drug use: Never       Family History   Problem Relation Name Age of Onset    Ovarian cancer Mother      Stroke Mother      No Known Problems Father      Leukemia Brother      Breast cancer Paternal Grandmother          mastectomy    Heart attack Paternal Grandmother  82        COD    Colon cancer Paternal Grandfather      Ovarian cancer Maternal Aunt      Stroke Maternal Aunt      Colon cancer Paternal Uncle         Patient's Medications   New Prescriptions    No medications on file   Previous Medications    ALBUTEROL (VENTOLIN HFA) 90 MCG/ACTUATION INHALER    Inhale 2 puffs into the lungs every 6 (six) hours as needed for Shortness of Breath. Rescue    AMLODIPINE (NORVASC) 10 MG TABLET    Take 1 tablet (10 mg total) by mouth once daily.    ASPIRIN (ECOTRIN) 81 MG EC TABLET    Take 1 tablet (81 mg total) by mouth once daily.    AZELASTINE (ASTELIN) 137 MCG (0.1 %) NASAL SPRAY    1 spray (137 mcg total) by Nasal route 2 (two) times daily.    FEXOFENADINE (ALLEGRA) 180 MG TABLET    Take 180 mg by mouth once daily.    FLUTICASONE PROPIONATE (FLONASE) 50 MCG/ACTUATION NASAL  SPRAY    2 sprays (100 mcg total) by Each Nostril route once daily.    GABAPENTIN (NEURONTIN) 300 MG CAPSULE    Take 1 capsule (300 mg total) by mouth every evening for 3 days, THEN 1 capsule (300 mg total) 2 (two) times daily for 3 days, THEN 1 capsule (300 mg total) 3 (three) times daily for 3 days, THEN 2 capsules (600 mg total) 2 (two) times daily for 3 days, THEN 2 capsules (600 mg total) 3 (three) times daily for 18 days.    LINZESS 145 MCG CAP CAPSULE    TAKE 1 CAPSULE BY MOUTH BEFORE BREAKFAST    METHOCARBAMOL (ROBAXIN) 500 MG TAB    Take 1 tablet (500 mg total) by mouth 2 (two) times daily as needed (muscle spasms).    MIRABEGRON (MYRBETRIQ) 50 MG TB24    Take 1 tablet (50 mg total) by mouth once daily.    MONTELUKAST (SINGULAIR) 10 MG TABLET    Take 1 tablet (10 mg total) by mouth once daily.    NITROGLYCERIN (NITROSTAT) 0.4 MG SL TABLET    Place 1 tablet (0.4 mg total) under the tongue every 5 (five) minutes as needed for Chest pain.    OMEPRAZOLE (PRILOSEC) 40 MG CAPSULE    TAKE 1 CAPSULE BY MOUTH TWICE DAILY BEFORE MEAL(S)    SODIUM,POTASSIUM,MAG SULFATES (SUPREP BOWEL PREP KIT) 17.5-3.13-1.6 GRAM SOLR    Take 177 mLs by mouth once daily. for 2 days    VITAMIN D (VITAMIN D3) 1000 UNITS TAB    Take 1,000 Units by mouth once daily. Take 2000 units daily.   Modified Medications    No medications on file   Discontinued Medications    No medications on file       Review of Systems   Constitutional: Negative.    HENT: Negative.     Eyes: Negative.    Respiratory: Negative.     Cardiovascular:  Positive for chest pain and palpitations.   Gastrointestinal: Negative.    Genitourinary: Negative.    Musculoskeletal: Negative.    Skin: Negative.    Neurological: Negative.    Endo/Heme/Allergies: Negative.    Psychiatric/Behavioral: Negative.     All 12 systems otherwise negative.      Wt Readings from Last 3 Encounters:   12/02/24 70.3 kg (155 lb)   11/14/24 68.5 kg (151 lb 0.2 oz)   11/13/24 68.5 kg (151 lb 0.2  oz)     Temp Readings from Last 3 Encounters:   11/13/24 (!) 95.7 °F (35.4 °C) (Tympanic)   04/30/24 98.8 °F (37.1 °C)   01/02/24 98.4 °F (36.9 °C)     BP Readings from Last 3 Encounters:   12/02/24 124/82   11/14/24 125/88   11/13/24 104/88     Pulse Readings from Last 3 Encounters:   12/02/24 103   11/14/24 75   11/13/24 87       /82 (BP Location: Left arm, Patient Position: Sitting)   Pulse 103   Wt 70.3 kg (155 lb)   SpO2 99%   BMI 30.27 kg/m²     Objective:   Physical Exam  Vitals and nursing note reviewed.   Constitutional:       General: She is not in acute distress.     Appearance: She is well-developed. She is not diaphoretic.   HENT:      Head: Normocephalic and atraumatic.      Nose: Nose normal.   Eyes:      General: No scleral icterus.     Conjunctiva/sclera: Conjunctivae normal.   Neck:      Thyroid: No thyromegaly.      Vascular: No JVD.   Cardiovascular:      Rate and Rhythm: Normal rate and regular rhythm.      Heart sounds: S1 normal and S2 normal. No murmur heard.     No friction rub. No gallop. No S3 or S4 sounds.   Pulmonary:      Effort: Pulmonary effort is normal. No respiratory distress.      Breath sounds: Normal breath sounds. No stridor. No wheezing or rales.   Chest:      Chest wall: No tenderness.   Abdominal:      General: Bowel sounds are normal. There is no distension.      Palpations: Abdomen is soft. There is no mass.      Tenderness: There is no abdominal tenderness. There is no rebound.   Genitourinary:     Comments: Deferred  Musculoskeletal:         General: No tenderness or deformity. Normal range of motion.      Cervical back: Normal range of motion and neck supple.   Lymphadenopathy:      Cervical: No cervical adenopathy.   Skin:     General: Skin is warm and dry.      Coloration: Skin is not pale.      Findings: No erythema or rash.   Neurological:      Mental Status: She is alert and oriented to person, place, and time.      Motor: No abnormal muscle tone.       Coordination: Coordination normal.   Psychiatric:         Behavior: Behavior normal.         Thought Content: Thought content normal.         Judgment: Judgment normal.         Lab Results   Component Value Date     11/14/2024    K 3.8 11/14/2024     11/14/2024    CO2 25 11/14/2024    BUN 11 11/14/2024    CREATININE 0.8 11/14/2024     11/14/2024    HGBA1C 5.4 11/14/2024    AST 22 11/14/2024    ALT 26 11/14/2024    ALBUMIN 3.8 11/14/2024    PROT 7.9 11/14/2024    BILITOT 0.3 11/14/2024    WBC 9.03 11/14/2024    HGB 13.4 11/14/2024    HCT 42.3 11/14/2024    MCV 94 11/14/2024     11/14/2024    TSH 3.692 11/14/2024    CHOL 165 04/23/2024    HDL 45 04/23/2024    LDLCALC 94.4 04/23/2024    TRIG 128 04/23/2024    BNP 14 06/07/2019     Assessment:      1. Primary hypertension    2. Other chest pain    3. Palpitations    4. Gastroesophageal reflux disease, unspecified whether esophagitis present    5. BMI 30.0-30.9,adult    6. Former smoker    7. Other hyperlipidemia    8. Hypertension, unspecified type    9. Preop cardiovascular exam          Plan:   1. Chest pain - had ER eval r/o ACS in past; with palpitations  - coronary calcif on Chest CT  - prior Nuclear stress test 8/2022 neg for ischemia. 2D ECHO 8/2022 with normal bi V function and valves  - Bardy neg for arrythmias.   - cont aspirin 81 mg  - give PRN NTG   - CA score - 33 - low risk.  In 2023    2. HTN  - titrate meds    3. GERD  - cont PPI  - refer to GI - low CV risk for EGD     4. Overweight/Obesity - BMI 31 - 160 lbs.  BMI 30  -154 lbs  --> BMI 29   - cont low stefanie diet     5. HLD - improving Tgs  - recently started statin - but could not tolerate in past   - needs to take fish oils    6. Former smoker  - has quit     7. Anxiety  - rec meditation     8. LISETTE  - has CPAP now     9. Preop CV eval - Cscope/EGD  - low CV risk for both procedures     Visit today included increased complexity associated with the care of the episodic problem  chest pain addressed and managing the longitudinal care of the patient due to the serious and/or complex managed problem(s) .      Thank you for allowing me to participate in this patient's care. Please do not hesitate to contact me with any questions or concerns. Consult note has been forwarded to the referral physician.

## 2024-12-17 DIAGNOSIS — Z87.891 HISTORY OF SMOKING: ICD-10-CM

## 2024-12-17 RX ORDER — ALBUTEROL SULFATE 90 UG/1
INHALANT RESPIRATORY (INHALATION)
Qty: 54 G | Refills: 3 | Status: SHIPPED | OUTPATIENT
Start: 2024-12-17

## 2024-12-17 NOTE — TELEPHONE ENCOUNTER
No care due was identified.  [facility] Embedded Care Due Messages. Reference number: [id number].   12/17/2024 7:05:00 AM CST

## 2024-12-17 NOTE — TELEPHONE ENCOUNTER
Refill Decision Note   Reta Navas  is requesting a refill authorization.  Brief Assessment and Rationale for Refill:  Approve     Medication Therapy Plan:         Comments:     Note composed:2:05 PM 12/17/2024

## 2025-02-26 ENCOUNTER — TELEPHONE (OUTPATIENT)
Dept: PAIN MEDICINE | Facility: CLINIC | Age: 60
End: 2025-02-26
Payer: COMMERCIAL

## 2025-02-27 ENCOUNTER — OFFICE VISIT (OUTPATIENT)
Dept: PAIN MEDICINE | Facility: CLINIC | Age: 60
End: 2025-02-27
Payer: COMMERCIAL

## 2025-02-27 DIAGNOSIS — M47.816 LUMBAR SPONDYLOSIS: Primary | ICD-10-CM

## 2025-02-27 DIAGNOSIS — M51.360 DEGENERATION OF INTERVERTEBRAL DISC OF LUMBAR REGION WITH DISCOGENIC BACK PAIN: ICD-10-CM

## 2025-02-27 DIAGNOSIS — M47.816 LUMBAR FACET ARTHROPATHY: ICD-10-CM

## 2025-02-27 NOTE — PROGRESS NOTES
Established Patient Chronic Pain Note     The patient location is: home  The chief complaint leading to consultation is: LBP  Visit type: Virtual visit with synchronous audio and video  Each patient to whom he or she provides medical services by telemedicine is: (1) informed of the relationship between the physician and patient and the respective role of any other health care provider with respect to management of the patient; and (2) notified that he or she may decline to receive medical services by telemedicine and may withdraw from such care at any time.    Referring Physician: No ref. provider found    PCP: Mendoza Cardoso MD    Chief Complaint:   LBP       SUBJECTIVE:  Interval history 02/27/2025  Patient presents for MRI lumbar spine review an x-ray lumbar spine review.  Today she reports continuation of pain remaining in the lower back in a bandlike distribution.  Patient does report participating in conventional land-based physical therapy 3 sessions from 06/03/2024 through 613th 2024 internally.  She has continued physician directed physical therapy exercises daily over the last 8 weeks from 12/27/2024 through 02/27/2025 with noticeable improvement in her pain.  Pain can be exacerbated with prolonged sitting, lumbar flexion, facet loading and lateral twisting.  Today she denies significant lower extremity weakness, bowel or bladder incontinence or saddle anesthesia.      HPI 05/23/2024  Reta Navas is a 59 y.o. female with past medical history significant for hypertension, urinary incontinence, obesity, GERD, obstructive sleep apnea, nicotine dependence who presents to the clinic for the evaluation of lower back and leg pain.  Patient reports pain began approximately 20 years prior without inciting accident or injury.  Today she reports pain which is constant which is rated a 4/10.  Pain at its best is a 2/10 and at its worse is a 10/10.  She reports pain which she believes a separate in a bandlike  distribution in the lower back as well as pain which radiates down bilateral lower extremities to the feet in no particular dermatomal distribution.  Pain is described as aching in the back and numbness in the legs.  Pain can be exacerbated by ambulation exceeding half a mi, sleeping exceeding 6 hours and with prolonged sitting.  Pain is marginally improved with hot baths, alternating ibuprofen and Tylenol and position changes.  Patient has not trialed antispasmodics or membrane stabilizing agents.  She is undergone conventional land-based physical therapy out of state as well as chiropractic manipulation with ineffective relief.  She is continued physician directed physical therapy exercises at home over the last 8 weeks from 03/23/2024 through 05/23/2024 with marginal improvement in her symptoms.  Of note patient has traveling to [place] 05/24/[place]24 and would like pharmacologic management to help manage her symptoms.    Patient denies night fever/night sweats, urinary incontinence, bowel incontinence, significant weight loss, and significant motor weakness.  Patient reports loss of sensations.      Pain Disability Index Review:         5/23/2024     8:46 AM   Last 3 PDI Scores   Pain Disability Index (PDI) 64       Non-Pharmacologic Treatments:  Physical Therapy/Home Exercise: yes  Ice/Heat:yes  TENS: no  Acupuncture: no  Massage: no  Chiropractic: yes    Other: no      Pain Medications:  - Anti-Coagulants: Aspirin    Pain Procedures:   None    Past Medical History:   Diagnosis Date    Abnormal Pap smear     Abnormal Pap smear of cervix     Chronic rhinitis     Depression     GERD (gastroesophageal reflux disease)     Hyperlipidemia     Hypertension     LISETTE (obstructive sleep apnea) 2/8/2024    Overactive bladder     Plantar fasciitis, bilateral     Psoriasis      Past Surgical History:   Procedure Laterality Date    CERVICAL BIOPSY  W/ LOOP ELECTRODE EXCISION      HYSTERECTOMY      supracervical hyst/LSO for  fibroids    OOPHORECTOMY      LSO    ROBOT-ASSISTED CHOLECYSTECTOMY USING DA WILVER XI N/A 10/18/2018    Procedure: XI ROBOTIC CHOLECYSTECTOMY;  Surgeon: Robel Ferrer MD;  Location: [facility];  Service: General;  Laterality: N/A;    TUBAL LIGATION       Review of patient's allergies indicates:   Allergen Reactions    Adhesive Rash    Latex, natural rubber        Current Outpatient Medications   Medication Sig    albuterol (PROVENTIL/VENTOLIN HFA) 90 mcg/actuation inhaler INHALE 2 PUFFS INTO THE LUNGS EVERY 6 HOURS AS NEEDED FOR SHORTNESS OF BREATH, RESCUE    amLODIPine (NORVASC) 10 MG tablet Take 1 tablet (10 mg total) by mouth once daily.    aspirin (ECOTRIN) 81 MG EC tablet Take 1 tablet (81 mg total) by mouth once daily.    azelastine (ASTELIN) 137 mcg (0.1 %) nasal spray 1 spray (137 mcg total) by Nasal route 2 (two) times daily.    fexofenadine (ALLEGRA) 180 MG tablet Take 180 mg by mouth once daily.    fluticasone propionate (FLONASE) 50 mcg/actuation nasal spray 2 sprays (100 mcg total) by Each Nostril route once daily.    gabapentin (NEURONTIN) 300 MG capsule Take 1 capsule (300 mg total) by mouth every evening for 3 days, THEN 1 capsule (300 mg total) 2 (two) times daily for 3 days, THEN 1 capsule (300 mg total) 3 (three) times daily for 3 days, THEN 2 capsules (600 mg total) 2 (two) times daily for 3 days, THEN 2 capsules (600 mg total) 3 (three) times daily for 18 days.    LINZESS 145 mcg Cap capsule TAKE 1 CAPSULE BY MOUTH BEFORE BREAKFAST    methocarbamoL (ROBAXIN) 500 MG Tab Take 1 tablet (500 mg total) by mouth 2 (two) times daily as needed (muscle spasms).    mirabegron (MYRBETRIQ) 50 mg Tb24 Take 1 tablet (50 mg total) by mouth once daily.    montelukast (SINGULAIR) 10 mg tablet Take 1 tablet (10 mg total) by mouth once daily.    nitroGLYCERIN (NITROSTAT) 0.4 MG SL tablet Place 1 tablet (0.4 mg total) under the tongue every 5 (five) minutes as needed for Chest pain.    omeprazole (PRILOSEC) 40 MG  capsule TAKE 1 CAPSULE BY MOUTH TWICE DAILY BEFORE MEAL(S)    vitamin D (VITAMIN D3) 1000 units Tab Take 1,000 Units by mouth once daily. Take 2000 units daily.     No current facility-administered medications for this visit.       Review of Systems     GENERAL:  No weight loss, malaise or fevers.  HEENT:   No recent changes in vision or hearing  NECK:  Negative for lumps, no difficulty with swallowing.  RESPIRATORY:  Negative for cough, wheezing or shortness of breath, patient denies any recent URI.  CARDIOVASCULAR:  Negative for chest pain or palpitations.  GI:  Negative for abdominal discomfort, blood in stools or black stools or change in bowel habits.  MUSCULOSKELETAL:  See HPI.  SKIN:  Negative for lesions, rash, and itching.  PSYCH:  No mood disorder or recent psychosocial stressors.   HEMATOLOGY/LYMPHOLOGY:  Negative for prolonged bleeding, bruising easily or swollen nodes.    NEURO:   No history of syncope, paralysis, seizures or tremors.  All other reviewed and negative other than HPI.    OBJECTIVE:    There were no vitals taken for this visit.      Physical Exam    GENERAL: Well appearing, in no acute distress, alert and oriented x3.  PSYCH:  Mood and affect appropriate.  SKIN: Skin color, texture, turgor normal, no rashes or lesions.  HEAD/FACE:  Normocephalic, atraumatic. Cranial nerves grossly intact.    CV: RRR with palpation of the radial artery.  PULM: No evidence of respiratory difficulty, symmetric chest rise.  GI:  Soft and non-tender.    BACK: Straight leg raising in the sitting and supine positions is negative to radicular pain.  pain to palpation over the facet joints of the lumbar spine or spinous processes. Normal range of motion without pain reproduction.  EXTREMITIES: Peripheral joint ROM is full and pain free without obvious instability or laxity in all four extremities. No deformities, edema, or skin discoloration. Good capillary refill.  MUSCULOSKELETAL: Able to stand on heels & toes.    Shoulder, hip, and knee provocative maneuvers are negative.  There is no pain with palpation over the sacroiliac joints bilaterally.  Gaenslen's, Distraction/Compression and  FABERs test is negative.  Facet loading test is positive bilaterally.   Bilateral upper and lower extremity strength is normal and symmetric.  No atrophy or tone abnormalities are noted.    RIGHT Lower extremity: Hip flexion 5/5, Hip Abduction 5/5, Hip Adduction 5/5, Knee extension 5/5, Knee flexion 5/5, Ankle dorsiflexion5/5, Extensor hallucis longus 5/5, Ankle plantarflexion 5/5  LEFT Lower extremity:  Hip flexion 5/5, Hip Abduction 5/5,Hip Adduction 5/5, Knee extension 5/5, Knee flexion 5/5, Ankle dorsiflexion 5/5, Extensor hallucis longus 5/5, Ankle plantarflexion 5/5  -Normal testing knee (patellar) jerk and ankle (achilles) jerk    NEURO: Bilateral upper and lower extremity coordination and muscle stretch reflexes are physiologic and symmetric. No loss of sensation is noted.  GAIT: normal.    Imaging:   X-ray lumbar spine 05/23/2024  FINDINGS: 5 views of the lumbosacral spine were performed including additional lateral views in flexion and extension. Alignment is normal. Disc spaces and vertebral body heights are maintained. No fractures are visible. The sacroiliac joints appear normal and symmetrical. Normal alignment is maintained over the full range of motion through flexion and extension.       MRI lumbar spine 06/02/2024  FINDINGS:  The distal cord and conus reveal normal signal and morphology.     Incidental note made of a left renal cyst.     The lumbar vertebra reveal normal alignment, shape and signal intensity.     T12-L1: Unremarkable.     L1-2:     Minor disc desiccation.     L2-3:     Minor disc degeneration with disc desiccation, narrowing and disc bulge.     L3-4:     Minor disc desiccation with small posterior annular fissure and minor disc bulge.     L4-5:     Minor disc degeneration with disc desiccation and mild disc  bulge.  Mild facet arthrosis.     L5-S1:    Unremarkable.      06/07/19    X-Ray Cervical Spine AP And Lateral    Narrative  EXAMINATION:  XR CERVICAL SPINE AP LATERAL    CLINICAL HISTORY:  neck pain;    COMPARISON:  None    FINDINGS  There is slight reversal of the normal lordotic curve which could be positional or due to muscle spasm.  No acute cervical fracture or significant subluxation.    Impression  1.  Slight reversal of normal lordotic curve which could be positional or due to muscle spasm.  No acute cervical fracture or significant subluxation.          ASSESSMENT: 59 y.o. year old female with     1. Lumbar spondylosis  Case Request-RAD/Other Procedure Area: Bilateral L3-5 MBB #1 with local      2. Degeneration of intervertebral disc of lumbar region with discogenic back pain        3. Lumbar facet arthropathy            PLAN:   - Interventions:  Schedule for bilateral L3-5 diagnostic lumbar medial branch block to see if this helps with axial back pain.  We have discussed with significant (80%) relief x2, she may be a candidate for high heat radiofrequency ablation for more sustained relief  Explained the risks and benefits of the procedure in detail with the patient today in clinic along with alternative treatment options.  Patient has elected to pursue this procedure.    - Anticoagulation use: Yes aspirin  --primary prophylaxis  ---per NEY guidelines for primary prophylaxis, patient can continue aspirin for lumbar medial branch block/potential RFA.     report:  Reviewed and consistent with medication use as prescribed.    - Medications:  -Continue anti spasmodic, Robaxin 500 mg PRN for myofascial pain.  We have discussed potential deleterious side effects associated with this medication including  dizziness, drowsiness, stomach upset, nausea vomiting or blurred vision.  Patient expresses understanding.    - Therapy:   We discussed continuing at home physician directed physical therapy to help manage  the patient/s painful condition. The patient was counseled that muscle strengthening will improve the long term prognosis in regards to pain and may also help increase range of motion and mobility.  Completed conventional land-based physical therapy internally from 06/03/2024 through 06/13/2024.    - Imaging:  Diagnostic imaging (x-ray lumbar spine, MRI of the lumbar spine) reviewed and discussed with the patient.    - Follow up visit:  24 hour reminder call status post 1st diagnostic lumbar medial branch block    The above plan and management options were discussed at length with patient. Patient is in agreement with the above and verbalized understanding.    - I discussed the goals of interventional chronic pain management with the patient on today's visit. We discussed a multimodal and systematic approach to pain.  This includes diagnostic and therapeutic injections, adjuvant pharmacologic treatment, physical therapy, and at times psychiatry.  I emphasized the importance of regular exercise, core strengthening and stretching, diet and weight loss as a cornerstone of long-term pain management.    - This condition does not require this patient to take time off of work, and the primary goal of our Pain Management services is to improve the patient's functional capacity.  - Patient Questions: Answered all of the patient's questions regarding diagnoses, therapy, treatment and next steps        Franco Santillan MD  Interventional Pain Management  [facility]    Disclaimer:  This note was prepared using voice recognition system and is likely to have sound alike errors that may have been overlooked even after proof reading.  Please call me with any questions

## 2025-03-05 NOTE — PRE-PROCEDURE INSTRUCTIONS
Spoke with patient regarding procedure scheduled on 3.18     Arrival time 0700     Has patient been sick with fever or on antibiotics within the last 7 days? No     Does the patient have any open wounds, sores or rashes? No     Does the patient have any recent fractures? no     Has patient received a vaccination within the last 7 days? No     Received the COVID vaccination?      Has the patient stopped all medications as directed? na     Does patient have a pacemaker, defibrillator, or implantable stimulator? No     Does the patient have a ride to and from procedure and someone reliable to remain with patient?  daughter     Is the patient diabetic? no      Does the patient have sleep apnea? Or use O2 at home? jonna cpap     Is the patient receiving sedation? LOCAL     Is the patient instructed to remain NPO beginning at midnight the night before their procedure? NPO 4 hrs     Procedure location confirmed with patient? Yes     Covid- Denies signs/symptoms. Instructed to notify PAT/MD if any changes.

## 2025-03-06 ENCOUNTER — OFFICE VISIT (OUTPATIENT)
Dept: UROLOGY | Facility: CLINIC | Age: 60
End: 2025-03-06
Payer: COMMERCIAL

## 2025-03-06 VITALS
DIASTOLIC BLOOD PRESSURE: 86 MMHG | BODY MASS INDEX: 30.27 KG/M2 | WEIGHT: 155 LBS | SYSTOLIC BLOOD PRESSURE: 125 MMHG | RESPIRATION RATE: 16 BRPM | HEART RATE: 93 BPM

## 2025-03-06 DIAGNOSIS — N30.01 ACUTE CYSTITIS WITH HEMATURIA: Primary | ICD-10-CM

## 2025-03-06 PROBLEM — R52 PAIN: Status: RESOLVED | Noted: 2024-06-04 | Resolved: 2025-03-06

## 2025-03-06 PROBLEM — J30.9 ALLERGIC RHINITIS: Status: RESOLVED | Noted: 2024-04-23 | Resolved: 2025-03-06

## 2025-03-06 PROBLEM — J31.0 CHRONIC RHINITIS: Status: RESOLVED | Noted: 2024-05-01 | Resolved: 2025-03-06

## 2025-03-06 PROBLEM — R29.3 POOR POSTURE: Status: RESOLVED | Noted: 2024-06-04 | Resolved: 2025-03-06

## 2025-03-06 PROBLEM — R09.82 POST-NASAL DRIP: Status: RESOLVED | Noted: 2024-05-01 | Resolved: 2025-03-06

## 2025-03-06 LAB
BILIRUB UR QL STRIP: NEGATIVE
GLUCOSE UR QL STRIP: NEGATIVE
KETONES UR QL STRIP: NEGATIVE
LEUKOCYTE ESTERASE UR QL STRIP: POSITIVE
PH, POC UA: 5.5
POC BLOOD, URINE: POSITIVE
POC NITRATES, URINE: POSITIVE
PROT UR QL STRIP: POSITIVE
SP GR UR STRIP: 1.03 (ref 1–1.03)
UROBILINOGEN UR STRIP-ACNC: 0.2 (ref 0.1–1.1)

## 2025-03-06 PROCEDURE — 87086 URINE CULTURE/COLONY COUNT: CPT | Performed by: NURSE PRACTITIONER

## 2025-03-06 PROCEDURE — 1159F MED LIST DOCD IN RCRD: CPT | Mod: CPTII,S$GLB,, | Performed by: NURSE PRACTITIONER

## 2025-03-06 PROCEDURE — 3074F SYST BP LT 130 MM HG: CPT | Mod: CPTII,S$GLB,, | Performed by: NURSE PRACTITIONER

## 2025-03-06 PROCEDURE — 81003 URINALYSIS AUTO W/O SCOPE: CPT | Mod: QW,S$GLB,, | Performed by: NURSE PRACTITIONER

## 2025-03-06 PROCEDURE — 3079F DIAST BP 80-89 MM HG: CPT | Mod: CPTII,S$GLB,, | Performed by: NURSE PRACTITIONER

## 2025-03-06 PROCEDURE — 99999 PR PBB SHADOW E&M-EST. PATIENT-LVL IV: CPT | Mod: PBBFAC,,, | Performed by: NURSE PRACTITIONER

## 2025-03-06 PROCEDURE — 3008F BODY MASS INDEX DOCD: CPT | Mod: CPTII,S$GLB,, | Performed by: NURSE PRACTITIONER

## 2025-03-06 PROCEDURE — 87088 URINE BACTERIA CULTURE: CPT | Performed by: NURSE PRACTITIONER

## 2025-03-06 PROCEDURE — 1160F RVW MEDS BY RX/DR IN RCRD: CPT | Mod: CPTII,S$GLB,, | Performed by: NURSE PRACTITIONER

## 2025-03-06 PROCEDURE — 99214 OFFICE O/P EST MOD 30 MIN: CPT | Mod: S$GLB,,, | Performed by: NURSE PRACTITIONER

## 2025-03-06 PROCEDURE — 87186 SC STD MICRODIL/AGAR DIL: CPT | Performed by: NURSE PRACTITIONER

## 2025-03-06 RX ORDER — CEFDINIR 300 MG/1
300 CAPSULE ORAL 2 TIMES DAILY
Qty: 20 CAPSULE | Refills: 0 | Status: SHIPPED | OUTPATIENT
Start: 2025-03-06 | End: 2025-03-16

## 2025-03-06 NOTE — PROGRESS NOTES
Chief Complaint:   Urinary tract infection    HPI:   Patient is presenting with dysuria.  States that she has had slight dysuria for several days.  Denies pelvic or flank pain.  Reports that she started with painful gross hematuria this morning.  Urine in clinic indicates nitrates, leukocytes, blood and protein.  06/14/2024  Patient is a 57-year-old female that is presenting as a follow-up to an increase in VESIcare.  Patient states that VESIcare 10 mg once daily has decrease her nocturia twice nightly and daytime frequency has resolved.  Patient states she was unable to follow-up with PT pelvic floor training and is requesting a new referral.  05/03/2023  Patient is a 57-year-old that is presenting as a follow-up to overactive bladder.  Patient states that she is currently on VESIcare 5 mg once daily, however, nocturia has increased 3 times nightly and urge incontinence has increased over the last 6 months.  Urine in clinic is negative and PVR is 1 mL.  Patient is being followed by this clinic for renal cysts, renal ultrasound indicated a complex cyst, however, CT scan renal mass protocol indicated a nonenhancing cyst.  Patient denies gross hematuria.  11/22/2022  Patient is a 57-year-old female that is presenting with lower back pain, nocturia, mixed incontinence and daytime frequency.  No history of renal stones.  No gross hematuria.  Urine in clinic was negative and PVR was 23 mL.  Patient states that she drinks 2-3 glasses of water during the day, 3-4 cokes every day.  Reports that urge incontinence is greater than stress incontinence.  Has been seen by primary care physician, gyn and prescribed an  Allergies:  Adhesive and Latex, natural rubber    Medications:  has a current medication list which includes the following prescription(s): albuterol, amlodipine, aspirin, azelastine, fexofenadine, fluticasone propionate, gabapentin, linzess, methocarbamol, mirabegron, montelukast, nitroglycerin, omeprazole, and  vitamin d.    Review of Systems:  General: No fever, chills, fatigability, or weight loss.  Skin: No rashes, itching, or changes in color or texture of skin.  Chest: Denies VERONICA, cyanosis, wheezing, cough, and sputum production.  Abdomen: Appetite fine. No weight loss. Denies diarrhea, abdominal pain, hematemesis, or blood in stool.  Musculoskeletal: No joint stiffness or swelling. Denies back pain.  : As above.  All other review of systems negative.    PMH:   has a past medical history of Abnormal Pap smear, Abnormal Pap smear of cervix, Chronic rhinitis, Depression, GERD (gastroesophageal reflux disease), Hyperlipidemia, Hypertension, LISETTE (obstructive sleep apnea) (2/8/2024), Overactive bladder, Plantar fasciitis, bilateral, and Psoriasis.    PSH:   has a past surgical history that includes Tubal ligation; Oophorectomy; Robot-assisted cholecystectomy using da Mildred Xi (N/A, 10/18/2018); Cervical biopsy w/ loop electrode excision; and Hysterectomy.    FamHx: family history includes Breast cancer in her paternal grandmother; Colon cancer in her paternal grandfather and paternal uncle; Heart attack (age of onset: 82) in her paternal grandmother; Leukemia in her brother; No Known Problems in her father; Ovarian cancer in her maternal aunt and mother; Stroke in her maternal aunt and mother.    SocHx:  reports that she quit smoking about 6 years ago. Her smoking use included vaping with nicotine and cigarettes. She started smoking about 51 years ago. She has a 39 pack-year smoking history. She has been exposed to tobacco smoke. She has never used smokeless tobacco. She reports that she does not drink alcohol and does not use drugs.      Physical Exam:  General: A&Ox3, no apparent distress, no deformities  Neck: No masses, normal thyroid  Lungs: normal inspiration, no use of accessory muscles  Heart: normal pulse, no arrhythmias  Abdomen: Soft, NT, ND, no masses, no hernias, no hepatosplenomegaly  Lymphatic: Neck and  groin nodes negative  Skin: The skin is warm and dry. No jaundice.  Ext: No c/c/e.    Labs/Studies:   See HPI  Impression/Plan:   Urine sent for culture and patient was empirically treated with cefdinir.  Will be contacted with results and needed follow-up.

## 2025-03-07 LAB — BACTERIA UR CULT: ABNORMAL

## 2025-03-08 ENCOUNTER — RESULTS FOLLOW-UP (OUTPATIENT)
Dept: UROLOGY | Facility: CLINIC | Age: 60
End: 2025-03-08

## 2025-03-10 ENCOUNTER — TELEPHONE (OUTPATIENT)
Dept: PAIN MEDICINE | Facility: CLINIC | Age: 60
End: 2025-03-10
Payer: COMMERCIAL

## 2025-03-10 NOTE — TELEPHONE ENCOUNTER
----- Message from Pina sent at 3/10/2025 10:08 AM CDT -----  Contact: valeria schreiber is requesting a call back in regards to finding out if she is able to continue with her p[procedure as she was placed on antibiotics on 3/6 Please call her at [phone]

## 2025-03-18 ENCOUNTER — TELEPHONE (OUTPATIENT)
Facility: CLINIC | Age: 60
End: 2025-03-18
Payer: COMMERCIAL

## 2025-03-18 ENCOUNTER — OFFICE VISIT (OUTPATIENT)
Dept: CARDIOLOGY | Facility: CLINIC | Age: 60
End: 2025-03-18
Payer: COMMERCIAL

## 2025-03-18 ENCOUNTER — HOSPITAL ENCOUNTER (OUTPATIENT)
Dept: CARDIOLOGY | Facility: HOSPITAL | Age: 60
Discharge: HOME | End: 2025-03-18
Attending: INTERNAL MEDICINE
Payer: COMMERCIAL

## 2025-03-18 ENCOUNTER — PATIENT MESSAGE (OUTPATIENT)
Dept: CARDIOLOGY | Facility: HOSPITAL | Age: 60
End: 2025-03-18
Payer: COMMERCIAL

## 2025-03-18 VITALS — BODY MASS INDEX: 30.87 KG/M2 | WEIGHT: 158.06 LBS

## 2025-03-18 VITALS
SYSTOLIC BLOOD PRESSURE: 120 MMHG | WEIGHT: 156.75 LBS | HEIGHT: 60 IN | HEART RATE: 94 BPM | BODY MASS INDEX: 30.77 KG/M2 | DIASTOLIC BLOOD PRESSURE: 84 MMHG

## 2025-03-18 DIAGNOSIS — R07.89 OTHER CHEST PAIN: Primary | ICD-10-CM

## 2025-03-18 DIAGNOSIS — G47.33 OSA (OBSTRUCTIVE SLEEP APNEA): ICD-10-CM

## 2025-03-18 DIAGNOSIS — I10 PRIMARY HYPERTENSION: ICD-10-CM

## 2025-03-18 DIAGNOSIS — Z00.00 ROUTINE HEALTH MAINTENANCE: ICD-10-CM

## 2025-03-18 LAB
OHS QRS DURATION: 70 MS
OHS QTC CALCULATION: 426 MS

## 2025-03-18 PROCEDURE — 3079F DIAST BP 80-89 MM HG: CPT | Mod: CPTII,S$GLB,,

## 2025-03-18 PROCEDURE — 1160F RVW MEDS BY RX/DR IN RCRD: CPT | Mod: CPTII,S$GLB,,

## 2025-03-18 PROCEDURE — 93005 ELECTROCARDIOGRAM TRACING: CPT

## 2025-03-18 PROCEDURE — 93010 ELECTROCARDIOGRAM REPORT: CPT | Mod: ,,, | Performed by: INTERNAL MEDICINE

## 2025-03-18 PROCEDURE — 99999 PR PBB SHADOW E&M-EST. PATIENT-LVL III: CPT | Mod: PBBFAC,,,

## 2025-03-18 PROCEDURE — 3008F BODY MASS INDEX DOCD: CPT | Mod: CPTII,S$GLB,,

## 2025-03-18 PROCEDURE — 1159F MED LIST DOCD IN RCRD: CPT | Mod: CPTII,S$GLB,,

## 2025-03-18 PROCEDURE — 3074F SYST BP LT 130 MM HG: CPT | Mod: CPTII,S$GLB,,

## 2025-03-18 PROCEDURE — 99215 OFFICE O/P EST HI 40 MIN: CPT | Mod: 25,S$GLB,,

## 2025-03-18 NOTE — PROGRESS NOTES
Subjective:   Patient ID:  Reta Navas is a 59 y.o. female who presents for evaluation of No chief complaint on file.      HPI 59-year-old female whose current medical conditions include HTN, HLD - elevated Tgs, obesity, GERD, h/o tobacco abuse previously followed in cardiology clinic by  here today for CV follow-up, urgent visit patient complaining of chest pain that started last night with associated numbness and tingling in arms and legs.  Patient states chest pain episodes have been happening on and off all day today.  Patient refused ER.  She does have SL nitro at home but did not use it.  She also reports recent strenuous activity that she thinks she may have pulled a muscle however chest pain not reproducible on exam.  Blood pressure looks good in clinic today heart rate on the higher side.  Denies any shortness of breath but does get fatigued with little activity    EKG today NSR       LDL 94 24'  HGA1C 5.4 24'    ETOH none  Tobacco quit 10 years ago  Exercise none  FH mother MI 70s    Echo 8/22' nml EF  Nuc stress 22' neg for ischemia  Past Medical History:   Diagnosis Date    Abnormal Pap smear     Abnormal Pap smear of cervix     Chronic rhinitis     Depression     GERD (gastroesophageal reflux disease)     Hyperlipidemia     Hypertension     LISETTE (obstructive sleep apnea) 2/8/2024    Overactive bladder     Plantar fasciitis, bilateral     Psoriasis        Past Surgical History:   Procedure Laterality Date    CERVICAL BIOPSY  W/ LOOP ELECTRODE EXCISION      HYSTERECTOMY      supracervical hyst/LSO for fibroids    OOPHORECTOMY      LSO    ROBOT-ASSISTED CHOLECYSTECTOMY USING DA WILVER XI N/A 10/18/2018    Procedure: XI ROBOTIC CHOLECYSTECTOMY;  Surgeon: Robel Ferrer MD;  Location: [facility];  Service: General;  Laterality: N/A;    TUBAL LIGATION         Social History[1]    Family History   Problem Relation Name Age of Onset    Ovarian cancer Mother      Stroke Mother      No Known Problems Father       Leukemia Brother      Breast cancer Paternal Grandmother          mastectomy    Heart attack Paternal Grandmother  82        COD    Colon cancer Paternal Grandfather      Ovarian cancer Maternal Aunt      Stroke Maternal Aunt      Colon cancer Paternal Uncle         Medications Ordered Prior to Encounter[2]   Wt Readings from Last 3 Encounters:   03/18/25 71.1 kg (156 lb 12 oz)   03/18/25 71.7 kg (158 lb 1.1 oz)   03/06/25 70.3 kg (154 lb 15.7 oz)     Temp Readings from Last 3 Encounters:   11/13/24 (!) 95.7 °F (35.4 °C) (Tympanic)   04/30/24 98.8 °F (37.1 °C)   01/02/24 98.4 °F (36.9 °C)     BP Readings from Last 3 Encounters:   03/18/25 120/84   03/06/25 125/86   12/02/24 124/82     Pulse Readings from Last 3 Encounters:   03/18/25 94   03/06/25 93   12/02/24 103        Review of Systems   Constitutional: Positive for malaise/fatigue.   HENT: Negative.     Eyes: Negative.    Cardiovascular:  Positive for chest pain, dyspnea on exertion and palpitations.   Respiratory: Negative.     Skin: Negative.    Musculoskeletal: Negative.    Gastrointestinal: Negative.    Genitourinary: Negative.    Neurological:  Positive for numbness and weakness.   Psychiatric/Behavioral: Negative.         Objective:   Physical Exam  Vitals and nursing note reviewed.   Constitutional:       Appearance: Normal appearance.   HENT:      Head: Normocephalic.   Eyes:      Pupils: Pupils are equal, round, and reactive to light.   Cardiovascular:      Rate and Rhythm: Normal rate and regular rhythm.      Heart sounds: Normal heart sounds, S1 normal and S2 normal. No murmur heard.     No S3 or S4 sounds.   Pulmonary:      Effort: Pulmonary effort is normal.      Breath sounds: Normal breath sounds.   Abdominal:      General: Bowel sounds are normal.      Palpations: Abdomen is soft.   Musculoskeletal:         General: Normal range of motion.      Cervical back: Normal range of motion.   Skin:     Capillary Refill: Capillary refill takes less  "than 2 seconds.   Neurological:      General: No focal deficit present.      Mental Status: She is alert and oriented to person, place, and time.   Psychiatric:         Mood and Affect: Mood normal.         Behavior: Behavior normal.         Thought Content: Thought content normal.         Lab Results   Component Value Date    CHOL 165 04/23/2024    CHOL 166 01/27/2023    CHOL 182 05/24/2022     Lab Results   Component Value Date    HDL 45 04/23/2024    HDL 41 01/27/2023    HDL 41 05/24/2022     Lab Results   Component Value Date    LDLCALC 94.4 04/23/2024    LDLCALC 101.0 01/27/2023    LDLCALC 108.4 05/24/2022     Lab Results   Component Value Date    TRIG 128 04/23/2024    TRIG 120 01/27/2023    TRIG 163 (H) 05/24/2022     Lab Results   Component Value Date    CHOLHDL 27.3 04/23/2024    CHOLHDL 24.7 01/27/2023    CHOLHDL 22.5 05/24/2022       Chemistry        Component Value Date/Time     11/14/2024 0849    K 3.8 11/14/2024 0849     11/14/2024 0849    CO2 25 11/14/2024 0849    BUN 11 11/14/2024 0849    CREATININE 0.8 11/14/2024 0849     11/14/2024 0849        Component Value Date/Time    CALCIUM 9.1 11/14/2024 0849    ALKPHOS 112 11/14/2024 0849    AST 22 11/14/2024 0849    ALT 26 11/14/2024 0849    BILITOT 0.3 11/14/2024 0849    ESTGFRAFRICA >60 06/27/2022 1825    EGFRNONAA >60 06/27/2022 1825          Lab Results   Component Value Date    TSH 3.692 11/14/2024     No results found for: "INR", "PROTIME"  @RESUFAST(WBC,HGB,HCT,MCV,PLT)  @LABRCNTIP(BNP,BNPTRIAGEBLO)@  CrCl cannot be calculated (Patient's most recent lab result is older than the maximum 7 days allowed.).     Results for orders placed during the hospital encounter of 08/15/22    Echo    Interpretation Summary  · Concentric remodeling and normal systolic function.  · The estimated ejection fraction is 60%.  · Normal left ventricular diastolic function.  · Normal right ventricular size with normal right ventricular systolic " function.     Results for orders placed during the hospital encounter of 08/15/22    Nuclear Stress - Cardiology Interpreted    Interpretation Summary    Normal myocardial perfusion scan. There is no evidence of myocardial ischemia or infarction.    The gated perfusion images showed an ejection fraction of 74% at rest. The gated perfusion images showed an ejection fraction of 71% post stress.    There is normal wall motion at rest and post stress.    LV cavity size is normal at rest and normal at stress.    The EKG portion of this study is negative for ischemia.    The patient reported no chest pain during the stress test.    There were no arrhythmias during stress.    The exercise capacity was above average.     Assessment:      1. Primary hypertension    2. LISETTE (obstructive sleep apnea)        Plan:   Primary hypertension    LISETTE (obstructive sleep apnea)      Amlodipine, low-sodium diet, profile blood pressure-HTN   CPAP LISETTE  SL nitro-chest pain  Risk factor modification   Low-sodium, low-fat diet   Daily exercise as tolerated     Recommended ER eval for ongoing angina pains, patient refused emergency room.  Discussed importance of going to the emergency room if chest pain returns.  Patient agrees    Nuclear stress test  Return to clinic as scheduled    Visit today included increased complexity associated with the care of the episodic problem chest pain addressed and managing the longitudinal care of the patient due to the serious and/or complex managed problem(s) chest pain, HTN.     Amberly Shah, FNP-C [facility], Cardiology         [1]   Social History  Tobacco Use    Smoking status: Former     Current packs/day: 0.00     Average packs/day: 1 pack/day for 39.0 years (39.0 ttl pk-yrs)     Types: Vaping with nicotine, Cigarettes     Start date:      Quit date: 2019     Years since quittin.2     Passive exposure: Past    Smokeless tobacco: Never   Substance Use Topics    Alcohol use: No     Alcohol/week:  0.0 standard drinks of alcohol    Drug use: Never   [2]   Current Outpatient Medications on File Prior to Visit   Medication Sig Dispense Refill    albuterol (PROVENTIL/VENTOLIN HFA) 90 mcg/actuation inhaler INHALE 2 PUFFS INTO THE LUNGS EVERY 6 HOURS AS NEEDED FOR SHORTNESS OF BREATH, RESCUE 54 g 3    amLODIPine (NORVASC) 10 MG tablet Take 1 tablet (10 mg total) by mouth once daily. 90 tablet 3    aspirin (ECOTRIN) 81 MG EC tablet Take 1 tablet (81 mg total) by mouth once daily. 90 tablet 1    azelastine (ASTELIN) 137 mcg (0.1 %) nasal spray 1 spray (137 mcg total) by Nasal route 2 (two) times daily. 30 mL 11    fexofenadine (ALLEGRA) 180 MG tablet Take 180 mg by mouth once daily.      fluticasone propionate (FLONASE) 50 mcg/actuation nasal spray 2 sprays (100 mcg total) by Each Nostril route once daily. 3 Bottle 1    gabapentin (NEURONTIN) 300 MG capsule Take 1 capsule (300 mg total) by mouth every evening for 3 days, THEN 1 capsule (300 mg total) 2 (two) times daily for 3 days, THEN 1 capsule (300 mg total) 3 (three) times daily for 3 days, THEN 2 capsules (600 mg total) 2 (two) times daily for 3 days, THEN 2 capsules (600 mg total) 3 (three) times daily for 18 days. (Patient taking differently: As Needed) 138 capsule 0    LINZESS 145 mcg Cap capsule TAKE 1 CAPSULE BY MOUTH BEFORE BREAKFAST (Patient taking differently: Take 145 mcg by mouth as needed.) 90 capsule 0    methocarbamoL (ROBAXIN) 500 MG Tab Take 1 tablet (500 mg total) by mouth 2 (two) times daily as needed (muscle spasms). 60 tablet 0    mirabegron (MYRBETRIQ) 50 mg Tb24 Take 1 tablet (50 mg total) by mouth once daily. 30 tablet 11    montelukast (SINGULAIR) 10 mg tablet Take 1 tablet (10 mg total) by mouth once daily. 90 tablet 3    nitroGLYCERIN (NITROSTAT) 0.4 MG SL tablet Place 1 tablet (0.4 mg total) under the tongue every 5 (five) minutes as needed for Chest pain. 30 tablet 0    omeprazole (PRILOSEC) 40 MG capsule TAKE 1 CAPSULE BY MOUTH TWICE  DAILY BEFORE MEAL(S) 180 capsule 0    vitamin D (VITAMIN D3) 1000 units Tab Take 1,000 Units by mouth once daily. Take 2000 units daily.       No current facility-administered medications on file prior to visit.

## 2025-03-18 NOTE — TELEPHONE ENCOUNTER
----- Message from Juanjose sent at 3/18/2025  3:44 PM CDT -----  Contact: self  .Type:  Sooner Apoointment RequestCaller is requesting a sooner appointment.  Caller declined first available appointment listed below.  Caller will not accept being placed on the waitlist and is requesting a message be sent to doctor.Name of Caller:.Reta NavasManuelahansel is the first available appointment?Symptoms:numbness in hands, feet, and armsWould the patient rather a call back or a response via [facility]? Call [facility] Call Back Number:.[phone] Information: Pt states she is needing a sooner primo than June which was the first available.

## 2025-03-24 ENCOUNTER — TELEPHONE (OUTPATIENT)
Dept: CARDIOLOGY | Facility: CLINIC | Age: 60
End: 2025-03-24
Payer: COMMERCIAL

## 2025-03-24 NOTE — TELEPHONE ENCOUNTER
Patient states someone already called her back and rescheduled the stress jacinda.    ----- Message from Med Assistant Disla sent at 3/24/2025 10:58 AM CDT -----  Contact: Reta    ----- Message -----  From: Opal Yo  Sent: 3/24/2025   8:31 AM CDT  To: [facility] Cardio Clinical Staff    TYPE: Patient Call BackWho called:PatientWhat is the request in detail:  patient call to reschedule appt patient states that she will be out of town. Please give call back to reschedule Can the clinic reply by [facility]?   Would the patient rather a call back or a response via [facility] call back number:[phone] (home)

## 2025-03-25 DIAGNOSIS — Z13.6 ENCOUNTER FOR SCREENING FOR CARDIOVASCULAR DISORDERS: ICD-10-CM

## 2025-03-25 DIAGNOSIS — I10 PRIMARY HYPERTENSION: ICD-10-CM

## 2025-03-25 DIAGNOSIS — R07.89 OTHER CHEST PAIN: Primary | ICD-10-CM

## 2025-03-26 ENCOUNTER — PATIENT MESSAGE (OUTPATIENT)
Dept: CARDIOLOGY | Facility: HOSPITAL | Age: 60
End: 2025-03-26
Payer: COMMERCIAL

## 2025-04-05 NOTE — PROGRESS NOTES
"Subjective:      Patient ID: Reta Navas is a 59 y.o. female.    Chief Complaint:  " Numbness "    HPI 59 Years old Female with PMHx of Obesity / LISETTE / HTN and others Medical issues   came for the evaluation and recommendation of " Numbness "     Started: about 30 years ago   Describes: " decreased feeling "  Timing: intermittent.   Frequency: daily.   Pain:  4 to 7/ 10 - LBP.   Location: LB / hands / Legs / feet.   Family: none with above issues.   Medications: Robaxin / NSAID's PRN.    Worsen: certain posture / at night while sleep.    Alleviated: certain posture.   Associated symptoms:   Back pain / tingling / fall / weakness   Triggers: walking / bending to the side.   Prodrome symptoms: none.   Auras: none.           Self Referral.        At firs only just hands / fingers / 4 to 6 months ago went to arms and legs / feet.        Stopped the Neurontin.     Review of Systems   Neurological:  Positive for numbness.   All other systems reviewed and are negative.    Objective:     Neurological Exam  Mental Status  Alert. Oriented to person, place, time and situation. Recent and remote memory are intact. Able to copy figure. Clock drawing is normal. Speech is normal. Language is fluent with no aphasia. Attention and concentration are normal. Fund of knowledge is appropriate for level of education. Apraxia absent.    Cranial Nerves  CN I: Sense of smell is normal.  CN II: Visual acuity is normal. Visual fields full to confrontation.  CN III, IV, VI: Extraocular movements intact bilaterally. Normal lids and orbits bilaterally. Pupils equal round and reactive to light bilaterally.  CN V: Facial sensation is normal.  CN VII: Full and symmetric facial movement.  CN VIII: Hearing is normal.  CN IX, X: Palate elevates symmetrically. Normal gag reflex.  CN XI: Shoulder shrug strength is normal.  CN XII: Tongue midline without atrophy or fasciculations.    Motor  Normal muscle bulk throughout. No fasciculations present. " Left message regarding MRI pre procedure instructions   Normal muscle tone. No abnormal involuntary movements. Strength is 5/5 throughout all four extremities.    Sensory  Sensation is intact to light touch, pinprick, vibration and proprioception in all four extremities.    Reflexes                                            Right                      Left  Brachioradialis                    2+                         2+  Biceps                                 2+                         2+  Triceps                                2+                         2+  Finger flex                           2+                         2+  Hamstring                            2+                         2+  Patellar                                2+                         2+  Achilles                                2+                         2+  Right Plantar: downgoing  Left Plantar: downgoing  Jaw jerk absent.  Right pathological reflexes: Sunil's absent. Ankle clonus absent.  Left pathological reflexes: Sunil's absent. Ankle clonus absent.  Glabellar tap absent. Snout absent. Right palmomental absent. Left palmomental absent. Right palmar grasp absent. Left palmar grasp absent.    Coordination    Finger-to-nose, rapid alternating movements and heel-to-shin normal bilaterally without dysmetria.    Gait  Normal casual, toe, heel and tandem gait.    Physical Exam  Vitals and nursing note reviewed.   Constitutional:       Appearance: Normal appearance. She is normal weight.   HENT:      Head: Normocephalic and atraumatic.      Right Ear: Tympanic membrane normal.      Left Ear: Tympanic membrane normal.      Nose: Nose normal.      Mouth/Throat:      Mouth: Mucous membranes are moist.      Pharynx: Oropharynx is clear.   Eyes:      General: Lids are normal.      Extraocular Movements: Extraocular movements intact.      Conjunctiva/sclera: Conjunctivae normal.      Pupils: Pupils are equal, round, and reactive to light.   Cardiovascular:      Rate and Rhythm: Normal rate and  "regular rhythm.      Pulses: Normal pulses.      Heart sounds: Normal heart sounds.   Pulmonary:      Effort: Pulmonary effort is normal.      Breath sounds: Normal breath sounds.   Abdominal:      General: Abdomen is flat. Bowel sounds are normal.      Palpations: Abdomen is soft.   Genitourinary:     Comments: Deferred.   Musculoskeletal:         General: Normal range of motion.        Arms:       Cervical back: Normal range of motion and neck supple.      Comments: Positive Tinel's sings.    Skin:     General: Skin is warm and dry.      Capillary Refill: Capillary refill takes less than 2 seconds.   Neurological:      Mental Status: She is alert. Mental status is at baseline.      Motor: Motor strength is normal.     Coordination: Coordination is intact.      Deep Tendon Reflexes:      Reflex Scores:       Tricep reflexes are 2+ on the right side and 2+ on the left side.       Bicep reflexes are 2+ on the right side and 2+ on the left side.       Brachioradialis reflexes are 2+ on the right side and 2+ on the left side.       Patellar reflexes are 2+ on the right side and 2+ on the left side.       Achilles reflexes are 2+ on the right side and 2+ on the left side.  Psychiatric:         Mood and Affect: Mood normal.         Speech: Speech normal.         Behavior: Behavior normal.         Thought Content: Thought content normal.         Judgment: Judgment normal.        Assessment:   59 Years old C. Female with PMHX as above came of the evaluation of " Numbness ".   - Sensory Neuropathy.  - CTS.   Plan:   Patient Neurological Assessment is non focal.     NCS/ EMG.    Out Patient PT program in the past.    Pain Management following.     Labs: CBC / CMP / Vit B 12 / TSH / Hgb A 1 C / Lipids panel - 2024 - non significant abnormalities.     Personally reviewed MRI Lumbar spine - 2024 - no acute changes / multiple DGD's non significant stenosis.     Please do not hesitate to contact me with any updates, questions or " concerns.    RTC: 1 month.     I spent a total of 45 minutes on the day of the visit.This includes face to face time and non-face to face time preparing to see the patient (eg, review of tests),   obtaining and/or reviewing separately obtained history, documenting clinical information in the electronic or other health record,   independently interpreting results and communicating results to the patient/family/caregiver, or care coordinator.    Kenneth Alegre MD.  General Neurologist.

## 2025-04-10 ENCOUNTER — OFFICE VISIT (OUTPATIENT)
Dept: NEUROLOGY | Facility: CLINIC | Age: 60
End: 2025-04-10
Payer: COMMERCIAL

## 2025-04-10 VITALS
HEART RATE: 109 BPM | SYSTOLIC BLOOD PRESSURE: 141 MMHG | DIASTOLIC BLOOD PRESSURE: 86 MMHG | WEIGHT: 157.19 LBS | BODY MASS INDEX: 30.7 KG/M2

## 2025-04-10 DIAGNOSIS — G56.00 CARPAL TUNNEL SYNDROME, UNSPECIFIED LATERALITY: Primary | ICD-10-CM

## 2025-04-10 DIAGNOSIS — G62.9 POLYNEUROPATHY: ICD-10-CM

## 2025-04-10 PROCEDURE — 3077F SYST BP >= 140 MM HG: CPT | Mod: CPTII,S$GLB,, | Performed by: PSYCHIATRY & NEUROLOGY

## 2025-04-10 PROCEDURE — 3008F BODY MASS INDEX DOCD: CPT | Mod: CPTII,S$GLB,, | Performed by: PSYCHIATRY & NEUROLOGY

## 2025-04-10 PROCEDURE — 1160F RVW MEDS BY RX/DR IN RCRD: CPT | Mod: CPTII,S$GLB,, | Performed by: PSYCHIATRY & NEUROLOGY

## 2025-04-10 PROCEDURE — 99204 OFFICE O/P NEW MOD 45 MIN: CPT | Mod: S$GLB,,, | Performed by: PSYCHIATRY & NEUROLOGY

## 2025-04-10 PROCEDURE — 3079F DIAST BP 80-89 MM HG: CPT | Mod: CPTII,S$GLB,, | Performed by: PSYCHIATRY & NEUROLOGY

## 2025-04-10 PROCEDURE — 99999 PR PBB SHADOW E&M-EST. PATIENT-LVL IV: CPT | Mod: PBBFAC,,, | Performed by: PSYCHIATRY & NEUROLOGY

## 2025-04-10 PROCEDURE — 1159F MED LIST DOCD IN RCRD: CPT | Mod: CPTII,S$GLB,, | Performed by: PSYCHIATRY & NEUROLOGY

## 2025-04-15 NOTE — PRE-PROCEDURE INSTRUCTIONS
Spoke with patient regarding procedure scheduled on 4.23     Arrival time 1215     Has patient been sick with fever or on antibiotics within the last 7 days? No     Does the patient have any open wounds, sores or rashes? No     Does the patient have any recent fractures? no     Has patient received a vaccination within the last 7 days? No     Received the COVID vaccination? yes     Has the patient stopped all medications as directed? na     Does patient have a pacemaker, defibrillator, or implantable stimulator? No     Does the patient have a ride to and from procedure and someone reliable to remain with patient?        Is the patient diabetic? no      Does the patient have sleep apnea? Or use O2 at home? jonna     Is the patient receiving sedation? Yes      Is the patient instructed to remain NPO beginning at midnight the night before their procedure? yes     Procedure location confirmed with patient? Yes     Covid- Denies signs/symptoms. Instructed to notify PAT/MD if any changes.

## 2025-04-17 ENCOUNTER — PROCEDURE VISIT (OUTPATIENT)
Dept: NEUROLOGY | Facility: CLINIC | Age: 60
End: 2025-04-17
Payer: COMMERCIAL

## 2025-04-17 DIAGNOSIS — G56.00 CARPAL TUNNEL SYNDROME, UNSPECIFIED LATERALITY: ICD-10-CM

## 2025-04-17 DIAGNOSIS — G62.9 POLYNEUROPATHY: ICD-10-CM

## 2025-04-17 PROCEDURE — 95913 NRV CNDJ TEST 13/> STUDIES: CPT | Mod: S$GLB,,, | Performed by: PSYCHIATRY & NEUROLOGY

## 2025-04-21 ENCOUNTER — OFFICE VISIT (OUTPATIENT)
Dept: CARDIOLOGY | Facility: CLINIC | Age: 60
End: 2025-04-21
Payer: COMMERCIAL

## 2025-04-21 ENCOUNTER — HOSPITAL ENCOUNTER (OUTPATIENT)
Dept: CARDIOLOGY | Facility: HOSPITAL | Age: 60
Discharge: HOME | End: 2025-04-21
Payer: COMMERCIAL

## 2025-04-21 VITALS
DIASTOLIC BLOOD PRESSURE: 80 MMHG | BODY MASS INDEX: 30.48 KG/M2 | OXYGEN SATURATION: 96 % | WEIGHT: 156.06 LBS | SYSTOLIC BLOOD PRESSURE: 130 MMHG | HEART RATE: 90 BPM

## 2025-04-21 DIAGNOSIS — Z13.6 ENCOUNTER FOR SCREENING FOR CARDIOVASCULAR DISORDERS: ICD-10-CM

## 2025-04-21 DIAGNOSIS — K21.9 GASTROESOPHAGEAL REFLUX DISEASE, UNSPECIFIED WHETHER ESOPHAGITIS PRESENT: ICD-10-CM

## 2025-04-21 DIAGNOSIS — E78.49 OTHER HYPERLIPIDEMIA: ICD-10-CM

## 2025-04-21 DIAGNOSIS — G47.33 OSA (OBSTRUCTIVE SLEEP APNEA): ICD-10-CM

## 2025-04-21 DIAGNOSIS — I10 PRIMARY HYPERTENSION: ICD-10-CM

## 2025-04-21 DIAGNOSIS — I10 HYPERTENSION, UNSPECIFIED TYPE: ICD-10-CM

## 2025-04-21 DIAGNOSIS — Z87.891 FORMER SMOKER: ICD-10-CM

## 2025-04-21 DIAGNOSIS — R00.2 PALPITATIONS: ICD-10-CM

## 2025-04-21 DIAGNOSIS — R07.89 OTHER CHEST PAIN: ICD-10-CM

## 2025-04-21 DIAGNOSIS — R09.89 CAROTID BRUIT, UNSPECIFIED LATERALITY: ICD-10-CM

## 2025-04-21 DIAGNOSIS — I10 PRIMARY HYPERTENSION: Primary | ICD-10-CM

## 2025-04-21 PROBLEM — G56.00 CARPAL TUNNEL SYNDROME: Status: ACTIVE | Noted: 2025-04-21

## 2025-04-21 PROBLEM — G62.9 POLYNEUROPATHY: Status: ACTIVE | Noted: 2025-04-21

## 2025-04-21 LAB
CV STRESS BASE HR: 88 BPM
DIASTOLIC BLOOD PRESSURE: 89 MMHG
OHS CV CPX 85 PERCENT MAX PREDICTED HEART RATE MALE: 136
OHS CV CPX ESTIMATED METS: 10
OHS CV CPX MAX PREDICTED HEART RATE: 160
OHS CV CPX PATIENT IS FEMALE: 1
OHS CV CPX PATIENT IS MALE: 0
OHS CV CPX PEAK DIASTOLIC BLOOD PRESSURE: 84 MMHG
OHS CV CPX PEAK HEAR RATE: 162 BPM
OHS CV CPX PEAK RATE PRESSURE PRODUCT: NORMAL
OHS CV CPX PEAK SYSTOLIC BLOOD PRESSURE: 184 MMHG
OHS CV CPX PERCENT MAX PREDICTED HEART RATE ACHIEVED: 106
OHS CV CPX RATE PRESSURE PRODUCT PRESENTING: NORMAL
STRESS ECHO POST EXERCISE DUR MIN: 7 MINUTES
STRESS ECHO POST EXERCISE DUR SEC: 4 SECONDS
SYSTOLIC BLOOD PRESSURE: 125 MMHG

## 2025-04-21 PROCEDURE — 99999 PR PBB SHADOW E&M-EST. PATIENT-LVL IV: CPT | Mod: PBBFAC,,, | Performed by: INTERNAL MEDICINE

## 2025-04-21 PROCEDURE — 99214 OFFICE O/P EST MOD 30 MIN: CPT | Mod: S$GLB,,, | Performed by: INTERNAL MEDICINE

## 2025-04-21 PROCEDURE — 3075F SYST BP GE 130 - 139MM HG: CPT | Mod: CPTII,S$GLB,, | Performed by: INTERNAL MEDICINE

## 2025-04-21 PROCEDURE — 93017 CV STRESS TEST TRACING ONLY: CPT | Mod: PO

## 2025-04-21 PROCEDURE — G2211 COMPLEX E/M VISIT ADD ON: HCPCS | Mod: S$GLB,,, | Performed by: INTERNAL MEDICINE

## 2025-04-21 PROCEDURE — 1160F RVW MEDS BY RX/DR IN RCRD: CPT | Mod: CPTII,S$GLB,, | Performed by: INTERNAL MEDICINE

## 2025-04-21 PROCEDURE — 93018 CV STRESS TEST I&R ONLY: CPT | Mod: ,,, | Performed by: INTERNAL MEDICINE

## 2025-04-21 PROCEDURE — 3008F BODY MASS INDEX DOCD: CPT | Mod: CPTII,S$GLB,, | Performed by: INTERNAL MEDICINE

## 2025-04-21 PROCEDURE — 1159F MED LIST DOCD IN RCRD: CPT | Mod: CPTII,S$GLB,, | Performed by: INTERNAL MEDICINE

## 2025-04-21 PROCEDURE — 93016 CV STRESS TEST SUPVJ ONLY: CPT | Mod: ,,, | Performed by: INTERNAL MEDICINE

## 2025-04-21 PROCEDURE — 3079F DIAST BP 80-89 MM HG: CPT | Mod: CPTII,S$GLB,, | Performed by: INTERNAL MEDICINE

## 2025-04-21 RX ORDER — ASPIRIN 81 MG/1
81 TABLET ORAL DAILY
Qty: 90 TABLET | Refills: 1 | Status: SHIPPED | OUTPATIENT
Start: 2025-04-21 | End: 2026-04-21

## 2025-04-21 NOTE — PROCEDURES
[facility]   Andrey Abad  Department of Neurology  [address] [place] [address] GEORGINA Moore  [address]  Phone [phone]     Fax [phone]        Full Name: Reta Navas Gender: Female  Patient ID: [id number] YOB: 1965      Visit Date: 4/17/2025 11:01 AM  Age: 59 Years  Examining Physician: Angel Pierce M.D.  Referring Physician: Kenneth Smith MD  Technician: BRANDIE Alcala  History: PN/CTS workups. C/O: Numbness in all 4 extremities.  PMHX: LISETTE, obesity, HTN, GERD, HLD, psoriasis.      SUMMARY    Nerve conduction studies were performed in the left upper and lower extremity with right side comparison.       The left median motor study recording the abductor pollicis brevis showed a normal amplitude, normal distal latency, and normal conduction velocity. The left ulnar motor study recording the abductor digiti minimi showed a normal amplitude, normal distal latency, and normal conduction velocity. No conduction block or focal slowing was present across the elbow.       The right median motor study recording the abductor pollicis brevis showed a normal amplitude, normal distal latency, and normal conduction velocity.     The left median sensory response recording digit two showed a normal amplitude, latency, and conduction velocity. The left ulnar sensory response recording digit five showed a normal amplitude, latency, and conduction velocity. The left radial sensory response recording over the extensor snuff box showed a normal amplitude, latency, and conduction velocity.     As routine median motor and sensory studies have a false negative rate of 25%, additional internal comparison studies were done to assess for possible median neuropathy at the wrist. These internal comparison studies (median vs. ulnar palmar mixed; median vs. ulnar sensory recording digit four; median vs. ulnar motor studies to the second lumbrical / interosseous; median vs. radial  sensory studies recording digit one; median segmental sensory studies comparing the wrist-palm and palm-digit velocities) increase the electrodiagnostic sensitivity rate to 95%. However, due to statistical issues with multiple tests, it is required that at least two studies are abnormal to reduce the false positive rate to acceptable levels. Left median-ulnar mixed palmar latencies showed no significant difference.      The right median sensory response recording digit two showed a normal amplitude, latency, and conduction velocity. The right ulnar sensory response recording digit five showed a normal amplitude, latency, and conduction velocity.   As routine median motor and sensory studies have a false negative rate of 25%, additional internal comparison studies were done to assess for possible median neuropathy at the wrist. These internal comparison studies (median vs. ulnar palmar mixed; median vs. ulnar sensory recording digit four; median vs. ulnar motor studies to the second lumbrical / interosseous; median vs. radial sensory studies recording digit one; median segmental sensory studies comparing the wrist-palm and palm-digit velocities) increase the electrodiagnostic sensitivity rate to 95%. However, due to statistical issues with multiple tests, it is required that at least two studies are abnormal to reduce the false positive rate to acceptable levels. Right median-ulnar mixed palmar latencies showed no significant difference.    The left peroneal motor study recording the extensor digitorum brevis showed a normal amplitude, normal distal latency, and normal conduction velocity. No conduction block or focal slowing was present across the fibular neck. The left tibial motor study recording the abductor hallucis brevis showed a normal amplitude, normal distal latency, and normal conduction velocity.     Left sural sensory response showed a normal amplitude and conduction velocity. Left superficial peroneal  sensory response showed a normal amplitude and conduction velocity.         IMPRESSION       This is a normal study.    There is no electrophysiologic evidence of peripheral neuropathy.     In addition, there was no electrophysiologic evidence of median mononeuropathy across the wrist (carpal tunnel syndrome).     ---------------------------------             Angel Pierce M.D., F.A.A.N.      Diplomate, American Board of Psychiatry and Neurology  Diplomate, American Board of Clinical Neurophysiology  Fellow, American Academy of Neurology             SENSORY NCS      Nerve / Sites Rec. Site Peak PP Amp Dist Newton     ms µV cm m/s   L Superficial peroneal      Lat Leg Ankle 3.46 15.1 10 40.0      Ref.  4.60 3.0  40.0   L Sural - Lat Mall      Calf Lat Mall 3.77 6.7 14 44.8      Ref.  4.60 3.0  40.0   L Median - Dig II      Wrist II 3.38 16.9 13 48.7      Ref.  3.80 10.0  48.0   L Median - Ulnar - Palmar      Median Wrist 2.42 42.3 8 42.2      Ulnar Wrist 1.94 22.3 8 167.0   L Ulnar - Dig V      Wrist Dig V 2.60 27.4 11 56.2      Ref.  3.20 5.0  48.0   L Radial - Snuff box      Forearm Snuff box 2.15 79.7 10 62.3      Ref.  2.80 10.0     R Median - Dig II      Wrist II 3.75 13.6 13 43.0      Ref.  3.80 10.0  48.0   R Median - Ulnar - Palmar      Median Wrist 2.85 23.8 8 38.8      Ulnar Wrist 1.90 15.3 8 128.0   R Ulnar - Dig V      Wrist Dig V 2.58 14.0 11 58.7      Ref.  3.20 5.0  48.0       MOTOR NCS      Nerve / Sites Rec. Site Lat Amp Dist Newton     ms mV cm m/s   L Peroneal - EDB      Ankle EDB 3.40 6.0 8       Ref.  6.00 2.5        FibHead EDB 9.10 5.8 29 50.8      Ref.     40.0      Knee EDB 10.77 5.8 10 60.0   L Tibial - AH      Ankle AH 3.73 10.2 8       Ref.  6.00 4.0        Knee AH 10.35 9.1 31 46.8      Ref.     40.0   L Median - APB      Wrist APB 3.29 6.4 8       Ref.  4.00 5.0        Elbow APB 6.75 5.9 18 52.0      Ref.     45.0   L Ulnar - ADM      Wrist ADM 2.40 9.8 8       Ref.  3.10 7.0        B. Elbow ADM  4.92 9.0 16 63.5      Ref.     50.0      A. Elbow ADM 6.35 8.6 10 69.6   R Median - APB      Wrist APB 3.83 7.1 8       Ref.  4.00 5.0        Elbow APB 6.65 6.3 17 60.4      Ref.     45.0                       ---------------------------------             Angel Pierce M.D., F.A.A.N.      Diplomate, American Board of Psychiatry and Neurology  Diplomate, American Board of Clinical Neurophysiology  Fellow, American Academy of Neurology

## 2025-04-21 NOTE — PROGRESS NOTES
Subjective:   Patient ID:  Reta Navas is a 59 y.o. female who presents for cardiac consult of No chief complaint on file.        HPI  The patient came in today for cardiac consult of No chief complaint on file.      Reta Navas is a 59 y.o. female pt with HTN, HLD - elevated Tgs, obesity, GERD, h/o tobacco abuse presents for follow up CV eval.         12/2/24  She has upcoming Cscope.   BP and HR stable. BMI 30 - 155 lbs   She still has atypical CP - wants to discuss tx for GERD    4/21/25  BP and HR stable. BMI 30 - 156 lbs   She is s/p EMG recently - negative for neuropathy. Has numbness still and early CTS.   Pt was having chest pain for a week. She had stress test today which was negative .       Procedure Note    PHYSICIAN INTERPRETATION AND COMMENTS: Findings are consistent with moderate obstructive sleep apnea (LISETTE).       Calcium score:     0-10: Very little coronary heart disease risk     11 through 100: Low to moderate coronary heart disease risk     101 through 400: Moderate to high coronary heart disease risk     Greater than 401: High coronary heart disease risk.     SCORE SUMMARY     Your total calcium score is 33     Incidental findings: None     Impression:     Your total calcium score is 33.  Moderate to high coronary heart disease risk.        Electronically signed by:Francois Lott  Date:                                            05/01/2023  Time:                                           11:17    FH - mother - had MI and CVA    Bardy 7/2022  Conclusion    Heart rates varied between 51 and 136 BPM with an average of 81 BPM.  Total Analysis Time: 15 days and 0 hours  Predominant rhythm: NSR  PAC 0.01 %  Ectopic Atrial Rhythm (EAR)    Results for orders placed during the hospital encounter of 08/15/22    Echo    Interpretation Summary  · Concentric remodeling and normal systolic function.  · The estimated ejection fraction is 60%.  · Normal left ventricular diastolic function.  · Normal  right ventricular size with normal right ventricular systolic function.      Results for orders placed during the hospital encounter of 08/15/22    Nuclear Stress - Cardiology Interpreted    Interpretation Summary    Normal myocardial perfusion scan. There is no evidence of myocardial ischemia or infarction.    The gated perfusion images showed an ejection fraction of 74% at rest. The gated perfusion images showed an ejection fraction of 71% post stress.    There is normal wall motion at rest and post stress.    LV cavity size is normal at rest and normal at stress.    The EKG portion of this study is negative for ischemia.    The patient reported no chest pain during the stress test.    There were no arrhythmias during stress.    The exercise capacity was above average.      Post Exercise Imaging:     Immediate post exercise images demonstrate left ventricular function augmenting. Right ventricular function augments. Left ventricular end systolic volume decreases.   The left ventricle is globally hyperdynamic and no exercise induced wall motion abnormalities were identified.    CONCLUSIONS     1 - Normal left ventricular systolic function (EF 60-65%).     2 - Normal left ventricular diastolic function.     3 - Normal right ventricular systolic function .     4 - The estimated PA systolic pressure is 20 mmHg.     5 - Concentric hypertrophy.     6 - No wall motion abnormalities.     7 - Mild tricuspid regurgitation.     No evidence of stress induced myocardial ischemia.     This document has been electronically    SIGNED BY: Byron Puente MD On: 12/31/2019 10:54      Past Medical History:   Diagnosis Date    Abnormal Pap smear     Abnormal Pap smear of cervix     Chronic rhinitis     Depression     GERD (gastroesophageal reflux disease)     Hyperlipidemia     Hypertension     LISETTE (obstructive sleep apnea) 2/8/2024    Overactive bladder     Plantar fasciitis, bilateral     Psoriasis        Past Surgical History:    Procedure Laterality Date    CERVICAL BIOPSY  W/ LOOP ELECTRODE EXCISION      HYSTERECTOMY      supracervical hyst/LSO for fibroids    OOPHORECTOMY      LSO    ROBOT-ASSISTED CHOLECYSTECTOMY USING DA WILVER XI N/A 10/18/2018    Procedure: XI ROBOTIC CHOLECYSTECTOMY;  Surgeon: Robel Ferrer MD;  Location: [facility];  Service: General;  Laterality: N/A;    TUBAL LIGATION         Social History     Tobacco Use    Smoking status: Former     Current packs/day: 0.00     Average packs/day: 1 pack/day for 39.0 years (39.0 ttl pk-yrs)     Types: Vaping with nicotine, Cigarettes     Start date:      Quit date:      Years since quittin.3     Passive exposure: Past    Smokeless tobacco: Never   Substance Use Topics    Alcohol use: No     Alcohol/week: 0.0 standard drinks of alcohol    Drug use: Never       Family History   Problem Relation Name Age of Onset    Ovarian cancer Mother      Stroke Mother      No Known Problems Father      Leukemia Brother      Breast cancer Paternal Grandmother          mastectomy    Heart attack Paternal Grandmother  82        COD    Colon cancer Paternal Grandfather      Ovarian cancer Maternal Aunt      Stroke Maternal Aunt      Colon cancer Paternal Uncle         Patient's Medications   New Prescriptions    No medications on file   Previous Medications    ALBUTEROL (PROVENTIL/VENTOLIN HFA) 90 MCG/ACTUATION INHALER    INHALE 2 PUFFS INTO THE LUNGS EVERY 6 HOURS AS NEEDED FOR SHORTNESS OF BREATH, RESCUE    AMLODIPINE (NORVASC) 10 MG TABLET    Take 1 tablet (10 mg total) by mouth once daily.    ASPIRIN (ECOTRIN) 81 MG EC TABLET    Take 1 tablet (81 mg total) by mouth once daily.    AZELASTINE (ASTELIN) 137 MCG (0.1 %) NASAL SPRAY    1 spray (137 mcg total) by Nasal route 2 (two) times daily.    FEXOFENADINE (ALLEGRA) 180 MG TABLET    Take 180 mg by mouth once daily.    FLUTICASONE PROPIONATE (FLONASE) 50 MCG/ACTUATION NASAL SPRAY    2 sprays (100 mcg total) by Each Nostril route once  daily.    LINZESS 145 MCG CAP CAPSULE    TAKE 1 CAPSULE BY MOUTH BEFORE BREAKFAST    METHOCARBAMOL (ROBAXIN) 500 MG TAB    Take 1 tablet (500 mg total) by mouth 2 (two) times daily as needed (muscle spasms).    MIRABEGRON (MYRBETRIQ) 50 MG TB24    Take 1 tablet (50 mg total) by mouth once daily.    MONTELUKAST (SINGULAIR) 10 MG TABLET    Take 1 tablet (10 mg total) by mouth once daily.    NITROGLYCERIN (NITROSTAT) 0.4 MG SL TABLET    Place 1 tablet (0.4 mg total) under the tongue every 5 (five) minutes as needed for Chest pain.    OMEPRAZOLE (PRILOSEC) 40 MG CAPSULE    TAKE 1 CAPSULE BY MOUTH TWICE DAILY BEFORE MEAL(S)    VITAMIN D (VITAMIN D3) 1000 UNITS TAB    Take 1,000 Units by mouth once daily. Take 2000 units daily.   Modified Medications    No medications on file   Discontinued Medications    No medications on file       Review of Systems   Constitutional: Negative.    HENT: Negative.     Eyes: Negative.    Respiratory: Negative.     Cardiovascular:  Positive for chest pain and palpitations.   Gastrointestinal: Negative.    Genitourinary: Negative.    Musculoskeletal:  Positive for back pain and joint pain.   Skin: Negative.    Neurological:  Positive for tingling.   Endo/Heme/Allergies: Negative.    Psychiatric/Behavioral: Negative.     All 12 systems otherwise negative.      Wt Readings from Last 3 Encounters:   04/10/25 71.3 kg (157 lb 3 oz)   03/18/25 71.7 kg (158 lb 1.1 oz)   03/18/25 71.1 kg (156 lb 12 oz)     Temp Readings from Last 3 Encounters:   11/13/24 (!) 95.7 °F (35.4 °C) (Tympanic)   04/30/24 98.8 °F (37.1 °C)   01/02/24 98.4 °F (36.9 °C)     BP Readings from Last 3 Encounters:   04/10/25 (!) 141/86   03/18/25 120/84   03/06/25 125/86     Pulse Readings from Last 3 Encounters:   04/10/25 109   03/18/25 94   03/06/25 93       There were no vitals taken for this visit.    Objective:   Physical Exam  Vitals and nursing note reviewed.   Constitutional:       General: She is not in acute distress.      Appearance: Normal appearance. She is well-developed. She is not diaphoretic.   HENT:      Head: Normocephalic and atraumatic.      Nose: Nose normal.   Eyes:      General: No scleral icterus.     Conjunctiva/sclera: Conjunctivae normal.   Neck:      Thyroid: No thyromegaly.      Vascular: No JVD.   Cardiovascular:      Rate and Rhythm: Normal rate and regular rhythm.      Heart sounds: S1 normal and S2 normal. Murmur heard.      No friction rub. No gallop. No S3 or S4 sounds.   Pulmonary:      Effort: Pulmonary effort is normal. No respiratory distress.      Breath sounds: Normal breath sounds. No stridor. No wheezing or rales.   Chest:      Chest wall: No tenderness.   Abdominal:      General: Bowel sounds are normal. There is no distension.      Palpations: Abdomen is soft. There is no mass.      Tenderness: There is no abdominal tenderness. There is no rebound.   Genitourinary:     Comments: Deferred  Musculoskeletal:         General: No tenderness or deformity. Normal range of motion.      Cervical back: Normal range of motion and neck supple.   Lymphadenopathy:      Cervical: No cervical adenopathy.   Skin:     General: Skin is warm and dry.      Coloration: Skin is not pale.      Findings: No erythema or rash.   Neurological:      Mental Status: She is alert and oriented to person, place, and time.      Motor: No abnormal muscle tone.      Coordination: Coordination normal.   Psychiatric:         Behavior: Behavior normal.         Thought Content: Thought content normal.         Judgment: Judgment normal.         Lab Results   Component Value Date     11/14/2024    K 3.8 11/14/2024     11/14/2024    CO2 25 11/14/2024    BUN 11 11/14/2024    CREATININE 0.8 11/14/2024     11/14/2024    HGBA1C 5.4 11/14/2024    AST 22 11/14/2024    ALT 26 11/14/2024    ALBUMIN 3.8 11/14/2024    PROT 7.9 11/14/2024    BILITOT 0.3 11/14/2024    WBC 9.03 11/14/2024    HGB 13.4 11/14/2024    HCT 42.3 11/14/2024     MCV 94 11/14/2024     11/14/2024    TSH 3.692 11/14/2024    CHOL 165 04/23/2024    HDL 45 04/23/2024    LDLCALC 94.4 04/23/2024    TRIG 128 04/23/2024    BNP 14 06/07/2019     Assessment:      1. Primary hypertension    2. Other chest pain    3. LISETTE (obstructive sleep apnea)    4. Palpitations    5. Gastroesophageal reflux disease, unspecified whether esophagitis present    6. BMI 30.0-30.9,adult    7. Former smoker    8. Other hyperlipidemia    9. Hypertension, unspecified type          Plan:   1. Chest pain - had ER eval r/o ACS in past; with palpitations  - coronary calcif on Chest CT  - prior Nuclear stress test 8/2022 neg for ischemia. 2D ECHO 8/2022 with normal bi V function and valves  - Bardy neg for arrythmias.   - cont aspirin 81 mg  - give PRN NTG   - CA score - 33 - low risk.  In 2023  - March 2025 - chest pain for a week. She had stress test today which was negative -   -order ECHO and carotid u/s     2. HTN  - titrate meds    3. GERD  - cont PPI  - refer to GI - low CV risk for EGD     4. Overweight/Obesity - BMI 31 - 160 lbs.  BMI 30  -154 lbs  --> BMI 29 --> 30 - 156 lbs  - cont low stefanie diet     5. HLD - improving Tgs  - recently started statin - but could not tolerate in past   - needs to take fish oils    6. Former smoker  - has quit     7. Anxiety  - rec meditation     8. LISETTE  - has CPAP now       Visit today included increased complexity associated with the care of the episodic problem chest pain addressed and managing the longitudinal care of the patient due to the serious and/or complex managed problem(s) .      Thank you for allowing me to participate in this patient's care. Please do not hesitate to contact me with any questions or concerns. Consult note has been forwarded to the referral physician.

## 2025-04-22 ENCOUNTER — RESULTS FOLLOW-UP (OUTPATIENT)
Dept: CARDIOLOGY | Facility: CLINIC | Age: 60
End: 2025-04-22

## 2025-04-23 ENCOUNTER — HOSPITAL ENCOUNTER (OUTPATIENT)
Facility: HOSPITAL | Age: 60
Discharge: HOME | End: 2025-04-23
Attending: ANESTHESIOLOGY | Admitting: ANESTHESIOLOGY
Payer: COMMERCIAL

## 2025-04-23 VITALS
WEIGHT: 155 LBS | HEART RATE: 91 BPM | DIASTOLIC BLOOD PRESSURE: 60 MMHG | TEMPERATURE: 99 F | SYSTOLIC BLOOD PRESSURE: 109 MMHG | OXYGEN SATURATION: 95 % | HEIGHT: 60 IN | BODY MASS INDEX: 30.43 KG/M2 | RESPIRATION RATE: 18 BRPM

## 2025-04-23 DIAGNOSIS — M47.816 LUMBAR SPONDYLOSIS: ICD-10-CM

## 2025-04-23 PROCEDURE — 64493 INJ PARAVERT F JNT L/S 1 LEV: CPT | Mod: 50,,, | Performed by: ANESTHESIOLOGY

## 2025-04-23 PROCEDURE — 64494 INJ PARAVERT F JNT L/S 2 LEV: CPT | Mod: 50 | Performed by: ANESTHESIOLOGY

## 2025-04-23 PROCEDURE — 63600175 PHARM REV CODE 636 W HCPCS: Performed by: ANESTHESIOLOGY

## 2025-04-23 PROCEDURE — 64494 INJ PARAVERT F JNT L/S 2 LEV: CPT | Mod: 50,,, | Performed by: ANESTHESIOLOGY

## 2025-04-23 PROCEDURE — 64493 INJ PARAVERT F JNT L/S 1 LEV: CPT | Mod: 50 | Performed by: ANESTHESIOLOGY

## 2025-04-23 RX ORDER — BUPIVACAINE HYDROCHLORIDE 5 MG/ML
INJECTION, SOLUTION EPIDURAL; INTRACAUDAL; PERINEURAL
Status: DISCONTINUED | OUTPATIENT
Start: 2025-04-23 | End: 2025-04-23 | Stop reason: HOSPADM

## 2025-04-23 RX ORDER — FENTANYL CITRATE 50 UG/ML
INJECTION, SOLUTION INTRAMUSCULAR; INTRAVENOUS
Status: DISCONTINUED | OUTPATIENT
Start: 2025-04-23 | End: 2025-04-23 | Stop reason: HOSPADM

## 2025-04-23 RX ORDER — MIDAZOLAM HYDROCHLORIDE 1 MG/ML
INJECTION, SOLUTION INTRAMUSCULAR; INTRAVENOUS
Status: DISCONTINUED | OUTPATIENT
Start: 2025-04-23 | End: 2025-04-23 | Stop reason: HOSPADM

## 2025-04-23 NOTE — DISCHARGE SUMMARY
Discharge Note  Short Stay      SUMMARY     Admit Date: 4/23/2025    Attending Physician: Franco Santillan MD        Discharge Physician: Franco Santillan MD        Discharge Date: 4/23/2025 1:05 PM    Procedure(s) (LRB):  Bilateral L3-5 MBB #1 with local (Bilateral)    Final Diagnosis: Lumbar spondylosis [M47.816]    Disposition: Home or self care    Patient Instructions:   Current Discharge Medication List        CONTINUE these medications which have NOT CHANGED    Details   amLODIPine (NORVASC) 10 MG tablet Take 1 tablet (10 mg total) by mouth once daily.  Qty: 90 tablet, Refills: 3    Comments: .  Associated Diagnoses: Hypertension, unspecified type      aspirin (ECOTRIN) 81 MG EC tablet Take 1 tablet (81 mg total) by mouth once daily.  Qty: 90 tablet, Refills: 1      fexofenadine (ALLEGRA) 180 MG tablet Take 180 mg by mouth once daily.      methocarbamoL (ROBAXIN) 500 MG Tab Take 1 tablet (500 mg total) by mouth 2 (two) times daily as needed (muscle spasms).  Qty: 60 tablet, Refills: 0      mirabegron (MYRBETRIQ) 50 mg Tb24 Take 1 tablet (50 mg total) by mouth once daily.  Qty: 30 tablet, Refills: 11    Associated Diagnoses: OAB (overactive bladder)      montelukast (SINGULAIR) 10 mg tablet Take 1 tablet (10 mg total) by mouth once daily.  Qty: 90 tablet, Refills: 3    Associated Diagnoses: Chronic rhinitis; Post-nasal drip      omeprazole (PRILOSEC) 40 MG capsule TAKE 1 CAPSULE BY MOUTH TWICE DAILY BEFORE MEAL(S)  Qty: 180 capsule, Refills: 0      vitamin D (VITAMIN D3) 1000 units Tab Take 1,000 Units by mouth once daily. Take 2000 units daily.      albuterol (PROVENTIL/VENTOLIN HFA) 90 mcg/actuation inhaler INHALE 2 PUFFS INTO THE LUNGS EVERY 6 HOURS AS NEEDED FOR SHORTNESS OF BREATH, RESCUE  Qty: 54 g, Refills: 3    Associated Diagnoses: History of smoking      azelastine (ASTELIN) 137 mcg (0.1 %) nasal spray 1 spray (137 mcg total) by Nasal route 2 (two) times daily.  Qty: 30 mL, Refills: 11    Associated  Diagnoses: Chronic rhinitis; Post-nasal drip      fluticasone propionate (FLONASE) 50 mcg/actuation nasal spray 2 sprays (100 mcg total) by Each Nostril route once daily.  Qty: 3 Bottle, Refills: 1    Associated Diagnoses: Chronic allergic rhinitis      LINZESS 145 mcg Cap capsule TAKE 1 CAPSULE BY MOUTH BEFORE BREAKFAST  Qty: 90 capsule, Refills: 0      nitroGLYCERIN (NITROSTAT) 0.4 MG SL tablet Place 1 tablet (0.4 mg total) under the tongue every 5 (five) minutes as needed for Chest pain.  Qty: 30 tablet, Refills: 0    Comments: All future refill requests to go to Dr. Puente                 Discharge Diagnosis: Lumbar spondylosis [M47.816]  Condition on Discharge: Stable with no complications to procedure   Diet on Discharge: Same as before.  Activity: as per instruction sheet.  Discharge to: Home with a responsible adult.  Follow up: 2-4 weeks       Please call the office at [phone] if you experience any weakness or loss of sensation, fever > 101.5, pain uncontrolled with oral medications, persistent nausea/vomiting/or diarrhea, redness or drainage from the incisions, or any other worrisome concerns. If physician on call was not reached or could not communicate with our office for any reason please go to the nearest emergency department

## 2025-04-23 NOTE — DISCHARGE INSTRUCTIONS

## 2025-04-23 NOTE — H&P
HPI  Patient presenting for Procedure(s) (LRB):  Bilateral L3-5 MBB #1 with local (Bilateral)     Patient on Anti-coagulation No    No health changes since previous encounter    Past Medical History:   Diagnosis Date    Abnormal Pap smear     Abnormal Pap smear of cervix     Chronic rhinitis     Depression     GERD (gastroesophageal reflux disease)     Hyperlipidemia     Hypertension     LISETTE (obstructive sleep apnea) 2/8/2024    Overactive bladder     Plantar fasciitis, bilateral     Psoriasis      Past Surgical History:   Procedure Laterality Date    CERVICAL BIOPSY  W/ LOOP ELECTRODE EXCISION      HYSTERECTOMY      supracervical hyst/LSO for fibroids    OOPHORECTOMY      LSO    ROBOT-ASSISTED CHOLECYSTECTOMY USING DA WILVER XI N/A 10/18/2018    Procedure: XI ROBOTIC CHOLECYSTECTOMY;  Surgeon: Robel Fererr MD;  Location: [facility];  Service: General;  Laterality: N/A;    TUBAL LIGATION       Review of patient's allergies indicates:   Allergen Reactions    Adhesive Rash    Latex, natural rubber         Medications Ordered Prior to Encounter[1]     PMHx, PSHx, Allergies, Medications reviewed in epic    ROS negative except pain complaints in HPI    OBJECTIVE:    Breastfeeding No     PHYSICAL EXAMINATION:    GENERAL: Well appearing, in no acute distress, alert and oriented x3.  PSYCH:  Mood and affect appropriate.  SKIN: Skin color, texture, turgor normal, no rashes or lesions which will impact the procedure.  CV: RRR with palpation of the radial artery.  PULM: No evidence of respiratory difficulty, symmetric chest rise. Clear to auscultation.  NEURO: Cranial nerves grossly intact.    Plan:    Proceed with procedure as planned Procedure(s) (LRB):  Bilateral L3-5 MBB #1 with local (Bilateral)    Franco Santillan MD  04/23/2025                 [1]   No current facility-administered medications on file prior to encounter.     Current Outpatient Medications on File Prior to Encounter   Medication Sig Dispense Refill    albuterol  (PROVENTIL/VENTOLIN HFA) 90 mcg/actuation inhaler INHALE 2 PUFFS INTO THE LUNGS EVERY 6 HOURS AS NEEDED FOR SHORTNESS OF BREATH, RESCUE 54 g 3    amLODIPine (NORVASC) 10 MG tablet Take 1 tablet (10 mg total) by mouth once daily. 90 tablet 3    azelastine (ASTELIN) 137 mcg (0.1 %) nasal spray 1 spray (137 mcg total) by Nasal route 2 (two) times daily. 30 mL 11    fexofenadine (ALLEGRA) 180 MG tablet Take 180 mg by mouth once daily.      fluticasone propionate (FLONASE) 50 mcg/actuation nasal spray 2 sprays (100 mcg total) by Each Nostril route once daily. 3 Bottle 1    LINZESS 145 mcg Cap capsule TAKE 1 CAPSULE BY MOUTH BEFORE BREAKFAST (Patient taking differently: Take 145 mcg by mouth as needed.) 90 capsule 0    methocarbamoL (ROBAXIN) 500 MG Tab Take 1 tablet (500 mg total) by mouth 2 (two) times daily as needed (muscle spasms). 60 tablet 0    mirabegron (MYRBETRIQ) 50 mg Tb24 Take 1 tablet (50 mg total) by mouth once daily. 30 tablet 11    montelukast (SINGULAIR) 10 mg tablet Take 1 tablet (10 mg total) by mouth once daily. 90 tablet 3    nitroGLYCERIN (NITROSTAT) 0.4 MG SL tablet Place 1 tablet (0.4 mg total) under the tongue every 5 (five) minutes as needed for Chest pain. 30 tablet 0    omeprazole (PRILOSEC) 40 MG capsule TAKE 1 CAPSULE BY MOUTH TWICE DAILY BEFORE MEAL(S) 180 capsule 0    vitamin D (VITAMIN D3) 1000 units Tab Take 1,000 Units by mouth once daily. Take 2000 units daily.

## 2025-04-23 NOTE — OP NOTE
Reta Navas  59 y.o. female      Vitals:    04/23/25 1233   BP: (!) 134/90   Pulse: 98   Resp: 17   Temp: 98.7 °F (37.1 °C)       Procedure Date: 04/23/2025          INFORMED CONSENT: The procedure, risks, benefits and options were discussed with patient. There are no contraindications to the procedure. The patient expressed understanding and agreed to proceed. The personnel performing the procedure was discussed. I verify that I personally obtained consent prior to the start of the procedure and the signed consent can be found on the patient's chart.       Anesthesia:   Conscious sedation provided by M.D    The patient was monitored with continuous pulse oximetry, EKG, and intermittent blood pressure monitors.  The patient was hemodynamically stable throughout the entire process was responsive to voice, and breathing spontaneously.  Supplemental O2 was provided at 2L/min via nasal cannula.  Patient was comfortable for the duration of the procedure. (See nurse documentation and case log for sedation time)    There was a total of 2mg IV Midazolam and 100mcg Fentanyl titrated for the procedure     Pre Procedure diagnosis: Lumbar spondylosis [M47.816]  Post-Procedure diagnosis: SAME     PROCEDURE: bilateral L3,4,5 LUMBAR FACET MEDIAL BRANCH NERVE BLOCK        DESCRIPTION OF PROCEDURE:The patient was brought to the procedure room. After performing time out. IV access was obtained prior to the procedure. The patient was positioned prone on the fluoroscopy table. Continuous hemodynamic monitoring was initiated including blood pressure, EKG, and pulse oximetry. The area of the lumbar spine was prepped chlorhexidine and draped into a sterile field. Fluoroscopy was used to identify the location of the bilateral side L3, L4, and L5 medial branch nerves at the junctions of the superior articular process and the transverse processes of  L4, L5, and the sacral ala respectively. Skin anesthesia was achieved using 5 cc of  "Lidocaine 1% over the injection sites. A 22 gauge, 3 1/2" spinal needle was slowly inserted at each level using AP, lateral and oblique fluoroscopic imaging. Negative aspiration for blood or CSF was confirmed.  8 ml bupivacaine 0.25% ONLY was injected at all sites in divided doses. The needles were removed and bleeding was nil. A sterile dressing was applied. No specimens collected. Patient was taken back to the PACU for observation .       Blood Loss: Nill  Specimen: None    Franco S Santillan    "

## 2025-04-24 ENCOUNTER — TELEPHONE (OUTPATIENT)
Dept: PAIN MEDICINE | Facility: CLINIC | Age: 60
End: 2025-04-24
Payer: COMMERCIAL

## 2025-04-24 DIAGNOSIS — M47.816 LUMBAR SPONDYLOSIS: Primary | ICD-10-CM

## 2025-05-19 ENCOUNTER — HOSPITAL ENCOUNTER (OUTPATIENT)
Dept: CARDIOLOGY | Facility: HOSPITAL | Age: 60
Discharge: HOME | End: 2025-05-19
Attending: INTERNAL MEDICINE
Payer: COMMERCIAL

## 2025-05-19 VITALS
SYSTOLIC BLOOD PRESSURE: 130 MMHG | WEIGHT: 154 LBS | BODY MASS INDEX: 30.23 KG/M2 | DIASTOLIC BLOOD PRESSURE: 80 MMHG | HEIGHT: 60 IN

## 2025-05-19 VITALS
DIASTOLIC BLOOD PRESSURE: 67 MMHG | BODY MASS INDEX: 30.23 KG/M2 | WEIGHT: 154 LBS | HEIGHT: 60 IN | SYSTOLIC BLOOD PRESSURE: 111 MMHG

## 2025-05-19 DIAGNOSIS — R09.89 CAROTID BRUIT, UNSPECIFIED LATERALITY: ICD-10-CM

## 2025-05-19 DIAGNOSIS — R07.89 OTHER CHEST PAIN: ICD-10-CM

## 2025-05-19 DIAGNOSIS — G47.33 OSA (OBSTRUCTIVE SLEEP APNEA): ICD-10-CM

## 2025-05-19 DIAGNOSIS — I10 PRIMARY HYPERTENSION: ICD-10-CM

## 2025-05-19 DIAGNOSIS — E78.49 OTHER HYPERLIPIDEMIA: ICD-10-CM

## 2025-05-19 DIAGNOSIS — Z87.891 FORMER SMOKER: ICD-10-CM

## 2025-05-19 DIAGNOSIS — I10 HYPERTENSION, UNSPECIFIED TYPE: ICD-10-CM

## 2025-05-19 DIAGNOSIS — K21.9 GASTROESOPHAGEAL REFLUX DISEASE, UNSPECIFIED WHETHER ESOPHAGITIS PRESENT: ICD-10-CM

## 2025-05-19 DIAGNOSIS — R00.2 PALPITATIONS: ICD-10-CM

## 2025-05-19 LAB
AORTIC ROOT ANNULUS: 3.3 CM
AORTIC SIZE INDEX (SOV): 1.6 CM/M2
AV INDEX (PROSTH): 0.69
AV MEAN GRADIENT: 3 MMHG
AV PEAK GRADIENT: 6 MMHG
AV VALVE AREA BY VELOCITY RATIO: 2.1 CM²
AV VALVE AREA: 2.2 CM²
AV VELOCITY RATIO: 0.67
BSA FOR ECHO PROCEDURE: 1.72 M2
CV ECHO LV RWT: 0.5 CM
DOP CALC AO PEAK VEL: 1.2 M/S
DOP CALC AO VTI: 25.5 CM
DOP CALC LVOT AREA: 3.1 CM2
DOP CALC LVOT DIAMETER: 2 CM
DOP CALC LVOT PEAK VEL: 0.8 M/S
DOP CALC LVOT STROKE VOLUME: 55 CM3
DOP CALC RVOT PEAK VEL: 0.58 M/S
DOP CALC RVOT VTI: 12.8 CM
DOP CALCLVOT PEAK VEL VTI: 17.5 CM
E WAVE DECELERATION TIME: 223 MSEC
E/A RATIO: 0.91
E/E' RATIO: 9 M/S
ECHO LV POSTERIOR WALL: 1 CM (ref 0.6–1.1)
FRACTIONAL SHORTENING: 32.5 % (ref 28–44)
INTERVENTRICULAR SEPTUM: 1.1 CM (ref 0.6–1.1)
IVRT: 86 MSEC
LA MAJOR: 5.1 CM
LA MINOR: 4.6 CM
LA WIDTH: 3.1 CM
LEFT ARM DIASTOLIC BLOOD PRESSURE: 65 MMHG
LEFT ARM SYSTOLIC BLOOD PRESSURE: 109 MMHG
LEFT ATRIUM AREA SYSTOLIC (APICAL 2 CHAMBER): 16.81 CM2
LEFT ATRIUM AREA SYSTOLIC (APICAL 4 CHAMBER): 18.65 CM2
LEFT ATRIUM SIZE: 4 CM
LEFT ATRIUM VOLUME INDEX MOD: 28 ML/M2
LEFT ATRIUM VOLUME INDEX: 31 ML/M2
LEFT ATRIUM VOLUME MOD: 47 ML
LEFT ATRIUM VOLUME: 51 CM3
LEFT CBA DIAS: 19 CM/S
LEFT CBA SYS: 47 CM/S
LEFT CCA DIST DIAS: 25 CM/S
LEFT CCA DIST SYS: 60 CM/S
LEFT CCA MID DIAS: 21 CM/S
LEFT CCA MID SYS: 58 CM/S
LEFT CCA PROX DIAS: 16 CM/S
LEFT CCA PROX SYS: 51 CM/S
LEFT ECA SYS: 63 CM/S
LEFT ICA DIST DIAS: 25 CM/S
LEFT ICA DIST SYS: 55 CM/S
LEFT ICA MID DIAS: 20 CM/S
LEFT ICA MID SYS: 48 CM/S
LEFT ICA PROX DIAS: 19 CM/S
LEFT ICA PROX SYS: 53 CM/S
LEFT INTERNAL DIMENSION IN SYSTOLE: 2.7 CM (ref 2.1–4)
LEFT VENTRICLE DIASTOLIC VOLUME INDEX: 41.32 ML/M2
LEFT VENTRICLE DIASTOLIC VOLUME: 69 ML
LEFT VENTRICLE END SYSTOLIC VOLUME APICAL 2 CHAMBER: 40.37 ML
LEFT VENTRICLE END SYSTOLIC VOLUME APICAL 4 CHAMBER: 50.9 ML
LEFT VENTRICLE MASS INDEX: 81.6 G/M2
LEFT VENTRICLE SYSTOLIC VOLUME INDEX: 16.2 ML/M2
LEFT VENTRICLE SYSTOLIC VOLUME: 27 ML
LEFT VENTRICULAR INTERNAL DIMENSION IN DIASTOLE: 4 CM (ref 3.5–6)
LEFT VENTRICULAR MASS: 136.2 G
LEFT VERTEBRAL DIAS: 6 CM/S
LEFT VERTEBRAL SYS: 23 CM/S
LV LATERAL E/E' RATIO: 8.6 M/S
LV SEPTAL E/E' RATIO: 9.9 M/S
LVED V (TEICH): 69.12 ML
LVES V (TEICH): 27.04 ML
LVOT MG: 1.76 MMHG
LVOT MV: 0.63 CM/S
MV PEAK A VEL: 0.76 M/S
MV PEAK E VEL: 0.69 M/S
OHS CV CAROTID RIGHT ICA EDV HIGHEST: 16
OHS CV CAROTID ULTRASOUND LEFT ICA/CCA RATIO: 0.92
OHS CV CAROTID ULTRASOUND RIGHT ICA/CCA RATIO: 1.02
OHS CV PV CAROTID LEFT HIGHEST CCA: 60
OHS CV PV CAROTID LEFT HIGHEST ICA: 55
OHS CV PV CAROTID RIGHT HIGHEST CCA: 65
OHS CV PV CAROTID RIGHT HIGHEST ICA: 51
OHS CV RV/LV RATIO: 0.48 CM
OHS CV US CAROTID LEFT HIGHEST EDV: 25
PISA TR MAX VEL: 2.5 M/S
PV MEAN GRADIENT: 1 MMHG
PV MV: 0.63 M/S
PV PEAK GRADIENT: 4 MMHG
PV PEAK VELOCITY: 0.94 M/S
RA MAJOR: 4.64 CM
RA PRESSURE ESTIMATED: 3 MMHG
RA WIDTH: 2.54 CM
RIGHT ARM DIASTOLIC BLOOD PRESSURE: 67 MMHG
RIGHT ARM SYSTOLIC BLOOD PRESSURE: 111 MMHG
RIGHT CBA DIAS: 15 CM/S
RIGHT CBA SYS: 47 CM/S
RIGHT CCA DIST DIAS: 15 CM/S
RIGHT CCA DIST SYS: 50 CM/S
RIGHT CCA MID DIAS: 16 CM/S
RIGHT CCA MID SYS: 65 CM/S
RIGHT CCA PROX DIAS: 14 CM/S
RIGHT CCA PROX SYS: 61 CM/S
RIGHT ECA SYS: 80 CM/S
RIGHT ICA DIST DIAS: 16 CM/S
RIGHT ICA DIST SYS: 51 CM/S
RIGHT ICA MID DIAS: 7 CM/S
RIGHT ICA MID SYS: 29 CM/S
RIGHT ICA PROX DIAS: 16 CM/S
RIGHT ICA PROX SYS: 51 CM/S
RIGHT VENTRICLE DIASTOLIC BASEL DIMENSION: 1.9 CM
RIGHT VENTRICULAR END-DIASTOLIC DIMENSION: 1.92 CM
RIGHT VERTEBRAL DIAS: 13 CM/S
RIGHT VERTEBRAL SYS: 37 CM/S
RV TB RVSP: 6 MMHG
SINUS: 2.66 CM
STJ: 2.5 CM
TDI LATERAL: 0.08 M/S
TDI SEPTAL: 0.07 M/S
TDI: 0.08 M/S
TR MAX PG: 26 MMHG
TRICUSPID ANNULAR PLANE SYSTOLIC EXCURSION: 2.2 CM
TV REST PULMONARY ARTERY PRESSURE: 28 MMHG
Z-SCORE OF LEFT VENTRICULAR DIMENSION IN END DIASTOLE: -1.54
Z-SCORE OF LEFT VENTRICULAR DIMENSION IN END SYSTOLE: -0.54

## 2025-05-19 PROCEDURE — 93306 TTE W/DOPPLER COMPLETE: CPT | Mod: 26,,, | Performed by: INTERNAL MEDICINE

## 2025-05-19 PROCEDURE — 93880 EXTRACRANIAL BILAT STUDY: CPT

## 2025-05-19 PROCEDURE — 93306 TTE W/DOPPLER COMPLETE: CPT

## 2025-05-19 PROCEDURE — 93880 EXTRACRANIAL BILAT STUDY: CPT | Mod: 26,,, | Performed by: INTERNAL MEDICINE

## 2025-05-20 ENCOUNTER — RESULTS FOLLOW-UP (OUTPATIENT)
Dept: CARDIOLOGY | Facility: CLINIC | Age: 60
End: 2025-05-20

## 2025-05-20 NOTE — PROGRESS NOTES
Subjective:       Patient ID: Reta Navas is a 59 y.o. female.    Chief Complaint: No chief complaint on file.      HPI The patient presented on 04/ 2024 for evaluation of Numbness.  New issues: none.   Numbness: intermittent.  Neck pain comes and goes.       The originating site (patient location) is: Home.    The distant site (neurologist location) is: [facility]rology Clinic at [facility].    The chief complaint leading to consultation is: Numbness.     Visit type: Virtual visit with synchronous audio and video.    Consent: The patient verbally consented to participating in the video visit and informed that may   decline to receive medical services by telemedicine and may withdraw from such care at any time.    I discussed with the patient the nature of our telemedicine visits, that:    I  would evaluate the patient and recommend diagnostics and treatments based on my assessment.    Our sessions are not being recorded and that personal health information is protected.    Our team would provide follow up care in person if/when the patient needs it.    Virtual (video/telemedicine) visits have significant limitations.   A telemedicine exam is primarily focused on the history and what I can observe.   Several critical parts of the neurological exam cannot be performed.     Review of Systems      Neck pain.    Current Medications[1]    Past Medical History:   Diagnosis Date    Abnormal Pap smear     Abnormal Pap smear of cervix     Chronic rhinitis     Depression     GERD (gastroesophageal reflux disease)     Hyperlipidemia     Hypertension     LISETTE (obstructive sleep apnea) 2/8/2024    Overactive bladder     Plantar fasciitis, bilateral     Psoriasis      Past Surgical History:   Procedure Laterality Date    CERVICAL BIOPSY  W/ LOOP ELECTRODE EXCISION      HYSTERECTOMY      supracervical hyst/LSO for fibroids    INJECTION OF ANESTHETIC AGENT AROUND MEDIAL BRANCH NERVES INNERVATING LUMBAR FACET JOINT Bilateral  4/23/2025    Procedure: Bilateral L3-5 MBB #1 with local;  Surgeon: Franco Santillan MD;  Location: [facility];  Service: Pain Management;  Laterality: Bilateral;    OOPHORECTOMY      LSO    ROBOT-ASSISTED CHOLECYSTECTOMY USING DA WILVER XI N/A 10/18/2018    Procedure: XI ROBOTIC CHOLECYSTECTOMY;  Surgeon: Robel Ferrer MD;  Location: [facility];  Service: General;  Laterality: N/A;    TUBAL LIGATION       Social History[2]    Past/Current Medical/Surgical History, Past/Current Social History,   Past/Current Family History and Past/Current Medications were reviewed in detail.    Objective:     GENERAL APPEARANCE:     The patient looks comfortable.    No signs of respiratory distress.    Normal breathing pattern.    No dysmorphic features    Normal eye contact.     GENERAL MEDICAL EXAM:    HEENT:  Head is atraumatic normocephalic.      Neck and Axillae: No JVD. No visible lesions.    Cardiopulmonary: No cyanosis. No tachypnea. Normal respiratory effort.    Gastrointestinal/Urogenital:  No jaundice. No stomas or lesions. No visible hernias. No catheters.     Skin, Hair and Nails: No pathognonomic skin rash. No neurofibromatosis.   No visible lesions.No stigmata of autoimmune disease. No clubbing.    Limbs: No varicose veins. No visible swelling.    Muskoskeletal: No visible deformities.No visible lesions.    Neurological Exam    Grossly unchanged Neuro.    Lab Results   Component Value Date    WBC 9.03 11/14/2024    HGB 13.4 11/14/2024    HCT 42.3 11/14/2024    MCV 94 11/14/2024     11/14/2024     Sodium   Date Value Ref Range Status   11/14/2024 139 136 - 145 mmol/L Final     Potassium   Date Value Ref Range Status   11/14/2024 3.8 3.5 - 5.1 mmol/L Final     Chloride   Date Value Ref Range Status   11/14/2024 105 95 - 110 mmol/L Final     CO2   Date Value Ref Range Status   11/14/2024 25 23 - 29 mmol/L Final     Glucose   Date Value Ref Range Status   11/14/2024 102 70 - 110 mg/dL Final     BUN   Date Value Ref  Range Status   11/14/2024 11 6 - 20 mg/dL Final     Creatinine   Date Value Ref Range Status   11/14/2024 0.8 0.5 - 1.4 mg/dL Final     Calcium   Date Value Ref Range Status   11/14/2024 9.1 8.7 - 10.5 mg/dL Final     Total Protein   Date Value Ref Range Status   11/14/2024 7.9 6.0 - 8.4 g/dL Final     Albumin   Date Value Ref Range Status   11/14/2024 3.8 3.5 - 5.2 g/dL Final     Total Bilirubin   Date Value Ref Range Status   11/14/2024 0.3 0.1 - 1.0 mg/dL Final     Comment:     For infants and newborns, interpretation of results should be based  on gestational age, weight and in agreement with clinical  observations.    Premature Infant recommended reference ranges:  Up to 24 hours.............<8.0 mg/dL  Up to 48 hours............<12.0 mg/dL  3-5 days..................<15.0 mg/dL  6-29 days.................<15.0 mg/dL       Alkaline Phosphatase   Date Value Ref Range Status   11/14/2024 112 40 - 150 U/L Final     AST   Date Value Ref Range Status   11/14/2024 22 10 - 40 U/L Final     ALT   Date Value Ref Range Status   11/14/2024 26 10 - 44 U/L Final     Anion Gap   Date Value Ref Range Status   11/14/2024 9 8 - 16 mmol/L Final     eGFR if    Date Value Ref Range Status   06/27/2022 >60 >60 mL/min/1.73 m^2 Final     eGFR if non    Date Value Ref Range Status   06/27/2022 >60 >60 mL/min/1.73 m^2 Final     Comment:     Calculation used to obtain the estimated glomerular filtration  rate (eGFR) is the CKD-EPI equation.        Lab Results   Component Value Date    HJDFRHAH53 419 04/23/2024     Lab Results   Component Value Date    TSH 3.692 11/14/2024    FREET4 0.95 09/22/2015     No results found in the last 24 hours.    LABORATORY EVALUATION    RADIOLOGY EVALUATION     NEUROPHYSIOLOGY EVALUATION     PATHOLOGY EVALUATION      NEUROCOGNITIVE AND NEUROPSYCHOLOGY EVALUATION     Reviewed the neuroimaging independently     Assessment:   59 Years old C. Female with PMHX as above came of  "the evaluation of " Numbness ".   - Chronic Neck pain.    Plan:   Stable.     Order X Ray Cervical and Lumbar spines.     NCS/ EMG - 04/ 2024 - normal.     Out Patient PT program in the past.     Pain Management following.      Labs: CBC / CMP / Vit B 12 / TSH / Hgb A 1 C / Lipids panel - 2024 - non significant abnormalities.      Personally reviewed MRI Lumbar spine - 2024 - no acute changes / multiple DGD's non significant stenosis.     MEDICAL/SURGICAL COMORBIDITIES     All relevant medical comorbidities noted and managed by primary care physician and medical care team.      HEALTHY LIFESTYLE AND PREVENTATIVE CARE    The patient to adhere to the age-appropriate health maintenance guidelines including screening tests and vaccinations.   The patient to adhere to  healthy lifestyle, optimal weight, exercise, healthy diet,   good sleep hygiene and avoiding drugs including smoking, alcohol and recreational drugs.    RTC: 1 month.     I spent a total of 20 minutes on the day of the visit.This includes face to face time and non-face to face time preparing to see the patient (eg, review of tests),   obtaining and/or reviewing separately obtained history, documenting clinical information in the electronic or other health record,   independently interpreting results and communicating results to the patient/family/caregiver, or care coordinator.    Please do not hesitate to contact me with any updates, questions or concerns.    Kenneth Alegre MD.  General Neurologist.        [1]   Current Outpatient Medications:     albuterol (PROVENTIL/VENTOLIN HFA) 90 mcg/actuation inhaler, INHALE 2 PUFFS INTO THE LUNGS EVERY 6 HOURS AS NEEDED FOR SHORTNESS OF BREATH, RESCUE, Disp: 54 g, Rfl: 3    amLODIPine (NORVASC) 10 MG tablet, Take 1 tablet (10 mg total) by mouth once daily., Disp: 90 tablet, Rfl: 3    aspirin (ECOTRIN) 81 MG EC tablet, Take 1 tablet (81 mg total) by mouth once daily., Disp: 90 tablet, Rfl: 1    azelastine " (ASTELIN) 137 mcg (0.1 %) nasal spray, 1 spray (137 mcg total) by Nasal route 2 (two) times daily., Disp: 30 mL, Rfl: 11    fexofenadine (ALLEGRA) 180 MG tablet, Take 180 mg by mouth once daily., Disp: , Rfl:     fluticasone propionate (FLONASE) 50 mcg/actuation nasal spray, 2 sprays (100 mcg total) by Each Nostril route once daily., Disp: 3 Bottle, Rfl: 1    LINZESS 145 mcg Cap capsule, TAKE 1 CAPSULE BY MOUTH BEFORE BREAKFAST (Patient taking differently: Take 145 mcg by mouth as needed.), Disp: 90 capsule, Rfl: 0    methocarbamoL (ROBAXIN) 500 MG Tab, Take 1 tablet (500 mg total) by mouth 2 (two) times daily as needed (muscle spasms)., Disp: 60 tablet, Rfl: 0    mirabegron (MYRBETRIQ) 50 mg Tb24, Take 1 tablet (50 mg total) by mouth once daily., Disp: 30 tablet, Rfl: 11    nitroGLYCERIN (NITROSTAT) 0.4 MG SL tablet, Place 1 tablet (0.4 mg total) under the tongue every 5 (five) minutes as needed for Chest pain., Disp: 30 tablet, Rfl: 0    omeprazole (PRILOSEC) 40 MG capsule, TAKE 1 CAPSULE BY MOUTH TWICE DAILY BEFORE MEAL(S), Disp: 180 capsule, Rfl: 0    vitamin D (VITAMIN D3) 1000 units Tab, Take 1,000 Units by mouth once daily. Take 2000 units daily., Disp: , Rfl:   [2]   Social History  Socioeconomic History    Marital status:    Tobacco Use    Smoking status: Former     Current packs/day: 0.00     Average packs/day: 1 pack/day for 39.0 years (39.0 ttl pk-yrs)     Types: Vaping with nicotine, Cigarettes     Start date:      Quit date: 2019     Years since quittin.3     Passive exposure: Past    Smokeless tobacco: Never   Substance and Sexual Activity    Alcohol use: No     Alcohol/week: 0.0 standard drinks of alcohol    Drug use: Never    Sexual activity: Yes     Partners: Male     Birth control/protection: None, Surgical, Post-menopausal     Social Drivers of Health     Financial Resource Strain: Low Risk  (2020)    Overall Financial Resource Strain (CARDIA)     Difficulty of Paying Living  Expenses: Not hard at all   Food Insecurity: No Food Insecurity (6/30/2020)    Hunger Vital Sign     Worried About Running Out of Food in the Last Year: Never true     Ran Out of Food in the Last Year: Never true   Transportation Needs: No Transportation Needs (6/30/2020)    PRAPARE - Transportation     Lack of Transportation (Medical): No     Lack of Transportation (Non-Medical): No   Physical Activity: Insufficiently Active (6/30/2020)    Exercise Vital Sign     Days of Exercise per Week: 1 day     Minutes of Exercise per Session: 30 min   Stress: Stress Concern Present (6/30/2020)    [employer] - Occupational Stress Questionnaire     Feeling of Stress : Very much

## 2025-05-27 ENCOUNTER — OFFICE VISIT (OUTPATIENT)
Dept: NEUROLOGY | Facility: CLINIC | Age: 60
End: 2025-05-27
Payer: COMMERCIAL

## 2025-05-27 DIAGNOSIS — M54.2 CHRONIC NECK PAIN: Primary | ICD-10-CM

## 2025-05-27 DIAGNOSIS — G89.29 CHRONIC NECK PAIN: Primary | ICD-10-CM

## 2025-05-27 PROCEDURE — 1160F RVW MEDS BY RX/DR IN RCRD: CPT | Mod: CPTII,95,, | Performed by: PSYCHIATRY & NEUROLOGY

## 2025-05-27 PROCEDURE — 1159F MED LIST DOCD IN RCRD: CPT | Mod: CPTII,95,, | Performed by: PSYCHIATRY & NEUROLOGY

## 2025-05-27 PROCEDURE — 98005 SYNCH AUDIO-VIDEO EST LOW 20: CPT | Mod: 95,,, | Performed by: PSYCHIATRY & NEUROLOGY

## 2025-05-27 NOTE — PRE-PROCEDURE INSTRUCTIONS
Spoke with patient regarding procedure scheduled on 6/3     Arrival time 0715     Has patient been sick with fever or on antibiotics within the last 7 days? No     Does the patient have any open wounds, sores or rashes? No     Does the patient have any recent fractures? no     Has patient received a vaccination within the last 7 days? No     Received the COVID vaccination?      Has the patient stopped all medications as directed? na     Does patient have a pacemaker, defibrillator, or implantable stimulator? No     Does the patient have a ride to and from procedure and someone reliable to remain with patient?       Is the patient diabetic? no     Does the patient have sleep apnea? Or use O2 at home? jonna      Is the patient receiving sedation?      Is the patient instructed to remain NPO beginning at midnight the night before their procedure? yes     Procedure location confirmed with patient? Yes     Covid- Denies signs/symptoms. Instructed to notify PAT/MD if any changes.

## 2025-05-29 ENCOUNTER — HOSPITAL ENCOUNTER (OUTPATIENT)
Dept: RADIOLOGY | Facility: HOSPITAL | Age: 60
Discharge: HOME | End: 2025-05-29
Attending: PSYCHIATRY & NEUROLOGY
Payer: COMMERCIAL

## 2025-05-29 DIAGNOSIS — M54.2 CHRONIC NECK PAIN: ICD-10-CM

## 2025-05-29 DIAGNOSIS — G89.29 CHRONIC NECK PAIN: ICD-10-CM

## 2025-05-29 PROCEDURE — 72050 X-RAY EXAM NECK SPINE 4/5VWS: CPT | Mod: TC

## 2025-05-29 PROCEDURE — 72050 X-RAY EXAM NECK SPINE 4/5VWS: CPT | Mod: 26,,, | Performed by: RADIOLOGY

## 2025-06-02 ENCOUNTER — TELEPHONE (OUTPATIENT)
Dept: INTERNAL MEDICINE | Facility: CLINIC | Age: 60
End: 2025-06-02
Payer: COMMERCIAL

## 2025-06-03 ENCOUNTER — HOSPITAL ENCOUNTER (OUTPATIENT)
Facility: HOSPITAL | Age: 60
Discharge: HOME | End: 2025-06-03
Attending: ANESTHESIOLOGY | Admitting: ANESTHESIOLOGY
Payer: COMMERCIAL

## 2025-06-03 VITALS
WEIGHT: 158.5 LBS | TEMPERATURE: 98 F | HEART RATE: 79 BPM | OXYGEN SATURATION: 97 % | HEIGHT: 60 IN | RESPIRATION RATE: 16 BRPM | SYSTOLIC BLOOD PRESSURE: 120 MMHG | DIASTOLIC BLOOD PRESSURE: 66 MMHG | BODY MASS INDEX: 31.12 KG/M2

## 2025-06-03 DIAGNOSIS — M47.816 LUMBAR SPONDYLOSIS: ICD-10-CM

## 2025-06-03 PROCEDURE — 64493 INJ PARAVERT F JNT L/S 1 LEV: CPT | Mod: 50 | Performed by: ANESTHESIOLOGY

## 2025-06-03 PROCEDURE — 64494 INJ PARAVERT F JNT L/S 2 LEV: CPT | Mod: 50 | Performed by: ANESTHESIOLOGY

## 2025-06-03 PROCEDURE — 25000003 PHARM REV CODE 250: Performed by: ANESTHESIOLOGY

## 2025-06-03 PROCEDURE — 63600175 PHARM REV CODE 636 W HCPCS: Performed by: ANESTHESIOLOGY

## 2025-06-03 PROCEDURE — 64494 INJ PARAVERT F JNT L/S 2 LEV: CPT | Mod: 50,,, | Performed by: ANESTHESIOLOGY

## 2025-06-03 PROCEDURE — 64493 INJ PARAVERT F JNT L/S 1 LEV: CPT | Mod: 50,,, | Performed by: ANESTHESIOLOGY

## 2025-06-03 RX ORDER — MIDAZOLAM HYDROCHLORIDE 1 MG/ML
INJECTION INTRAMUSCULAR; INTRAVENOUS
Status: DISCONTINUED | OUTPATIENT
Start: 2025-06-03 | End: 2025-06-03 | Stop reason: HOSPADM

## 2025-06-03 RX ORDER — BUPIVACAINE HYDROCHLORIDE 5 MG/ML
INJECTION, SOLUTION EPIDURAL; INTRACAUDAL; PERINEURAL
Status: DISCONTINUED | OUTPATIENT
Start: 2025-06-03 | End: 2025-06-03 | Stop reason: HOSPADM

## 2025-06-03 RX ORDER — FENTANYL CITRATE 50 UG/ML
INJECTION, SOLUTION INTRAMUSCULAR; INTRAVENOUS
Status: DISCONTINUED | OUTPATIENT
Start: 2025-06-03 | End: 2025-06-03 | Stop reason: HOSPADM

## 2025-06-03 RX ORDER — SODIUM BICARBONATE 1 MEQ/ML
SYRINGE (ML) INTRAVENOUS
Status: DISCONTINUED | OUTPATIENT
Start: 2025-06-03 | End: 2025-06-03 | Stop reason: HOSPADM

## 2025-06-03 NOTE — DISCHARGE SUMMARY
Discharge Note  Short Stay      SUMMARY     Admit Date: 6/3/2025    Attending Physician: Franco Santillan MD        Discharge Physician: Franco Santillan MD        Discharge Date: 6/3/2025 8:14 AM    Procedure(s) (LRB):  Diagnostic Bilateral L3-5 MBB #2 (Bilateral)    Final Diagnosis: Lumbar spondylosis [M47.816]    Disposition: Home or self care    Patient Instructions:   Current Discharge Medication List        CONTINUE these medications which have NOT CHANGED    Details   amLODIPine (NORVASC) 10 MG tablet Take 1 tablet (10 mg total) by mouth once daily.  Qty: 90 tablet, Refills: 3    Comments: .  Associated Diagnoses: Hypertension, unspecified type      aspirin (ECOTRIN) 81 MG EC tablet Take 1 tablet (81 mg total) by mouth once daily.  Qty: 90 tablet, Refills: 1      fexofenadine (ALLEGRA) 180 MG tablet Take 180 mg by mouth once daily.      LINZESS 145 mcg Cap capsule TAKE 1 CAPSULE BY MOUTH BEFORE BREAKFAST  Qty: 90 capsule, Refills: 0      methocarbamoL (ROBAXIN) 500 MG Tab Take 1 tablet (500 mg total) by mouth 2 (two) times daily as needed (muscle spasms).  Qty: 60 tablet, Refills: 0      mirabegron (MYRBETRIQ) 50 mg Tb24 Take 1 tablet (50 mg total) by mouth once daily.  Qty: 30 tablet, Refills: 11    Associated Diagnoses: OAB (overactive bladder)      omeprazole (PRILOSEC) 40 MG capsule TAKE 1 CAPSULE BY MOUTH TWICE DAILY BEFORE MEAL(S)  Qty: 180 capsule, Refills: 0      vitamin D (VITAMIN D3) 1000 units Tab Take 1,000 Units by mouth once daily. Take 2000 units daily.      albuterol (PROVENTIL/VENTOLIN HFA) 90 mcg/actuation inhaler INHALE 2 PUFFS INTO THE LUNGS EVERY 6 HOURS AS NEEDED FOR SHORTNESS OF BREATH, RESCUE  Qty: 54 g, Refills: 3    Associated Diagnoses: History of smoking      azelastine (ASTELIN) 137 mcg (0.1 %) nasal spray 1 spray (137 mcg total) by Nasal route 2 (two) times daily.  Qty: 30 mL, Refills: 11    Associated Diagnoses: Chronic rhinitis; Post-nasal drip      fluticasone propionate (FLONASE)  50 mcg/actuation nasal spray 2 sprays (100 mcg total) by Each Nostril route once daily.  Qty: 3 Bottle, Refills: 1    Associated Diagnoses: Chronic allergic rhinitis      nitroGLYCERIN (NITROSTAT) 0.4 MG SL tablet Place 1 tablet (0.4 mg total) under the tongue every 5 (five) minutes as needed for Chest pain.  Qty: 30 tablet, Refills: 0    Comments: All future refill requests to go to Dr. Puente                 Discharge Diagnosis: Lumbar spondylosis [M47.816]  Condition on Discharge: Stable with no complications to procedure   Diet on Discharge: Same as before.  Activity: as per instruction sheet.  Discharge to: Home with a responsible adult.  Follow up: 2-4 weeks       Please call the office at [phone] if you experience any weakness or loss of sensation, fever > 101.5, pain uncontrolled with oral medications, persistent nausea/vomiting/or diarrhea, redness or drainage from the incisions, or any other worrisome concerns. If physician on call was not reached or could not communicate with our office for any reason please go to the nearest emergency department

## 2025-06-03 NOTE — DISCHARGE INSTRUCTIONS

## 2025-06-03 NOTE — H&P
HPI  Patient presenting for Procedure(s) (LRB):  Diagnostic Bilateral L3-5 MBB #2 (Bilateral)     Patient on Anti-coagulation No    No health changes since previous encounter    Past Medical History:   Diagnosis Date    Abnormal Pap smear     Abnormal Pap smear of cervix     Chronic rhinitis     Depression     GERD (gastroesophageal reflux disease)     Hyperlipidemia     Hypertension     LISETTE (obstructive sleep apnea) 2/8/2024    Overactive bladder     Plantar fasciitis, bilateral     Psoriasis      Past Surgical History:   Procedure Laterality Date    CERVICAL BIOPSY  W/ LOOP ELECTRODE EXCISION      HYSTERECTOMY      supracervical hyst/LSO for fibroids    INJECTION OF ANESTHETIC AGENT AROUND MEDIAL BRANCH NERVES INNERVATING LUMBAR FACET JOINT Bilateral 4/23/2025    Procedure: Bilateral L3-5 MBB #1 with local;  Surgeon: Franco Santillan MD;  Location: [facility] PAIN [facility]GT;  Service: Pain Management;  Laterality: Bilateral;    OOPHORECTOMY      LSO    ROBOT-ASSISTED CHOLECYSTECTOMY USING DA [employer] XI N/A 10/18/2018    Procedure: XI ROBOTIC CHOLECYSTECTOMY;  Surgeon: Robel Ferrer MD;  Location: [facility] OR;  Service: General;  Laterality: N/A;    TUBAL LIGATION       Review of patient's allergies indicates:   Allergen Reactions    Adhesive Rash    Latex, natural rubber         Medications Ordered Prior to Encounter[1]     PMHx, PSHx, Allergies, Medications reviewed in epic    ROS negative except pain complaints in HPI    OBJECTIVE:    /84 (BP Location: Left arm, Patient Position: Lying)   Pulse 75   Temp 97.5 °F (36.4 °C) (Temporal)   Resp 16   Ht 5' (1.524 m)   Wt 71.9 kg (158 lb 8.2 oz)   SpO2 100%   Breastfeeding No   BMI 30.96 kg/m²     PHYSICAL EXAMINATION:    GENERAL: Well appearing, in no acute distress, alert and oriented x3.  PSYCH:  Mood and affect appropriate.  SKIN: Skin color, texture, turgor normal, no rashes or lesions which will impact the procedure.  CV: RRR with palpation of the radial artery.  PULM:  No evidence of respiratory difficulty, symmetric chest rise. Clear to auscultation.  NEURO: Cranial nerves grossly intact.    Plan:    Proceed with procedure as planned Procedure(s) (LRB):  Diagnostic Bilateral L3-5 MBB #2 (Bilateral)    Franco Santillan MD  06/03/2025                 [1]   No current facility-administered medications on file prior to encounter.     Current Outpatient Medications on File Prior to Encounter   Medication Sig Dispense Refill    amLODIPine (NORVASC) 10 MG tablet Take 1 tablet (10 mg total) by mouth once daily. 90 tablet 3    aspirin (ECOTRIN) 81 MG EC tablet Take 1 tablet (81 mg total) by mouth once daily. 90 tablet 1    fexofenadine (ALLEGRA) 180 MG tablet Take 180 mg by mouth once daily.      LINZESS 145 mcg Cap capsule TAKE 1 CAPSULE BY MOUTH BEFORE BREAKFAST (Patient taking differently: Take 145 mcg by mouth as needed.) 90 capsule 0    methocarbamoL (ROBAXIN) 500 MG Tab Take 1 tablet (500 mg total) by mouth 2 (two) times daily as needed (muscle spasms). 60 tablet 0    mirabegron (MYRBETRIQ) 50 mg Tb24 Take 1 tablet (50 mg total) by mouth once daily. 30 tablet 11    omeprazole (PRILOSEC) 40 MG capsule TAKE 1 CAPSULE BY MOUTH TWICE DAILY BEFORE MEAL(S) 180 capsule 0    vitamin D (VITAMIN D3) 1000 units Tab Take 1,000 Units by mouth once daily. Take 2000 units daily.      albuterol (PROVENTIL/VENTOLIN HFA) 90 mcg/actuation inhaler INHALE 2 PUFFS INTO THE LUNGS EVERY 6 HOURS AS NEEDED FOR SHORTNESS OF BREATH, RESCUE 54 g 3    azelastine (ASTELIN) 137 mcg (0.1 %) nasal spray 1 spray (137 mcg total) by Nasal route 2 (two) times daily. 30 mL 11    fluticasone propionate (FLONASE) 50 mcg/actuation nasal spray 2 sprays (100 mcg total) by Each Nostril route once daily. 3 Bottle 1    nitroGLYCERIN (NITROSTAT) 0.4 MG SL tablet Place 1 tablet (0.4 mg total) under the tongue every 5 (five) minutes as needed for Chest pain. 30 tablet 0

## 2025-06-03 NOTE — OP NOTE
Reta Navas  59 y.o. female      Vitals:    06/03/25 0806   BP: 130/85   Pulse: 85   Resp: 16   Temp:        Procedure Date: 06/03/2025          INFORMED CONSENT: The procedure, risks, benefits and options were discussed with patient. There are no contraindications to the procedure. The patient expressed understanding and agreed to proceed. The personnel performing the procedure was discussed. I verify that I personally obtained consent prior to the start of the procedure and the signed consent can be found on the patient's chart.       Anesthesia:   Conscious sedation provided by M.D    The patient was monitored with continuous pulse oximetry, EKG, and intermittent blood pressure monitors.  The patient was hemodynamically stable throughout the entire process was responsive to voice, and breathing spontaneously.  Supplemental O2 was provided at 2L/min via nasal cannula.  Patient was comfortable for the duration of the procedure. (See nurse documentation and case log for sedation time)    There was a total of 2mg IV Midazolam and 75mcg Fentanyl titrated for the procedure     Pre Procedure diagnosis: Lumbar spondylosis [M47.816]  Post-Procedure diagnosis: SAME     PROCEDURE: bilateral L3,4,5 LUMBAR FACET MEDIAL BRANCH NERVE BLOCK        DESCRIPTION OF PROCEDURE:The patient was brought to the procedure room. After performing time out. IV access was obtained prior to the procedure. The patient was positioned prone on the fluoroscopy table. Continuous hemodynamic monitoring was initiated including blood pressure, EKG, and pulse oximetry. The area of the lumbar spine was prepped chlorhexidine and draped into a sterile field. Fluoroscopy was used to identify the location of the bilateral side L3, L4, and L5 medial branch nerves at the junctions of the superior articular process and the transverse processes of  L4, L5, and the sacral ala respectively. Skin anesthesia was achieved using 5 cc of Lidocaine 1% over the  "injection sites. A 22 gauge, 3 1/2" spinal needle was slowly inserted at each level using AP, lateral and oblique fluoroscopic imaging. Negative aspiration for blood or CSF was confirmed.  8 ml bupivacaine 0.25% ONLY was injected at all sites in divided doses. The needles were removed and bleeding was nil. A sterile dressing was applied. No specimens collected. Patient was taken back to the PACU for observation .       Blood Loss: Nill  Specimen: None    Franco Santillan    "

## 2025-06-04 ENCOUNTER — PATIENT MESSAGE (OUTPATIENT)
Dept: PAIN MEDICINE | Facility: CLINIC | Age: 60
End: 2025-06-04
Payer: COMMERCIAL

## 2025-06-24 ENCOUNTER — PATIENT MESSAGE (OUTPATIENT)
Facility: CLINIC | Age: 60
End: 2025-06-24
Payer: COMMERCIAL

## 2025-06-24 ENCOUNTER — OFFICE VISIT (OUTPATIENT)
Dept: NEUROLOGY | Facility: CLINIC | Age: 60
End: 2025-06-24
Payer: COMMERCIAL

## 2025-06-24 DIAGNOSIS — G62.9 POLYNEUROPATHY: Primary | ICD-10-CM

## 2025-06-24 PROCEDURE — 1160F RVW MEDS BY RX/DR IN RCRD: CPT | Mod: CPTII,95,, | Performed by: PSYCHIATRY & NEUROLOGY

## 2025-06-24 PROCEDURE — 98005 SYNCH AUDIO-VIDEO EST LOW 20: CPT | Mod: 95,,, | Performed by: PSYCHIATRY & NEUROLOGY

## 2025-06-24 PROCEDURE — 1159F MED LIST DOCD IN RCRD: CPT | Mod: CPTII,95,, | Performed by: PSYCHIATRY & NEUROLOGY

## 2025-06-24 NOTE — PROGRESS NOTES
Subjective:       Patient ID: Reta Navas is a 59 y.o. female.    Chief Complaint: No chief complaint on file.    HPI The patient presented on 05/ 2025 for evaluation of Numbness.  New issues: none.   Numbness: some improvement / less frequent and decrease intensity.   No weakness.     The originating site (patient location) is: Home.    The distant site (neurologist location) is: Neurology Clinic at [facility].    The chief complaint leading to consultation is: numbness.     Visit type: Virtual visit with synchronous audio and video.    Consent: The patient verbally consented to participating in the video visit and informed that may   decline to receive medical services by telemedicine and may withdraw from such care at any time.    I discussed with the patient the nature of our telemedicine visits, that:    I  would evaluate the patient and recommend diagnostics and treatments based on my assessment.    Our sessions are not being recorded and that personal health information is protected.    Our team would provide follow up care in person if/when the patient needs it.    Virtual (video/telemedicine) visits have significant limitations.   A telemedicine exam is primarily focused on the history and what I can observe.   Several critical parts of the neurological exam cannot be performed.     Review of Systems      No complaints.    Current Medications[1]    Past Medical History:   Diagnosis Date    Abnormal Pap smear     Abnormal Pap smear of cervix     Chronic rhinitis     Depression     GERD (gastroesophageal reflux disease)     Hyperlipidemia     Hypertension     LISETTE (obstructive sleep apnea) 2/8/2024    Overactive bladder     Plantar fasciitis, bilateral     Psoriasis      Past Surgical History:   Procedure Laterality Date    CERVICAL BIOPSY  W/ LOOP ELECTRODE EXCISION      HYSTERECTOMY      supracervical hyst/LSO for fibroids    INJECTION OF ANESTHETIC AGENT AROUND MEDIAL BRANCH NERVES INNERVATING  LUMBAR FACET JOINT Bilateral 4/23/2025    Procedure: Bilateral L3-5 MBB #1 with local;  Surgeon: Franco Santillan MD;  Location: [facility] PAIN T;  Service: Pain Management;  Laterality: Bilateral;    INJECTION OF ANESTHETIC AGENT AROUND MEDIAL BRANCH NERVES INNERVATING LUMBAR FACET JOINT Bilateral 6/3/2025    Procedure: Diagnostic Bilateral L3-5 MBB #2;  Surgeon: Franco Santillan MD;  Location: [facility] PAIN [facility]GT;  Service: Pain Management;  Laterality: Bilateral;    OOPHORECTOMY      LSO    ROBOT-ASSISTED CHOLECYSTECTOMY USING DA [employer] XI N/A 10/18/2018    Procedure: XI ROBOTIC CHOLECYSTECTOMY;  Surgeon: Robel Ferrer MD;  Location: [facility] OR;  Service: General;  Laterality: N/A;    TUBAL LIGATION       Social History[2]    Past/Current Medical/Surgical History, Past/Current Social History,   Past/Current Family History and Past/Current Medications were reviewed in detail.    Objective:     GENERAL APPEARANCE:     The patient looks comfortable.    No signs of respiratory distress.    Normal breathing pattern.    No dysmorphic features    Normal eye contact.     GENERAL MEDICAL EXAM:    HEENT:  Head is atraumatic normocephalic.      Neck and Axillae: No JVD. No visible lesions.    Cardiopulmonary: No cyanosis. No tachypnea. Normal respiratory effort.    Gastrointestinal/Urogenital:  No jaundice. No stomas or lesions. No visible hernias. No catheters.     Skin, Hair and Nails: No pathognonomic skin rash. No neurofibromatosis.   No visible lesions.No stigmata of autoimmune disease. No clubbing.    Limbs: No varicose veins. No visible swelling.    Muskoskeletal: No visible deformities.No visible lesions.    Neurological Exam    Grossly unchanged Neuro.    Lab Results   Component Value Date    WBC 9.03 11/14/2024    HGB 13.4 11/14/2024    HCT 42.3 11/14/2024    MCV 94 11/14/2024     11/14/2024       Sodium   Date Value Ref Range Status   11/14/2024 139 136 - 145 mmol/L Final     Potassium   Date Value Ref Range Status    11/14/2024 3.8 3.5 - 5.1 mmol/L Final     Chloride   Date Value Ref Range Status   11/14/2024 105 95 - 110 mmol/L Final     CO2   Date Value Ref Range Status   11/14/2024 25 23 - 29 mmol/L Final     Glucose   Date Value Ref Range Status   11/14/2024 102 70 - 110 mg/dL Final     BUN   Date Value Ref Range Status   11/14/2024 11 6 - 20 mg/dL Final     Creatinine   Date Value Ref Range Status   11/14/2024 0.8 0.5 - 1.4 mg/dL Final     Calcium   Date Value Ref Range Status   11/14/2024 9.1 8.7 - 10.5 mg/dL Final     Total Protein   Date Value Ref Range Status   11/14/2024 7.9 6.0 - 8.4 g/dL Final     Albumin   Date Value Ref Range Status   11/14/2024 3.8 3.5 - 5.2 g/dL Final     Total Bilirubin   Date Value Ref Range Status   11/14/2024 0.3 0.1 - 1.0 mg/dL Final     Comment:     For infants and newborns, interpretation of results should be based  on gestational age, weight and in agreement with clinical  observations.    Premature Infant recommended reference ranges:  Up to 24 hours.............<8.0 mg/dL  Up to 48 hours............<12.0 mg/dL  3-5 days..................<15.0 mg/dL  6-29 days.................<15.0 mg/dL       Alkaline Phosphatase   Date Value Ref Range Status   11/14/2024 112 40 - 150 U/L Final     AST   Date Value Ref Range Status   11/14/2024 22 10 - 40 U/L Final     ALT   Date Value Ref Range Status   11/14/2024 26 10 - 44 U/L Final     Anion Gap   Date Value Ref Range Status   11/14/2024 9 8 - 16 mmol/L Final     eGFR if    Date Value Ref Range Status   06/27/2022 >60 >60 mL/min/1.73 m^2 Final     eGFR if non    Date Value Ref Range Status   06/27/2022 >60 >60 mL/min/1.73 m^2 Final     Comment:     Calculation used to obtain the estimated glomerular filtration  rate (eGFR) is the CKD-EPI equation.        Lab Results   Component Value Date    GNIPYBQG08 419 04/23/2024     Lab Results   Component Value Date    TSH 3.692 11/14/2024    FREET4 0.95 09/22/2015     No  "results found in the last 24 hours.    LABORATORY EVALUATION    RADIOLOGY EVALUATION     NEUROPHYSIOLOGY EVALUATION     PATHOLOGY EVALUATION      NEUROCOGNITIVE AND NEUROPSYCHOLOGY EVALUATION     Reviewed the neuroimaging independently     Assessment:   59 Years old C. Female with PMHX as above came of the evaluation of " Numbness ".   - Chronic Neck pain.    Plan:   Doing much better.     Sensory Neuropathy panel.     Repeat NCS/ EMG in 6 months.     X Ray Cervical - 05/ 2025 - normal    NCS/ EMG - 04/ 2024 - normal.     Out Patient PT program in the past.     Pain Management following.      Labs: CBC / CMP / Vit B 12 / TSH / Hgb A 1 C / Lipids panel - 2024 - non significant abnormalities.      Personally reviewed MRI Lumbar spine - 2024 - no acute changes / multiple DGD's non significant stenosis.     MEDICAL/SURGICAL COMORBIDITIES     All relevant medical comorbidities noted and managed by primary care physician and medical care team.      HEALTHY LIFESTYLE AND PREVENTATIVE CARE    The patient to adhere to the age-appropriate health maintenance guidelines including screening tests and vaccinations.   The patient to adhere to  healthy lifestyle, optimal weight, exercise, healthy diet,   good sleep hygiene and avoiding drugs including smoking, alcohol and recreational drugs.    RTC: 7 months.      I spent a total of 20 minutes on the day of the visit.This includes face to face time and non-face to face time preparing to see the patient (eg, review of tests),   obtaining and/or reviewing separately obtained history, documenting clinical information in the electronic or other health record,   independently interpreting results and communicating results to the patient/family/caregiver, or care coordinator.    Please do not hesitate to contact me with any updates, questions or concerns.    Kenneth Alegre MD.  General Neurologist.     The patient location is: [place]  The chief complaint leading to consultation is: " Numbness.     Visit type: audiovisual    Face to Face time with patient: yes  20  minutes of total time spent on the encounter, which includes face to face time and non-face to face time preparing to see the patient (eg, review of tests),   Obtaining and/or reviewing separately obtained history, Documenting clinical information in the electronic or other health record,   Independently interpreting results (not separately reported) and communicating results to the patient/family/caregiver, or Care coordination (not separately reported).     Each patient to whom he or she provides medical services by telemedicine is:  (1) informed of the relationship between the physician and patient   and the respective role of any other health care provider with respect to management of the patient; and (2)   notified that he or she may decline to receive medical services by telemedicine and may withdraw from such care at any time.    Notes:              [1]   Current Outpatient Medications:     albuterol (PROVENTIL/VENTOLIN HFA) 90 mcg/actuation inhaler, INHALE 2 PUFFS INTO THE LUNGS EVERY 6 HOURS AS NEEDED FOR SHORTNESS OF BREATH, RESCUE, Disp: 54 g, Rfl: 3    amLODIPine (NORVASC) 10 MG tablet, Take 1 tablet (10 mg total) by mouth once daily., Disp: 90 tablet, Rfl: 3    aspirin (ECOTRIN) 81 MG EC tablet, Take 1 tablet (81 mg total) by mouth once daily., Disp: 90 tablet, Rfl: 1    azelastine (ASTELIN) 137 mcg (0.1 %) nasal spray, 1 spray (137 mcg total) by Nasal route 2 (two) times daily., Disp: 30 mL, Rfl: 11    fexofenadine (ALLEGRA) 180 MG tablet, Take 180 mg by mouth once daily., Disp: , Rfl:     fluticasone propionate (FLONASE) 50 mcg/actuation nasal spray, 2 sprays (100 mcg total) by Each Nostril route once daily., Disp: 3 Bottle, Rfl: 1    LINZESS 145 mcg Cap capsule, TAKE 1 CAPSULE BY MOUTH BEFORE BREAKFAST (Patient taking differently: Take 145 mcg by mouth as needed.), Disp: 90 capsule, Rfl: 0    methocarbamoL (ROBAXIN)  500 MG Tab, Take 1 tablet (500 mg total) by mouth 2 (two) times daily as needed (muscle spasms)., Disp: 60 tablet, Rfl: 0    mirabegron (MYRBETRIQ) 50 mg Tb24, Take 1 tablet (50 mg total) by mouth once daily., Disp: 30 tablet, Rfl: 11    nitroGLYCERIN (NITROSTAT) 0.4 MG SL tablet, Place 1 tablet (0.4 mg total) under the tongue every 5 (five) minutes as needed for Chest pain., Disp: 30 tablet, Rfl: 0    omeprazole (PRILOSEC) 40 MG capsule, TAKE 1 CAPSULE BY MOUTH TWICE DAILY BEFORE MEAL(S), Disp: 180 capsule, Rfl: 0    vitamin D (VITAMIN D3) 1000 units Tab, Take 1,000 Units by mouth once daily. Take 2000 units daily., Disp: , Rfl:   [2]   Social History  Socioeconomic History    Marital status:    Tobacco Use    Smoking status: Former     Current packs/day: 0.00     Average packs/day: 1 pack/day for 39.0 years (39.0 ttl pk-yrs)     Types: Vaping with nicotine, Cigarettes     Start date:      Quit date:      Years since quittin.4     Passive exposure: Past    Smokeless tobacco: Never   Vaping Use    Vaping status: Former   Substance and Sexual Activity    Alcohol use: No     Alcohol/week: 0.0 standard drinks of alcohol    Drug use: Never    Sexual activity: Yes     Partners: Male     Birth control/protection: None, Surgical, Post-menopausal     Social Drivers of Health     Financial Resource Strain: Low Risk  (2020)    Overall Financial Resource Strain (CARDIA)     Difficulty of Paying Living Expenses: Not hard at all   Food Insecurity: No Food Insecurity (2020)    Hunger Vital Sign     Worried About Running Out of Food in the Last Year: Never true     Ran Out of Food in the Last Year: Never true   Transportation Needs: No Transportation Needs (2020)    PRAPARE - Transportation     Lack of Transportation (Medical): No     Lack of Transportation (Non-Medical): No   Physical Activity: Insufficiently Active (2020)    Exercise Vital Sign     Days of Exercise per Week: 1 day      Minutes of Exercise per Session: 30 min   Stress: Stress Concern Present (6/30/2020)    [employer] - Occupational Stress Questionnaire     Feeling of Stress : Very much

## 2025-06-29 DIAGNOSIS — I10 HYPERTENSION, UNSPECIFIED TYPE: ICD-10-CM

## 2025-06-30 RX ORDER — AMLODIPINE BESYLATE 10 MG/1
10 TABLET ORAL
Qty: 90 TABLET | Refills: 1 | Status: SHIPPED | OUTPATIENT
Start: 2025-06-30

## 2025-06-30 NOTE — TELEPHONE ENCOUNTER
No care due was identified.  [facility] Embedded Care Due Messages. Reference number: [id number].   6/29/2025 9:24:37 PM CDT

## 2025-06-30 NOTE — TELEPHONE ENCOUNTER
Refill Decision Note   Reta Navas  is requesting a refill authorization.  Brief Assessment and Rationale for Refill:  Approve     Medication Therapy Plan:         Comments:     Note composed:2:42 PM 06/30/2025

## 2025-07-18 ENCOUNTER — LAB VISIT (OUTPATIENT)
Dept: LAB | Facility: HOSPITAL | Age: 60
End: 2025-07-18
Attending: PSYCHIATRY & NEUROLOGY
Payer: COMMERCIAL

## 2025-07-18 ENCOUNTER — OFFICE VISIT (OUTPATIENT)
Dept: INTERNAL MEDICINE | Facility: CLINIC | Age: 60
End: 2025-07-18
Payer: COMMERCIAL

## 2025-07-18 VITALS
HEIGHT: 60 IN | SYSTOLIC BLOOD PRESSURE: 110 MMHG | BODY MASS INDEX: 31.17 KG/M2 | DIASTOLIC BLOOD PRESSURE: 80 MMHG | WEIGHT: 158.75 LBS | TEMPERATURE: 98 F | OXYGEN SATURATION: 95 % | HEART RATE: 92 BPM

## 2025-07-18 DIAGNOSIS — I10 PRIMARY HYPERTENSION: ICD-10-CM

## 2025-07-18 DIAGNOSIS — G62.9 POLYNEUROPATHY: ICD-10-CM

## 2025-07-18 DIAGNOSIS — R30.0 DYSURIA: Primary | ICD-10-CM

## 2025-07-18 LAB
BILIRUB UR QL STRIP.AUTO: NEGATIVE
CLARITY UR: CLEAR
COLOR UR AUTO: YELLOW
GLUCOSE UR QL STRIP: NEGATIVE
HGB UR QL STRIP: ABNORMAL
KETONES UR QL STRIP: NEGATIVE
LEUKOCYTE ESTERASE UR QL STRIP: NEGATIVE
NITRITE UR QL STRIP: NEGATIVE
PH UR STRIP: 6 [PH]
PROT UR QL STRIP: NEGATIVE
SP GR UR STRIP: 1.01

## 2025-07-18 PROCEDURE — 36415 COLL VENOUS BLD VENIPUNCTURE: CPT

## 2025-07-18 PROCEDURE — 99999 PR PBB SHADOW E&M-EST. PATIENT-LVL V: CPT | Mod: PBBFAC,,, | Performed by: PHYSICIAN ASSISTANT

## 2025-07-18 PROCEDURE — 83520 IMMUNOASSAY QUANT NOS NONAB: CPT | Mod: 59

## 2025-07-18 PROCEDURE — 81003 URINALYSIS AUTO W/O SCOPE: CPT | Performed by: PHYSICIAN ASSISTANT

## 2025-07-18 NOTE — PROGRESS NOTES
Subjective:      Patient ID: Reta Navas is a 59 y.o. female.    Chief Complaint: Urinary Tract Infection    Patient is new to me, being seen today for UTI.  No treatment thus far     Last UTI March 2025     Followed by [facility] for OAB, LV March 2025     Last visit Nov 2024 w PCP.      Review of Systems   Constitutional:  Positive for chills. Negative for diaphoresis and fever.   HENT:  Negative for congestion, rhinorrhea and sore throat.    Respiratory:  Negative for cough, shortness of breath and wheezing.    Gastrointestinal:  Positive for abdominal pain (bloated) and diarrhea (1.5wk, since resolved). Negative for constipation (chronic, takes linzess), nausea and vomiting.        Last BM yesterday, normal   Genitourinary:  Positive for dysuria, frequency and pelvic pain (cramping). Negative for flank pain and hematuria.   Skin:  Negative for rash.   Neurological:  Negative for dizziness, light-headedness and headaches.       Objective:   /80 (BP Location: Right arm, Patient Position: Sitting)   Pulse 92   Temp 97.8 °F (36.6 °C) (Tympanic)   Ht 5' (1.524 m)   Wt 72 kg (158 lb 11.7 oz)   SpO2 95%   BMI 31.00 kg/m²   Physical Exam  Constitutional:       General: She is not in acute distress.     Appearance: Normal appearance. She is well-developed. She is not ill-appearing.   HENT:      Head: Normocephalic and atraumatic.   Cardiovascular:      Rate and Rhythm: Normal rate and regular rhythm.      Heart sounds: Normal heart sounds. No murmur heard.  Pulmonary:      Effort: Pulmonary effort is normal. No respiratory distress.      Breath sounds: Normal breath sounds. No decreased breath sounds.   Abdominal:      General: Bowel sounds are normal.      Palpations: Abdomen is soft.      Tenderness: There is abdominal tenderness in the suprapubic area. There is no right CVA tenderness or left CVA tenderness.   Musculoskeletal:      Right lower leg: No edema.      Left lower leg: No edema.   Skin:     General:  Skin is warm and dry.      Findings: No rash.   Psychiatric:         Speech: Speech normal.         Behavior: Behavior normal.         Thought Content: Thought content normal.       Assessment:      1. Dysuria    2. Primary hypertension       Plan:   Dysuria  -     Urinalysis, Reflex to Urine Culture Urine, Clean Catch    Primary hypertension      Keep f/u PCP in Oct     Discussed worsening signs/symptoms and when to return to clinic or go to ED.   Patient expresses understanding and agrees with treatment plan.

## 2025-07-19 LAB — HOLD SPECIMEN: NORMAL

## 2025-07-21 NOTE — TELEPHONE ENCOUNTER
Spoke with pt r/t results of test. Liver(abnormal) has appt. Gallstones (multiple) has appt for 09/21/2018. PT voiced understanding and agreement.  
71

## 2025-07-30 ENCOUNTER — HOSPITAL ENCOUNTER (OUTPATIENT)
Dept: RADIOLOGY | Facility: HOSPITAL | Age: 60
Discharge: HOME | End: 2025-07-30
Attending: OBSTETRICS & GYNECOLOGY
Payer: COMMERCIAL

## 2025-07-30 ENCOUNTER — OFFICE VISIT (OUTPATIENT)
Dept: OBSTETRICS AND GYNECOLOGY | Facility: CLINIC | Age: 60
End: 2025-07-30
Payer: COMMERCIAL

## 2025-07-30 VITALS
WEIGHT: 159.81 LBS | DIASTOLIC BLOOD PRESSURE: 70 MMHG | SYSTOLIC BLOOD PRESSURE: 110 MMHG | BODY MASS INDEX: 31.38 KG/M2 | HEIGHT: 60 IN

## 2025-07-30 DIAGNOSIS — R10.2 PELVIC PAIN: ICD-10-CM

## 2025-07-30 DIAGNOSIS — Z12.31 ENCOUNTER FOR SCREENING MAMMOGRAM FOR MALIGNANT NEOPLASM OF BREAST: ICD-10-CM

## 2025-07-30 DIAGNOSIS — Z01.419 WELL WOMAN EXAM WITH ROUTINE GYNECOLOGICAL EXAM: Primary | ICD-10-CM

## 2025-07-30 PROCEDURE — 77067 SCR MAMMO BI INCL CAD: CPT | Mod: 26,,, | Performed by: RADIOLOGY

## 2025-07-30 PROCEDURE — 77067 SCR MAMMO BI INCL CAD: CPT | Mod: TC,PO

## 2025-07-30 PROCEDURE — 76830 TRANSVAGINAL US NON-OB: CPT | Mod: 26,,, | Performed by: RADIOLOGY

## 2025-07-30 PROCEDURE — 99396 PREV VISIT EST AGE 40-64: CPT | Mod: S$GLB,,, | Performed by: OBSTETRICS & GYNECOLOGY

## 2025-07-30 PROCEDURE — 76856 US EXAM PELVIC COMPLETE: CPT | Mod: TC,PO

## 2025-07-30 PROCEDURE — 1159F MED LIST DOCD IN RCRD: CPT | Mod: CPTII,S$GLB,, | Performed by: OBSTETRICS & GYNECOLOGY

## 2025-07-30 PROCEDURE — 3078F DIAST BP <80 MM HG: CPT | Mod: CPTII,S$GLB,, | Performed by: OBSTETRICS & GYNECOLOGY

## 2025-07-30 PROCEDURE — 76856 US EXAM PELVIC COMPLETE: CPT | Mod: 26,,, | Performed by: RADIOLOGY

## 2025-07-30 PROCEDURE — 3008F BODY MASS INDEX DOCD: CPT | Mod: CPTII,S$GLB,, | Performed by: OBSTETRICS & GYNECOLOGY

## 2025-07-30 PROCEDURE — 1160F RVW MEDS BY RX/DR IN RCRD: CPT | Mod: CPTII,S$GLB,, | Performed by: OBSTETRICS & GYNECOLOGY

## 2025-07-30 PROCEDURE — 99999 PR PBB SHADOW E&M-EST. PATIENT-LVL IV: CPT | Mod: PBBFAC,,, | Performed by: OBSTETRICS & GYNECOLOGY

## 2025-07-30 PROCEDURE — 77063 BREAST TOMOSYNTHESIS BI: CPT | Mod: 26,,, | Performed by: RADIOLOGY

## 2025-07-30 PROCEDURE — 3074F SYST BP LT 130 MM HG: CPT | Mod: CPTII,S$GLB,, | Performed by: OBSTETRICS & GYNECOLOGY

## 2025-07-30 NOTE — PROGRESS NOTES
Subjective     Patient ID: Reta Navas is a 59 y.o. female.    Chief Complaint:  Gynecologic Exam      History of Present Illness  Gynecologic Exam      Presents for well-woman exam.  Has hx of supracervical hysterectomy and LSO.  Still has cervix and right ovary.  Lately, she has had pelvic pain and bloating.  Was also having some dysuria.  Saw PCP last week for this, and UA had some trace blood, but no evidence of UTI.  Dysuria improved, but still feels pelvic pressure and bloating.  Took Linzesse 3 weeks ago, then had diarrhea for several days.  Diarrhea resolved.  Now on a probiotic.  Pt's mother and maternal aunt both had ovarian cancer.  Last pap: 3/15/24: normal, HPV neg  MM2024: neg  Has colonoscopy orders from PCP    GYN & OB History  No LMP recorded. Patient has had a hysterectomy.   Date of Last Pap: 3/22/2024    OB History    Para Term  AB Living   5 4 4  1 4   SAB IAB Ectopic Multiple Live Births   1    4      # Outcome Date GA Lbr Cesar/2nd Weight Sex Type Anes PTL Lv   5 Term  38w0d  3.175 kg (7 lb)  Vag-Spont   DANIELLA   4 Term  40w0d  3.175 kg (7 lb)  Vag-Spont   DANIELLA   3 Term  40w0d  3.175 kg (7 lb)  Vag-Spont   DANIELLA   2 Term  42w0d  4.309 kg (9 lb 8 oz)  Vag-Spont EPI  DANIELLA   1 SAB                Review of Systems  Review of Systems       Objective   Physical Exam:   Constitutional: She is oriented to person, place, and time. She appears well-developed and well-nourished. No distress.      Neck: No thyromegaly present.     Pulmonary/Chest: Right breast exhibits no inverted nipple, no mass, no nipple discharge, no skin change, no tenderness, no bleeding and no swelling. Left breast exhibits no inverted nipple, no mass, no nipple discharge, no skin change, no tenderness, no bleeding and no swelling. Breasts are symmetrical.        Abdominal: Soft. She exhibits distension (mild distension). She exhibits no mass. There is no abdominal tenderness. There is no rebound and no guarding.  Hernia confirmed negative in the right inguinal area and confirmed negative in the left inguinal area.     Genitourinary:    Vagina normal.      Pelvic exam was performed with patient supine.   There is no rash, tenderness, lesion or injury on the right labia. There is no rash, tenderness, lesion or injury on the left labia. Cervix is normal. Right adnexum displays no mass, no tenderness and no fullness. Left adnexum displays no mass, no tenderness and no fullness. No erythema, vaginal discharge, tenderness, bleeding, rectocele, cystocele or prolapse of vaginal walls in the vagina.    No foreign body in the vagina.      No signs of injury in the vagina.   Cervix is absent.Uterus is absent.               Neurological: She is alert and oriented to person, place, and time.     Psychiatric: She has a normal mood and affect.            Assessment and Plan     1. Well woman exam with routine gynecological exam    2. Encounter for screening mammogram for malignant neoplasm of breast    3. Pelvic pain             Plan:  Reta was seen today for gynecologic exam.    Diagnoses and all orders for this visit:    Well woman exam with routine gynecological exam    Encounter for screening mammogram for malignant neoplasm of breast  -     Mammo Digital Screening Bilat w/ Amaury (XPD); Future    Pelvic pain  -     US Pelvis Complete Non OB; Future     Reviewed updated recommendations for pap smears (every 3 years) in low risk patients.   Recommend annual pelvic exams.  Reviewed recommendations for annual CBE and annual MMG.  Discussed that bloating could be GI related.  Minimize gluten, dairy, and artificial sweetners.  Continue probiotic.  If pelvic US normal and if symptoms continue, then I recommend further evaluation with PCP.  RTC 1 year

## 2025-07-31 DIAGNOSIS — R09.82 POST-NASAL DRIP: ICD-10-CM

## 2025-07-31 DIAGNOSIS — J31.0 CHRONIC RHINITIS: ICD-10-CM

## 2025-07-31 RX ORDER — MONTELUKAST SODIUM 10 MG/1
10 TABLET ORAL
Qty: 90 TABLET | Refills: 0 | Status: SHIPPED | OUTPATIENT
Start: 2025-07-31

## 2025-09-02 ENCOUNTER — PROCEDURE VISIT (OUTPATIENT)
Dept: NEUROLOGY | Facility: CLINIC | Age: 60
End: 2025-09-02
Payer: COMMERCIAL

## 2025-09-02 DIAGNOSIS — R20.2 PARESTHESIAS: Primary | ICD-10-CM

## 2025-09-02 DIAGNOSIS — G62.9 POLYNEUROPATHY: ICD-10-CM

## 2025-09-02 PROCEDURE — 95911 NRV CNDJ TEST 9-10 STUDIES: CPT | Mod: S$GLB,,, | Performed by: PSYCHIATRY & NEUROLOGY

## (undated) DEVICE — NDL SAFETY 22G X 1.5 ECLIPSE

## (undated) DEVICE — DRAPE ABDOMINAL TIBURON 14X11

## (undated) DEVICE — SUT VICRYL PLUS 4-0 P3 18IN

## (undated) DEVICE — TUBING HEATED INSUFFLATOR

## (undated) DEVICE — SEE MEDLINE ITEM 157131

## (undated) DEVICE — EVACUATOR KIT SMOKE PLUME AWAY

## (undated) DEVICE — POSITIONER HEAD DONUT 9IN FOAM

## (undated) DEVICE — NDL PNEUMO INSUFFLATI 120MM

## (undated) DEVICE — SOL NS 1000CC

## (undated) DEVICE — SEE MEDLINE ITEM 152680

## (undated) DEVICE — MANIFOLD 4 PORT

## (undated) DEVICE — DRAPE ARM DAVINCI XI

## (undated) DEVICE — DRAPE COLUMN DAVINCI XI

## (undated) DEVICE — SEE MEDLINE ITEM 157117

## (undated) DEVICE — ELECTRODE REM PLYHSV RETURN 9

## (undated) DEVICE — APPLICATOR CHLORAPREP ORN 26ML

## (undated) DEVICE — KIT ANTIFOG

## (undated) DEVICE — OBTURATOR BLADELESS 8MM XI CLR

## (undated) DEVICE — SEE MEDLINE ITEM 157027

## (undated) DEVICE — BAG TISSUE RETRIEVAL 5MM

## (undated) DEVICE — SUPPORT ULNA NERVE PROTECTOR

## (undated) DEVICE — SYR 3CC LUER LOC

## (undated) DEVICE — SYR 30CC LUER LOCK

## (undated) DEVICE — CLIP HEMO-LOK ML

## (undated) DEVICE — SEAL UNIVERSAL 5MM-8MM XI

## (undated) DEVICE — ADHESIVE MASTISOL VIAL 48/BX

## (undated) DEVICE — SOL STRL WATER INJ 1000ML BG

## (undated) DEVICE — COVER OVERHEAD SURG LT BLUE

## (undated) DEVICE — SEE MEDLINE ITEM 152622

## (undated) DEVICE — COVER TIP CURVED SCISSORS XI

## (undated) DEVICE — NDL INSUF ULTRA VERESS 120MM

## (undated) DEVICE — GLOVE 7.0 PROTEXIS PI BLUE

## (undated) DEVICE — CLOSURE SKIN STERI STRIP 1/2X4